# Patient Record
Sex: FEMALE | Race: ASIAN | NOT HISPANIC OR LATINO | Employment: FULL TIME | URBAN - METROPOLITAN AREA
[De-identification: names, ages, dates, MRNs, and addresses within clinical notes are randomized per-mention and may not be internally consistent; named-entity substitution may affect disease eponyms.]

---

## 2019-11-06 ENCOUNTER — APPOINTMENT (OUTPATIENT)
Dept: GENERAL RADIOLOGY | Facility: HOSPITAL | Age: 34
End: 2019-11-06

## 2019-11-06 ENCOUNTER — HOSPITAL ENCOUNTER (INPATIENT)
Facility: HOSPITAL | Age: 34
LOS: 3 days | Discharge: HOME | End: 2019-11-10
Attending: EMERGENCY MEDICINE | Admitting: INTERNAL MEDICINE

## 2019-11-06 DIAGNOSIS — I50.9 ACUTE CONGESTIVE HEART FAILURE, UNSPECIFIED HEART FAILURE TYPE (HCC): Primary | ICD-10-CM

## 2019-11-06 DIAGNOSIS — R60.0 PEDAL EDEMA: ICD-10-CM

## 2019-11-06 DIAGNOSIS — I10 HYPERTENSION, UNSPECIFIED TYPE: ICD-10-CM

## 2019-11-06 DIAGNOSIS — N28.9 ACUTE RENAL INSUFFICIENCY: ICD-10-CM

## 2019-11-06 PROCEDURE — 84703 CHORIONIC GONADOTROPIN ASSAY: CPT | Performed by: EMERGENCY MEDICINE

## 2019-11-06 PROCEDURE — 99284 EMERGENCY DEPT VISIT MOD MDM: CPT

## 2019-11-06 PROCEDURE — 83880 ASSAY OF NATRIURETIC PEPTIDE: CPT | Performed by: EMERGENCY MEDICINE

## 2019-11-06 PROCEDURE — 85025 COMPLETE CBC W/AUTO DIFF WBC: CPT | Performed by: EMERGENCY MEDICINE

## 2019-11-06 PROCEDURE — 80053 COMPREHEN METABOLIC PANEL: CPT | Performed by: EMERGENCY MEDICINE

## 2019-11-06 PROCEDURE — 93005 ELECTROCARDIOGRAM TRACING: CPT | Performed by: EMERGENCY MEDICINE

## 2019-11-06 PROCEDURE — 84484 ASSAY OF TROPONIN QUANT: CPT | Performed by: EMERGENCY MEDICINE

## 2019-11-06 PROCEDURE — 71046 X-RAY EXAM CHEST 2 VIEWS: CPT

## 2019-11-06 RX ORDER — SODIUM CHLORIDE 0.9 % (FLUSH) 0.9 %
10 SYRINGE (ML) INJECTION AS NEEDED
Status: DISCONTINUED | OUTPATIENT
Start: 2019-11-06 | End: 2019-11-10 | Stop reason: HOSPADM

## 2019-11-07 ENCOUNTER — APPOINTMENT (OUTPATIENT)
Dept: ULTRASOUND IMAGING | Facility: HOSPITAL | Age: 34
End: 2019-11-07

## 2019-11-07 ENCOUNTER — APPOINTMENT (OUTPATIENT)
Dept: CARDIOLOGY | Facility: HOSPITAL | Age: 34
End: 2019-11-07

## 2019-11-07 PROBLEM — I50.9 ACUTE CONGESTIVE HEART FAILURE (HCC): Status: ACTIVE | Noted: 2019-11-07

## 2019-11-07 PROBLEM — E66.01 OBESITY, MORBID, BMI 50 OR HIGHER: Status: ACTIVE | Noted: 2019-11-07

## 2019-11-07 PROBLEM — E11.65 TYPE 2 DIABETES MELLITUS WITH HYPERGLYCEMIA: Status: ACTIVE | Noted: 2019-11-07

## 2019-11-07 PROBLEM — N17.9 AKI (ACUTE KIDNEY INJURY) (HCC): Status: ACTIVE | Noted: 2019-11-07

## 2019-11-07 LAB
ALBUMIN SERPL-MCNC: 2.9 G/DL (ref 3.5–5.2)
ALBUMIN/GLOB SERPL: 0.7 G/DL
ALP SERPL-CCNC: 97 U/L (ref 39–117)
ALT SERPL W P-5'-P-CCNC: 18 U/L (ref 1–33)
AMPHET+METHAMPHET UR QL: NEGATIVE
ANION GAP SERPL CALCULATED.3IONS-SCNC: 13.3 MMOL/L (ref 5–15)
ANION GAP SERPL CALCULATED.3IONS-SCNC: 14.3 MMOL/L (ref 5–15)
AORTIC DIMENSIONLESS INDEX: 0.7 (DI)
AST SERPL-CCNC: 22 U/L (ref 1–32)
BACTERIA UR QL AUTO: ABNORMAL /HPF
BARBITURATES UR QL SCN: NEGATIVE
BASOPHILS # BLD AUTO: 0.09 10*3/MM3 (ref 0–0.2)
BASOPHILS NFR BLD AUTO: 1.1 % (ref 0–1.5)
BENZODIAZ UR QL SCN: NEGATIVE
BH CV ECHO MEAS - ACS: 1.5 CM
BH CV ECHO MEAS - AO MAX PG (FULL): 3.8 MMHG
BH CV ECHO MEAS - AO MAX PG: 9.5 MMHG
BH CV ECHO MEAS - AO MEAN PG (FULL): 2 MMHG
BH CV ECHO MEAS - AO MEAN PG: 5 MMHG
BH CV ECHO MEAS - AO ROOT AREA (BSA CORRECTED): 1.2
BH CV ECHO MEAS - AO ROOT AREA: 6.2 CM^2
BH CV ECHO MEAS - AO ROOT DIAM: 2.8 CM
BH CV ECHO MEAS - AO V2 MAX: 154 CM/SEC
BH CV ECHO MEAS - AO V2 MEAN: 112 CM/SEC
BH CV ECHO MEAS - AO V2 VTI: 21.9 CM
BH CV ECHO MEAS - ASC AORTA: 3.2 CM
BH CV ECHO MEAS - AVA(I,A): 2.1 CM^2
BH CV ECHO MEAS - AVA(I,D): 2.1 CM^2
BH CV ECHO MEAS - AVA(V,A): 2.2 CM^2
BH CV ECHO MEAS - AVA(V,D): 2.2 CM^2
BH CV ECHO MEAS - BSA(HAYCOCK): 2.7 M^2
BH CV ECHO MEAS - BSA: 2.4 M^2
BH CV ECHO MEAS - BZI_BMI: 60.4 KILOGRAMS/M^2
BH CV ECHO MEAS - BZI_METRIC_HEIGHT: 157.5 CM
BH CV ECHO MEAS - BZI_METRIC_WEIGHT: 149.7 KG
BH CV ECHO MEAS - DIST REN A EDV LEFT: 8.8 CM/SEC
BH CV ECHO MEAS - DIST REN A PSV LEFT: 46.8 CM/SEC
BH CV ECHO MEAS - DIST REN A RI LEFT: 0.81
BH CV ECHO MEAS - EDV(CUBED): 117.6 ML
BH CV ECHO MEAS - EDV(MOD-SP2): 186 ML
BH CV ECHO MEAS - EDV(MOD-SP4): 156 ML
BH CV ECHO MEAS - EDV(TEICH): 112.8 ML
BH CV ECHO MEAS - EF(CUBED): 69.5 %
BH CV ECHO MEAS - EF(MOD-SP2): 50 %
BH CV ECHO MEAS - EF(MOD-SP4): 46.8 %
BH CV ECHO MEAS - EF(TEICH): 60.9 %
BH CV ECHO MEAS - ESV(CUBED): 35.9 ML
BH CV ECHO MEAS - ESV(MOD-SP2): 93 ML
BH CV ECHO MEAS - ESV(MOD-SP4): 83 ML
BH CV ECHO MEAS - ESV(TEICH): 44.1 ML
BH CV ECHO MEAS - FS: 32.7 %
BH CV ECHO MEAS - IVS/LVPW: 1.2
BH CV ECHO MEAS - IVSD: 1.4 CM
BH CV ECHO MEAS - LAT PEAK E' VEL: 9 CM/SEC
BH CV ECHO MEAS - LV DIASTOLIC VOL/BSA (35-75): 66 ML/M^2
BH CV ECHO MEAS - LV MASS(C)D: 253.7 GRAMS
BH CV ECHO MEAS - LV MASS(C)DI: 107.3 GRAMS/M^2
BH CV ECHO MEAS - LV MAX PG: 5.7 MMHG
BH CV ECHO MEAS - LV MEAN PG: 3 MMHG
BH CV ECHO MEAS - LV SYSTOLIC VOL/BSA (12-30): 35.1 ML/M^2
BH CV ECHO MEAS - LV V1 MAX: 119 CM/SEC
BH CV ECHO MEAS - LV V1 MEAN: 76.7 CM/SEC
BH CV ECHO MEAS - LV V1 VTI: 16 CM
BH CV ECHO MEAS - LVIDD: 4.9 CM
BH CV ECHO MEAS - LVIDS: 3.3 CM
BH CV ECHO MEAS - LVLD AP2: 9.4 CM
BH CV ECHO MEAS - LVLD AP4: 9.1 CM
BH CV ECHO MEAS - LVLS AP2: 8.5 CM
BH CV ECHO MEAS - LVLS AP4: 7.6 CM
BH CV ECHO MEAS - LVOT AREA (M): 2.8 CM^2
BH CV ECHO MEAS - LVOT AREA: 2.8 CM^2
BH CV ECHO MEAS - LVOT DIAM: 1.9 CM
BH CV ECHO MEAS - LVPWD: 1.2 CM
BH CV ECHO MEAS - MED PEAK E' VEL: 10 CM/SEC
BH CV ECHO MEAS - MID REN A EDV LEFT: 13.9 CM/SEC
BH CV ECHO MEAS - MID REN A PSV LEFT: 65.5 CM/SEC
BH CV ECHO MEAS - MID REN A RI LEFT: 0.79
BH CV ECHO MEAS - MR MAX PG: 142.6 MMHG
BH CV ECHO MEAS - MR MAX VEL: 597 CM/SEC
BH CV ECHO MEAS - MV DEC SLOPE: 1464 CM/SEC^2
BH CV ECHO MEAS - MV DEC TIME: 120 SEC
BH CV ECHO MEAS - MV E MAX VEL: 134.4 CM/SEC
BH CV ECHO MEAS - MV MAX PG: 11 MMHG
BH CV ECHO MEAS - MV MEAN PG: 6 MMHG
BH CV ECHO MEAS - MV P1/2T MAX VEL: 171 CM/SEC
BH CV ECHO MEAS - MV P1/2T: 34.2 MSEC
BH CV ECHO MEAS - MV V2 MAX: 166 CM/SEC
BH CV ECHO MEAS - MV V2 MEAN: 112 CM/SEC
BH CV ECHO MEAS - MV V2 VTI: 22.4 CM
BH CV ECHO MEAS - MVA P1/2T LCG: 1.3 CM^2
BH CV ECHO MEAS - MVA(P1/2T): 6.4 CM^2
BH CV ECHO MEAS - MVA(VTI): 2 CM^2
BH CV ECHO MEAS - PA ACC TIME: 0.06 SEC
BH CV ECHO MEAS - PA MAX PG (FULL): 3.6 MMHG
BH CV ECHO MEAS - PA MAX PG: 6.1 MMHG
BH CV ECHO MEAS - PA PR(ACCEL): 54.3 MMHG
BH CV ECHO MEAS - PA V2 MAX: 123 CM/SEC
BH CV ECHO MEAS - PROX REN A EDV LEFT: 19.4 CM/SEC
BH CV ECHO MEAS - PROX REN A PSV LEFT: 106 CM/SEC
BH CV ECHO MEAS - PROX REN A RI LEFT: 0.82
BH CV ECHO MEAS - PVA(V,A): 3.1 CM^2
BH CV ECHO MEAS - PVA(V,D): 3.1 CM^2
BH CV ECHO MEAS - QP/QS: 1.3
BH CV ECHO MEAS - RAP SYSTOLE: 15 MMHG
BH CV ECHO MEAS - RV MAX PG: 2.5 MMHG
BH CV ECHO MEAS - RV MEAN PG: 1 MMHG
BH CV ECHO MEAS - RV V1 MAX: 78.3 CM/SEC
BH CV ECHO MEAS - RV V1 MEAN: 48.7 CM/SEC
BH CV ECHO MEAS - RV V1 VTI: 12.1 CM
BH CV ECHO MEAS - RVOT AREA: 4.9 CM^2
BH CV ECHO MEAS - RVOT DIAM: 2.5 CM
BH CV ECHO MEAS - RVSP: 58 MMHG
BH CV ECHO MEAS - SI(AO): 57 ML/M^2
BH CV ECHO MEAS - SI(CUBED): 34.6 ML/M^2
BH CV ECHO MEAS - SI(LVOT): 19.2 ML/M^2
BH CV ECHO MEAS - SI(MOD-SP2): 39.3 ML/M^2
BH CV ECHO MEAS - SI(MOD-SP4): 30.9 ML/M^2
BH CV ECHO MEAS - SI(TEICH): 29 ML/M^2
BH CV ECHO MEAS - SV(AO): 134.9 ML
BH CV ECHO MEAS - SV(CUBED): 81.7 ML
BH CV ECHO MEAS - SV(LVOT): 45.4 ML
BH CV ECHO MEAS - SV(MOD-SP2): 93 ML
BH CV ECHO MEAS - SV(MOD-SP4): 73 ML
BH CV ECHO MEAS - SV(RVOT): 59.4 ML
BH CV ECHO MEAS - SV(TEICH): 68.7 ML
BH CV ECHO MEAS - TAPSE (>1.6): 1.9 CM2
BH CV ECHO MEAS - TR MAX VEL: 331 CM/SEC
BH CV ECHO MEASUREMENTS AVERAGE E/E' RATIO: 14.15
BH CV VAS BP LEFT ARM: NORMAL MMHG
BH CV VAS BP LEFT ARM: NORMAL MMHG
BH CV VAS BP RIGHT ARM: NORMAL MMHG
BH CV VAS RENAL AORTIC MID PSV: 68 CM/S
BH CV VAS RENAL HILUM LEFT EDV: 6 CM/S
BH CV VAS RENAL HILUM LEFT PSV: 26 CM/S
BH CV VAS RENAL HILUM RIGHT EDV: 7.3 CM/S
BH CV VAS RENAL HILUM RIGHT PSV: 23.3 CM/S
BH CV XLRA - RV BASE: 3.8 CM
BH CV XLRA - TDI S': 15 CM/SEC
BH CV XLRA MEAS - KID L LEFT: 11.2 CM
BH CV XLRA MEAS - RENAL A ORG RI LEFT: 0.81
BH CV XLRA MEAS DIST REN A EDV RIGHT: 14.5 CM/SEC
BH CV XLRA MEAS DIST REN A PSV RIGHT: 59.8 CM/SEC
BH CV XLRA MEAS DIST REN A RI RIGHT: 0.76
BH CV XLRA MEAS KID L RIGHT: 10.6 CM
BH CV XLRA MEAS KID W RIGHT: 4.9 CM
BH CV XLRA MEAS MID REN A EDV RIGHT: 15.3 CM/SEC
BH CV XLRA MEAS MID REN A PSV RIGHT: 71.7 CM/SEC
BH CV XLRA MEAS MID REN A RI RIGHT: 0.79
BH CV XLRA MEAS PROX REN A EDV RIGHT: 19.2 CM/SEC
BH CV XLRA MEAS PROX REN A PSV RIGHT: 87.4 CM/SEC
BH CV XLRA MEAS PROX REN A RI RIGHT: 0.78
BH CV XLRA MEAS RAR LEFT: 1.6
BH CV XLRA MEAS RAR RIGHT: 1.5
BH CV XLRA MEAS RENAL A ORG EDV LEFT: 15.7 CM/SEC
BH CV XLRA MEAS RENAL A ORG EDV RIGHT: 19.2 CM/SEC
BH CV XLRA MEAS RENAL A ORG PSV LEFT: 81.6 CM/SEC
BH CV XLRA MEAS RENAL A ORG PSV RIGHT: 103 CM/SEC
BH CV XLRA MEAS RENAL A ORG RI RIGHT: 0.81
BILIRUB SERPL-MCNC: 0.4 MG/DL (ref 0.2–1.2)
BILIRUB UR QL STRIP: NEGATIVE
BUN BLD-MCNC: 20 MG/DL (ref 6–20)
BUN BLD-MCNC: 21 MG/DL (ref 6–20)
BUN/CREAT SERPL: 8.9 (ref 7–25)
BUN/CREAT SERPL: 9.2 (ref 7–25)
CALCIUM SPEC-SCNC: 9 MG/DL (ref 8.6–10.5)
CALCIUM SPEC-SCNC: 9 MG/DL (ref 8.6–10.5)
CANNABINOIDS SERPL QL: NEGATIVE
CHLORIDE SERPL-SCNC: 100 MMOL/L (ref 98–107)
CHLORIDE SERPL-SCNC: 103 MMOL/L (ref 98–107)
CLARITY UR: CLEAR
CO2 SERPL-SCNC: 22.7 MMOL/L (ref 22–29)
CO2 SERPL-SCNC: 23.7 MMOL/L (ref 22–29)
COCAINE UR QL: NEGATIVE
COLOR UR: YELLOW
CREAT BLD-MCNC: 2.24 MG/DL (ref 0.57–1)
CREAT BLD-MCNC: 2.28 MG/DL (ref 0.57–1)
CREAT UR-MCNC: 36.8 MG/DL
DEPRECATED RDW RBC AUTO: 38.6 FL (ref 37–54)
DEPRECATED RDW RBC AUTO: 41 FL (ref 37–54)
EOSINOPHIL # BLD AUTO: 0.18 10*3/MM3 (ref 0–0.4)
EOSINOPHIL NFR BLD AUTO: 2.2 % (ref 0.3–6.2)
ERYTHROCYTE [DISTWIDTH] IN BLOOD BY AUTOMATED COUNT: 12.2 % (ref 12.3–15.4)
ERYTHROCYTE [DISTWIDTH] IN BLOOD BY AUTOMATED COUNT: 12.7 % (ref 12.3–15.4)
GFR SERPL CREATININE-BSD FRML MDRD: 25 ML/MIN/1.73
GFR SERPL CREATININE-BSD FRML MDRD: 25 ML/MIN/1.73
GFR SERPL CREATININE-BSD FRML MDRD: 30 ML/MIN/1.73
GFR SERPL CREATININE-BSD FRML MDRD: 31 ML/MIN/1.73
GLOBULIN UR ELPH-MCNC: 3.9 GM/DL
GLUCOSE BLD-MCNC: 142 MG/DL (ref 65–99)
GLUCOSE BLD-MCNC: 155 MG/DL (ref 65–99)
GLUCOSE BLDC GLUCOMTR-MCNC: 135 MG/DL (ref 70–130)
GLUCOSE BLDC GLUCOMTR-MCNC: 154 MG/DL (ref 70–130)
GLUCOSE UR STRIP-MCNC: ABNORMAL MG/DL
HBA1C MFR BLD: 9.2 % (ref 4.8–5.6)
HCG SERPL QL: NEGATIVE
HCT VFR BLD AUTO: 51 % (ref 34–46.6)
HCT VFR BLD AUTO: 52.8 % (ref 34–46.6)
HGB BLD-MCNC: 16.5 G/DL (ref 12–15.9)
HGB BLD-MCNC: 16.6 G/DL (ref 12–15.9)
HGB UR QL STRIP.AUTO: ABNORMAL
HYALINE CASTS UR QL AUTO: ABNORMAL /LPF
IMM GRANULOCYTES # BLD AUTO: 0.02 10*3/MM3 (ref 0–0.05)
IMM GRANULOCYTES NFR BLD AUTO: 0.2 % (ref 0–0.5)
KETONES UR QL STRIP: NEGATIVE
LEFT ATRIUM VOLUME INDEX: 37 ML/M2
LEFT ATRIUM VOLUME: 75 CM3
LEFT KIDNEY WIDTH: 5.1 CM
LEFT RENAL UPPER PARENCHYMA MAX: 22.3 CM/S
LEFT RENAL UPPER PARENCHYMA MIN: 0 CM/S
LEFT RENAL UPPER PARENCHYMA RI: 1
LEUKOCYTE ESTERASE UR QL STRIP.AUTO: NEGATIVE
LYMPHOCYTES # BLD AUTO: 2.06 10*3/MM3 (ref 0.7–3.1)
LYMPHOCYTES NFR BLD AUTO: 25.2 % (ref 19.6–45.3)
MAGNESIUM SERPL-MCNC: 1.8 MG/DL (ref 1.6–2.6)
MAXIMAL PREDICTED HEART RATE: 188 BPM
MCH RBC QN AUTO: 27.6 PG (ref 26.6–33)
MCH RBC QN AUTO: 28.2 PG (ref 26.6–33)
MCHC RBC AUTO-ENTMCNC: 31.4 G/DL (ref 31.5–35.7)
MCHC RBC AUTO-ENTMCNC: 32.4 G/DL (ref 31.5–35.7)
MCV RBC AUTO: 87 FL (ref 79–97)
MCV RBC AUTO: 87.7 FL (ref 79–97)
METHADONE UR QL SCN: NEGATIVE
MONOCYTES # BLD AUTO: 0.5 10*3/MM3 (ref 0.1–0.9)
MONOCYTES NFR BLD AUTO: 6.1 % (ref 5–12)
NEUTROPHILS # BLD AUTO: 5.34 10*3/MM3 (ref 1.7–7)
NEUTROPHILS NFR BLD AUTO: 65.2 % (ref 42.7–76)
NITRITE UR QL STRIP: NEGATIVE
NRBC BLD AUTO-RTO: 0 /100 WBC (ref 0–0.2)
NT-PROBNP SERPL-MCNC: 6322 PG/ML (ref 5–450)
OPIATES UR QL: NEGATIVE
OXYCODONE UR QL SCN: NEGATIVE
PH UR STRIP.AUTO: 6.5 [PH] (ref 5–8)
PLATELET # BLD AUTO: 339 10*3/MM3 (ref 140–450)
PLATELET # BLD AUTO: 358 10*3/MM3 (ref 140–450)
PMV BLD AUTO: 10 FL (ref 6–12)
PMV BLD AUTO: 10.1 FL (ref 6–12)
POTASSIUM BLD-SCNC: 4 MMOL/L (ref 3.5–5.2)
POTASSIUM BLD-SCNC: 4.2 MMOL/L (ref 3.5–5.2)
PROT SERPL-MCNC: 6.8 G/DL (ref 6–8.5)
PROT UR QL STRIP: ABNORMAL
PROT UR-MCNC: 409 MG/DL
RBC # BLD AUTO: 5.86 10*6/MM3 (ref 3.77–5.28)
RBC # BLD AUTO: 6.02 10*6/MM3 (ref 3.77–5.28)
RBC # UR: ABNORMAL /HPF
REF LAB TEST METHOD: ABNORMAL
RIGHT RENAL UPPER PARENCHYMA MAX: 38.2 CM/S
RIGHT RENAL UPPER PARENCHYMA MIN: 11.3 CM/S
RIGHT RENAL UPPER PARENCHYMA RI: 0.7
SODIUM BLD-SCNC: 136 MMOL/L (ref 136–145)
SODIUM BLD-SCNC: 141 MMOL/L (ref 136–145)
SODIUM UR-SCNC: 100 MMOL/L
SP GR UR STRIP: 1.01 (ref 1–1.03)
SQUAMOUS #/AREA URNS HPF: ABNORMAL /HPF
STRESS TARGET HR: 160 BPM
T3FREE SERPL-MCNC: 3.33 PG/ML (ref 2–4.4)
T4 FREE SERPL-MCNC: 1.52 NG/DL (ref 0.93–1.7)
TROPONIN T SERPL-MCNC: 0.01 NG/ML (ref 0–0.03)
TROPONIN T SERPL-MCNC: 0.02 NG/ML (ref 0–0.03)
TSH SERPL DL<=0.05 MIU/L-ACNC: 10.4 UIU/ML (ref 0.27–4.2)
UROBILINOGEN UR QL STRIP: ABNORMAL
WBC NRBC COR # BLD: 8.19 10*3/MM3 (ref 3.4–10.8)
WBC NRBC COR # BLD: 8.95 10*3/MM3 (ref 3.4–10.8)
WBC UR QL AUTO: ABNORMAL /HPF

## 2019-11-07 PROCEDURE — 81001 URINALYSIS AUTO W/SCOPE: CPT | Performed by: INTERNAL MEDICINE

## 2019-11-07 PROCEDURE — 80184 ASSAY OF PHENOBARBITAL: CPT | Performed by: INTERNAL MEDICINE

## 2019-11-07 PROCEDURE — 84484 ASSAY OF TROPONIN QUANT: CPT | Performed by: NURSE PRACTITIONER

## 2019-11-07 PROCEDURE — 25010000002 HYDRALAZINE PER 20 MG: Performed by: EMERGENCY MEDICINE

## 2019-11-07 PROCEDURE — G0480 DRUG TEST DEF 1-7 CLASSES: HCPCS | Performed by: INTERNAL MEDICINE

## 2019-11-07 PROCEDURE — 25010000002 FUROSEMIDE PER 20 MG: Performed by: INTERNAL MEDICINE

## 2019-11-07 PROCEDURE — 93306 TTE W/DOPPLER COMPLETE: CPT | Performed by: INTERNAL MEDICINE

## 2019-11-07 PROCEDURE — 84439 ASSAY OF FREE THYROXINE: CPT | Performed by: INTERNAL MEDICINE

## 2019-11-07 PROCEDURE — 83036 HEMOGLOBIN GLYCOSYLATED A1C: CPT | Performed by: INTERNAL MEDICINE

## 2019-11-07 PROCEDURE — 84300 ASSAY OF URINE SODIUM: CPT | Performed by: INTERNAL MEDICINE

## 2019-11-07 PROCEDURE — 82962 GLUCOSE BLOOD TEST: CPT

## 2019-11-07 PROCEDURE — 80307 DRUG TEST PRSMV CHEM ANLYZR: CPT | Performed by: INTERNAL MEDICINE

## 2019-11-07 PROCEDURE — 82570 ASSAY OF URINE CREATININE: CPT | Performed by: INTERNAL MEDICINE

## 2019-11-07 PROCEDURE — 93306 TTE W/DOPPLER COMPLETE: CPT

## 2019-11-07 PROCEDURE — 80048 BASIC METABOLIC PNL TOTAL CA: CPT | Performed by: NURSE PRACTITIONER

## 2019-11-07 PROCEDURE — 84443 ASSAY THYROID STIM HORMONE: CPT | Performed by: NURSE PRACTITIONER

## 2019-11-07 PROCEDURE — 84156 ASSAY OF PROTEIN URINE: CPT | Performed by: INTERNAL MEDICINE

## 2019-11-07 PROCEDURE — 83735 ASSAY OF MAGNESIUM: CPT | Performed by: NURSE PRACTITIONER

## 2019-11-07 PROCEDURE — 93010 ELECTROCARDIOGRAM REPORT: CPT | Performed by: INTERNAL MEDICINE

## 2019-11-07 PROCEDURE — 25010000002 HYDRALAZINE PER 20 MG: Performed by: NURSE PRACTITIONER

## 2019-11-07 PROCEDURE — 80299 QUANTITATIVE ASSAY DRUG: CPT | Performed by: INTERNAL MEDICINE

## 2019-11-07 PROCEDURE — 76775 US EXAM ABDO BACK WALL LIM: CPT

## 2019-11-07 PROCEDURE — 99254 IP/OBS CNSLTJ NEW/EST MOD 60: CPT | Performed by: INTERNAL MEDICINE

## 2019-11-07 PROCEDURE — 36415 COLL VENOUS BLD VENIPUNCTURE: CPT | Performed by: NURSE PRACTITIONER

## 2019-11-07 PROCEDURE — 85027 COMPLETE CBC AUTOMATED: CPT | Performed by: NURSE PRACTITIONER

## 2019-11-07 PROCEDURE — 63710000001 INSULIN LISPRO (HUMAN) PER 5 UNITS: Performed by: INTERNAL MEDICINE

## 2019-11-07 PROCEDURE — 93975 VASCULAR STUDY: CPT

## 2019-11-07 PROCEDURE — 84481 FREE ASSAY (FT-3): CPT | Performed by: INTERNAL MEDICINE

## 2019-11-07 RX ORDER — ONDANSETRON 2 MG/ML
4 INJECTION INTRAMUSCULAR; INTRAVENOUS EVERY 6 HOURS PRN
Status: DISCONTINUED | OUTPATIENT
Start: 2019-11-07 | End: 2019-11-10 | Stop reason: HOSPADM

## 2019-11-07 RX ORDER — SODIUM CHLORIDE 0.9 % (FLUSH) 0.9 %
10 SYRINGE (ML) INJECTION EVERY 12 HOURS SCHEDULED
Status: DISCONTINUED | OUTPATIENT
Start: 2019-11-07 | End: 2019-11-10 | Stop reason: HOSPADM

## 2019-11-07 RX ORDER — HYDRALAZINE HYDROCHLORIDE 20 MG/ML
20 INJECTION INTRAMUSCULAR; INTRAVENOUS ONCE
Status: COMPLETED | OUTPATIENT
Start: 2019-11-07 | End: 2019-11-07

## 2019-11-07 RX ORDER — FUROSEMIDE 10 MG/ML
80 INJECTION INTRAMUSCULAR; INTRAVENOUS ONCE
Status: COMPLETED | OUTPATIENT
Start: 2019-11-07 | End: 2019-11-07

## 2019-11-07 RX ORDER — SODIUM CHLORIDE 9 MG/ML
50 INJECTION, SOLUTION INTRAVENOUS CONTINUOUS
Status: DISCONTINUED | OUTPATIENT
Start: 2019-11-07 | End: 2019-11-07

## 2019-11-07 RX ORDER — DEXTROSE MONOHYDRATE 25 G/50ML
25 INJECTION, SOLUTION INTRAVENOUS
Status: DISCONTINUED | OUTPATIENT
Start: 2019-11-07 | End: 2019-11-10 | Stop reason: HOSPADM

## 2019-11-07 RX ORDER — CARVEDILOL 12.5 MG/1
12.5 TABLET ORAL 2 TIMES DAILY WITH MEALS
Status: DISCONTINUED | OUTPATIENT
Start: 2019-11-07 | End: 2019-11-09

## 2019-11-07 RX ORDER — CALCIUM CARBONATE 200(500)MG
2 TABLET,CHEWABLE ORAL 3 TIMES DAILY PRN
Status: DISCONTINUED | OUTPATIENT
Start: 2019-11-07 | End: 2019-11-10 | Stop reason: HOSPADM

## 2019-11-07 RX ORDER — HYDRALAZINE HYDROCHLORIDE 20 MG/ML
10 INJECTION INTRAMUSCULAR; INTRAVENOUS EVERY 6 HOURS PRN
Status: DISCONTINUED | OUTPATIENT
Start: 2019-11-07 | End: 2019-11-10 | Stop reason: HOSPADM

## 2019-11-07 RX ORDER — BISACODYL 5 MG/1
10 TABLET, DELAYED RELEASE ORAL DAILY PRN
Status: DISCONTINUED | OUTPATIENT
Start: 2019-11-07 | End: 2019-11-10 | Stop reason: HOSPADM

## 2019-11-07 RX ORDER — SODIUM CHLORIDE 0.9 % (FLUSH) 0.9 %
10 SYRINGE (ML) INJECTION AS NEEDED
Status: DISCONTINUED | OUTPATIENT
Start: 2019-11-07 | End: 2019-11-10 | Stop reason: HOSPADM

## 2019-11-07 RX ORDER — NICOTINE POLACRILEX 4 MG
15 LOZENGE BUCCAL
Status: DISCONTINUED | OUTPATIENT
Start: 2019-11-07 | End: 2019-11-10 | Stop reason: HOSPADM

## 2019-11-07 RX ADMIN — SODIUM CHLORIDE, PRESERVATIVE FREE 10 ML: 5 INJECTION INTRAVENOUS at 08:18

## 2019-11-07 RX ADMIN — NIFEDIPINE 90 MG: 60 TABLET, FILM COATED, EXTENDED RELEASE ORAL at 11:41

## 2019-11-07 RX ADMIN — HYDRALAZINE HYDROCHLORIDE 20 MG: 20 INJECTION INTRAMUSCULAR; INTRAVENOUS at 02:59

## 2019-11-07 RX ADMIN — HYDRALAZINE HYDROCHLORIDE 10 MG: 20 INJECTION INTRAMUSCULAR; INTRAVENOUS at 07:59

## 2019-11-07 RX ADMIN — INSULIN LISPRO 2 UNITS: 100 INJECTION, SOLUTION INTRAVENOUS; SUBCUTANEOUS at 21:45

## 2019-11-07 RX ADMIN — FUROSEMIDE 80 MG: 40 INJECTION, SOLUTION INTRAMUSCULAR; INTRAVENOUS at 11:42

## 2019-11-07 RX ADMIN — FUROSEMIDE 80 MG: 40 INJECTION, SOLUTION INTRAMUSCULAR; INTRAVENOUS at 18:22

## 2019-11-07 RX ADMIN — CARVEDILOL 12.5 MG: 12.5 TABLET, FILM COATED ORAL at 18:22

## 2019-11-07 RX ADMIN — CARVEDILOL 12.5 MG: 12.5 TABLET, FILM COATED ORAL at 11:41

## 2019-11-07 NOTE — PROGRESS NOTES
Discharge Planning Assessment  [facility]     Patient Name: Gisela Dyson  MRN: [id number]  Today's Date: 11/7/2019    Admit Date: 11/6/2019    Discharge Needs Assessment     Row Name 11/07/19 0322       Living Environment    Lives With  parent(s)    Name(s) of Who Lives With Patient  father, Tony Dyson, [phone]    Current Living Arrangements  home/apartment/condo    Primary Care Provided by  self    Provides Primary Care For  no one    Family Caregiver if Needed  parent(s)    Quality of Family Relationships  helpful;involved;supportive    Able to Return to Prior Arrangements  yes       Resource/Environmental Concerns    Resource/Environmental Concerns  financial    Financial Concerns  insurance, none       Transition Planning    Patient/Family Anticipates Transition to  home with family    Patient/Family Anticipated Services at Transition  outpatient care    Transportation Anticipated  family or friend will provide       Discharge Needs Assessment    Concerns to be Addressed  discharge planning;financial/insurance    Equipment Currently Used at Home  none    Discharge Coordination/Progress  Home        Discharge Plan     Row Name 11/07/19 9710       Plan    Plan  Home with parents    Patient/Family in Agreement with Plan  yes    Plan Comments  CCP met with pt to discuss d/c planning. Pt resides in a home with her parents, uses no DME, and denies past HH/sub-acute rehab. Pt uses [facility] on [address], but agrees to Meds to beds enrollment to verify/assist with d/c medication cost due to no insurance on file. Pt states she works part-time at [employer] and is ineligible for insurance via her employer. Med Assist contacted to eval for Medicaid eligibility. Pt has no current PCP. CCP to provide information for follow up at [facility] where pt can access care via sliding scale fees and medication cost assistance. CCP to follow to assist with d/c planning. Juana Escamilla LCSW        Destination      No  service coordination in this encounter.      Durable Medical Equipment      No service coordination in this encounter.      Dialysis/Infusion      No service coordination in this encounter.      Home Medical Care      No service coordination in this encounter.      Therapy      No service coordination in this encounter.      Community Resources      No service coordination in this encounter.          Demographic Summary     Row Name 11/07/19 1613       General Information    Admission Type  inpatient    Arrived From  home    Required Notices Provided  Important Message from Medicare    Referral Source  admission list    Reason for Consult  discharge planning    Preferred Language  English        Functional Status     Row Name 11/07/19 1614       Functional Status    Usual Activity Tolerance  good    Current Activity Tolerance  good       Functional Status, IADL    Medications  independent    Meal Preparation  independent    Housekeeping  independent    Laundry  independent    Shopping  independent       Mental Status Summary    Recent Changes in Mental Status/Cognitive Functioning  no changes        Psychosocial    No documentation.       Abuse/Neglect    No documentation.       Legal    No documentation.       Substance Abuse    No documentation.       Patient Forms    No documentation.           Chloe Escamilla LCSW

## 2019-11-07 NOTE — CONSULTS
Referring Provider: Dr. Mohit Christy  Reason for Consultation: elevated serum creatinine    Subjective     Chief complaint   Chief Complaint   Patient presents with   • Abdominal Pain   • Leg Swelling       History of present illness:  33 yo woman who denies any prior kidney problem, admitted yesterday for further evaluation of worsening lower extremity swelling for the past several days.  Renal consulted due to elevated SCR 2.3, with value today 2.2; potassium is normal.  Evaluation so far has revealed hypertensive urgency; tachycardia; erythrocytosis; and elevated TSH (10.4).  PMH outlined below; pertinent is morbid obesity and lack of any regular medications other than as needed Zyrtec.  No prior kidney stones, frequent urinary tract infections, or any regular NSAID use.  Family history notable for ESRD in mother, attributed to life-long diabetes, successfully treated with kidney transplant.  · Stable weight for the past 1 year  · No urinary complaints  · Denies any illicit substance use  · No prior pregnancy  · Leg swelling has occurred just over the last few days  · No orthopnea or PND; does have MORRIS  · No fever or chills  · Appetite is good; no nausea or vomiting  · No rash, epistaxis, or chronic URI infections    History reviewed. No pertinent past medical history.  History reviewed. No pertinent surgical history.  History reviewed. No pertinent family history.  Social History     Tobacco Use   • Smoking status: Never Smoker   • Smokeless tobacco: Never Used   Substance Use Topics   • Alcohol use: Not on file   • Drug use: Not on file     No medications prior to admission.     Allergies:  Patient has no known allergies.    Review of Systems  14-point ROS performed and all negative except for pertinent +/-'s detailed in HPI.     Objective     Vital Signs  Temp:  [97.6 °F (36.4 °C)-98.1 °F (36.7 °C)] 97.6 °F (36.4 °C)  Heart Rate:  [106-130] 110  Resp:  [18-20] 20  BP: (169-196)/(116-141) 169/116    Flowsheet  "Rows      First Filed Value   Admission Height  157.5 cm (62\") Documented at 11/06/2019 2322   Admission Weight  155 kg (340 lb 12.8 oz)  (Abnormal)  Documented at 11/06/2019 2331           I/O this shift:  In: 210 [P.O.:210]  Out: -   No intake/output data recorded.    Intake/Output Summary (Last 24 hours) at 11/7/2019 1008  Last data filed at 11/7/2019 0919  Gross per 24 hour   Intake 210 ml   Output --   Net 210 ml       Physical Exam:  NAD; pleasant; oriented; looks stated age  Morbidly obese; alopecic  MMM; AT/NC   No eye discharge; no scleral icterus; periorbital edema  No JVD; no carotid bruits  Coarse bilat; no wheezes or crackles heard; not labored  RRR, no rub  Soft, NT, +D, BS+  +1-2 edema both legs  No clubbing  No asterixis  Moves all extremities   Mood and affect are normal  Speech is fluent    Results Review:  Results from last 7 days   Lab Units 11/07/19 0628 11/06/19  2357   SODIUM mmol/L 141 136   POTASSIUM mmol/L 4.0 4.2   CHLORIDE mmol/L 103 100   CO2 mmol/L 23.7 22.7   BUN mg/dL 20 21*   CREATININE mg/dL 2.24* 2.28*   CALCIUM mg/dL 9.0 9.0   BILIRUBIN mg/dL  --  0.4   ALK PHOS U/L  --  97   ALT (SGPT) U/L  --  18   AST (SGOT) U/L  --  22   GLUCOSE mg/dL 142* 155*       Estimated Creatinine Clearance: 51.3 mL/min (A) (by C-G formula based on SCr of 2.24 mg/dL (H)).    Results from last 7 days   Lab Units 11/07/19 0628   MAGNESIUM mg/dL 1.8       Results from last 7 days   Lab Units 11/07/19 0628 11/06/19 2357   WBC 10*3/mm3 8.95 8.19   HEMOGLOBIN g/dL 16.5* 16.6*   PLATELETS 10*3/mm3 339 358             Active Medications    carvedilol 12.5 mg Oral BID With Meals   furosemide 80 mg Intravenous Once   NIFEdipine XL 90 mg Oral Q24H   sodium chloride 10 mL Intravenous Q12H          Assessment/Plan   Assessment  1.  JULISSA versus CKD: no baseline SCR available as she has not had reason for any lab work previously.  Most worried about primary renal disease, such as GN, though no urine available yet.  " She denies any foaming or significant bubbles when urinating, though this certainly does not rule out the heavy proteinuria.  Family history of ESRD (mother), though in the setting of chronic diabetes.  ROS not suggestive of obstructive etiology.  Secondary renal disease, perhaps due to primary cardiac disease, also possible.  Peripheral and central volume excess.  Electrolytes are stable.  No uremic symptoms  2.  Lower extremity edema and pulmonary vascular congestion  3.  Morbid obesity  4.  Hypertension, likely driven in part by volume excess.  Primary GN could also be the culprit. Denies illicit substance use  5.  Tachycardia  6.  Erythrocytosis  7.  Elevated TSH  8.  Hyperglycemia      Acute congestive heart failure (CMS/HCC)    JULISSA (acute kidney injury) (CMS/[facility])    Obesity, morbid, BMI 50 or higher (CMS/[facility])      Plan  1.  Urinalysis and random urine for protein:creatinine  2.  If urine sediment active, will pursue serologies +/- renal biopsy  3.  Diuresis  4.  Noted addition already of nifedipine and carvedilol  5.  Echocardiogram  6.  Check serum and urine toxicology screens  7.  Renal u/s and doppler      I discussed the patient's findings and my recommendations with the patient    Hayden Hunter MD  11/07/19  10:08 AM

## 2019-11-07 NOTE — CONSULTS
Date of Hospital Visit: 2019  Date of consult: 2019  Encounter Provider: Reji Rudd MD  Place of Service: [facility] CARDIOLOGY  Patient Name: Gisela Dyson  :1985  Referral Provider: Zaki De Oliveira*    Chief complaint: Extremity edema/shortness of breath    Reason for Consult: CHF    History of Present Illness    Ms Dyson is a 34 year old patient with past medical history of untreated hypertension who presented to the ED yesterday with bilateral LE edema and leg tightness that started 2 days ago that progressively gotten worse.  She denied associated pain or change in color of the extremity.  But she reports increased exertional shortness of breath. She denies any chest pain. On arrival to the ED her HR was 130 elevated blood pressure.  She is admitted for further evaluation as she is also noted to have elevated BUN/creatinine  Patient denied any prior known heart disease, stress testing or coronary angiogram.  Any significant palpitations, presyncope or syncope.  She denied any orthopnea or PND.  She was given IV hydralazine once but blood pressure still high.  Patient denied any significant changes in her symptoms after admission and feels about the same.      No Previous Cardiac Testing found      History reviewed. No pertinent past medical history.    History reviewed. No pertinent surgical history.    No medications prior to admission.       Current Meds  Scheduled Meds:    sodium chloride 10 mL Intravenous Q12H     Continuous Infusions:   PRN Meds:.•  bisacodyl  •  calcium carbonate  •  hydrALAZINE  •  ondansetron  •  [COMPLETED] Insert peripheral IV **AND** sodium chloride  •  [COMPLETED] Insert peripheral IV **AND** sodium chloride  •  sodium chloride    Allergies as of 2019   • (No Known Allergies)       Social History     Socioeconomic History   • Marital status: Single     Spouse name: Not on file   • Number of children: Not on file  "  • Years of education: Not on file   • Highest education level: Not on file   Tobacco Use   • Smoking status: Never Smoker   • Smokeless tobacco: Never Used       History reviewed. No pertinent family history.    REVIEW OF SYSTEMS:   All systems reviewed.  She complains of lower abdominal area pain.  Otherwise negative review of systems       Objective:   Temp:  [97.6 °F (36.4 °C)-98.1 °F (36.7 °C)] 97.6 °F (36.4 °C)  Heart Rate:  [106-130] 110  Resp:  [18-20] 20  BP: (169-196)/(116-141) 169/116  Body mass index is 60.41 kg/m².  Flowsheet Rows      First Filed Value   Admission Height  157.5 cm (62\") Documented at 11/06/2019 2322   Admission Weight  155 kg (340 lb 12.8 oz)  (Abnormal)  Documented at 11/06/2019 2331        Vitals:    11/07/19 0819   BP: (!) 169/116   Pulse:    Resp:    Temp:    SpO2:        General Appearance:    Alert, cooperative, in no acute distress, morbidly obese   Head:    Normocephalic, without obvious abnormality, atraumatic   Eyes:            Lids and lashes normal, conjunctivae and sclerae normal, no   icterus, no pallor, corneas clear, PERRLA   Ears:    Ears appear intact with no abnormalities noted   Throat:   No oral lesions, no thrush, oral mucosa moist   Neck:   No adenopathy, supple, trachea midline, no thyromegaly, no   carotid bruit, no JVD   Back:     No kyphosis present, no scoliosis present, no skin lesions, erythema or scars, no tenderness to percussion or palpation, range of motion normal   Lungs:     Clear to auscultation,respirations regular, even and unlabored    Heart:    Regular rhythm and normal rate, normal S1 and S2, no murmur, no gallop, no rub, no click   Chest Wall:    No abnormalities observed   Abdomen:     Normal bowel sounds, no masses, no organomegaly, soft        non-tender, non-distended, no guarding, no rebound  tenderness   Extremities:   Moves all extremities well, bilateral lower extremity pitting edema, no cyanosis, no redness   Pulses:   Pulses " palpable and equal bilaterally. Normal radial, carotid, femoral, dorsalis pedis and posterior tibial pulses bilaterally. Normal abdominal aorta   Skin:  Neurology:   Psychiatric:   No bleeding, bruising or rash   Normal speech and cranial nerve exam, no focal deficit   Alert and oriented x 3, normal mood and affect                 Review of Data:      Results from last 7 days   Lab Units 11/07/19 0628 11/06/19  2357   SODIUM mmol/L 141 136   POTASSIUM mmol/L 4.0 4.2   CHLORIDE mmol/L 103 100   CO2 mmol/L 23.7 22.7   BUN mg/dL 20 21*   CREATININE mg/dL 2.24* 2.28*   CALCIUM mg/dL 9.0 9.0   BILIRUBIN mg/dL  --  0.4   ALK PHOS U/L  --  97   ALT (SGPT) U/L  --  18   AST (SGOT) U/L  --  22   GLUCOSE mg/dL 142* 155*     Results from last 7 days   Lab Units 11/07/19 0628 11/06/19  2357   TROPONIN T ng/mL 0.016 0.014     @LABRCNTbnp@  Results from last 7 days   Lab Units 11/07/19 0628 11/06/19  2357   WBC 10*3/mm3 8.95 8.19   HEMOGLOBIN g/dL 16.5* 16.6*   HEMATOCRIT % 51.0* 52.8*   PLATELETS 10*3/mm3 339 358         Results from last 7 days   Lab Units 11/07/19 0628   MAGNESIUM mg/dL 1.8     @LABRCNTIP(chol,trig,hdl,ldl)  CXR  FINDINGS:  Cardiomegaly is identified. No significant definite evidence of vascular  congestion. No pneumothorax is seen. There is some blunting of the  costophrenic angles, which may reflect trace effusions. There is  bibasilar atelectasis. No definite infiltrates are seen.     IMPRESSION:     1. Cardiomegaly.  2. Blunting of the costophrenic angles may reflect trace effusions    Telemetry      EKG    No previous EKG for comparison    I personally viewed and interpreted the patient's EKG/Telemetry data  )  Patient Active Problem List   Diagnosis   • Acute congestive heart failure (CMS/HCC)   • JULISSA (acute kidney injury) (CMS/[facility])   • Obesity, morbid, BMI 50 or higher (CMS/[facility])     Assessment and Plan:    Ms. Dyson is a 34 years old female patient admitted with significant bilateral lower extremity  edema and shortness of breath.    1.  Shortness of breath-DDX -CHF, renal failure   -some pulmonary congestion on chest x-ray but not florid pulmonary edema.  BNP elevation could also be due to renal failure  -She does not have orthopnea or PND.  Difficult to evaluate JVD  -Get echocardiogram  -Cautious diuresis    2.  Hypertensive urgency  -Start calcium channel blocker, diuretic, beta-blocker.  She may benefit from addition of ACE but will wait and monitor creatinine first    3.  Morbidly obese  4.  High TSH-check free T4    5.  Renal failure-current GFR 31.  Unknown baseline.  Nephrology consulted        Reji Rudd MD  11/07/19  8:57 AM.  Time spent in reviewing chart, discussion and examination:

## 2019-11-07 NOTE — PLAN OF CARE
Problem: Patient Care Overview  Goal: Plan of Care Review  Outcome: Ongoing (interventions implemented as appropriate)   11/07/19 1612   Coping/Psychosocial   Plan of Care Reviewed With patient   Plan of Care Review   Progress no change   OTHER   Outcome Summary Vitals as charted, no c/o pain, echo and renal US completed, A1C 9.2, will continue to monitor.

## 2019-11-07 NOTE — ED NOTES
While walking back to pt room, pt also c/o SOB with activity     Maricruz Pagan RN  11/06/19 0479

## 2019-11-07 NOTE — H&P
Patient Name:  Gisela Dyson  YOB: 1985  MRN:  [id number]  Admit Date:  11/6/2019  Patient Care Team:  Provider, No Known as PCP - General      Subjective   History Present Illness     Chief Complaint   Patient presents with   • Abdominal Pain   • Leg Swelling       Ms. Dyson is a 34 y.o. non-smoker with no prior medical history that presents to [facility] complaining of leg swelling and shortness of breath.  She reports the leg swelling has been ongoing for about a week, but progressed to the point where her legs began feeling tight last night.  She states her shortness of breath is worse on exertion, but she denies orthopnea.  She denies fever, chills, nausea, vomiting, and abdominal pain.  She denies chest pain and chest tightness.  Denies dizziness, headache, and light-headedness.  She c/o non-productive cough for about two weeks. She denies fatigue and weakness.   Work up in the ED revealed BNP 6,322, glucose 155, creat 2.28, BUN 21, and GFR 25. EKG showed sinus tachycardia with a possible anterior infarct. Troponin was negative.  Chest x-ray showed cardiomegaly and blunting of the costophrenic angles, suggestive of trace effusions.  Her blood pressure was 192/141 on arrival, and she received IV hydralazine in the ED.      History of Present Illness  Review of Systems   Constitutional: Negative.    HENT: Negative.    Eyes: Negative.    Respiratory: Positive for cough and shortness of breath.    Cardiovascular: Positive for leg swelling.   Gastrointestinal: Negative.    Genitourinary: Negative.    Musculoskeletal: Negative.    Skin: Negative.    Neurological: Negative.    Psychiatric/Behavioral: Negative.         Personal History     History reviewed. No pertinent past medical history.  History reviewed. No pertinent surgical history.  History reviewed. No pertinent family history.  Social History     Tobacco Use   • Smoking status: Never Smoker   • Smokeless tobacco: Never  Used   Substance Use Topics   • Alcohol use: Not on file   • Drug use: Not on file     No medications prior to admission.     Allergies:  No Known Allergies    Objective    Objective     Vital Signs  Temp:  [97.6 °F (36.4 °C)-98.1 °F (36.7 °C)] 97.6 °F (36.4 °C)  Heart Rate:  [] 90  Resp:  [18-20] 20  BP: (150-196)/() 150/94  SpO2:  [97 %-100 %] 99 %  on   ;   Device (Oxygen Therapy): room air  Body mass index is 60.36 kg/m².    Physical Exam   Constitutional: She is oriented to person, place, and time. She appears well-developed and well-nourished.   HENT:   Head: Normocephalic and atraumatic.   Eyes: Conjunctivae and EOM are normal.   Neck: Normal range of motion. Neck supple.   Cardiovascular: Normal rate, regular rhythm, normal heart sounds and intact distal pulses.   No murmur heard.  Pulmonary/Chest: Effort normal. She has wheezes (Expiratory) in the right lower field and the left lower field.   Abdominal: Soft. Bowel sounds are normal.   Musculoskeletal: Normal range of motion. She exhibits edema (2+ BLE).   Neurological: She is alert and oriented to person, place, and time.   Skin: Skin is warm and dry.   Psychiatric: She has a normal mood and affect. Her behavior is normal. Judgment and thought content normal.   Nursing note and vitals reviewed.      Results Review:  I reviewed the patient's new clinical results.  I reviewed the patient's new imaging results and agree with the interpretation.  I reviewed the patient's other test results and agree with the interpretation  I personally viewed and interpreted the patient's EKG/Telemetry data  Discussed with ED provider.    Lab Results (last 24 hours)     Procedure Component Value Units Date/Time    CBC & Differential [[id number]] Collected:  11/06/19 2577    Specimen:  Blood Updated:  11/07/19 0020    Narrative:       The following orders were created for panel order CBC & Differential.  Procedure                               Abnormality          Status                     ---------                               -----------         ------                     CBC Auto Differential[[id number]]        Abnormal            Final result                 Please view results for these tests on the individual orders.    Comprehensive Metabolic Panel [[id number]]  (Abnormal) Collected:  11/06/19 2357    Specimen:  Blood Updated:  11/07/19 0052     Glucose 155 mg/dL      BUN 21 mg/dL      Creatinine 2.28 mg/dL      Sodium 136 mmol/L      Potassium 4.2 mmol/L      Chloride 100 mmol/L      CO2 22.7 mmol/L      Calcium 9.0 mg/dL      Total Protein 6.8 g/dL      Albumin 2.90 g/dL      ALT (SGPT) 18 U/L      AST (SGOT) 22 U/L      Alkaline Phosphatase 97 U/L      Total Bilirubin 0.4 mg/dL      eGFR Non African Amer 25 mL/min/1.73      eGFR  African Amer 30 mL/min/1.73      Globulin 3.9 gm/dL      A/G Ratio 0.7 g/dL      BUN/Creatinine Ratio 9.2     Anion Gap 13.3 mmol/L     Narrative:       GFR Normal >60  Chronic Kidney Disease <60  Kidney Failure <15    hCG, Serum, Qualitative [[id number]]  (Normal) Collected:  11/06/19 2357    Specimen:  Blood Updated:  11/07/19 0053     HCG Qualitative Negative    CBC Auto Differential [[id number]]  (Abnormal) Collected:  11/06/19 2357    Specimen:  Blood Updated:  11/07/19 0020     WBC 8.19 10*3/mm3      RBC 6.02 10*6/mm3      Hemoglobin 16.6 g/dL      Hematocrit 52.8 %      MCV 87.7 fL      MCH 27.6 pg      MCHC 31.4 g/dL      RDW 12.7 %      RDW-SD 41.0 fl      MPV 10.1 fL      Platelets 358 10*3/mm3      Neutrophil % 65.2 %      Lymphocyte % 25.2 %      Monocyte % 6.1 %      Eosinophil % 2.2 %      Basophil % 1.1 %      Immature Grans % 0.2 %      Neutrophils, Absolute 5.34 10*3/mm3      Lymphocytes, Absolute 2.06 10*3/mm3      Monocytes, Absolute 0.50 10*3/mm3      Eosinophils, Absolute 0.18 10*3/mm3      Basophils, Absolute 0.09 10*3/mm3      Immature Grans, Absolute 0.02 10*3/mm3      nRBC 0.0 /100 WBC     BNP [[id number]]  (Abnormal)  Collected:  11/06/19 2357    Specimen:  Blood Updated:  11/07/19 0053     proBNP 6,322.0 pg/mL     Narrative:       Among patients with dyspnea, NT-proBNP is highly sensitive for the detection of acute congestive heart failure. In addition NT-proBNP of <300 pg/ml effectively rules out acute congestive heart failure with 99% negative predictive value.    Troponin [[id number]]  (Normal) Collected:  11/06/19 2357    Specimen:  Blood Updated:  11/07/19 0053     Troponin T 0.014 ng/mL     Narrative:       Troponin T Reference Range:  <= 0.03 ng/mL-   Negative for AMI  >0.03 ng/mL-     Abnormal for myocardial necrosis.  Clinicians would have to utilize clinical acumen, EKG, Troponin and serial changes to determine if it is an Acute Myocardial Infarction or myocardial injury due to an underlying chronic condition.     Basic Metabolic Panel [[id number]]  (Abnormal) Collected:  11/07/19 0628    Specimen:  Blood from Arm, Left Updated:  11/07/19 0712     Glucose 142 mg/dL      BUN 20 mg/dL      Creatinine 2.24 mg/dL      Sodium 141 mmol/L      Potassium 4.0 mmol/L      Chloride 103 mmol/L      CO2 23.7 mmol/L      Calcium 9.0 mg/dL      eGFR   Amer 31 mL/min/1.73      eGFR Non African Amer 25 mL/min/1.73      BUN/Creatinine Ratio 8.9     Anion Gap 14.3 mmol/L     Narrative:       GFR Normal >60  Chronic Kidney Disease <60  Kidney Failure <15    CBC (No Diff) [[id number]]  (Abnormal) Collected:  11/07/19 0628    Specimen:  Blood Updated:  11/07/19 0650     WBC 8.95 10*3/mm3      RBC 5.86 10*6/mm3      Hemoglobin 16.5 g/dL      Hematocrit 51.0 %      MCV 87.0 fL      MCH 28.2 pg      MCHC 32.4 g/dL      RDW 12.2 %      RDW-SD 38.6 fl      MPV 10.0 fL      Platelets 339 10*3/mm3     Magnesium [[id number]]  (Normal) Collected:  11/07/19 0628    Specimen:  Blood from Arm, Left Updated:  11/07/19 0709     Magnesium 1.8 mg/dL     TSH [[id number]]  (Abnormal) Collected:  11/07/19 0628    Specimen:  Blood from Arm, Left Updated:   11/07/19 0719     TSH 10.400 uIU/mL     Troponin [[id number]]  (Normal) Collected:  11/07/19 0628    Specimen:  Blood from Arm, Left Updated:  11/07/19 0709     Troponin T 0.016 ng/mL     Narrative:       Troponin T Reference Range:  <= 0.03 ng/mL-   Negative for AMI  >0.03 ng/mL-     Abnormal for myocardial necrosis.  Clinicians would have to utilize clinical acumen, EKG, Troponin and serial changes to determine if it is an Acute Myocardial Infarction or myocardial injury due to an underlying chronic condition.     T4, Free [[id number]]  (Normal) Collected:  11/07/19 0628    Specimen:  Blood from Arm, Left Updated:  11/07/19 0821     Free T4 1.52 ng/dL     T3, Free [[id number]]  (Normal) Collected:  11/07/19 0628    Specimen:  Blood from Arm, Left Updated:  11/07/19 1010     T3, Free 3.33 pg/mL     Hemoglobin A1c [[id number]]  (Abnormal) Collected:  11/07/19 0628    Specimen:  Blood Updated:  11/07/19 1412     Hemoglobin A1C 9.20 %     Narrative:       Hemoglobin A1C Ranges:    Increased Risk for Diabetes  5.7% to 6.4%  Diabetes                     >= 6.5%  Diabetic Goal                < 7.0%    Urinalysis With Microscopic If Indicated (No Culture) - Urine, Clean Catch [[id number]]  (Abnormal) Collected:  11/07/19 1205    Specimen:  Urine, Clean Catch Updated:  11/07/19 1236     Color, UA Yellow     Appearance, UA Clear     pH, UA 6.5     Specific Gravity, UA 1.012     Glucose,  mg/dL (Trace)     Ketones, UA Negative     Bilirubin, UA Negative     Blood, UA Small (1+)     Protein, UA >=300 mg/dL (3+)     Leuk Esterase, UA Negative     Nitrite, UA Negative     Urobilinogen, UA 0.2 E.U./dL    Sodium, Urine, Random - [[id number]] Collected:  11/07/19 1205    Specimen:  Urine Updated:  11/07/19 1246     Sodium, Urine 100 mmol/L     Narrative:       Reference intervals for random urine have not been established.  Clinical usage is dependent upon physician's interpretation in combination with other laboratory tests.      Protein, Urine, Random - [[id number]] Collected:  11/07/19 1205    Specimen:  Urine Updated:  11/07/19 1258     Total Protein, Urine 409.0 mg/dL     Narrative:       Reference intervals for random urine have not been established.  Clinical usage is dependent upon physician's interpretation in combination with other laboratory tests.     Creatinine, Urine, Random - [[id number]] Collected:  11/07/19 1205    Specimen:  Urine Updated:  11/07/19 1246     Creatinine, Urine 36.8 mg/dL     Narrative:       Reference intervals for random urine have not been established.  Clinical usage is dependent upon physician's interpretation in combination with other laboratory tests.     Urine Drug Screen - Urine, Clean Catch [[id number]]  (Normal) Collected:  11/07/19 1205    Specimen:  Urine, Clean Catch Updated:  11/07/19 1243     Amphet/Methamphet, Screen Negative     Barbiturates Screen, Urine Negative     Benzodiazepine Screen, Urine Negative     Cocaine Screen, Urine Negative     Opiate Screen Negative     THC, Screen, Urine Negative     Methadone Screen, Urine Negative     Oxycodone Screen, Urine Negative    Narrative:       Negative Thresholds For Drugs Screened:     Amphetamines               500 ng/ml   Barbiturates               200 ng/ml   Benzodiazepines            100 ng/ml   Cocaine                    300 ng/ml   Methadone                  300 ng/ml   Opiates                    300 ng/ml   Oxycodone                  100 ng/ml   THC                        50 ng/ml    The Normal Value for all drugs tested is negative. This report includes final unconfirmed screening results to be used for medical treatment purposes only. Unconfirmed results must not be used for non-medical purposes such as employment or legal testing. Clinical consideration should be applied to any drug of abuse test, particulary when unconfirmed results are used.    Urinalysis, Microscopic Only - Urine, Clean Catch [[id number]]  (Abnormal) Collected:   11/07/19 1205    Specimen:  Urine, Clean Catch Updated:  11/07/19 1236     RBC, UA 3-5 /HPF      WBC, UA 0-2 /HPF      Bacteria, UA None Seen /HPF      Squamous Epithelial Cells, UA 0-2 /HPF      Hyaline Casts, UA 0-2 /LPF      Methodology Automated Microscopy    Toxicology Screen, Serum [[id number]] Collected:  11/07/19 1207    Specimen:  Blood from Arm, Left Updated:  11/07/19 1225          Imaging Results (Last 24 Hours)     Procedure Component Value Units Date/Time    US Renal Bilateral [[id number]] Collected:  11/07/19 1325     Updated:  11/07/19 1330    Narrative:       US RENAL BILATERAL-     INDICATIONS: Acute kidney injury     TECHNIQUE: ULTRASOUND OF THE KIDNEYS AND URINARY BLADDER.     COMPARISON: None available     FINDINGS:     The right kidney measures 11.6 centimeters, the left kidney measures  10.6 centimeters.     No renal lesion is identified. No hydronephrosis or echogenic  nephrolithiasis.     No ureteral jets were observed during the exam. The urinary bladder is  suboptimally visualized owing to body habitus, otherwise appears  unremarkable.     Incidentally, right pleural effusion.       Impression:       No hydronephrosis or echogenic nephrolithiasis.        This report was finalized on 11/7/2019 1:26 PM by Dr. Emery Cook M.D.       XR Chest 2 View [[id number]] Collected:  11/07/19 0038     Updated:  11/07/19 0043    Narrative:       PA AND LATERAL CHEST RADIOGRAPH     HISTORY: Shortness of air     COMPARISON: None available.     FINDINGS:  Cardiomegaly is identified. No significant definite evidence of vascular  congestion. No pneumothorax is seen. There is some blunting of the  costophrenic angles, which may reflect trace effusions. There is  bibasilar atelectasis. No definite infiltrates are seen.       Impression:          1. Cardiomegaly.  2. Blunting of the costophrenic angles may reflect trace effusions.     This report was finalized on 11/7/2019 12:40 AM by Dr. Feng  SUGAR Quick                  ECG 12 Lead   Final Result   HEART RATE= 116  bpm   RR Interval= 516  ms   AL Interval= 149  ms   P Horizontal Axis= -46  deg   P Front Axis= 51  deg   QRSD Interval= 89  ms   QT Interval= 334  ms   QRS Axis= 90  deg   T Wave Axis= 0  deg   - BORDERLINE ECG -   Sinus tachycardia   Borderline right axis deviation   Consider anterior infarct   NO PRIOR TRACING AVAILABLE FOR COMPARISON   Electronically Signed By: Rodriguez YadavWILLOW) ([facility]) 07-Nov-2019 06:03:02   Date and Time of Study: 2019-11-07 00:04:03           Assessment/Plan     Active Hospital Problems    Diagnosis POA   • **Acute congestive heart failure (CMS/[facility]) [I50.9] Yes   • JULISSA (acute kidney injury) (CMS/HCC) [N17.9] Yes   • Obesity, morbid, BMI 50 or higher (CMS/[facility]) [E66.01] Yes   • Type 2 diabetes mellitus with hyperglycemia (CMS/[facility]) [E11.65] Yes     New Onset Acute Congestive Heart Failure  -Cardiomegaly and blunting of the costophrenic angles, suggestive of trace effusions on x-ray  -BNP 6,322  -With no prior cardiac history, I will consult cardiology  -Will defer diuretic therapy to cardiology group. 2+ BLE edema, but no respiratory distress on exam  -Blood pressures running high. Will add hydralazine q 6 H PRN SBP > 170  -Daily weights   -Recheck EKG and troponin in the AM  -Check TSH in the AM    Acute Kidney Injury  -Creat 2.28, GFR 25  -Most likely prerenal in nature  -Nephrology consult to assist with diuretic management    -I discussed the patients findings and my recommendations with patient.    VTE Prophylaxis - SCDs.  Code Status - Full code.       DANIKA Clinton  [facility]  11/07/19  6:10 AM  Attending Note:  I have personally interviewed and examined the patient and agree with the above note.  Patient without known past medical history and no consistent medical follow up presenting with edema from her feet to her abdomen and dyspnea.  She is found to be in congestive  heart failure which is newly diagnosed.  She is feeling much better with diuresis.  She also has significant renal dysfunction and it is not known how much of this is acute, though I suspect the latter based on renal ultrasound.  She has significant proteinuria as well. This is probably going to be secondary to HTN and CHF but will also check A1C given hyperglycemia.  Will continue diuresis and BP control and await renal artery dopplers and echocardiogram results. Cardiology and nephrology are following.      Idris Christy MD  11/7/2019  1:35 PM    Addendum:  A1C 9.20%.  Will start on sliding scale insulin coverage with hypoglycemia protocol and consult DM educator.    Idris Christy MD  11/7/2019  2:50 PM

## 2019-11-07 NOTE — ED PROVIDER NOTES
EMERGENCY DEPARTMENT ENCOUNTER    CHIEF COMPLAINT  Chief Complaint: Leg swelling  History given by: Patient  History limited by: None  Room Number: 12/12  PMD: Provider, No Known      HPI:  Pt is a 34 y.o. female who presents complaining of constant leg swelling/stiffness that began a few days ago. Pt reports she works at [employer], and she stands and walks all day long. Pt denies urinary symptoms, chest pain, nausea/vomiting, but she reports associated SOA with ambulation that she attributes to the leg stiffness. Pt denies chance of pregnancy. She denies any known medical conditions.    Duration:  A few days  Onset: gradual  Timing: constant  Location: BLE  Radiation: none  Quality: stiffness  Intensity/Severity: moderate  Progression: worsened  Associated Symptoms: SOA  Aggravating Factors: standing/walking  Alleviating Factors: none  Previous Episodes: none  Treatment before arrival: none    PAST MEDICAL HISTORY  Active Ambulatory Problems     Diagnosis Date Noted   • No Active Ambulatory Problems     Resolved Ambulatory Problems     Diagnosis Date Noted   • No Resolved Ambulatory Problems     No Additional Past Medical History       PAST SURGICAL HISTORY  History reviewed. No pertinent surgical history.    FAMILY HISTORY  History reviewed. No pertinent family history.    SOCIAL HISTORY  Social History     Socioeconomic History   • Marital status: Single     Spouse name: Not on file   • Number of children: Not on file   • Years of education: Not on file   • Highest education level: Not on file   Tobacco Use   • Smoking status: Never Smoker   • Smokeless tobacco: Never Used       ALLERGIES  Patient has no known allergies.    REVIEW OF SYSTEMS  Review of Systems   Constitutional: Negative for fever.   HENT: Negative for sore throat.    Eyes: Negative.    Respiratory: Positive for shortness of breath. Negative for cough.    Cardiovascular: Positive for leg swelling. Negative for chest pain.   Gastrointestinal:  Negative for abdominal pain, diarrhea and vomiting.   Genitourinary: Negative for dysuria.   Musculoskeletal: Negative for neck pain.   Skin: Negative for rash.   Allergic/Immunologic: Negative.    Neurological: Negative for weakness, numbness and headaches.   Hematological: Negative.    Psychiatric/Behavioral: Negative.    All other systems reviewed and are negative.      PHYSICAL EXAM  ED Triage Vitals [11/06/19 2322]   Temp Heart Rate Resp BP SpO2   98.1 °F (36.7 °C) (!) 130 18 -- 97 %      Temp src Heart Rate Source Patient Position BP Location FiO2 (%)   Tympanic -- -- -- --       Physical Exam   Constitutional: She is oriented to person, place, and time. No distress.   HENT:   Head: Normocephalic and atraumatic.   Eyes: EOM are normal. Pupils are equal, round, and reactive to light.   Neck: Normal range of motion. Neck supple.   Cardiovascular: Regular rhythm and normal heart sounds. Tachycardia present. Exam reveals no gallop and no friction rub.   No murmur heard.  Pulmonary/Chest: Effort normal and breath sounds normal. No respiratory distress. She has no wheezes. She has no rhonchi. She has no rales.   Abdominal: Soft. There is no tenderness. There is no rebound and no guarding.   Musculoskeletal: Normal range of motion. She exhibits edema (2+ edema to the BLE).   Neurological: She is alert and oriented to person, place, and time. She has normal sensation and normal strength.   Skin: Skin is warm and dry. No rash noted.   Psychiatric: Mood and affect normal.   Nursing note and vitals reviewed.      LAB RESULTS  Lab Results (last 24 hours)     Procedure Component Value Units Date/Time    CBC & Differential [[id number]] Collected:  11/06/19 2357    Specimen:  Blood Updated:  11/07/19 0020    Narrative:       The following orders were created for panel order CBC & Differential.  Procedure                               Abnormality         Status                     ---------                                -----------         ------                     CBC Auto Differential[[id number]]        Abnormal            Final result                 Please view results for these tests on the individual orders.    Comprehensive Metabolic Panel [[id number]]  (Abnormal) Collected:  11/06/19 2357    Specimen:  Blood Updated:  11/07/19 0052     Glucose 155 mg/dL      BUN 21 mg/dL      Creatinine 2.28 mg/dL      Sodium 136 mmol/L      Potassium 4.2 mmol/L      Chloride 100 mmol/L      CO2 22.7 mmol/L      Calcium 9.0 mg/dL      Total Protein 6.8 g/dL      Albumin 2.90 g/dL      ALT (SGPT) 18 U/L      AST (SGOT) 22 U/L      Alkaline Phosphatase 97 U/L      Total Bilirubin 0.4 mg/dL      eGFR Non African Amer 25 mL/min/1.73      eGFR  African Amer 30 mL/min/1.73      Globulin 3.9 gm/dL      A/G Ratio 0.7 g/dL      BUN/Creatinine Ratio 9.2     Anion Gap 13.3 mmol/L     Narrative:       GFR Normal >60  Chronic Kidney Disease <60  Kidney Failure <15    hCG, Serum, Qualitative [[id number]]  (Normal) Collected:  11/06/19 2357    Specimen:  Blood Updated:  11/07/19 0053     HCG Qualitative Negative    CBC Auto Differential [[id number]]  (Abnormal) Collected:  11/06/19 2357    Specimen:  Blood Updated:  11/07/19 0020     WBC 8.19 10*3/mm3      RBC 6.02 10*6/mm3      Hemoglobin 16.6 g/dL      Hematocrit 52.8 %      MCV 87.7 fL      MCH 27.6 pg      MCHC 31.4 g/dL      RDW 12.7 %      RDW-SD 41.0 fl      MPV 10.1 fL      Platelets 358 10*3/mm3      Neutrophil % 65.2 %      Lymphocyte % 25.2 %      Monocyte % 6.1 %      Eosinophil % 2.2 %      Basophil % 1.1 %      Immature Grans % 0.2 %      Neutrophils, Absolute 5.34 10*3/mm3      Lymphocytes, Absolute 2.06 10*3/mm3      Monocytes, Absolute 0.50 10*3/mm3      Eosinophils, Absolute 0.18 10*3/mm3      Basophils, Absolute 0.09 10*3/mm3      Immature Grans, Absolute 0.02 10*3/mm3      nRBC 0.0 /100 WBC     BNP [[id number]]  (Abnormal) Collected:  11/06/19 7507    Specimen:  Blood Updated:  11/07/19  0053     proBNP 6,322.0 pg/mL     Narrative:       Among patients with dyspnea, NT-proBNP is highly sensitive for the detection of acute congestive heart failure. In addition NT-proBNP of <300 pg/ml effectively rules out acute congestive heart failure with 99% negative predictive value.    Troponin [[id number]]  (Normal) Collected:  11/06/19 2357    Specimen:  Blood Updated:  11/07/19 0053     Troponin T 0.014 ng/mL     Narrative:       Troponin T Reference Range:  <= 0.03 ng/mL-   Negative for AMI  >0.03 ng/mL-     Abnormal for myocardial necrosis.  Clinicians would have to utilize clinical acumen, EKG, Troponin and serial changes to determine if it is an Acute Myocardial Infarction or myocardial injury due to an underlying chronic condition.           I ordered the above labs and reviewed the results    RADIOLOGY  XR Chest 2 View   Final Result       1. Cardiomegaly.   2. Blunting of the costophrenic angles may reflect trace effusions.       This report was finalized on 11/7/2019 12:40 AM by Dr. Ping Quick M.D.               I ordered the above noted radiological studies. Interpreted by radiologist. Reviewed by me in PACS.       PROCEDURES  Procedures    EKG          EKG time: 0004  Rhythm/Rate: 116, sinus tachycardia  P waves and VA: normal  QRS, axis: normal   ST and T waves: normal     Interpreted Contemporaneously by me, independently viewed  No prior       PROGRESS AND CONSULTS     2327 Labs ordered for evaluation.    2345 CXR ordered for evaluation.    0133 Rechecked the patient who is resting comfortably and in NAD. Patient is stable. BP- (!) 180/140 HR- 117 Temp- 98.1 °F (36.7 °C) (Tympanic) O2 sat- 98%. Informed the patient of all labs and imaging, including elevated BNP, hypertension, and elevated renal function. Discussed the plan for admission for further evaluation and management and to obtain ECHO. Pt understands and agrees with the plan, all questions answered.    0246 Reviewed the patient's  case with DANIKA Lim ([facility]) who agrees with the plan for admission for Dr. Yennifer MD.      MEDICAL DECISION MAKING  Results were reviewed/discussed with the patient and they were also made aware of online access. Pt also made aware that some labs, such as cultures, will not be resulted during ER visit and follow up with PMD is necessary.     MDM  Number of Diagnoses or Management Options     Amount and/or Complexity of Data Reviewed  Clinical lab tests: ordered and reviewed (BNP: 6,322; BUN: 21; Creatinine: 2.28)  Tests in the radiology section of CPT®: reviewed and ordered (CXR: Cardiomegaly. Blunting of the costophrenic angles may reflect trace effusions.   )  Tests in the medicine section of CPT®: ordered and reviewed (See procedure note for EKG.)  Decide to obtain previous medical records or to obtain history from someone other than the patient: yes (Epic)  Review and summarize past medical records: yes (Pt has no recent pertinent ED records.)  Discuss the patient with other providers: yes  Independent visualization of images, tracings, or specimens: yes (Dr. Ynes MD)    Patient Progress  Patient progress: stable         DIAGNOSIS  Final diagnoses:   Acute congestive heart failure, unspecified heart failure type (CMS/HCC)   Pedal edema   Acute renal insufficiency   Hypertension, unspecified type       DISPOSITION  ADMISSION    Discussed treatment plan and reason for admission with pt/family and admitting physician.  Pt/family voiced understanding of the plan for admission for further testing/treatment as needed.       Latest Documented Vital Signs:  As of 5:51 AM  BP- (!) 180/140 HR- 117 Temp- 98.1 °F (36.7 °C) (Tympanic) O2 sat- 98%    --  Documentation assistance provided by massimo Damon for Dr. Dae MD.  Information recorded by the scromi was done at my direction and has been verified and validated by me.          Adriana Damon  11/07/19 0303       Frankie Pearl MD  11/07/19  9735

## 2019-11-07 NOTE — ED NOTES
Pt c/o low abd pain x2 days and aditi leg tightness and swelling. Pt states it feels like fluid in her legs. Pt denies urinary symptoms, denies n/v/d.        Maricruz Pagan RN  11/06/19 1740

## 2019-11-08 PROBLEM — E11.29 TYPE 2 DIABETES MELLITUS WITH RENAL COMPLICATION (HCC): Status: ACTIVE | Noted: 2019-11-07

## 2019-11-08 PROBLEM — I50.31 ACUTE DIASTOLIC CONGESTIVE HEART FAILURE (HCC): Status: ACTIVE | Noted: 2019-11-07

## 2019-11-08 LAB
ALBUMIN SERPL-MCNC: 2.1 G/DL (ref 3.5–5.2)
ANION GAP SERPL CALCULATED.3IONS-SCNC: 8.9 MMOL/L (ref 5–15)
BUN BLD-MCNC: 20 MG/DL (ref 6–20)
BUN/CREAT SERPL: 8.5 (ref 7–25)
CALCIUM SPEC-SCNC: 8.7 MG/DL (ref 8.6–10.5)
CHLORIDE SERPL-SCNC: 105 MMOL/L (ref 98–107)
CO2 SERPL-SCNC: 28.1 MMOL/L (ref 22–29)
CREAT BLD-MCNC: 2.35 MG/DL (ref 0.57–1)
DEPRECATED RDW RBC AUTO: 38.6 FL (ref 37–54)
ERYTHROCYTE [DISTWIDTH] IN BLOOD BY AUTOMATED COUNT: 12.4 % (ref 12.3–15.4)
GFR SERPL CREATININE-BSD FRML MDRD: 24 ML/MIN/1.73
GFR SERPL CREATININE-BSD FRML MDRD: 29 ML/MIN/1.73
GLUCOSE BLD-MCNC: 108 MG/DL (ref 65–99)
GLUCOSE BLDC GLUCOMTR-MCNC: 108 MG/DL (ref 70–130)
GLUCOSE BLDC GLUCOMTR-MCNC: 122 MG/DL (ref 70–130)
GLUCOSE BLDC GLUCOMTR-MCNC: 139 MG/DL (ref 70–130)
GLUCOSE BLDC GLUCOMTR-MCNC: 153 MG/DL (ref 70–130)
HCT VFR BLD AUTO: 45.6 % (ref 34–46.6)
HGB BLD-MCNC: 15 G/DL (ref 12–15.9)
MAGNESIUM SERPL-MCNC: 1.8 MG/DL (ref 1.6–2.6)
MCH RBC QN AUTO: 28.1 PG (ref 26.6–33)
MCHC RBC AUTO-ENTMCNC: 32.9 G/DL (ref 31.5–35.7)
MCV RBC AUTO: 85.4 FL (ref 79–97)
PHOSPHATE SERPL-MCNC: 4.3 MG/DL (ref 2.5–4.5)
PLATELET # BLD AUTO: 273 10*3/MM3 (ref 140–450)
PMV BLD AUTO: 10.3 FL (ref 6–12)
POTASSIUM BLD-SCNC: 3.8 MMOL/L (ref 3.5–5.2)
RBC # BLD AUTO: 5.34 10*6/MM3 (ref 3.77–5.28)
SODIUM BLD-SCNC: 142 MMOL/L (ref 136–145)
WBC NRBC COR # BLD: 5.67 10*3/MM3 (ref 3.4–10.8)

## 2019-11-08 PROCEDURE — 83735 ASSAY OF MAGNESIUM: CPT | Performed by: INTERNAL MEDICINE

## 2019-11-08 PROCEDURE — 85027 COMPLETE CBC AUTOMATED: CPT | Performed by: INTERNAL MEDICINE

## 2019-11-08 PROCEDURE — 99233 SBSQ HOSP IP/OBS HIGH 50: CPT | Performed by: INTERNAL MEDICINE

## 2019-11-08 PROCEDURE — 82962 GLUCOSE BLOOD TEST: CPT

## 2019-11-08 PROCEDURE — 80069 RENAL FUNCTION PANEL: CPT | Performed by: INTERNAL MEDICINE

## 2019-11-08 RX ORDER — FUROSEMIDE 80 MG
80 TABLET ORAL DAILY
Status: DISCONTINUED | OUTPATIENT
Start: 2019-11-08 | End: 2019-11-10 | Stop reason: HOSPADM

## 2019-11-08 RX ADMIN — SODIUM CHLORIDE, PRESERVATIVE FREE 10 ML: 5 INJECTION INTRAVENOUS at 00:39

## 2019-11-08 RX ADMIN — NIFEDIPINE 90 MG: 60 TABLET, FILM COATED, EXTENDED RELEASE ORAL at 09:22

## 2019-11-08 RX ADMIN — CARVEDILOL 12.5 MG: 12.5 TABLET, FILM COATED ORAL at 18:16

## 2019-11-08 RX ADMIN — CARVEDILOL 12.5 MG: 12.5 TABLET, FILM COATED ORAL at 09:23

## 2019-11-08 RX ADMIN — SODIUM CHLORIDE, PRESERVATIVE FREE 10 ML: 5 INJECTION INTRAVENOUS at 09:26

## 2019-11-08 RX ADMIN — FUROSEMIDE 80 MG: 80 TABLET ORAL at 13:33

## 2019-11-08 NOTE — PLAN OF CARE
Problem: Patient Care Overview  Goal: Plan of Care Review  Outcome: Ongoing (interventions implemented as appropriate)   11/08/19 0583   Coping/Psychosocial   Plan of Care Reviewed With patient   Plan of Care Review   Progress no change   OTHER   Outcome Summary pt vss, blood pressure stablized and within normal limits, pt A&Ox3 with education given on plan of care, fluid restrictions and diabetic diet, Pt calm and alert with no s's of distress, will cont to monitor       Problem: Cardiac: Heart Failure (Adult)  Goal: Signs and Symptoms of Listed Potential Problems Will be Absent, Minimized or Managed (Cardiac: Heart Failure)  Outcome: Ongoing (interventions implemented as appropriate)

## 2019-11-08 NOTE — PROGRESS NOTES
"CC: Shortness of breath/congestive heart failure    Interval History: Patient feels significantly better today with regards to breathing as well as extremity swelling and tightness      Vital Signs  Temp:  [97.6 °F (36.4 °C)-97.9 °F (36.6 °C)] 97.6 °F (36.4 °C)  Heart Rate:  [83-94] 85  Resp:  [18-20] 18  BP: (113-150)/(81-94) 140/88    Intake/Output Summary (Last 24 hours) at 11/8/2019 1227  Last data filed at 11/8/2019 0900  Gross per 24 hour   Intake 1028 ml   Output 3595 ml   Net -2567 ml     Flowsheet Rows      First Filed Value   Admission Height  157.5 cm (62\") Documented at 11/06/2019 2322   Admission Weight  155 kg (340 lb 12.8 oz)  (Abnormal)  Documented at 11/06/2019 2331          PHYSICAL EXAM:  General: No acute distress, morbidly obese  Resp:NL Rate, symmetric chest expansion,unlabored, clear  CV:NL rate and rhythm, NL PMI, NL S1 and S2, no Murmur, no gallop, no rub, No JVD.   ABD:Nl sounds, no masses or tenderness, nondistended, no guarding or rebound  Neuro: alert,cooperative and oriented  Extr:Normal pedal pulses, bilateral lower extremity pitting edema, No cyanosis, moves all extremities      Results Review:    Results from last 7 days   Lab Units 11/08/19  0639   SODIUM mmol/L 142   POTASSIUM mmol/L 3.8   CHLORIDE mmol/L 105   CO2 mmol/L 28.1   BUN mg/dL 20   CREATININE mg/dL 2.35*   GLUCOSE mg/dL 108*   CALCIUM mg/dL 8.7     Results from last 7 days   Lab Units 11/07/19  0628 11/06/19  2357   TROPONIN T ng/mL 0.016 0.014     Results from last 7 days   Lab Units 11/08/19  0638   WBC 10*3/mm3 5.67   HEMOGLOBIN g/dL 15.0   HEMATOCRIT % 45.6   PLATELETS 10*3/mm3 273             Results from last 7 days   Lab Units 11/08/19  0639   MAGNESIUM mg/dL 1.8         I reviewed the patient's new clinical results.  I personally viewed and interpreted the patient's EKG/Telemetry data        Medication Review:   Meds reviewed         Assessment/Plan    1.    Diastolic congestive heart failure with pulmonary " hypertension  -Patient responded well to IV diuresis over the last 24 hours and has improved shortness of breath and extremity swelling  -PH probably WHO group 2  -Stable BUN/creatinine  -Would benefit from continued diuresis, can be switched to oral at this point  -Optimize blood pressure control.  And patient would benefit from outpatient sleep study.    2.  Hypertensive urgency  -Better blood pressure control with Coreg and nifedipine  -Would defer starting ACE to nephrology     3.  Morbidly obese  -Would benefit from dietitian referral, exercise, diet    4.  High TSH- free T4 within range     5.  Renal failure-current GFR 31.  Unknown baseline.   -Follow-up nephrology recommendations    Reji Rudd MD  11/08/19  12:27 PM

## 2019-11-08 NOTE — PROGRESS NOTES
Name: Gisela Dyson ADMIT: 2019   : 1985  PCP: Provider, No Known    MRN: [id number] LOS: 1 days   AGE/SEX: 34 y.o. female  ROOM: [facility] [[email or web address]     Subjective   Subjective   CC: dyspnea  No acute events.  Patient has no new complaints.  She overall feels much better.  Her leg swelling and dyspnea have much improved.  Denies CP/f/c/n/v/d.    Objective   Objective   Vital Signs  Temp:  [97.6 °F (36.4 °C)-97.9 °F (36.6 °C)] 97.6 °F (36.4 °C)  Heart Rate:  [79-94] 79  Resp:  [18-20] 18  BP: (113-140)/(81-89) 128/89  SpO2:  [92 %-96 %] 95 %  on   ;   Device (Oxygen Therapy): room air  Body mass index is 57.38 kg/m².  Physical Exam   Constitutional: She is oriented to person, place, and time. No distress.   HENT:   Head: Normocephalic and atraumatic.   Mouth/Throat: Oropharynx is clear and moist.   Eyes: Conjunctivae and EOM are normal. Pupils are equal, round, and reactive to light.   Neck: Normal range of motion. Neck supple.   Cardiovascular: Normal rate, regular rhythm and intact distal pulses.   Pulmonary/Chest: Effort normal and breath sounds normal. She has no rales.   Abdominal: Soft. Bowel sounds are normal. There is no tenderness.   Musculoskeletal: She exhibits edema (2+ BLE). She exhibits no tenderness.   Neurological: She is alert and oriented to person, place, and time.   Skin: Skin is warm and dry. Capillary refill takes less than 2 seconds. She is not diaphoretic.   Psychiatric: She has a normal mood and affect. Her behavior is normal.   Nursing note and vitals reviewed.      Results Review:       I reviewed the patient's new clinical results.  Results from last 7 days   Lab Units 19  0638 19  2357   WBC 10*3/mm3 5.67 8.95 8.19   HEMOGLOBIN g/dL 15.0 16.5* 16.6*   PLATELETS 10*3/mm3 273 339 358     Results from last 7 days   Lab Units 19  0639 19  0628 19  3077   SODIUM mmol/L 142 141 136   POTASSIUM mmol/L 3.8 4.0 4.2   CHLORIDE mmol/L 105 103 100   CO2  mmol/L 28.1 23.7 22.7   BUN mg/dL 20 20 21*   CREATININE mg/dL 2.35* 2.24* 2.28*   GLUCOSE mg/dL 108* 142* 155*   Estimated Creatinine Clearance: 47.1 mL/min (A) (by C-G formula based on SCr of 2.35 mg/dL (H)).  Results from last 7 days   Lab Units 11/08/19  0639 11/06/19  2357   ALBUMIN g/dL 2.10* 2.90*   BILIRUBIN mg/dL  --  0.4   ALK PHOS U/L  --  97   AST (SGOT) U/L  --  22   ALT (SGPT) U/L  --  18     Results from last 7 days   Lab Units 11/08/19  0639 11/07/19  0628 11/06/19  2357   CALCIUM mg/dL 8.7 9.0 9.0   ALBUMIN g/dL 2.10*  --  2.90*   MAGNESIUM mg/dL 1.8 1.8  --    PHOSPHORUS mg/dL 4.3  --   --        Hemoglobin A1C   Date/Time Value Ref Range Status   11/07/2019 0628 9.20 (H) 4.80 - 5.60 % Final     Glucose   Date/Time Value Ref Range Status   11/08/2019 1701 139 (H) 70 - 130 mg/dL Final   11/08/2019 1142 122 70 - 130 mg/dL Final   11/08/2019 0637 108 70 - 130 mg/dL Final   11/07/2019 2055 154 (H) 70 - 130 mg/dL Final   11/07/2019 1807 135 (H) 70 - 130 mg/dL Final         carvedilol 12.5 mg Oral BID With Meals   furosemide 80 mg Oral Daily   insulin lispro 0-7 Units Subcutaneous 4x Daily With Meals & Nightly   NIFEdipine XL 90 mg Oral Q24H   sodium chloride 10 mL Intravenous Q12H      Diet Regular; Cardiac, Consistent Carbohydrate       Assessment/Plan     Active Hospital Problems    Diagnosis  POA   • **Acute diastolic congestive heart failure (CMS/[facility]) [I50.31]  Yes   • JULISSA (acute kidney injury) (CMS/[facility]) [N17.9]  Yes   • Obesity, morbid, BMI 50 or higher (CMS/[facility]) [E66.01]  Yes   • Type 2 diabetes mellitus with renal complication (CMS/[facility]) [E11.29]  Yes      Resolved Hospital Problems   No resolved problems to display.   Acute Diastolic CHF  - echocardiogram noted  - much improved with IV lasix, switched to PO  - complicated by renal dysfunction  - cardiology following, appreciate recs    JULISSA  - question of CKD but unknown baseline  - renal ultrasound showing no obstruction, renal artery doppler is  normal  - has significant proteinuria-probably from HTN and DM  - nephrology following, appreciate recs    HTN  - continue on nifedipine, coreg, and lasix    Type 2 DM  - new diagnosis  - A1C 9.20%  - BG have not been bad here and she has hardly received any sliding scale insulin coverage, likely either because her dietary habits are different here and/or her renal function has recently worsened-either way would not start on scheduled medication for this-oral options are limited anyway due to renal dysfunction  - DM educator and dietician consulted    Elevated TSH  - T3 and T4 nml  - would repeat TSH in 4-6 week      SCDs for DVT prophylaxis.  Full code.  Discussed with patient, family and nursing staff.  Anticipate discharge home possibly tomorrow depending on response to oral diuretic and further plans per nephrology and cardiology.      Idris Christy MD  [facility]  11/08/19  5:28 PM

## 2019-11-08 NOTE — PLAN OF CARE
Problem: Patient Care Overview  Goal: Plan of Care Review  Outcome: Ongoing (interventions implemented as appropriate)   11/08/19 0850   Coping/Psychosocial   Plan of Care Reviewed With patient;father   Plan of Care Review   Progress improving   OTHER   Outcome Summary Diabetes education initiated     Goal: Discharge Needs Assessment  Outcome: Ongoing (interventions implemented as appropriate)

## 2019-11-08 NOTE — PLAN OF CARE
Problem: Patient Care Overview  Goal: Plan of Care Review  Outcome: Ongoing (interventions implemented as appropriate)   11/08/19 8653   Coping/Psychosocial   Plan of Care Reviewed With patient   Plan of Care Review   Progress improving   OTHER   Outcome Summary Vital as charted, no c/o pain, pt states she feels less SOA, BLE edema has improved, education given r/t diabetes and CHF, A&Ox4, up ab edwin, orders changed to PO lasix, will continue to monitor.

## 2019-11-08 NOTE — CONSULTS
"Diabetes Education  Assessment/Teaching    Patient Name:  Gisela Dyson  YOB: 1985  MRN: [id number]  Admit Date:  11/6/2019      Assessment Date:  11/8/2019    Most Recent Value   General Information    Referral From:  MD abernathy   Height  157.5 cm (62\")   Height Method  Stated   Weight  142 kg (313 lb 11.4 oz)  (Abnormal)    Weight Method  Standing scale   Have you had weight changes?  No   Are you currently involved in an activity/exercise program?   No   Pregnancy Assessment   Diabetes History   What type of diabetes do you have?  Type 2   Length of Diabetes Diagnosis  Newly diagnosed <6 months   Current DM knowledge  fair [Father at bedside.  Father states pt lives at home.  Both parents have DM.  ]   Have you had diabetes education/teaching in the past?  no   Do you test your blood sugar at home?  no   Education Preferences   What areas of diabetes would you like to learn about?  diet information, exercise information   Barriers to Learning  other (comment) [Currently no health insurance or PCP.  No barriers to learning.  ]   Nutrition Information   Assessment Topics   Healthy Eating - Assessment  Needs education   Being Active - Assessment  Needs education   Taking Medication - Assessment  Needs education   Problem Solving - Assessment  Needs education   Reducing Risk - Assessment  Needs education   Monitoring - Assessment  Needs education   DM Goals   Healthy Eating - Goal  0-30 days from discharge   Being Active - Goal  0-30 days from discharge   Taking Medication - Goal  0-7 days from discharge   Problem Solving - Goal  0-30 days from discharge   Reducing Risk - Goal  30-90 days from discharge   Monitoring - Goal  0-7 days from discharge   Contact Plan  Follow-up medical care            Most Recent Value   DM Education Needs   Meter  Meter provided   Meter Type  Contour [[employer].  Discussed more economical store brands while pt waiting for insurance through open enrollment.  ]   Frequency of " Testing  AC [Discussed benefits of BGM.  Unsure testing frequency or times as no final discharge plans on medications at this time.  ]   Blood Glucose Target Range   mg/dL   Problem Solving  Hypoglycemia, Hyperglycemia, Sick days, Signs, Symptoms, Treatment   Reducing Risks  A1C testing [a1c 9.2%.  Discussed lowering target to less than 7% goal.  ]   Healthy Eating  Reviewed meal plan, Basic meal plan provided   Physical Activity  None   Physical Activity Frequency  Never   Healthy Coping  Appropriate   Discharge Plan  Home   Motivation  Engaged   Teaching Method  Discussion   Patient Response  Verbalized understanding            Other Comments:  Discussed CCHO diet and exercise with patient and father. Both expressed understanding and no further education needs at this time        Electronically signed by:  Shyam Walkre RD  11/08/19 1:04 PM

## 2019-11-08 NOTE — CONSULTS
"Diabetes Education  Assessment/Teaching    Patient Name:  Gisela Dyson  YOB: 1985  MRN: [id number]  Admit Date:  11/6/2019      Assessment Date:  11/8/2019    Most Recent Value   General Information    Referral From:  MD abernathy   Height  157.5 cm (62\")   Height Method  Stated   Weight  142 kg (313 lb 11.4 oz)  (Abnormal)    Weight Method  Standing scale   Pregnancy Assessment   Diabetes History   What type of diabetes do you have?  Type 2   Length of Diabetes Diagnosis  Newly diagnosed <6 months   Current DM knowledge  fair [Father at bedside.  Father states pt lives at home.  Both parents have DM.  ]   Have you had diabetes education/teaching in the past?  no   Do you test your blood sugar at home?  no   Education Preferences   Barriers to Learning  other (comment) [Currently no health insurance or PCP.  No barriers to learning.  ]   Nutrition Information   Assessment Topics   Healthy Eating - Assessment  Needs education   Being Active - Assessment  Needs education   Taking Medication - Assessment  Needs education   Problem Solving - Assessment  Needs education   Reducing Risk - Assessment  Needs education   Monitoring - Assessment  Needs education   DM Goals   Healthy Eating - Goal  0-30 days from discharge   Being Active - Goal  0-30 days from discharge   Taking Medication - Goal  0-7 days from discharge   Problem Solving - Goal  0-30 days from discharge   Reducing Risk - Goal  30-90 days from discharge   Monitoring - Goal  0-7 days from discharge   Contact Plan  Follow-up medical care            Most Recent Value   DM Education Needs   Meter  Meter provided   Meter Type  Contour [[employer].  Discussed more economical store brands while pt waiting for insurance through open enrollment.  ]   Frequency of Testing  AC [Discussed benefits of BGM.  Unsure testing frequency or times as no final discharge plans on medications at this time.  ]   Blood Glucose Target Range   mg/dL   Problem Solving  " Hypoglycemia, Hyperglycemia, Sick days, Signs, Symptoms, Treatment   Reducing Risks  A1C testing [a1c 9.2%.  Discussed lowering target to less than 7% goal.  ]   Healthy Eating  RD consult   Physical Activity  Walking [Resistance bands, weights. ]   Physical Activity Frequency  Discussed exercise importance [Encouraged weight loss to assist w/ management of HTN and DM.  ]   Healthy Coping  Appropriate   Discharge Plan  Home, Follow-up with PCP   Motivation  Engaged   Teaching Method  Explanation, Discussion, Demonstration, Handouts   Patient Response  Verbalized understanding              Electronically signed by:  Tri Cheng RN  11/08/19 9:06 AM

## 2019-11-09 LAB
ACETONE SERPL-MCNC: NEGATIVE % (ref 0–0.01)
BUTALBITAL SERPL-MCNC: ABNORMAL UG/ML (ref 1–10)
C3 SERPL-MCNC: 143 MG/DL (ref 82–167)
C4 SERPL-MCNC: 27 MG/DL (ref 14–44)
CHLORDIAZEP SERPL-MCNC: <0.1 UG/ML (ref 0.1–0.9)
CREAT UR-MCNC: 86.9 MG/DL
DIAZEPAM SERPL-MCNC: <0.1 UG/ML (ref 0.1–0.9)
ETHANOL SPEC-SCNC: NEGATIVE % (ref 0–0.01)
GLUCOSE BLDC GLUCOMTR-MCNC: 127 MG/DL (ref 70–130)
GLUCOSE BLDC GLUCOMTR-MCNC: 132 MG/DL (ref 70–130)
GLUCOSE BLDC GLUCOMTR-MCNC: 137 MG/DL (ref 70–130)
GLUCOSE BLDC GLUCOMTR-MCNC: 147 MG/DL (ref 70–130)
ISOPROPANOL SERPL-MCNC: NEGATIVE % (ref 0–0.01)
Lab: ABNORMAL
METHANOL SERPL-MCNC: NEGATIVE % (ref 0–0.01)
NORCHLORDIAZEP SERPL-MCNC: <0.1 UG/ML (ref 0.1–0.6)
NORDIAZEPAM SERPL-MCNC: <0.1 UG/ML (ref 0.1–1.4)
PENTOBARB SERPL-MCNC: ABNORMAL UG/ML (ref 1–5)
PHENOBARB SERPL-MCNC: ABNORMAL UG/ML (ref 15–40)
PROT UR-MCNC: 447 MG/DL
SALICYLATES SERPL-MCNC: ABNORMAL UG/ML (ref 30–250)

## 2019-11-09 PROCEDURE — 82962 GLUCOSE BLOOD TEST: CPT

## 2019-11-09 PROCEDURE — 86256 FLUORESCENT ANTIBODY TITER: CPT | Performed by: INTERNAL MEDICINE

## 2019-11-09 PROCEDURE — 86160 COMPLEMENT ANTIGEN: CPT | Performed by: INTERNAL MEDICINE

## 2019-11-09 PROCEDURE — 86235 NUCLEAR ANTIGEN ANTIBODY: CPT | Performed by: INTERNAL MEDICINE

## 2019-11-09 PROCEDURE — 25010000002 FUROSEMIDE PER 20 MG: Performed by: INTERNAL MEDICINE

## 2019-11-09 PROCEDURE — 86225 DNA ANTIBODY NATIVE: CPT | Performed by: INTERNAL MEDICINE

## 2019-11-09 PROCEDURE — 82570 ASSAY OF URINE CREATININE: CPT | Performed by: INTERNAL MEDICINE

## 2019-11-09 PROCEDURE — 83520 IMMUNOASSAY QUANT NOS NONAB: CPT | Performed by: INTERNAL MEDICINE

## 2019-11-09 PROCEDURE — 86431 RHEUMATOID FACTOR QUANT: CPT | Performed by: INTERNAL MEDICINE

## 2019-11-09 PROCEDURE — 84156 ASSAY OF PROTEIN URINE: CPT | Performed by: INTERNAL MEDICINE

## 2019-11-09 RX ORDER — HYDRALAZINE HYDROCHLORIDE 25 MG/1
25 TABLET, FILM COATED ORAL EVERY 8 HOURS SCHEDULED
Status: DISCONTINUED | OUTPATIENT
Start: 2019-11-09 | End: 2019-11-10 | Stop reason: HOSPADM

## 2019-11-09 RX ORDER — CARVEDILOL 25 MG/1
25 TABLET ORAL 2 TIMES DAILY WITH MEALS
Status: DISCONTINUED | OUTPATIENT
Start: 2019-11-09 | End: 2019-11-10 | Stop reason: HOSPADM

## 2019-11-09 RX ORDER — FUROSEMIDE 10 MG/ML
80 INJECTION INTRAMUSCULAR; INTRAVENOUS ONCE
Status: COMPLETED | OUTPATIENT
Start: 2019-11-09 | End: 2019-11-09

## 2019-11-09 RX ORDER — NIFEDIPINE 60 MG/1
120 TABLET, EXTENDED RELEASE ORAL
Status: DISCONTINUED | OUTPATIENT
Start: 2019-11-10 | End: 2019-11-10 | Stop reason: HOSPADM

## 2019-11-09 RX ADMIN — HYDRALAZINE HYDROCHLORIDE 25 MG: 25 TABLET, FILM COATED ORAL at 20:52

## 2019-11-09 RX ADMIN — HYDRALAZINE HYDROCHLORIDE 25 MG: 25 TABLET, FILM COATED ORAL at 14:34

## 2019-11-09 RX ADMIN — CARVEDILOL 25 MG: 25 TABLET, FILM COATED ORAL at 17:44

## 2019-11-09 RX ADMIN — SODIUM CHLORIDE, PRESERVATIVE FREE 10 ML: 5 INJECTION INTRAVENOUS at 20:53

## 2019-11-09 RX ADMIN — NIFEDIPINE 90 MG: 60 TABLET, FILM COATED, EXTENDED RELEASE ORAL at 07:58

## 2019-11-09 RX ADMIN — FUROSEMIDE 80 MG: 40 INJECTION, SOLUTION INTRAMUSCULAR; INTRAVENOUS at 08:43

## 2019-11-09 RX ADMIN — CARVEDILOL 12.5 MG: 12.5 TABLET, FILM COATED ORAL at 07:58

## 2019-11-09 NOTE — PLAN OF CARE
Problem: Patient Care Overview  Goal: Plan of Care Review  Outcome: Ongoing (interventions implemented as appropriate)   11/09/19 0510 11/09/19 4391   Coping/Psychosocial   Plan of Care Reviewed With patient --    Plan of Care Review   Progress --  improving   OTHER   Outcome Summary --  SOA better, swelling increased, BP meds changed, dose of IV lasix given, hopeful for dc tomorrow.        Problem: Cardiac: Heart Failure (Adult)  Goal: Signs and Symptoms of Listed Potential Problems Will be Absent, Minimized or Managed (Cardiac: Heart Failure)  Outcome: Ongoing (interventions implemented as appropriate)

## 2019-11-09 NOTE — PROGRESS NOTES
NEPHROLOGY PROGRESS NOTE    PATIENT IDENTIFICATION:   Name:  Gisela Dyson      MRN:  [id number]     34 y.o.  female             Reason for visit: JULISSA vs CKD    SUBJECTIVE:   Very high blood pressures earlier this morning, but trend is improving now; eager to go home; no shortness of breath; leg swelling better.  Father at bedside; many questions about future course    OBJECTIVE:  Vitals:    11/09/19 0700 11/09/19 0930 11/09/19 1150 11/09/19 1310   BP: (!) 183/115 (!) 147/105 (!) 147/102 146/97   BP Location: Left arm   Left arm   Patient Position: Lying   Lying   Pulse: 78 89 76 80   Resp: 18   16   Temp: 98.1 °F (36.7 °C)   97.4 °F (36.3 °C)   TempSrc: Oral   Oral   SpO2: 99%  99% 98%   Weight:       Height:               Body mass index is 56.97 kg/m².    Intake/Output Summary (Last 24 hours) at 11/9/2019 1709  Last data filed at 11/9/2019 1310  Gross per 24 hour   Intake 1600 ml   Output 3400 ml   Net -1800 ml     Wt Readings from Last 1 Encounters:   11/09/19 0529 (!) 141 kg (311 lb 8 oz)   11/08/19 0523 (!) 142 kg (313 lb 11.4 oz)   11/07/19 1150 (!) 150 kg (330 lb)   11/07/19 0651 (!) 150 kg (330 lb 4.8 oz)   11/07/19 0648 (!) 151 kg (332 lb 10.8 oz)   11/06/19 2331 (!) 155 kg (340 lb 12.8 oz)     Wt Readings from Last 3 Encounters:   11/09/19 (!) 141 kg (311 lb 8 oz)         Exam:  NAD; pleasant; oriented; looks stated age  Morbidly obese; alopecic  MMM; AT/NC   No eye discharge; no scleral icterus; periorbital edema, less than yesterday  No JVD; no carotid bruits  Coarse bilat; no wheezes or crackles heard; not labored  RRR, no rub  Soft, NT, +D, BS+  +1-2 edema both legs  No clubbing  No asterixis  Moves all extremities   Mood and affect are normal  Speech is fluent    Scheduled meds:      carvedilol 25 mg Oral BID With Meals   furosemide 80 mg Oral Daily   hydrALAZINE 25 mg Oral Q8H   insulin lispro 0-7 Units Subcutaneous 4x Daily With Meals & Nightly   [START ON 11/10/2019] NIFEdipine  mg Oral  Q24H   sodium chloride 10 mL Intravenous Q12H     IV meds:                           Data Review:    Results from last 7 days   Lab Units 11/08/19  0639 11/07/19  0628 11/06/19  2357   SODIUM mmol/L 142 141 136   POTASSIUM mmol/L 3.8 4.0 4.2   CHLORIDE mmol/L 105 103 100   CO2 mmol/L 28.1 23.7 22.7   BUN mg/dL 20 20 21*   CREATININE mg/dL 2.35* 2.24* 2.28*   CALCIUM mg/dL 8.7 9.0 9.0   BILIRUBIN mg/dL  --   --  0.4   ALK PHOS U/L  --   --  97   ALT (SGPT) U/L  --   --  18   AST (SGOT) U/L  --   --  22   GLUCOSE mg/dL 108* 142* 155*     Estimated Creatinine Clearance: 46.9 mL/min (A) (by C-G formula based on SCr of 2.35 mg/dL (H)).  Results from last 7 days   Lab Units 11/09/19  0529 11/07/19  1205   SODIUM UR mmol/L  --  100   CREATININE UR mg/dL 86.9 36.8   PROTEIN TOTAL URINE mg/dL 447.0 409.0     Results from last 7 days   Lab Units 11/08/19  0639 11/07/19  0628   MAGNESIUM mg/dL 1.8 1.8   PHOSPHORUS mg/dL 4.3  --        Results from last 7 days   Lab Units 11/08/19  0638 11/07/19  0628 11/06/19  2357   WBC 10*3/mm3 5.67 8.95 8.19   HEMOGLOBIN g/dL 15.0 16.5* 16.6*   PLATELETS 10*3/mm3 273 339 358                   ASSESSMENT:     Acute diastolic congestive heart failure (CMS/[facility])    JULISSA (acute kidney injury) (CMS/[facility])    Obesity, morbid, BMI 50 or higher (CMS/[facility])    Type 2 diabetes mellitus with renal complication (CMS/[facility])    1.  JULISSA versus CKD and heavy nephrotic syndrome (11 g/day predicted initially, though ratio now predicts roughly 5 g daily when obtained with better blood pressure control)): no baseline SCR available as she has not had reason for any lab work previously.  Suspect glomerular disease, though not clear whether primary or secondary.  Peripheral and central volume excess, improving. Electrolytes are stable.  No uremic symptoms.  No hydronephrosis; no JANA  2.  Lower extremity edema and pulmonary vascular congestion  3.  Morbid obesity  4.  Hypertension, likely driven in part by volume excess,  better following diuresis and start of BP medications.  Primary GN could also be the culprit. Denies illicit substance use  5.  Tachycardia  6.  Erythrocytosis  7.  Elevated TSH  8.  Hyperglycemia        PLAN:  1.  I told her and her father that I recommend she remain an inpatient given multiple medicine changes made earlier this morning needed to control blood pressure.   2.  Will follow up on serology studies sent earlier this morning  3.  Furosemide 80 mg once daily  4.  Hopefully home tomorrow morning barring any surprises with blood pressure control this evening and tonight, and provided blood work stable tomorrow  5.  Will arrange close follow-up in my office; discussed with her the high likelihood a renal biopsy will be needed (which I will arrange as an outpatient procedure)  6.  Discussed with her father at bedside  7.  Discussed with Dr. Tana Hunter MD  11/9/2019    5:09 PM

## 2019-11-09 NOTE — PLAN OF CARE
Problem: Patient Care Overview  Goal: Plan of Care Review  Outcome: Ongoing (interventions implemented as appropriate)   11/09/19 0510   Coping/Psychosocial   Plan of Care Reviewed With patient   Plan of Care Review   Progress improving   OTHER   Outcome Summary SOA and swelling much decreased. VSS, no longer hypertenive. Hopeful to d/c home today. Needs a uring sample this morning. Continue to monitor.       Problem: Cardiac: Heart Failure (Adult)  Goal: Signs and Symptoms of Listed Potential Problems Will be Absent, Minimized or Managed (Cardiac: Heart Failure)  Outcome: Resolved for upgrade, new template will be applied

## 2019-11-09 NOTE — PROGRESS NOTES
NEPHROLOGY PROGRESS NOTE    PATIENT IDENTIFICATION:   Name:  Gisela Dyson      MRN:  [id number]     34 y.o.  female             Reason for visit: JULISSA vs CKD    SUBJECTIVE:   She feels much better; breathing is comfortable on room air; feels much less puffy than yesterday; good UOP    OBJECTIVE:  Vitals:    11/08/19 0700 11/08/19 1334 11/08/19 1816 11/08/19 1956   BP: 140/88 128/89  119/82   BP Location: Left arm Left arm  Left arm   Patient Position: Lying Lying  Sitting   Pulse: 85 79 81 83   Resp: 18 18  18   Temp: 97.6 °F (36.4 °C) 97.6 °F (36.4 °C)  97.2 °F (36.2 °C)   TempSrc: Oral Oral  Oral   SpO2: 96% 95%  100%   Weight:       Height:               Body mass index is 57.38 kg/m².    Intake/Output Summary (Last 24 hours) at 11/8/2019 2004  Last data filed at 11/8/2019 1956  Gross per 24 hour   Intake 908 ml   Output 3315 ml   Net -2407 ml     Wt Readings from Last 1 Encounters:   11/08/19 0523 (!) 142 kg (313 lb 11.4 oz)   11/07/19 1150 (!) 150 kg (330 lb)   11/07/19 0651 (!) 150 kg (330 lb 4.8 oz)   11/07/19 0648 (!) 151 kg (332 lb 10.8 oz)   11/06/19 2331 (!) 155 kg (340 lb 12.8 oz)     Wt Readings from Last 3 Encounters:   11/08/19 (!) 142 kg (313 lb 11.4 oz)         Exam:  NAD; pleasant; oriented; looks stated age  Morbidly obese; alopecic  MMM; AT/NC   No eye discharge; no scleral icterus; periorbital edema, less than yesterday  No JVD; no carotid bruits  Coarse bilat; no wheezes or crackles heard; not labored  RRR, no rub  Soft, NT, +D, BS+  +1-2 edema both legs  No clubbing  No asterixis  Moves all extremities   Mood and affect are normal  Speech is fluent    Scheduled meds:    carvedilol 12.5 mg Oral BID With Meals   furosemide 80 mg Oral Daily   insulin lispro 0-7 Units Subcutaneous 4x Daily With Meals & Nightly   NIFEdipine XL 90 mg Oral Q24H   sodium chloride 10 mL Intravenous Q12H     IV meds:                           Data Review:    Results from last 7 days   Lab Units 11/08/19  0615  11/07/19  0628 11/06/19  2357   SODIUM mmol/L 142 141 136   POTASSIUM mmol/L 3.8 4.0 4.2   CHLORIDE mmol/L 105 103 100   CO2 mmol/L 28.1 23.7 22.7   BUN mg/dL 20 20 21*   CREATININE mg/dL 2.35* 2.24* 2.28*   CALCIUM mg/dL 8.7 9.0 9.0   BILIRUBIN mg/dL  --   --  0.4   ALK PHOS U/L  --   --  97   ALT (SGPT) U/L  --   --  18   AST (SGOT) U/L  --   --  22   GLUCOSE mg/dL 108* 142* 155*     Estimated Creatinine Clearance: 47.1 mL/min (A) (by C-G formula based on SCr of 2.35 mg/dL (H)).  Results from last 7 days   Lab Units 11/07/19  1205   SODIUM UR mmol/L 100   CREATININE UR mg/dL 36.8   PROTEIN TOTAL URINE mg/dL 409.0     Results from last 7 days   Lab Units 11/08/19  0639 11/07/19  0628   MAGNESIUM mg/dL 1.8 1.8   PHOSPHORUS mg/dL 4.3  --        Results from last 7 days   Lab Units 11/08/19  0638 11/07/19  0628 11/06/19  2357   WBC 10*3/mm3 5.67 8.95 8.19   HEMOGLOBIN g/dL 15.0 16.5* 16.6*   PLATELETS 10*3/mm3 273 339 358                   ASSESSMENT:     Acute diastolic congestive heart failure (CMS/[facility])    JULISSA (acute kidney injury) (CMS/[facility])    Obesity, morbid, BMI 50 or higher (CMS/[facility])    Type 2 diabetes mellitus with renal complication (CMS/[facility])    1.  JULISSA versus CKD and heavy nephrotic syndrome (11 g/day predicted): no baseline SCR available as she has not had reason for any lab work previously.  Suspect glomerular disease, though not clear whether primary or secondary.  Peripheral and central volume excess, improving. Electrolytes are stable.  No uremic symptoms.  No hydronephrosis; no JANA  2.  Lower extremity edema and pulmonary vascular congestion  3.  Morbid obesity  4.  Hypertension, likely driven in part by volume excess, better following diuresis and start of BP medications.  Primary GN could also be the culprit. Denies illicit substance use  5.  Tachycardia  6.  Erythrocytosis  7.  Elevated TSH  8.  Hyperglycemia        PLAN:  1.  Re-check urine for protein:creatinine tomorrow; previous ratio may have  been falsely elevated due to hypertensive urgency.  2.  Will send a few serologies: lupus panel, complements, ANCA  3.  Furosemide 80 mg once daily  4.  No renal objection to discharge home barring any surprise with chemistries tomorrow.  I will follow-up with serologies obtained tomorrow morning  5.  Will arrange close follow-up in my office; discussed with her the high likelihood a renal biopsy will need to be obtained as an outpatient    Hayden Hunter MD  11/8/2019    8:04 PM

## 2019-11-09 NOTE — PROGRESS NOTES
[facility]               ASSOCIATES     LOS: 2 days     Name: Gisela Dyson  Age: 34 y.o.  Sex: female  :  1985  MRN: [id number]         Primary Care Physician: Provider, No Known    Diet Regular; Cardiac, Consistent Carbohydrate    Subjective   Denies any complaint.  States she is breathing fine and is ambulating fine. Greater than 50% of time spent in counseling and/or coordination of care. Total face/floor time 35 minutes.     Review of Systems   Respiratory: Negative for shortness of breath.    Cardiovascular: Negative for chest pain.     Objective   Temp:  [97.2 °F (36.2 °C)-98.1 °F (36.7 °C)] 98.1 °F (36.7 °C)  Heart Rate:  [78-89] 89  Resp:  [18] 18  BP: (119-183)/() 147/105  SpO2:  [95 %-100 %] 99 %  on   ;   Device (Oxygen Therapy): room air  Body mass index is 56.97 kg/m².    Physical Exam   Constitutional: She is oriented to person, place, and time. No distress.   obese   Cardiovascular: Normal rate and regular rhythm.   Pulmonary/Chest: Effort normal and breath sounds normal. No respiratory distress.   Abdominal: Soft. Bowel sounds are normal. There is no tenderness. There is no rebound and no guarding.   Musculoskeletal: She exhibits edema (1+).   Neurological: She is alert and oriented to person, place, and time.   Skin: Skin is warm and dry.   Psychiatric: She has a normal mood and affect. Her behavior is normal.   Nursing note and vitals reviewed.    Reviewed medications and new clinical results    Scheduled Meds  carvedilol 12.5 mg Oral BID With Meals   furosemide 80 mg Oral Daily   insulin lispro 0-7 Units Subcutaneous 4x Daily With Meals & Nightly   NIFEdipine XL 90 mg Oral Q24H   sodium chloride 10 mL Intravenous Q12H     Continuous Infusions   PRN Meds  •  bisacodyl  •  calcium carbonate  •  dextrose  •  dextrose  •  glucagon (human recombinant)  •  hydrALAZINE  •  ondansetron  •  [COMPLETED] Insert peripheral IV **AND** sodium chloride  •  [COMPLETED]  Insert peripheral IV **AND** sodium chloride  •  sodium chloride    Results from last 7 days   Lab Units 11/08/19  0638 11/07/19  0628 11/06/19  2357   WBC 10*3/mm3 5.67 8.95 8.19   HEMOGLOBIN g/dL 15.0 16.5* 16.6*   PLATELETS 10*3/mm3 273 339 358     Results from last 7 days   Lab Units 11/08/19  0639 11/07/19  0628 11/06/19  2357   SODIUM mmol/L 142 141 136   POTASSIUM mmol/L 3.8 4.0 4.2   CHLORIDE mmol/L 105 103 100   CO2 mmol/L 28.1 23.7 22.7   BUN mg/dL 20 20 21*   CREATININE mg/dL 2.35* 2.24* 2.28*   CALCIUM mg/dL 8.7 9.0 9.0   GLUCOSE mg/dL 108* 142* 155*     Lab Results   Component Value Date    ANIONGAP 8.9 11/08/2019     Glucose   Date/Time Value Ref Range Status   11/09/2019 0633 127 70 - 130 mg/dL Final   11/08/2019 2023 153 (H) 70 - 130 mg/dL Final   11/08/2019 1701 139 (H) 70 - 130 mg/dL Final   11/08/2019 1142 122 70 - 130 mg/dL Final   11/08/2019 0637 108 70 - 130 mg/dL Final   11/07/2019 2055 154 (H) 70 - 130 mg/dL Final   11/07/2019 1807 135 (H) 70 - 130 mg/dL Final     Hemoglobin A1C   Date Value Ref Range Status   11/07/2019 9.20 (H) 4.80 - 5.60 % Final     Estimated Creatinine Clearance: 46.9 mL/min (A) (by C-G formula based on SCr of 2.35 mg/dL (H)).    Normal bilateral renal arteries on duplex  renal ultrasound unremarkable    Results for orders placed during the hospital encounter of 11/06/19   Adult Transthoracic Echo Complete W/ Cont if Necessary Per Protocol    Narrative · Left ventricular systolic function is normal. Estimated EF appears to be   in the range of 56 - 60%. Normal left ventricular cavity size noted.  · Left ventricular wall thickness is consistent with mild concentric   hypertrophy  · Left ventricular diastolic dysfunction is noted (grade II w/high LAP)   consistent with pseudonormalization. Elevated left atrial pressure.  · The mitral valve is grossly normal in structure. Moderate mitral valve   regurgitation is present. No significant mitral valve stenosis is present.  ·  Mild to moderate tricuspid valve regurgitation is present. Estimated   right ventricular systolic pressure from tricuspid regurgitation is   markedly elevated (>55 mmHg).            11/08/19  0523 11/09/19  0529   Weight: (!) 142 kg (313 lb 11.4 oz) (!) 141 kg (311 lb 8 oz)       Intake/Output Summary (Last 24 hours) at 11/9/2019 1036  Last data filed at 11/9/2019 0945  Gross per 24 hour   Intake 1520 ml   Output 3940 ml   Net -2420 ml     Assessment/Plan   Active Hospital Problems    Diagnosis  POA   • **Acute diastolic congestive heart failure (CMS/[facility]) [I50.31]  Yes   • JULISSA (acute kidney injury) (CMS/[facility]) [N17.9]  Yes   • Obesity, morbid, BMI 50 or higher (CMS/[facility]) [E66.01]  Yes   • Type 2 diabetes mellitus with renal complication (CMS/[facility]) [E11.29]  Yes      Resolved Hospital Problems   No resolved problems to display.     34 y.o. female admitted with leg swelling. Body mass index is 56.97 kg/m².     · Acute diastolic CHF  · Diuresing per nephrology.  Received IV Lasix this morning 80 mg.  -2420 mL / 24 hours  · Acute kidney injury  · Creatinine relatively stable.  Unknown baseline  · Hypertension  · Diabetes mellitus type 2 A1c 9.20  · Mostly controlled and not requiring much correctional insulin  · Continue same for now  · Elevated TSH: Repeat TSH 4 to 6 weeks  · SCDs for DVT prophylaxis  · Disposition to be determined  · Discussed with patient, family and nursing staff.    Nico Fajardo MD   11/09/19  10:34 AM

## 2019-11-10 VITALS
BODY MASS INDEX: 53.92 KG/M2 | SYSTOLIC BLOOD PRESSURE: 132 MMHG | HEART RATE: 78 BPM | OXYGEN SATURATION: 98 % | DIASTOLIC BLOOD PRESSURE: 93 MMHG | WEIGHT: 293 LBS | HEIGHT: 62 IN | TEMPERATURE: 97.5 F | RESPIRATION RATE: 18 BRPM

## 2019-11-10 LAB
ANION GAP SERPL CALCULATED.3IONS-SCNC: 13.5 MMOL/L (ref 5–15)
BUN BLD-MCNC: 26 MG/DL (ref 6–20)
BUN/CREAT SERPL: 9.4 (ref 7–25)
CALCIUM SPEC-SCNC: 8.6 MG/DL (ref 8.6–10.5)
CHLORIDE SERPL-SCNC: 100 MMOL/L (ref 98–107)
CO2 SERPL-SCNC: 25.5 MMOL/L (ref 22–29)
CREAT BLD-MCNC: 2.76 MG/DL (ref 0.57–1)
GFR SERPL CREATININE-BSD FRML MDRD: 20 ML/MIN/1.73
GFR SERPL CREATININE-BSD FRML MDRD: 24 ML/MIN/1.73
GLUCOSE BLD-MCNC: 146 MG/DL (ref 65–99)
GLUCOSE BLDC GLUCOMTR-MCNC: 137 MG/DL (ref 70–130)
GLUCOSE BLDC GLUCOMTR-MCNC: 159 MG/DL (ref 70–130)
MAGNESIUM SERPL-MCNC: 2.2 MG/DL (ref 1.6–2.6)
POTASSIUM BLD-SCNC: 3.9 MMOL/L (ref 3.5–5.2)
SODIUM BLD-SCNC: 139 MMOL/L (ref 136–145)

## 2019-11-10 PROCEDURE — 82962 GLUCOSE BLOOD TEST: CPT

## 2019-11-10 PROCEDURE — 83735 ASSAY OF MAGNESIUM: CPT | Performed by: INTERNAL MEDICINE

## 2019-11-10 PROCEDURE — 80048 BASIC METABOLIC PNL TOTAL CA: CPT | Performed by: HOSPITALIST

## 2019-11-10 PROCEDURE — 63710000001 INSULIN LISPRO (HUMAN) PER 5 UNITS: Performed by: INTERNAL MEDICINE

## 2019-11-10 RX ORDER — HYDRALAZINE HYDROCHLORIDE 25 MG/1
25 TABLET, FILM COATED ORAL EVERY 8 HOURS SCHEDULED
Qty: 90 TABLET | Refills: 0 | Status: SHIPPED | OUTPATIENT
Start: 2019-11-10 | End: 2021-06-22 | Stop reason: HOSPADM

## 2019-11-10 RX ORDER — CARVEDILOL 25 MG/1
25 TABLET ORAL 2 TIMES DAILY WITH MEALS
Qty: 60 TABLET | Refills: 0 | Status: SHIPPED | OUTPATIENT
Start: 2019-11-10

## 2019-11-10 RX ORDER — NIFEDIPINE 60 MG/1
120 TABLET, FILM COATED, EXTENDED RELEASE ORAL
Qty: 30 TABLET | Refills: 0 | Status: SHIPPED | OUTPATIENT
Start: 2019-11-11 | End: 2021-06-22 | Stop reason: HOSPADM

## 2019-11-10 RX ORDER — FUROSEMIDE 80 MG
80 TABLET ORAL DAILY
Qty: 30 TABLET | Refills: 0 | Status: SHIPPED | OUTPATIENT
Start: 2019-11-11 | End: 2021-06-22 | Stop reason: HOSPADM

## 2019-11-10 RX ADMIN — CARVEDILOL 25 MG: 25 TABLET, FILM COATED ORAL at 09:29

## 2019-11-10 RX ADMIN — HYDRALAZINE HYDROCHLORIDE 25 MG: 25 TABLET, FILM COATED ORAL at 05:19

## 2019-11-10 RX ADMIN — INSULIN LISPRO 2 UNITS: 100 INJECTION, SOLUTION INTRAVENOUS; SUBCUTANEOUS at 12:38

## 2019-11-10 RX ADMIN — NIFEDIPINE 120 MG: 60 TABLET, FILM COATED, EXTENDED RELEASE ORAL at 09:29

## 2019-11-10 RX ADMIN — FUROSEMIDE 80 MG: 80 TABLET ORAL at 09:29

## 2019-11-10 NOTE — PROGRESS NOTES
NEPHROLOGY PROGRESS NOTE    PATIENT IDENTIFICATION:   Name:  Gisela Dyson      MRN:  [id number]     34 y.o.  female             Reason for visit: JULISSA vs CKD    SUBJECTIVE:   No problems overnight; blood pressure trend improving, though diastolic still around 90; no shortness of breath; has only walked around the room, but no MORRIS with that    OBJECTIVE:  Vitals:    11/10/19 0519 11/10/19 0521 11/10/19 0737 11/10/19 0929   BP: 138/97  127/94 132/93   BP Location:   Left arm    Patient Position:   Sitting    Pulse: 77  77 78   Resp:   18    Temp:   97.5 °F (36.4 °C)    TempSrc:   Oral    SpO2:   98%    Weight:  (!) 141 kg (311 lb 8 oz)     Height:               Body mass index is 56.97 kg/m².    Intake/Output Summary (Last 24 hours) at 11/10/2019 1049  Last data filed at 11/10/2019 0608  Gross per 24 hour   Intake 700 ml   Output 1450 ml   Net -750 ml     Wt Readings from Last 1 Encounters:   11/10/19 0521 (!) 141 kg (311 lb 8 oz)   11/09/19 0529 (!) 141 kg (311 lb 8 oz)   11/08/19 0523 (!) 142 kg (313 lb 11.4 oz)   11/07/19 1150 (!) 150 kg (330 lb)   11/07/19 0651 (!) 150 kg (330 lb 4.8 oz)   11/07/19 0648 (!) 151 kg (332 lb 10.8 oz)   11/06/19 2331 (!) 155 kg (340 lb 12.8 oz)     Wt Readings from Last 3 Encounters:   11/10/19 (!) 141 kg (311 lb 8 oz)         Exam:  NAD; pleasant; oriented; looks stated age  Morbidly obese; alopecic; very flat affect; depressed mood  MMM; AT/NC   No eye discharge; no scleral icterus; periorbital edema, less than yesterday  No JVD; no carotid bruits  Coarse bilat; no wheezes or crackles heard; not labored  RRR, no rub  Soft, NT, +D, BS+  +1-2 edema both legs  No clubbing  No asterixis  Moves all extremities   Speech is fluent    Scheduled meds:      carvedilol 25 mg Oral BID With Meals   furosemide 80 mg Oral Daily   hydrALAZINE 25 mg Oral Q8H   insulin lispro 0-7 Units Subcutaneous 4x Daily With Meals & Nightly   NIFEdipine  mg Oral Q24H   sodium chloride 10 mL Intravenous  Q12H     IV meds:                           Data Review:    Results from last 7 days   Lab Units 11/10/19  0508 11/08/19  0639 11/07/19  0628 11/06/19  2357   SODIUM mmol/L 139 142 141 136   POTASSIUM mmol/L 3.9 3.8 4.0 4.2   CHLORIDE mmol/L 100 105 103 100   CO2 mmol/L 25.5 28.1 23.7 22.7   BUN mg/dL 26* 20 20 21*   CREATININE mg/dL 2.76* 2.35* 2.24* 2.28*   CALCIUM mg/dL 8.6 8.7 9.0 9.0   BILIRUBIN mg/dL  --   --   --  0.4   ALK PHOS U/L  --   --   --  97   ALT (SGPT) U/L  --   --   --  18   AST (SGOT) U/L  --   --   --  22   GLUCOSE mg/dL 146* 108* 142* 155*     Estimated Creatinine Clearance: 40 mL/min (A) (by C-G formula based on SCr of 2.76 mg/dL (H)).  Results from last 7 days   Lab Units 11/09/19  0529 11/07/19  1205   SODIUM UR mmol/L  --  100   CREATININE UR mg/dL 86.9 36.8   PROTEIN TOTAL URINE mg/dL 447.0 409.0     Results from last 7 days   Lab Units 11/10/19  0508 11/08/19  0639 11/07/19  0628   MAGNESIUM mg/dL 2.2 1.8 1.8   PHOSPHORUS mg/dL  --  4.3  --        Results from last 7 days   Lab Units 11/08/19  0638 11/07/19  0628 11/06/19  2357   WBC 10*3/mm3 5.67 8.95 8.19   HEMOGLOBIN g/dL 15.0 16.5* 16.6*   PLATELETS 10*3/mm3 273 339 358                   ASSESSMENT:     Acute diastolic congestive heart failure (CMS/[facility])    JULISSA (acute kidney injury) (CMS/[facility])    Obesity, morbid, BMI 50 or higher (CMS/[facility])    Type 2 diabetes mellitus with renal complication (CMS/[facility])    1.  JULISSA versus CKD and heavy nephrotic syndrome (11 g/day predicted initially, though more recent one predicts roughly 5 g daily with better blood pressure control)): no baseline SCR available as she has not had reason for any lab work previously. Expected rise in SCR with improved BP. Suspect glomerular disease, though not clear whether primary or secondary.  Peripheral and central volume excess, improving. Electrolytes are stable.  No uremic symptoms.  No hydronephrosis; no JANA.  Serum complements are normal.  2.  Lower extremity edema  and pulmonary vascular congestion  3.  Morbid obesity  4.  Hypertension, likely driven in part by volume excess, better following diuresis and start of BP medications.  Primary GN could also be the culprit. Denies illicit substance use  5.  Tachycardia  6.  Erythrocytosis  7.  Elevated TSH  8.  Hyperglycemia        PLAN:  1.  Will follow-up other serologies (lupus panel and ANCA panel) once available, though suspect she will need a renal biopsy, which I plan to do as an outpatient  2.  No objection to discharge today from renal view  3.  Will arrange follow-up in my office in the next 7-10 days  4.  Discussed with her father at bedside  5.  Discussed with Dr. Tana Hunter MD  11/10/2019    10:49 AM

## 2019-11-10 NOTE — PLAN OF CARE
Problem: Patient Care Overview  Goal: Plan of Care Review  Outcome: Ongoing (interventions implemented as appropriate)   11/10/19 0583   Coping/Psychosocial   Plan of Care Reviewed With patient   Plan of Care Review   Progress improving   OTHER   Outcome Summary Increased dosages of BP meds given overnight. AM blood pressure 138/76. Voiding well. No complaints of pain. No coverage given with insulin. Hopeful to d/c home today. Continue to monitor.        Problem: Cardiac: Heart Failure (Adult)  Goal: Signs and Symptoms of Listed Potential Problems Will be Absent, Minimized or Managed (Cardiac: Heart Failure)  Outcome: Ongoing (interventions implemented as appropriate)      Problem: Diabetes, Type 2 (Adult)  Goal: Signs and Symptoms of Listed Potential Problems Will be Absent, Minimized or Managed (Diabetes, Type 2)  Outcome: Ongoing (interventions implemented as appropriate)

## 2019-11-10 NOTE — DISCHARGE SUMMARY
[facility]    Date of Discharge:  11/10/2019    PCP: Provider, No Known    Discharge Diagnosis:   Active Hospital Problems    Diagnosis  POA   • **Acute diastolic congestive heart failure (CMS/[facility]) [I50.31]  Yes   • JULISSA (acute kidney injury) (CMS/[facility]) [N17.9]  Yes   • Obesity, morbid, BMI 50 or higher (CMS/[facility]) [E66.01]  Yes   • Type 2 diabetes mellitus with renal complication (CMS/[facility]) [E11.29]  Yes      Resolved Hospital Problems   No resolved problems to display.      Consults     Date and Time Order Name Status Description    11/7/2019 0613 Inpatient Nephrology Consult      11/7/2019 0412 Inpatient Cardiology Consult Completed     11/7/2019 0218 [facility] (on-call MD unless specified) Details Completed         Hospital Course  Please see history and physical for details. Patient is a 34 y.o. female morbidly obese with a BMI over 50 without any prior medical history presented with shortness of breath and leg swelling.  She diagnosed with diastolic heart failure and pulmonary hypertension and had acute kidney injury as well as new diagnosis of diabetes.  She responded well to IV diuretics with improved shortness of breath and swelling.  Diuretics were transitioned to p.o.  She also had heavy nephrotic syndrome.  There is no baseline creatinine available.  She had expected rising creatinine with blood pressure control.  Work-up for renal failure was initiated and lupus panel and ANCA panel are pending and she will follow-up with nephrology in about a week and they will discuss possible biopsy.  As far as her diabetes her A1c was 9 suggesting poor control however while here in the hospital on a consistent carbohydrate cardiac diet she has required only 2 units of correctional insulin over about 3 days.  Suspect her dietary habits are different here in the hospital.  Diabetic educator and dietitian consulted.  She will be referred to [facility] for  primary care provider.    Results for orders placed during the hospital encounter of 11/06/19   Adult Transthoracic Echo Complete W/ Cont if Necessary Per Protocol    Narrative · Left ventricular systolic function is normal. Estimated EF appears to be   in the range of 56 - 60%. Normal left ventricular cavity size noted.  · Left ventricular wall thickness is consistent with mild concentric   hypertrophy  · Left ventricular diastolic dysfunction is noted (grade II w/high LAP)   consistent with pseudonormalization. Elevated left atrial pressure.  · The mitral valve is grossly normal in structure. Moderate mitral valve   regurgitation is present. No significant mitral valve stenosis is present.  · Mild to moderate tricuspid valve regurgitation is present. Estimated   right ventricular systolic pressure from tricuspid regurgitation is   markedly elevated (>55 mmHg).        I discussed the patient's findings and my recommendations with patient, family and nursing staff and Dr. Hayden Hunter.    Condition on Discharge: Improved.  Patient would very much like to go home today.    Temp:  [97.2 °F (36.2 °C)-97.5 °F (36.4 °C)] 97.5 °F (36.4 °C)  Heart Rate:  [76-80] 78  Resp:  [16-18] 18  BP: (118-147)/() 132/93  Body mass index is 56.97 kg/m².    Physical Exam   Constitutional: She is oriented to person, place, and time. No distress.   Cardiovascular: Normal rate and regular rhythm.   Pulmonary/Chest: Effort normal and breath sounds normal. No respiratory distress.   Abdominal: Soft. Bowel sounds are normal. There is no tenderness. There is no rebound and no guarding.   Musculoskeletal: She exhibits edema (1+).   Neurological: She is alert and oriented to person, place, and time.   Skin: Skin is warm and dry.   Psychiatric: She has a normal mood and affect. Her behavior is normal.   Nursing note and vitals reviewed.       Discharge Medications      New Medications      Instructions Start Date   carvedilol 25 MG  tablet  Commonly known as:  COREG   25 mg, Oral, 2 Times Daily With Meals      furosemide 80 MG tablet  Commonly known as:  LASIX   80 mg, Oral, Daily   Start Date:  11/11/2019     hydrALAZINE 25 MG tablet  Commonly known as:  APRESOLINE   25 mg, Oral, Every 8 Hours Scheduled      NIFEdipine CC 60 MG 24 hr tablet  Commonly known as:  ADALAT CC   120 mg, Oral, Every 24 Hours Scheduled   Start Date:  11/11/2019           Diet Instructions     Diet: Regular, Consistent Carbohydrate, Cardiac      Discharge Diet:   Regular  Consistent Carbohydrate  Cardiac            Activity Instructions     Activity as Tolerated           Additional Instructions for the Follow-ups that You Need to Schedule     Call MD for problems / concerns.   As directed        Follow-up Information     [facility] Follow up.    Contact information:  [place] [address]  [phone]           Hayden Hunter MD Follow up in 1 week(s).    Specialty:  Nephrology  Contact information:  [address]  [facility] [address]  [place] [address]  [phone]             Reji Rudd MD Follow up.    Specialty:  Cardiology  Contact information:  [address]  [facility] [address]  [place] [address]  [phone]             Provider, No Known Follow up.    Contact information:  [place] [id number] [phone]                 Test Results Pending at Discharge   Order Current Status    ANCA Panel In process    Systemic Lupus Profile A In process         Nico Fajardo MD  11/10/19  11:44 AM    Discharge time spent greater than 30 minutes.

## 2019-11-11 ENCOUNTER — READMISSION MANAGEMENT (OUTPATIENT)
Dept: CALL CENTER | Facility: HOSPITAL | Age: 34
End: 2019-11-11

## 2019-11-11 LAB
CHROMATIN AB SERPL-ACNC: <0.2 AI (ref 0–0.9)
DSDNA AB SER-ACNC: 2 IU/ML (ref 0–9)
ENA RNP AB SER-ACNC: <0.2 AI (ref 0–0.9)
ENA SM AB SER-ACNC: <0.2 AI (ref 0–0.9)
ENA SS-A AB SER-ACNC: <0.2 AI (ref 0–0.9)
ENA SS-B AB SER-ACNC: <0.2 AI (ref 0–0.9)
RA LATEX TURBID: <10 IU/ML (ref 0–13.9)

## 2019-11-12 ENCOUNTER — READMISSION MANAGEMENT (OUTPATIENT)
Dept: CALL CENTER | Facility: HOSPITAL | Age: 34
End: 2019-11-12

## 2019-11-12 LAB
C-ANCA TITR SER IF: NORMAL TITER
MYELOPEROXIDASE AB SER-ACNC: <9 U/ML (ref 0–9)
P-ANCA ATYPICAL TITR SER IF: NORMAL TITER
P-ANCA TITR SER IF: NORMAL TITER
PROTEINASE3 AB SER IA-ACNC: <3.5 U/ML (ref 0–3.5)

## 2019-11-12 NOTE — OUTREACH NOTE
CHF Week 1 Survey      Responses   Facility patient discharged from?  [place]   Does the patient have one of the following disease processes/diagnoses(primary or secondary)?  CHF   Is there a successful TCM telephone encounter documented?  No   CHF Week 1 attempt successful?  Yes   Call start time  1535   Call end time  1540   Discharge diagnosis  Acute CHF   Is patient permission given to speak with other caregiver?  No   Meds reviewed with patient/caregiver?  Yes   Is the patient having any side effects they believe may be caused by any medication additions or changes?  No   Does the patient have all medications ordered at discharge?  Yes   Is the patient taking all medications as directed (includes completed medication regime)?  Yes   Does the patient have a primary care provider?   No   PCP Nursing Intervention  Provided number to obtain PCP   Does the patient have an appointment with their PCP within 7 days of discharge?  No   Nursing Interventions  Educated patient on importance of making appointment, Advised patient to make appointment   Has the patient kept scheduled appointments due by today?  N/A   Has home health visited the patient within 72 hours of discharge?  N/A   Psychosocial issues?  No   Did the patient receive a copy of their discharge instructions?  Yes   Nursing interventions  Reviewed instructions with patient   What is the patient's perception of their health status since discharge?  Improving   Nursing interventions  Nurse provided patient education   Is the patient able to teach back signs and symptoms of worsening condition? (i.e. weight gain, shortness of air, etc.)  Yes   Is the patient/caregiver able to teach back the hierarchy of who to call/visit for symptoms/problems? PCP, Specialist, Home health nurse, Urgent Care, ED, 911  Yes    CHF Week 1 call completed?  Yes          Eufemia Madison RN

## 2019-11-12 NOTE — OUTREACH NOTE
Prep Survey      Responses   Facility patient discharged from?  [place]   Is patient eligible?  Yes   Discharge diagnosis  Acute CHF, pulmonary HTN, JULISSA, new dx DM, morbid obesity   Does the patient have one of the following disease processes/diagnoses(primary or secondary)?  CHF   Does the patient have Home health ordered?  No   Is there a DME ordered?  No   Comments regarding appointments  referral given for a PCP   Medication alerts for this patient  no insurance - F/U Lexi Rios for med cost assistance   Prep survey completed?  Yes          Hortensia Cartagena RN

## 2019-11-15 ENCOUNTER — OFFICE VISIT (OUTPATIENT)
Dept: INTERNAL MEDICINE | Age: 34
End: 2019-11-15

## 2019-11-15 VITALS
WEIGHT: 293 LBS | OXYGEN SATURATION: 98 % | BODY MASS INDEX: 53.92 KG/M2 | DIASTOLIC BLOOD PRESSURE: 80 MMHG | HEIGHT: 62 IN | SYSTOLIC BLOOD PRESSURE: 110 MMHG | RESPIRATION RATE: 13 BRPM | HEART RATE: 69 BPM | TEMPERATURE: 97.1 F

## 2019-11-15 DIAGNOSIS — I50.31 ACUTE DIASTOLIC CONGESTIVE HEART FAILURE (HCC): ICD-10-CM

## 2019-11-15 DIAGNOSIS — E11.22 TYPE 2 DIABETES MELLITUS WITH STAGE 3 CHRONIC KIDNEY DISEASE, WITHOUT LONG-TERM CURRENT USE OF INSULIN (HCC): Primary | ICD-10-CM

## 2019-11-15 DIAGNOSIS — Z09 FOLLOW UP: ICD-10-CM

## 2019-11-15 DIAGNOSIS — N18.30 TYPE 2 DIABETES MELLITUS WITH STAGE 3 CHRONIC KIDNEY DISEASE, WITHOUT LONG-TERM CURRENT USE OF INSULIN (HCC): Primary | ICD-10-CM

## 2019-11-15 PROBLEM — I10 ESSENTIAL HYPERTENSION: Status: ACTIVE | Noted: 2019-11-15

## 2019-11-15 PROBLEM — I34.0 NONRHEUMATIC MITRAL VALVE REGURGITATION: Status: ACTIVE | Noted: 2019-11-15

## 2019-11-15 PROCEDURE — 99202 OFFICE O/P NEW SF 15 MIN: CPT | Performed by: INTERNAL MEDICINE

## 2019-11-15 NOTE — PROGRESS NOTES
"  Gisela Dyson is a 34 y.o. female who presents with   Chief Complaint   Patient presents with   • Diabetes     Currently on no medication.  Blood sugars at home ranging 140-190   • Follow-up CHF/chronic kidney disease     Recent hospitalization at [facility] November 6 to November 10-CHF; chronic kidney disease with 30% function of kidneys.  Echocardiogram showing mitral regurgitation/tricuspid regurgitation.   • New patient/initial visit     Patient has not seen a physician in 15 years according to her father who is with her.  Daughter of Emery/Gabrielle Dyson   .    34-year-old female.  Daughter of Emery and Gabrielle Dyson-patients of mine presents as a new patient with a history that she was in the hospital November 6 through November 10 with congestive heart failure and chronic kidney disease.  Her kidneys were estimated to be \"30% functioning\" according to her father.  She is going to follow-up with nephrology-Dr. Hunter.  Also while in the hospital in addition to the congestive heart failure she was noted to have mitral/tricuspid regurgitation per echocardiogram and is currently being seen in follow-up by cardiologist-Dr. Charlton.  They have not been referred to endocrinology yet but the father would like the patient referred to Dr. Coon who also sees his son.      Diabetes   She presents for her initial diabetic visit. She has type 2 diabetes mellitus. Her disease course has been stable. There are no hypoglycemic complications. Symptoms are stable. When asked about current treatments, none were reported.        /80   Pulse 69   Temp 97.1 °F (36.2 °C)   Resp 13   Ht 157.5 cm (62.01\")   Wt (!) 144 kg (318 lb 6.4 oz)   LMP 10/28/2019 (Exact Date)   SpO2 98%   Breastfeeding? No   BMI 58.22 kg/m²     The following portions of the patient's history were reviewed and updated as appropriate: allergies, current medications, past medical history and problem list.    Review of Systems   Constitutional: " Negative.    HENT: Negative.    Eyes: Negative.    Respiratory: Negative.    Cardiovascular: Negative.    Genitourinary: Negative.    Musculoskeletal: Negative.    Skin: Negative.    Neurological: Negative.    Psychiatric/Behavioral: Negative.        Objective   Physical Exam   Constitutional: She is oriented to person, place, and time. She appears well-developed and well-nourished. No distress.   Morbidly obese female who does not seem particularly concerned about her overall medical condition.   HENT:   Head: Normocephalic and atraumatic.   Eyes: Conjunctivae and EOM are normal. Pupils are equal, round, and reactive to light.   Neck: Normal range of motion. Neck supple. No thyromegaly present.   Neck exam negative.  Carotid auscultation normal-no bruits heard.   Cardiovascular: Normal rate, regular rhythm, normal heart sounds and intact distal pulses. Exam reveals no gallop and no friction rub.   No murmur heard.  Pulmonary/Chest: Effort normal and breath sounds normal. No respiratory distress. She has no wheezes. She has no rales. She exhibits no tenderness.   Neurological: She is alert and oriented to person, place, and time.   Psychiatric: She has a normal mood and affect. Her behavior is normal. Judgment and thought content normal.   Nursing note and vitals reviewed.      Assessment/Plan   Gisela was seen today for diabetes, follow-up chf/chronic kidney disease and new patient/initial visit.    Diagnoses and all orders for this visit:    Type 2 diabetes mellitus with stage 3 chronic kidney disease, without long-term current use of insulin (CMS/HCC)  -     Ambulatory Referral to Endocrinology  -     metFORMIN (GLUCOPHAGE) 500 MG tablet; Take 1 tablet by mouth 2 (Two) Times a Day With Meals.    Follow up    Acute diastolic congestive heart failure (CMS/HCC)        Plan: We will make ambulatory referral to [facility] per request of the patient's father.  Pending endocrinology evaluation we will place  her on metformin 500 mg twice daily.    Nephrology follow-up with Dr. Hunter.    Cardiology follow-up with Dr. Charlton.    Continue all other medications as prescribed.  Blood pressure checkup-4 months

## 2019-11-20 ENCOUNTER — READMISSION MANAGEMENT (OUTPATIENT)
Dept: CALL CENTER | Facility: HOSPITAL | Age: 34
End: 2019-11-20

## 2019-11-20 NOTE — OUTREACH NOTE
CHF Week 2 Survey      Responses   Facility patient discharged from?  [place]   Does the patient have one of the following disease processes/diagnoses(primary or secondary)?  CHF   Week 2 attempt successful?  No   Unsuccessful attempts  Attempt 1          Eufemia Acosta RN

## 2019-11-22 ENCOUNTER — READMISSION MANAGEMENT (OUTPATIENT)
Dept: CALL CENTER | Facility: HOSPITAL | Age: 34
End: 2019-11-22

## 2019-11-22 NOTE — OUTREACH NOTE
CHF Week 2 Survey      Responses   Facility patient discharged from?  [place]   Does the patient have one of the following disease processes/diagnoses(primary or secondary)?  CHF   Week 2 attempt successful?  No   Unsuccessful attempts  Attempt 2          Liseth Carvalho RN

## 2019-11-25 ENCOUNTER — READMISSION MANAGEMENT (OUTPATIENT)
Dept: CALL CENTER | Facility: HOSPITAL | Age: 34
End: 2019-11-25

## 2019-11-25 NOTE — OUTREACH NOTE
CHF Week 3 Survey      Responses   Facility patient discharged from?  [place]   Does the patient have one of the following disease processes/diagnoses(primary or secondary)?  CHF   Week 3 attempt successful?  No   Unsuccessful attempts  Attempt 1          Gaviota Rangel RN

## 2019-11-26 ENCOUNTER — READMISSION MANAGEMENT (OUTPATIENT)
Dept: CALL CENTER | Facility: HOSPITAL | Age: 34
End: 2019-11-26

## 2019-11-26 NOTE — OUTREACH NOTE
CHF Week 3 Survey      Responses   Facility patient discharged from?  [place]   Does the patient have one of the following disease processes/diagnoses(primary or secondary)?  CHF   Week 3 attempt successful?  No   Unsuccessful attempts  Attempt 2          Rufus Armando RN

## 2019-12-13 ENCOUNTER — OFFICE VISIT (OUTPATIENT)
Dept: CARDIOLOGY | Facility: CLINIC | Age: 34
End: 2019-12-13

## 2019-12-13 VITALS
BODY MASS INDEX: 46.93 KG/M2 | WEIGHT: 255 LBS | SYSTOLIC BLOOD PRESSURE: 142 MMHG | OXYGEN SATURATION: 100 % | HEART RATE: 81 BPM | DIASTOLIC BLOOD PRESSURE: 82 MMHG | HEIGHT: 62 IN

## 2019-12-13 DIAGNOSIS — I10 ESSENTIAL HYPERTENSION: Primary | ICD-10-CM

## 2019-12-13 DIAGNOSIS — N18.30 CKD (CHRONIC KIDNEY DISEASE) STAGE 3, GFR 30-59 ML/MIN (HCC): ICD-10-CM

## 2019-12-13 DIAGNOSIS — I50.31 ACUTE DIASTOLIC CONGESTIVE HEART FAILURE (HCC): ICD-10-CM

## 2019-12-13 DIAGNOSIS — I34.0 NONRHEUMATIC MITRAL VALVE REGURGITATION: ICD-10-CM

## 2019-12-13 DIAGNOSIS — E66.01 OBESITY, MORBID, BMI 50 OR HIGHER (HCC): ICD-10-CM

## 2019-12-13 PROCEDURE — 99213 OFFICE O/P EST LOW 20 MIN: CPT | Performed by: INTERNAL MEDICINE

## 2019-12-13 NOTE — PROGRESS NOTES
"PATIENTINFORMATION    Date of Office Visit: 2019  Encounter Provider: Reji Rudd MD  Place of Service: [facility] CARDIOLOGY  Patient Name: Gisela Dyson  : 1985    Subjective:     Encounter Date:2019      Patient ID: Gisela Dyson is a 34 y.o. female.    No chief complaint on file.    HPI  Ms. Dyson is a 34 years old female patient with past medical history of uncontrolled blood pressure, diastolic heart failure, hypertensive heart disease came to clinic for post hospital discharge follow-up visit.  Since discharge last month she has lost more than 50 pounds with diet and she feels significantly better.  She checks her blood pressure at home and mostly is in the 120s systolic and she reports being very compliant with her treatment regimen for hypertension.  And she is following up with nephrology for the renal failure.  She denied any significant shortness of breath, orthopnea, PND or extremity swelling.  Regarding elevated hemoglobin A1c she is going to follow-up with endocrinologist.  No other significant complaints today.    ROS   All systems reviewed and negative except as noted in HPI    Past Medical History:   Diagnosis Date   • JULISSA (acute kidney injury) (CMS/HCC)    • Diabetes mellitus (CMS/HCC)    • Diastolic CHF (CMS/HCC)    • Obesity, morbid, BMI 50 or higher (CMS/HCC)        No past surgical history on file.    Social History     Socioeconomic History   • Marital status: Single     Spouse name: Not on file   • Number of children: Not on file   • Years of education: Not on file   • Highest education level: Not on file   Tobacco Use   • Smoking status: Never Smoker   • Smokeless tobacco: Never Used   Substance and Sexual Activity   • Alcohol use: Yes     Comment: socially   • Drug use: No       Family History   Adopted: Yes         Procedures       Objective:     /82 (BP Location: Right arm)   Pulse 81   Ht 157.5 cm (62\")   Wt 116 kg (255 lb)  "  SpO2 100%   BMI 46.64 kg/m²  Body mass index is 46.64 kg/m².     Physical Exam   Constitutional: She is oriented to person, place, and time. She appears well-developed and well-nourished. No distress.   Morbidly obese   HENT:   Head: Normocephalic and atraumatic.   Eyes: Pupils are equal, round, and reactive to light. EOM are normal.   Neck: Normal range of motion. Neck supple. No thyromegaly present.   Cardiovascular: Normal rate, regular rhythm, normal heart sounds and intact distal pulses. Exam reveals no gallop and no friction rub.   No murmur heard.  Pulmonary/Chest: Effort normal and breath sounds normal. No respiratory distress. She has no wheezes. She has no rales. She exhibits no tenderness.   Abdominal: Soft. Bowel sounds are normal. She exhibits no distension. There is no guarding.   Musculoskeletal: Normal range of motion. She exhibits no edema or deformity.   Neurological: She is alert and oriented to person, place, and time. She has normal reflexes. No cranial nerve deficit.   Skin: Skin is warm and dry. No rash noted. She is not diaphoretic.   Psychiatric: She has a normal mood and affect. Judgment normal.       Review Of Data: Reviewed labs      Assessment/Plan:         Essential hypertension with hypertensive heart disease including left ventricular hypertrophy and diastolic dysfunction  -Noting clinical heart failure today  -Significantly improved blood pressure control on Coreg, Lasix, hydralazine and nifedipine    Nonrheumatic mitral valve regurgitation  -Moderate  -Follow-up    Obesity, morbid, BMI 50 or higher (CMS/HCC)  -Patient lost significant amount of weight in 1 month by adjusting her diet and she is motivated to lose more weight.    CKD (chronic kidney disease) stage 3, GFR 30-59 ml/min (CMS/HCC)  -Following up with nephrology      Diagnosis and plan of care discussed with patient and verbalized understanding.           Reji Rudd MD  12/13/19  10:45 AM

## 2019-12-17 ENCOUNTER — TELEPHONE (OUTPATIENT)
Dept: CARDIOLOGY | Facility: CLINIC | Age: 34
End: 2019-12-17

## 2020-01-10 ENCOUNTER — OFFICE VISIT (OUTPATIENT)
Dept: ENDOCRINOLOGY | Age: 35
End: 2020-01-10

## 2020-01-10 VITALS
DIASTOLIC BLOOD PRESSURE: 80 MMHG | BODY MASS INDEX: 46.7 KG/M2 | WEIGHT: 253.8 LBS | SYSTOLIC BLOOD PRESSURE: 128 MMHG | HEIGHT: 62 IN | RESPIRATION RATE: 16 BRPM

## 2020-01-10 DIAGNOSIS — I10 ESSENTIAL HYPERTENSION: ICD-10-CM

## 2020-01-10 DIAGNOSIS — L68.0 HIRSUTISM: ICD-10-CM

## 2020-01-10 DIAGNOSIS — E11.22 TYPE 2 DIABETES MELLITUS WITH STAGE 3 CHRONIC KIDNEY DISEASE, WITHOUT LONG-TERM CURRENT USE OF INSULIN (HCC): Primary | ICD-10-CM

## 2020-01-10 DIAGNOSIS — L65.9 ALOPECIA: ICD-10-CM

## 2020-01-10 DIAGNOSIS — E66.01 OBESITY, MORBID, BMI 50 OR HIGHER (HCC): ICD-10-CM

## 2020-01-10 DIAGNOSIS — E03.9 PRIMARY HYPOTHYROIDISM: ICD-10-CM

## 2020-01-10 DIAGNOSIS — N18.30 TYPE 2 DIABETES MELLITUS WITH STAGE 3 CHRONIC KIDNEY DISEASE, WITHOUT LONG-TERM CURRENT USE OF INSULIN (HCC): Primary | ICD-10-CM

## 2020-01-10 PROCEDURE — 99205 OFFICE O/P NEW HI 60 MIN: CPT | Performed by: INTERNAL MEDICINE

## 2020-01-10 RX ORDER — LEVOTHYROXINE SODIUM 0.12 MG/1
125 TABLET ORAL DAILY
Qty: 30 TABLET | Refills: 11 | Status: SHIPPED | OUTPATIENT
Start: 2020-01-10 | End: 2021-01-09

## 2020-01-10 RX ORDER — INSULIN DEGLUDEC 200 U/ML
75 INJECTION, SOLUTION SUBCUTANEOUS
Qty: 4 PEN | Refills: 11 | Status: SHIPPED | OUTPATIENT
Start: 2020-01-10 | End: 2021-06-22 | Stop reason: HOSPADM

## 2020-01-10 NOTE — PROGRESS NOTES
"Tim Dyson is a 34 y.o. female seen as a new patient for DM2. She is checking BG once a week. No BG readings. Patient denies any problems or concerns. Father states that she lost a lot of weight while she was in the hospital for CHF. He also states that she has given up soft drinks and cut eating down.     History of Present Illness 34-year-old female who is here with her adopted father for further evaluation and treatment of newly discovered type 2 diabetes.  She was hospitalized on 11/6/2019 because of congestive heart failure and uncontrolled hypertension.  In the process of work-up she was found to have a hemoglobin A1c of 9.2 and a TSH of 10.4.  By virtue of the fact that she is adopted there is no information on her family history.  As her hospitalization and is starting on antihypertensive medication as well as diuretics she has lost 65 pounds of weight and brought down her BMI from 58.2 to 46.4.    /80   Resp 16   Ht 157.5 cm (62\")   Wt 115 kg (253 lb 12.8 oz)   BMI 46.42 kg/m²      No Known Allergies    Current Outpatient Medications:   •  carvedilol (COREG) 25 MG tablet, Take 1 tablet by mouth 2 (Two) Times a Day With Meals., Disp: 60 tablet, Rfl: 0  •  furosemide (LASIX) 80 MG tablet, Take 1 tablet by mouth Daily., Disp: 30 tablet, Rfl: 0  •  hydrALAZINE (APRESOLINE) 25 MG tablet, Take 1 tablet by mouth Every 8 (Eight) Hours., Disp: 90 tablet, Rfl: 0  •  NIFEdipine XL (ADALAT CC) 60 MG 24 hr tablet, Take 2 tablets by mouth Daily., Disp: 30 tablet, Rfl: 0      The following portions of the patient's history were reviewed and updated as appropriate: allergies, current medications, past family history, past medical history, past social history, past surgical history and problem list.    Review of Systems   Constitutional: Negative.    HENT: Negative.    Eyes: Negative.    Respiratory: Negative.    Cardiovascular: Negative.    Gastrointestinal: Negative.    Endocrine: Negative.  "   Genitourinary: Negative.    Musculoskeletal: Negative.    Skin: Negative.    Allergic/Immunologic: Negative.    Neurological: Negative.    Hematological: Negative.    Psychiatric/Behavioral: Negative.        Objective   Physical Exam   Constitutional: She is oriented to person, place, and time. She appears well-developed and well-nourished. No distress.   HENT:   Head: Normocephalic and atraumatic.   Right Ear: External ear normal.   Left Ear: External ear normal.   Nose: Nose normal.   Mouth/Throat: Oropharynx is clear and moist. No oropharyngeal exudate.   Eyes: Pupils are equal, round, and reactive to light. Conjunctivae and EOM are normal. Right eye exhibits no discharge. Left eye exhibits no discharge. No scleral icterus.   Neck: Normal range of motion. Neck supple. No JVD present. No tracheal deviation present. No thyromegaly present.   Cardiovascular: Normal rate, regular rhythm, normal heart sounds and intact distal pulses. Exam reveals no gallop and no friction rub.   No murmur heard.  Pulmonary/Chest: Effort normal and breath sounds normal. No stridor. No respiratory distress. She has no wheezes. She has no rales. She exhibits no tenderness.   Abdominal: Soft. Bowel sounds are normal. She exhibits no distension and no mass. There is no tenderness. There is no rebound and no guarding. No hernia.   Musculoskeletal: Normal range of motion. She exhibits no edema, tenderness or deformity.   Lymphadenopathy:     She has no cervical adenopathy.   Neurological: She is alert and oriented to person, place, and time. She displays normal reflexes. No cranial nerve deficit or sensory deficit. She exhibits normal muscle tone. Coordination normal.   Skin: Skin is warm and dry. No rash noted. She is not diaphoretic. No erythema. No pallor.   Very sparse scalp hair and some facial hair under the chin.   Psychiatric: She has a normal mood and affect. Her behavior is normal. Judgment and thought content normal.   Nursing  note and vitals reviewed.        Assessment/Plan   Gisela was seen today for diabetes.    Diagnoses and all orders for this visit:    Type 2 diabetes mellitus with stage 3 chronic kidney disease, without long-term current use of insulin (CMS/HCC)  -     T4 & TSH ([facility])  -     T3, Free  -     T4, Free  -     Uric Acid  -     Vitamin D 25 Hydroxy  -     Comprehensive Metabolic Panel  -     C-Peptide  -     Follicle Stimulating Hormone  -     Hemoglobin A1c  -     Luteinizing Hormone  -     Prolactin  -     TestT+TestF+SHBG  -     ACTH  -     Cortisol  -     Aldosterone  -     Catecholamines, Fractionated, Plasma  -     Glutamic Acid Decarboxylase  -     Calcium, Ionized  -     PTH, Intact  -     Phosphorus  -     T4 & TSH ([facility]); Future  -     Uric Acid; Future  -     Vitamin D 25 Hydroxy; Future  -     Comprehensive Metabolic Panel; Future  -     C-Peptide; Future  -     Hemoglobin A1c; Future  -     MicroAlbumin, Urine, Random - Urine, Clean Catch; Future  -     NMR LipoProfile; Future    Obesity, morbid, BMI 50 or higher (CMS/[facility])  -     T4 & TSH ([facility])  -     T3, Free  -     T4, Free  -     Uric Acid  -     Vitamin D 25 Hydroxy  -     Comprehensive Metabolic Panel  -     C-Peptide  -     Follicle Stimulating Hormone  -     Hemoglobin A1c  -     Luteinizing Hormone  -     Prolactin  -     TestT+TestF+SHBG  -     ACTH  -     Cortisol  -     Aldosterone  -     Catecholamines, Fractionated, Plasma  -     Glutamic Acid Decarboxylase  -     Calcium, Ionized  -     PTH, Intact  -     Phosphorus  -     T4 & TSH ([facility]); Future  -     Uric Acid; Future  -     Vitamin D 25 Hydroxy; Future  -     Comprehensive Metabolic Panel; Future  -     C-Peptide; Future  -     Hemoglobin A1c; Future  -     MicroAlbumin, Urine, Random - Urine, Clean Catch; Future  -     NMR LipoProfile; Future    Essential hypertension  -     T4 & TSH ([facility])  -     T3, Free  -     T4, Free  -     Uric Acid  -     Vitamin D 25 Hydroxy  -      Comprehensive Metabolic Panel  -     C-Peptide  -     Follicle Stimulating Hormone  -     Hemoglobin A1c  -     Luteinizing Hormone  -     Prolactin  -     TestT+TestF+SHBG  -     ACTH  -     Cortisol  -     Aldosterone  -     Catecholamines, Fractionated, Plasma  -     Glutamic Acid Decarboxylase  -     Calcium, Ionized  -     PTH, Intact  -     Phosphorus  -     T4 & TSH ([facility]); Future  -     Uric Acid; Future  -     Vitamin D 25 Hydroxy; Future  -     Comprehensive Metabolic Panel; Future  -     C-Peptide; Future  -     Hemoglobin A1c; Future  -     MicroAlbumin, Urine, Random - Urine, Clean Catch; Future  -     NMR LipoProfile; Future    Primary hypothyroidism  -     T4 & TSH ([facility])  -     T3, Free  -     T4, Free  -     Uric Acid  -     Vitamin D 25 Hydroxy  -     Comprehensive Metabolic Panel  -     C-Peptide  -     Follicle Stimulating Hormone  -     Hemoglobin A1c  -     Luteinizing Hormone  -     Prolactin  -     TestT+TestF+SHBG  -     ACTH  -     Cortisol  -     Aldosterone  -     Catecholamines, Fractionated, Plasma  -     Glutamic Acid Decarboxylase  -     Calcium, Ionized  -     PTH, Intact  -     Phosphorus  -     T4 & TSH ([facility]); Future  -     Uric Acid; Future  -     Vitamin D 25 Hydroxy; Future  -     Comprehensive Metabolic Panel; Future  -     C-Peptide; Future  -     Hemoglobin A1c; Future  -     MicroAlbumin, Urine, Random - Urine, Clean Catch; Future  -     NMR LipoProfile; Future    Alopecia  -     T4 & TSH ([facility])  -     T3, Free  -     T4, Free  -     Uric Acid  -     Vitamin D 25 Hydroxy  -     Comprehensive Metabolic Panel  -     C-Peptide  -     Follicle Stimulating Hormone  -     Hemoglobin A1c  -     Luteinizing Hormone  -     Prolactin  -     TestT+TestF+SHBG  -     ACTH  -     Cortisol  -     Aldosterone  -     Catecholamines, Fractionated, Plasma  -     Glutamic Acid Decarboxylase  -     Calcium, Ionized  -     PTH, Intact  -     Phosphorus  -     T4 & TSH ([facility]);  Future  -     Uric Acid; Future  -     Vitamin D 25 Hydroxy; Future  -     Comprehensive Metabolic Panel; Future  -     C-Peptide; Future  -     Hemoglobin A1c; Future  -     MicroAlbumin, Urine, Random - Urine, Clean Catch; Future  -     NMR LipoProfile; Future    Hirsutism  -     T4 & TSH ([facility])  -     T3, Free  -     T4, Free  -     Uric Acid  -     Vitamin D 25 Hydroxy  -     Comprehensive Metabolic Panel  -     C-Peptide  -     Follicle Stimulating Hormone  -     Hemoglobin A1c  -     Luteinizing Hormone  -     Prolactin  -     TestT+TestF+SHBG  -     ACTH  -     Cortisol  -     Aldosterone  -     Catecholamines, Fractionated, Plasma  -     Glutamic Acid Decarboxylase  -     Calcium, Ionized  -     PTH, Intact  -     Phosphorus  -     T4 & TSH ([facility]); Future  -     Uric Acid; Future  -     Vitamin D 25 Hydroxy; Future  -     Comprehensive Metabolic Panel; Future  -     C-Peptide; Future  -     Hemoglobin A1c; Future  -     MicroAlbumin, Urine, Random - Urine, Clean Catch; Future  -     NMR LipoProfile; Future    Other orders  -     TRESIBA FLEXTOUCH 200 UNIT/ML solution pen-injector pen injection; Inject 75 Units under the skin into the appropriate area as directed Daily With Breakfast.  -     Dulaglutide (TRULICITY) 1.5 MG/0.5ML solution pen-injector; Inject 1.5 mg under the skin into the appropriate area as directed 1 (One) Time Per Week.  -     levothyroxine (SYNTHROID) 125 MCG tablet; Take 1 tablet by mouth Daily.      Summary I saw and examined this 34-year-old female for above-mentioned problems.  I reviewed her laboratory evaluation of 11/7/2019 and at this point will go ahead and order additional laboratory evaluation and once the results come back we will go ahead and call for any possible modification.  In the meantime I am starting her on Tresiba 50 units every morning and every 3 days if her fasting blood glucose is greater than 110 increase the dose by 2 units up to 75 units daily.   Additionally we are starting her on Trulicity 0.75 mg weekly and after 2 weeks move up to 1.5 mg weekly.  Also for her hypothyroidism I am going to start her on levothyroxine 125 mcg daily.  This office visit lasted 70 minutes of which 40 minutes was a spent on face-to-face counseling and education including what type 2 diabetes and hypothyroidism and morbid obesity are.  Additionally we had to teach her how to inject herself as well as how to monitor her blood glucose with the use of a meter.  I asked her to check her blood sugar 3 times daily.  She will see Ms. Norah Guerra in 4 months or sooner if needed with laboratory evaluation prior to each office visit.

## 2020-01-10 NOTE — PATIENT INSTRUCTIONS
Obesity, Adult  Obesity is the condition of having too much total body fat. Being overweight or obese means that your weight is greater than what is considered healthy for your body size. Obesity is determined by a measurement called BMI. BMI is an estimate of body fat and is calculated from height and weight. For adults, a BMI of 30 or higher is considered obese.  Obesity can lead to other health concerns and major illnesses, including:  · Stroke.  · Coronary artery disease (CAD).  · Type 2 diabetes.  · Some types of cancer, including cancers of the colon, breast, uterus, and gallbladder.  · Osteoarthritis.  · High blood pressure (hypertension).  · High cholesterol.  · Sleep apnea.  · Gallbladder stones.  · Infertility problems.  What are the causes?  Common causes of this condition include:  · Eating daily meals that are high in calories, sugar, and fat.  · Being born with genes that may make you more likely to become obese.  · Having a medical condition that causes obesity, including:  ? Hypothyroidism.  ? Polycystic ovarian syndrome (PCOS).  ? Binge-eating disorder.  ? Cushing syndrome.  · Taking certain medicines, such as steroids, antidepressants, and seizure medicines.  · Not being physically active (sedentary lifestyle).  · Not getting enough sleep.  · Drinking high amounts of sugar-sweetened beverages, such as soft drinks.  What increases the risk?  The following factors may make you more likely to develop this condition:  · Having a family history of obesity.  · Being a woman of  descent.  · Being a man of  descent.  · Living in an area with limited access to:  ? [place], recreation centers, or sidewalks.  ? Healthy food choices, such as grocery stores and farmers' markets.  What are the signs or symptoms?  The main sign of this condition is having too much body fat.  How is this diagnosed?  This condition is diagnosed based on:  · Your BMI. If you are an adult with a BMI of 30 or  higher, you are considered obese.  · Your waist circumference. This measures the distance around your waistline.  · Your skinfold thickness. Your health care provider may gently pinch a fold of your skin and measure it.  You may have other tests to check for underlying conditions.  How is this treated?  Treatment for this condition often includes changing your lifestyle. Treatment may include some or all of the following:  · Dietary changes. This may include developing a healthy meal plan.  · Regular physical activity. This may include activity that causes your heart to beat faster (aerobic exercise) and strength training. Work with your health care provider to design an exercise program that works for you.  · Medicine to help you lose weight if you are unable to lose 1 pound a week after 6 weeks of healthy eating and more physical activity.  · Treating conditions that cause the obesity (underlying conditions).  · Surgery. Surgical options may include gastric banding and gastric bypass. Surgery may be done if:  ? Other treatments have not helped to improve your condition.  ? You have a BMI of 40 or higher.  ? You have life-threatening health problems related to obesity.  Follow these instructions at home:  Eating and drinking    · Follow recommendations from your health care provider about what you eat and drink. Your health care provider may advise you to:  ? Limit fast food, sweets, and processed snack foods.  ? Choose low-fat options, such as low-fat milk instead of whole milk.  ? Eat 5 or more servings of fruits or vegetables every day.  ? Eat at home more often. This gives you more control over what you eat.  ? Choose healthy foods when you eat out.  ? Learn to read food labels. This will help you understand how much food is considered 1 serving.  ? Learn what a healthy serving size is.  ? Keep low-fat snacks available.  ? Limit sugary drinks, such as soda, fruit juice, sweetened iced tea, and flavored  milk.  · Drink enough water to keep your urine pale yellow.  · Do not follow a fad diet. Fad diets can be unhealthy and even dangerous.  Physical activity  · Exercise regularly, as told by your health care provider.  ? Most adults should get up to 150 minutes of moderate-intensity exercise every week.  ? Ask your health care provider what types of exercise are safe for you and how often you should exercise.  · Warm up and stretch before being active.  · Cool down and stretch after being active.  · Rest between periods of activity.  Lifestyle  · Work with your health care provider and a dietitian to set a weight-loss goal that is healthy and reasonable for you.  · Limit your screen time.  · Find ways to reward yourself that do not involve food.  · Do not drink alcohol if:  ? Your health care provider tells you not to drink.  ? You are pregnant, may be pregnant, or are planning to become pregnant.  · If you drink alcohol:  ? Limit how much you use to:  § 0-1 drink a day for women.  § 0-2 drinks a day for men.  ? Be aware of how much alcohol is in your drink. In the [place] one drink equals one 12 oz bottle of beer (355 mL), one 5 oz glass of wine (148 mL), or one 1½ oz glass of hard liquor (44 mL).  General instructions  · Keep a weight-loss journal to keep track of the food you eat and how much exercise you get.  · Take over-the-counter and prescription medicines only as told by your health care provider.  · Take vitamins and supplements only as told by your health care provider.  · Consider joining a support group. Your health care provider may be able to recommend a support group.  · Keep all follow-up visits as told by your health care provider. This is important.  Contact a health care provider if:  · You are unable to meet your weight loss goal after 6 weeks of dietary and lifestyle changes.  Get help right away if you are having:  · Trouble breathing.  · Suicidal thoughts or behaviors.  Summary  · Obesity is the  condition of having too much total body fat.  · Being overweight or obese means that your weight is greater than what is considered healthy for your body size.  · Work with your health care provider and a dietitian to set a weight-loss goal that is healthy and reasonable for you.  · Exercise regularly, as told by your health care provider. Ask your health care provider what types of exercise are safe for you and how often you should exercise.  This information is not intended to replace advice given to you by your health care provider. Make sure you discuss any questions you have with your health care provider.  Document Released: 01/25/2006 Document Revised: 08/22/2019 Document Reviewed: 08/22/2019  [employer] Interactive Patient Education © 2019 [employer]  Hypothyroidism    Hypothyroidism is when the thyroid gland does not make enough of certain hormones (it is underactive). The thyroid gland is a small gland located in the lower front part of the neck, just in front of the windpipe (trachea). This gland makes hormones that help control how the body uses food for energy (metabolism) as well as how the heart and brain function. These hormones also play a role in keeping your bones strong. When the thyroid is underactive, it produces too little of the hormones thyroxine (T4) and triiodothyronine (T3).  What are the causes?  This condition may be caused by:  · Hashimoto's disease. This is a disease in which the body's disease-fighting system (immune system) attacks the thyroid gland. This is the most common cause.  · Viral infections.  · Pregnancy.  · Certain medicines.  · Birth defects.  · Past radiation treatments to the head or neck for cancer.  · Past treatment with radioactive iodine.  · Past exposure to radiation in the environment.  · Past surgical removal of part or all of the thyroid.  · Problems with a gland in the center of the brain (pituitary gland).  · Lack of enough iodine in the diet.  What  increases the risk?  You are more likely to develop this condition if:  · You are female.  · You have a family history of thyroid conditions.  · You use a medicine called lithium.  · You take medicines that affect the immune system (immunosuppressants).  What are the signs or symptoms?  Symptoms of this condition include:  · Feeling as though you have no energy (lethargy).  · Not being able to tolerate cold.  · Weight gain that is not explained by a change in diet or exercise habits.  · Lack of appetite.  · Dry skin.  · Coarse hair.  · Menstrual irregularity.  · Slowing of thought processes.  · Constipation.  · Sadness or depression.  How is this diagnosed?  This condition may be diagnosed based on:  · Your symptoms, your medical history, and a physical exam.  · Blood tests.  You may also have imaging tests, such as an ultrasound or MRI.  How is this treated?  This condition is treated with medicine that replaces the thyroid hormones that your body does not make. After you begin treatment, it may take several weeks for symptoms to go away.  Follow these instructions at home:  · Take over-the-counter and prescription medicines only as told by your health care provider.  · If you start taking any new medicines, tell your health care provider.  · Keep all follow-up visits as told by your health care provider. This is important.  ? As your condition improves, your dosage of thyroid hormone medicine may change.  ? You will need to have blood tests regularly so that your health care provider can monitor your condition.  Contact a health care provider if:  · Your symptoms do not get better with treatment.  · You are taking thyroid replacement medicine and you:  ? Sweat a lot.  ? Have tremors.  ? Feel anxious.  ? Lose weight rapidly.  ? Cannot tolerate heat.  ? Have emotional swings.  ? Have diarrhea.  ? Feel weak.  Get help right away if you have:  · Chest pain.  · An irregular heartbeat.  · A rapid  heartbeat.  · Difficulty breathing.  Summary  · Hypothyroidism is when the thyroid gland does not make enough of certain hormones (it is underactive).  · When the thyroid is underactive, it produces too little of the hormones thyroxine (T4) and triiodothyronine (T3).  · The most common cause is Hashimoto's disease, a disease in which the body's disease-fighting system (immune system) attacks the thyroid gland. The condition can also be caused by viral infections, medicine, pregnancy, or past radiation treatment to the head or neck.  · Symptoms may include weight gain, dry skin, constipation, feeling as though you do not have energy, and not being able to tolerate cold.  · This condition is treated with medicine to replace the thyroid hormones that your body does not make.  This information is not intended to replace advice given to you by your health care provider. Make sure you discuss any questions you have with your health care provider.  Document Released: 12/18/2006 Document Revised: 11/28/2018 Document Reviewed: 11/28/2018  [employer] Interactive Patient Education © 2019 [employer]  Type 2 Diabetes Mellitus, Diagnosis, Adult  Type 2 diabetes (type 2 diabetes mellitus) is a long-term (chronic) disease. In type 2 diabetes, one or both of these problems may be present:  · The pancreas does not make enough of a hormone called insulin.  · Cells in the body do not respond properly to insulin that the body makes (insulin resistance).  Normally, insulin allows blood sugar (glucose) to enter cells in the body. The cells use glucose for energy. Insulin resistance or lack of insulin causes excess glucose to build up in the blood instead of going into cells. As a result, high blood glucose (hyperglycemia) develops.  What increases the risk?  The following factors may make you more likely to develop type 2 diabetes:  · Having a family member with type 2 diabetes.  · Being overweight or obese.  · Having an inactive  (sedentary) lifestyle.  · Having been diagnosed with insulin resistance.  · Having a history of prediabetes, gestational diabetes, or polycystic ovary syndrome (PCOS).  · Being of American[place], -American, /, or / descent.  What are the signs or symptoms?  In the early stage of this condition, you may not have symptoms. Symptoms develop slowly and may include:  · Increased thirst (polydipsia).  · Increased hunger (polyphagia).  · Increased urination (polyuria).  · Increased urination during the night (nocturia).  · Unexplained weight loss.  · Frequent infections that keep coming back (recurring).  · Fatigue.  · Weakness.  · Vision changes, such as blurry vision.  · Cuts or bruises that are slow to heal.  · Tingling or numbness in the hands or feet.  · Dark patches on the skin (acanthosis nigricans).  How is this diagnosed?  This condition is diagnosed based on your symptoms, your medical history, a physical exam, and your blood glucose level. Your blood glucose may be checked with one or more of the following blood tests:  · A fasting blood glucose (FBG) test. You will not be allowed to eat (you will fast) for 8 hours or longer before a blood sample is taken.  · A random blood glucose test. This test checks blood glucose at any time of day regardless of when you ate.  · An A1c (hemoglobin A1c) blood test. This test provides information about blood glucose control over the previous 2-3 months.  · An oral glucose tolerance test (OGTT). This test measures your blood glucose at two times:  ? After fasting. This is your baseline blood glucose level.  ? Two hours after drinking a beverage that contains glucose.  You may be diagnosed with type 2 diabetes if:  · Your FBG level is 126 mg/dL (7.0 mmol/L) or higher.  · Your random blood glucose level is 200 mg/dL (11.1 mmol/L) or higher.  · Your A1c level is 6.5% or higher.  · Your OGTT result is higher than 200 mg/dL (11.1  mmol/L).  These blood tests may be repeated to confirm your diagnosis.  How is this treated?  Your treatment may be managed by a specialist called an endocrinologist. Type 2 diabetes may be treated by following instructions from your health care provider about:  · Making diet and lifestyle changes. This may include:  ? Following an individualized nutrition plan that is developed by a diet and nutrition specialist (registered dietitian).  ? Exercising regularly.  ? Finding ways to manage stress.  · Checking your blood glucose level as often as told.  · Taking diabetes medicines or insulin daily. This helps to keep your blood glucose levels in the healthy range.  ? If you use insulin, you may need to adjust the dosage depending on how physically active you are and what foods you eat. Your health care provider will tell you how to adjust your dosage.  · Taking medicines to help prevent complications from diabetes, such as:  ? Aspirin.  ? Medicine to lower cholesterol.  ? Medicine to control blood pressure.  Your health care provider will set individualized treatment goals for you. Your goals will be based on your age, other medical conditions you have, and how you respond to diabetes treatment. Generally, the goal of treatment is to maintain the following blood glucose levels:  · Before meals (preprandial):  mg/dL (4.4-7.2 mmol/L).  · After meals (postprandial): below 180 mg/dL (10 mmol/L).  · A1c level: less than 7%.  Follow these instructions at home:  Questions to ask your health care provider  · Consider asking the following questions:  ? Do I need to meet with a diabetes educator?  ? Where can I find a support group for people with diabetes?  ? What equipment will I need to manage my diabetes at home?  ? What diabetes medicines do I need, and when should I take them?  ? How often do I need to check my blood glucose?  ? What number can I call if I have questions?  ? When is my next appointment?  General  instructions  · Take over-the-counter and prescription medicines only as told by your health care provider.  · Keep all follow-up visits as told by your health care provider. This is important.  · For more information about diabetes, visit:  ? American Diabetes Association (ADA): www.diabetes.org  ? American Association of Diabetes Educators (AADE): www.diabeteseducator.org  Contact a health care provider if:  · Your blood glucose is at or above 240 mg/dL (13.3 mmol/L) for 2 days in a row.  · You have been sick or have had a fever for 2 days or longer, and you are not getting better.  · You have any of the following problems for more than 6 hours:  ? You cannot eat or drink.  ? You have nausea and vomiting.  ? You have diarrhea.  Get help right away if:  · Your blood glucose is lower than 54 mg/dL (3.0 mmol/L).  · You become confused or you have trouble thinking clearly.  · You have difficulty breathing.  · You have moderate or large ketone levels in your urine.  Summary  · Type 2 diabetes (type 2 diabetes mellitus) is a long-term (chronic) disease. In type 2 diabetes, the pancreas does not make enough of a hormone called insulin, or cells in the body do not respond properly to insulin that the body makes (insulin resistance).  · This condition is treated by making diet and lifestyle changes and taking diabetes medicines or insulin.  · Your health care provider will set individualized treatment goals for you. Your goals will be based on your age, other medical conditions you have, and how you respond to diabetes treatment.  · Keep all follow-up visits as told by your health care provider. This is important.  This information is not intended to replace advice given to you by your health care provider. Make sure you discuss any questions you have with your health care provider.  Document Released: 12/18/2006 Document Revised: 07/19/2018 Document Reviewed: 01/20/2017  [employer] Interactive Patient Education © 2019  [employer].

## 2020-01-14 ENCOUNTER — TELEPHONE (OUTPATIENT)
Dept: ENDOCRINOLOGY | Age: 35
End: 2020-01-14

## 2020-01-14 NOTE — TELEPHONE ENCOUNTER
PT'S FATHER CALLED AND HE THAT HIS DAUGHTER CANNOT AFFORD THE RXS FOR TRULICITY AND TRESIBA, IS THERE ANYTHING THAT SHE COULD GET THAT IS CHEAPER.  IF YOU HAVE ANY QUESTIONS YOU CAN CONTACT HER FATHER -[phone].

## 2020-01-15 RX ORDER — EXENATIDE 2 MG/.85ML
2 INJECTION, SUSPENSION, EXTENDED RELEASE SUBCUTANEOUS WEEKLY
Qty: 4 PEN | Refills: 11
Start: 2020-01-15 | End: 2021-06-22 | Stop reason: HOSPADM

## 2020-01-15 NOTE — TELEPHONE ENCOUNTER
Her father is asked we will go ahead and provide samples for her until she gets things in order.  She can also apply for pharmaceutical assistance.  She will continue her Tresiba and instead of Trulicity since we have more all that switching her to Bydureon 2 mg once weekly.  I still do not have her lab results to share with her and her father.

## 2020-01-15 NOTE — TELEPHONE ENCOUNTER
Spoke to patient's father. He will come  samples. He got patient assistance paperwork from [facility] while there for himself so he will fill out their portion and bring into the office to be sent to the company.

## 2020-01-16 LAB
25(OH)D3+25(OH)D2 SERPL-MCNC: 14.1 NG/ML (ref 30–100)
ACTH PLAS-MCNC: 28.8 PG/ML (ref 7.2–63.3)
ALBUMIN SERPL-MCNC: 3.8 G/DL (ref 3.5–5.2)
ALBUMIN/GLOB SERPL: 1 G/DL
ALDOST SERPL-MCNC: 39 NG/DL (ref 0–30)
ALP SERPL-CCNC: 90 U/L (ref 39–117)
ALT SERPL-CCNC: 15 U/L (ref 1–33)
AST SERPL-CCNC: 17 U/L (ref 1–32)
BILIRUB SERPL-MCNC: 0.3 MG/DL (ref 0.2–1.2)
BUN SERPL-MCNC: 39 MG/DL (ref 6–20)
BUN/CREAT SERPL: 15.9 (ref 7–25)
C PEPTIDE SERPL-MCNC: 14.3 NG/ML (ref 1.1–4.4)
CA-I SERPL ISE-MCNC: 5.4 MG/DL (ref 4.5–5.6)
CALCIUM SERPL-MCNC: 9.4 MG/DL (ref 8.6–10.5)
CHLORIDE SERPL-SCNC: 99 MMOL/L (ref 98–107)
CO2 SERPL-SCNC: 22 MMOL/L (ref 22–29)
CORTIS SERPL-MCNC: 15.8 UG/DL
CREAT SERPL-MCNC: 2.45 MG/DL (ref 0.57–1)
DOPAMINE SERPL-MCNC: <30 PG/ML (ref 0–48)
EPINEPH PLAS-MCNC: 46 PG/ML (ref 0–62)
FSH SERPL-ACNC: 5.1 MIU/ML
GAD65 AB SER IA-ACNC: <5 U/ML (ref 0–5)
GLOBULIN SER CALC-MCNC: 3.8 GM/DL
GLUCOSE SERPL-MCNC: 155 MG/DL (ref 65–99)
HBA1C MFR BLD: 8.1 % (ref 4.8–5.6)
INTERPRETATION: NORMAL
LH SERPL-ACNC: 9.5 MIU/ML
Lab: NORMAL
NOREPINEPH PLAS-MCNC: 608 PG/ML (ref 0–874)
PHOSPHATE SERPL-MCNC: 4.9 MG/DL (ref 2.5–4.5)
POTASSIUM SERPL-SCNC: 4.8 MMOL/L (ref 3.5–5.2)
PROLACTIN SERPL-MCNC: 17.2 NG/ML (ref 4.8–23.3)
PROT SERPL-MCNC: 7.6 G/DL (ref 6–8.5)
PTH-INTACT SERPL-MCNC: 32 PG/ML (ref 15–65)
SHBG SERPL-SCNC: 22.3 NMOL/L (ref 24.6–122)
SODIUM SERPL-SCNC: 137 MMOL/L (ref 136–145)
T3FREE SERPL-MCNC: 2.8 PG/ML (ref 2–4.4)
T4 FREE SERPL-MCNC: 1.16 NG/DL (ref 0.93–1.7)
T4 SERPL-MCNC: 9.08 MCG/DL (ref 4.5–11.7)
TESTOST FREE SERPL-MCNC: 2.1 PG/ML (ref 0–4.2)
TESTOST SERPL-MCNC: 20 NG/DL (ref 8–48)
TSH SERPL DL<=0.005 MIU/L-ACNC: 3.87 UIU/ML (ref 0.27–4.2)
URATE SERPL-MCNC: 11 MG/DL (ref 2.4–5.7)

## 2020-01-16 RX ORDER — ALLOPURINOL 300 MG/1
300 TABLET ORAL DAILY
Qty: 90 TABLET | Refills: 3 | Status: SHIPPED | OUTPATIENT
Start: 2020-01-16 | End: 2021-01-15

## 2020-01-16 RX ORDER — ERGOCALCIFEROL 1.25 MG/1
50000 CAPSULE ORAL 3 TIMES WEEKLY
Qty: 39 CAPSULE | Refills: 3 | Status: SHIPPED | OUTPATIENT
Start: 2020-01-17 | End: 2021-01-16

## 2020-02-24 ENCOUNTER — TELEPHONE (OUTPATIENT)
Dept: ENDOCRINOLOGY | Age: 35
End: 2020-02-24

## 2020-02-24 NOTE — TELEPHONE ENCOUNTER
Left message for patient that patient assistance had arrived. Called father as well but number not in service.

## 2020-04-29 ENCOUNTER — RESULTS ENCOUNTER (OUTPATIENT)
Dept: ENDOCRINOLOGY | Age: 35
End: 2020-04-29

## 2020-04-29 DIAGNOSIS — L65.9 ALOPECIA: ICD-10-CM

## 2020-04-29 DIAGNOSIS — E11.22 TYPE 2 DIABETES MELLITUS WITH STAGE 3 CHRONIC KIDNEY DISEASE, WITHOUT LONG-TERM CURRENT USE OF INSULIN (HCC): ICD-10-CM

## 2020-04-29 DIAGNOSIS — L68.0 HIRSUTISM: ICD-10-CM

## 2020-04-29 DIAGNOSIS — N18.30 TYPE 2 DIABETES MELLITUS WITH STAGE 3 CHRONIC KIDNEY DISEASE, WITHOUT LONG-TERM CURRENT USE OF INSULIN (HCC): ICD-10-CM

## 2020-04-29 DIAGNOSIS — E66.01 OBESITY, MORBID, BMI 50 OR HIGHER (HCC): ICD-10-CM

## 2020-04-29 DIAGNOSIS — E03.9 PRIMARY HYPOTHYROIDISM: ICD-10-CM

## 2020-04-29 DIAGNOSIS — I10 ESSENTIAL HYPERTENSION: ICD-10-CM

## 2020-05-22 RX ORDER — DULAGLUTIDE 1.5 MG/.5ML
1 INJECTION, SOLUTION SUBCUTANEOUS
Qty: 12 PEN | Refills: 3 | Status: SHIPPED | OUTPATIENT
Start: 2020-05-22 | End: 2021-06-22 | Stop reason: HOSPADM

## 2021-04-16 ENCOUNTER — BULK ORDERING (OUTPATIENT)
Dept: CASE MANAGEMENT | Facility: OTHER | Age: 36
End: 2021-04-16

## 2021-04-16 DIAGNOSIS — Z23 IMMUNIZATION DUE: ICD-10-CM

## 2021-06-17 ENCOUNTER — APPOINTMENT (OUTPATIENT)
Dept: GENERAL RADIOLOGY | Facility: HOSPITAL | Age: 36
End: 2021-06-17

## 2021-06-17 ENCOUNTER — HOSPITAL ENCOUNTER (INPATIENT)
Facility: HOSPITAL | Age: 36
LOS: 5 days | Discharge: HOME | End: 2021-06-22
Attending: EMERGENCY MEDICINE | Admitting: HOSPITALIST

## 2021-06-17 ENCOUNTER — APPOINTMENT (OUTPATIENT)
Dept: ULTRASOUND IMAGING | Facility: HOSPITAL | Age: 36
End: 2021-06-17

## 2021-06-17 DIAGNOSIS — R77.8 ELEVATED TROPONIN: ICD-10-CM

## 2021-06-17 DIAGNOSIS — I10 UNCONTROLLED HYPERTENSION: ICD-10-CM

## 2021-06-17 DIAGNOSIS — N17.9 ACUTE RENAL FAILURE, UNSPECIFIED ACUTE RENAL FAILURE TYPE (HCC): Primary | ICD-10-CM

## 2021-06-17 DIAGNOSIS — E87.70 HYPERVOLEMIA, UNSPECIFIED HYPERVOLEMIA TYPE: ICD-10-CM

## 2021-06-17 DIAGNOSIS — Z91.199 NONCOMPLIANCE: ICD-10-CM

## 2021-06-17 PROBLEM — I50.32 CHRONIC DIASTOLIC CONGESTIVE HEART FAILURE (HCC): Status: ACTIVE | Noted: 2019-11-07

## 2021-06-17 LAB
ALBUMIN SERPL-MCNC: 3.1 G/DL (ref 3.5–5.2)
ALBUMIN/GLOB SERPL: 1.1 G/DL
ALP SERPL-CCNC: 84 U/L (ref 39–117)
ALT SERPL W P-5'-P-CCNC: 15 U/L (ref 1–33)
ANION GAP SERPL CALCULATED.3IONS-SCNC: 14.1 MMOL/L (ref 5–15)
AST SERPL-CCNC: 10 U/L (ref 1–32)
BILIRUB SERPL-MCNC: 0.3 MG/DL (ref 0–1.2)
BUN SERPL-MCNC: 80 MG/DL (ref 6–20)
BUN/CREAT SERPL: 6.3 (ref 7–25)
BURR CELLS BLD QL SMEAR: ABNORMAL
CALCIUM SPEC-SCNC: 7.3 MG/DL (ref 8.6–10.5)
CHLORIDE SERPL-SCNC: 106 MMOL/L (ref 98–107)
CO2 SERPL-SCNC: 18.9 MMOL/L (ref 22–29)
CREAT SERPL-MCNC: 12.67 MG/DL (ref 0.57–1)
DEPRECATED RDW RBC AUTO: 45.8 FL (ref 37–54)
EOSINOPHIL # BLD MANUAL: 2.16 10*3/MM3 (ref 0–0.4)
EOSINOPHIL NFR BLD MANUAL: 17.3 % (ref 0.3–6.2)
ERYTHROCYTE [DISTWIDTH] IN BLOOD BY AUTOMATED COUNT: 13.3 % (ref 12.3–15.4)
GFR SERPL CREATININE-BSD FRML MDRD: 3 ML/MIN/1.73
GFR SERPL CREATININE-BSD FRML MDRD: 4 ML/MIN/1.73
GLOBULIN UR ELPH-MCNC: 2.9 GM/DL
GLUCOSE SERPL-MCNC: 138 MG/DL (ref 65–99)
HCG SERPL QL: NEGATIVE
HCT VFR BLD AUTO: 33.2 % (ref 34–46.6)
HGB BLD-MCNC: 10.7 G/DL (ref 12–15.9)
HOLD SPECIMEN: NORMAL
LYMPHOCYTES # BLD MANUAL: 0.51 10*3/MM3 (ref 0.7–3.1)
LYMPHOCYTES NFR BLD MANUAL: 4.1 % (ref 19.6–45.3)
LYMPHOCYTES NFR BLD MANUAL: 5.1 % (ref 5–12)
MCH RBC QN AUTO: 29.9 PG (ref 26.6–33)
MCHC RBC AUTO-ENTMCNC: 32.2 G/DL (ref 31.5–35.7)
MCV RBC AUTO: 92.7 FL (ref 79–97)
MONOCYTES # BLD AUTO: 0.64 10*3/MM3 (ref 0.1–0.9)
NEUTROPHILS # BLD AUTO: 9.17 10*3/MM3 (ref 1.7–7)
NEUTROPHILS NFR BLD MANUAL: 73.5 % (ref 42.7–76)
NT-PROBNP SERPL-MCNC: ABNORMAL PG/ML (ref 0–450)
PLAT MORPH BLD: NORMAL
PLATELET # BLD AUTO: 304 10*3/MM3 (ref 140–450)
PMV BLD AUTO: 10.1 FL (ref 6–12)
POIKILOCYTOSIS BLD QL SMEAR: ABNORMAL
POTASSIUM SERPL-SCNC: 4.8 MMOL/L (ref 3.5–5.2)
PROT SERPL-MCNC: 6 G/DL (ref 6–8.5)
RBC # BLD AUTO: 3.58 10*6/MM3 (ref 3.77–5.28)
SARS-COV-2 RNA RESP QL NAA+PROBE: NOT DETECTED
SODIUM SERPL-SCNC: 139 MMOL/L (ref 136–145)
TROPONIN T SERPL-MCNC: 0.06 NG/ML (ref 0–0.03)
WBC # BLD AUTO: 12.47 10*3/MM3 (ref 3.4–10.8)
WBC MORPH BLD: NORMAL
WHOLE BLOOD HOLD SPECIMEN: NORMAL

## 2021-06-17 PROCEDURE — 99284 EMERGENCY DEPT VISIT MOD MDM: CPT

## 2021-06-17 PROCEDURE — 85007 BL SMEAR W/DIFF WBC COUNT: CPT

## 2021-06-17 PROCEDURE — 76775 US EXAM ABDO BACK WALL LIM: CPT

## 2021-06-17 PROCEDURE — 93005 ELECTROCARDIOGRAM TRACING: CPT

## 2021-06-17 PROCEDURE — 80053 COMPREHEN METABOLIC PANEL: CPT

## 2021-06-17 PROCEDURE — 87340 HEPATITIS B SURFACE AG IA: CPT | Performed by: INTERNAL MEDICINE

## 2021-06-17 PROCEDURE — 93010 ELECTROCARDIOGRAM REPORT: CPT | Performed by: INTERNAL MEDICINE

## 2021-06-17 PROCEDURE — 25010000002 HYDRALAZINE PER 20 MG: Performed by: EMERGENCY MEDICINE

## 2021-06-17 PROCEDURE — 84484 ASSAY OF TROPONIN QUANT: CPT | Performed by: NURSE PRACTITIONER

## 2021-06-17 PROCEDURE — 84703 CHORIONIC GONADOTROPIN ASSAY: CPT

## 2021-06-17 PROCEDURE — 71046 X-RAY EXAM CHEST 2 VIEWS: CPT

## 2021-06-17 PROCEDURE — U0003 INFECTIOUS AGENT DETECTION BY NUCLEIC ACID (DNA OR RNA); SEVERE ACUTE RESPIRATORY SYNDROME CORONAVIRUS 2 (SARS-COV-2) (CORONAVIRUS DISEASE [COVID-19]), AMPLIFIED PROBE TECHNIQUE, MAKING USE OF HIGH THROUGHPUT TECHNOLOGIES AS DESCRIBED BY CMS-2020-01-R: HCPCS | Performed by: EMERGENCY MEDICINE

## 2021-06-17 PROCEDURE — 84484 ASSAY OF TROPONIN QUANT: CPT

## 2021-06-17 PROCEDURE — 83880 ASSAY OF NATRIURETIC PEPTIDE: CPT

## 2021-06-17 PROCEDURE — 85025 COMPLETE CBC W/AUTO DIFF WBC: CPT

## 2021-06-17 PROCEDURE — 36415 COLL VENOUS BLD VENIPUNCTURE: CPT

## 2021-06-17 RX ORDER — ACETAMINOPHEN 650 MG/1
650 SUPPOSITORY RECTAL EVERY 4 HOURS PRN
Status: DISCONTINUED | OUTPATIENT
Start: 2021-06-17 | End: 2021-06-22 | Stop reason: HOSPADM

## 2021-06-17 RX ORDER — SODIUM CHLORIDE 9 MG/ML
100 INJECTION, SOLUTION INTRAVENOUS CONTINUOUS
Status: DISCONTINUED | OUTPATIENT
Start: 2021-06-18 | End: 2021-06-17

## 2021-06-17 RX ORDER — CEFDINIR 300 MG/1
300 CAPSULE ORAL DAILY
COMMUNITY
End: 2021-06-22 | Stop reason: HOSPADM

## 2021-06-17 RX ORDER — SODIUM CHLORIDE 0.9 % (FLUSH) 0.9 %
10 SYRINGE (ML) INJECTION EVERY 12 HOURS SCHEDULED
Status: DISCONTINUED | OUTPATIENT
Start: 2021-06-17 | End: 2021-06-22 | Stop reason: HOSPADM

## 2021-06-17 RX ORDER — ACETAMINOPHEN 325 MG/1
650 TABLET ORAL EVERY 4 HOURS PRN
Status: DISCONTINUED | OUTPATIENT
Start: 2021-06-17 | End: 2021-06-22 | Stop reason: HOSPADM

## 2021-06-17 RX ORDER — DEXTROSE MONOHYDRATE 25 G/50ML
25 INJECTION, SOLUTION INTRAVENOUS
Status: DISCONTINUED | OUTPATIENT
Start: 2021-06-17 | End: 2021-06-22 | Stop reason: HOSPADM

## 2021-06-17 RX ORDER — ACETAMINOPHEN 160 MG/5ML
650 SOLUTION ORAL EVERY 4 HOURS PRN
Status: DISCONTINUED | OUTPATIENT
Start: 2021-06-17 | End: 2021-06-22 | Stop reason: HOSPADM

## 2021-06-17 RX ORDER — SODIUM CHLORIDE 0.9 % (FLUSH) 0.9 %
10 SYRINGE (ML) INJECTION AS NEEDED
Status: DISCONTINUED | OUTPATIENT
Start: 2021-06-17 | End: 2021-06-22 | Stop reason: HOSPADM

## 2021-06-17 RX ORDER — NITROGLYCERIN 0.4 MG/1
0.4 TABLET SUBLINGUAL
Status: DISCONTINUED | OUTPATIENT
Start: 2021-06-17 | End: 2021-06-22 | Stop reason: HOSPADM

## 2021-06-17 RX ORDER — HYDRALAZINE HYDROCHLORIDE 20 MG/ML
20 INJECTION INTRAMUSCULAR; INTRAVENOUS ONCE
Status: COMPLETED | OUTPATIENT
Start: 2021-06-17 | End: 2021-06-17

## 2021-06-17 RX ORDER — NICOTINE POLACRILEX 4 MG
15 LOZENGE BUCCAL
Status: DISCONTINUED | OUTPATIENT
Start: 2021-06-17 | End: 2021-06-22 | Stop reason: HOSPADM

## 2021-06-17 RX ORDER — INSULIN LISPRO 100 [IU]/ML
0-14 INJECTION, SOLUTION INTRAVENOUS; SUBCUTANEOUS
Status: DISCONTINUED | OUTPATIENT
Start: 2021-06-18 | End: 2021-06-22 | Stop reason: HOSPADM

## 2021-06-17 RX ORDER — ALLOPURINOL 300 MG/1
300 TABLET ORAL DAILY
COMMUNITY

## 2021-06-17 RX ORDER — ONDANSETRON 2 MG/ML
4 INJECTION INTRAMUSCULAR; INTRAVENOUS EVERY 6 HOURS PRN
Status: DISCONTINUED | OUTPATIENT
Start: 2021-06-17 | End: 2021-06-22 | Stop reason: HOSPADM

## 2021-06-17 RX ORDER — LEVOTHYROXINE SODIUM 0.12 MG/1
125 TABLET ORAL NIGHTLY
COMMUNITY

## 2021-06-17 RX ORDER — INSULIN LISPRO 100 [IU]/ML
0-14 INJECTION, SOLUTION INTRAVENOUS; SUBCUTANEOUS
Status: DISCONTINUED | OUTPATIENT
Start: 2021-06-18 | End: 2021-06-17

## 2021-06-17 RX ADMIN — HYDRALAZINE HYDROCHLORIDE 20 MG: 20 INJECTION INTRAMUSCULAR; INTRAVENOUS at 21:13

## 2021-06-17 NOTE — ED TRIAGE NOTES
"Pt to ER via PV. Pt states bilateral leg swelling that started yesterday. Pt states hx of CHF. Pt also c/o abdominal distention and SOA.     Pt is prescribed Lasix but pt states she stopped taking it because it made her dizzy. Pt states she has been off it for \"awhile\"    Patient in mask. This RN in appropriate PPE - including mask, goggles, and gloves during all of patient care.     "

## 2021-06-18 ENCOUNTER — APPOINTMENT (OUTPATIENT)
Dept: CARDIOLOGY | Facility: HOSPITAL | Age: 36
End: 2021-06-18

## 2021-06-18 ENCOUNTER — ANESTHESIA (OUTPATIENT)
Dept: PERIOP | Facility: HOSPITAL | Age: 36
End: 2021-06-18

## 2021-06-18 ENCOUNTER — ANESTHESIA EVENT (OUTPATIENT)
Dept: PERIOP | Facility: HOSPITAL | Age: 36
End: 2021-06-18

## 2021-06-18 ENCOUNTER — APPOINTMENT (OUTPATIENT)
Dept: GENERAL RADIOLOGY | Facility: HOSPITAL | Age: 36
End: 2021-06-18

## 2021-06-18 PROBLEM — N18.6 ESRD ON HEMODIALYSIS (HCC): Status: ACTIVE | Noted: 2021-06-18

## 2021-06-18 PROBLEM — N18.30 CKD (CHRONIC KIDNEY DISEASE) STAGE 3, GFR 30-59 ML/MIN (HCC): Status: RESOLVED | Noted: 2019-12-13 | Resolved: 2021-06-18

## 2021-06-18 PROBLEM — Z99.2 ESRD ON HEMODIALYSIS (HCC): Status: ACTIVE | Noted: 2021-06-18

## 2021-06-18 LAB
ANION GAP SERPL CALCULATED.3IONS-SCNC: 18 MMOL/L (ref 5–15)
BACTERIA UR QL AUTO: ABNORMAL /HPF
BILIRUB UR QL STRIP: NEGATIVE
BUN SERPL-MCNC: 82 MG/DL (ref 6–20)
BUN/CREAT SERPL: 6.5 (ref 7–25)
CALCIUM SPEC-SCNC: 7.2 MG/DL (ref 8.6–10.5)
CHLORIDE SERPL-SCNC: 109 MMOL/L (ref 98–107)
CLARITY UR: CLEAR
CO2 SERPL-SCNC: 15 MMOL/L (ref 22–29)
COLOR UR: YELLOW
CREAT SERPL-MCNC: 12.56 MG/DL (ref 0.57–1)
CREAT UR-MCNC: 59.6 MG/DL
DEPRECATED RDW RBC AUTO: 46.5 FL (ref 37–54)
ERYTHROCYTE [DISTWIDTH] IN BLOOD BY AUTOMATED COUNT: 13.5 % (ref 12.3–15.4)
GFR SERPL CREATININE-BSD FRML MDRD: 3 ML/MIN/1.73
GFR SERPL CREATININE-BSD FRML MDRD: 4 ML/MIN/1.73
GLUCOSE BLDC GLUCOMTR-MCNC: 110 MG/DL (ref 70–130)
GLUCOSE BLDC GLUCOMTR-MCNC: 120 MG/DL (ref 70–130)
GLUCOSE BLDC GLUCOMTR-MCNC: 123 MG/DL (ref 70–130)
GLUCOSE BLDC GLUCOMTR-MCNC: 131 MG/DL (ref 70–130)
GLUCOSE BLDC GLUCOMTR-MCNC: 188 MG/DL (ref 70–130)
GLUCOSE SERPL-MCNC: 108 MG/DL (ref 65–99)
GLUCOSE UR STRIP-MCNC: ABNORMAL MG/DL
HBA1C MFR BLD: 6 % (ref 4.8–5.6)
HBV SURFACE AG SERPL QL IA: NORMAL
HCT VFR BLD AUTO: 30.2 % (ref 34–46.6)
HGB BLD-MCNC: 9.7 G/DL (ref 12–15.9)
HGB UR QL STRIP.AUTO: ABNORMAL
HYALINE CASTS UR QL AUTO: ABNORMAL /LPF
KETONES UR QL STRIP: NEGATIVE
LEUKOCYTE ESTERASE UR QL STRIP.AUTO: NEGATIVE
MCH RBC QN AUTO: 29.8 PG (ref 26.6–33)
MCHC RBC AUTO-ENTMCNC: 32.1 G/DL (ref 31.5–35.7)
MCV RBC AUTO: 92.9 FL (ref 79–97)
NITRITE UR QL STRIP: NEGATIVE
PH UR STRIP.AUTO: 6 [PH] (ref 5–8)
PLATELET # BLD AUTO: 258 10*3/MM3 (ref 140–450)
PMV BLD AUTO: 10.1 FL (ref 6–12)
POTASSIUM SERPL-SCNC: 4.1 MMOL/L (ref 3.5–5.2)
PROT UR QL STRIP: ABNORMAL
PROT UR-MCNC: 650 MG/DL
PROT/CREAT UR: ABNORMAL MG/G CREA (ref 0–200)
RBC # BLD AUTO: 3.25 10*6/MM3 (ref 3.77–5.28)
RBC # UR: ABNORMAL /HPF
REF LAB TEST METHOD: ABNORMAL
SODIUM SERPL-SCNC: 142 MMOL/L (ref 136–145)
SP GR UR STRIP: 1.01 (ref 1–1.03)
SQUAMOUS #/AREA URNS HPF: ABNORMAL /HPF
T4 FREE SERPL-MCNC: 1.05 NG/DL (ref 0.93–1.7)
TROPONIN T SERPL-MCNC: 0.06 NG/ML (ref 0–0.03)
TROPONIN T SERPL-MCNC: 0.06 NG/ML (ref 0–0.03)
TSH SERPL DL<=0.05 MIU/L-ACNC: 2.87 UIU/ML (ref 0.27–4.2)
UROBILINOGEN UR QL STRIP: ABNORMAL
WBC # BLD AUTO: 10.8 10*3/MM3 (ref 3.4–10.8)
WBC UR QL AUTO: ABNORMAL /HPF

## 2021-06-18 PROCEDURE — 84484 ASSAY OF TROPONIN QUANT: CPT | Performed by: NURSE PRACTITIONER

## 2021-06-18 PROCEDURE — 82962 GLUCOSE BLOOD TEST: CPT

## 2021-06-18 PROCEDURE — 25010000002 MIDAZOLAM PER 1 MG: Performed by: ANESTHESIOLOGY

## 2021-06-18 PROCEDURE — 63710000001 INSULIN LISPRO (HUMAN) PER 5 UNITS: Performed by: SURGERY

## 2021-06-18 PROCEDURE — 0JH63XZ INSERTION OF TUNNELED VASCULAR ACCESS DEVICE INTO CHEST SUBCUTANEOUS TISSUE AND FASCIA, PERCUTANEOUS APPROACH: ICD-10-PCS | Performed by: SURGERY

## 2021-06-18 PROCEDURE — 84156 ASSAY OF PROTEIN URINE: CPT | Performed by: INTERNAL MEDICINE

## 2021-06-18 PROCEDURE — 71045 X-RAY EXAM CHEST 1 VIEW: CPT

## 2021-06-18 PROCEDURE — 83036 HEMOGLOBIN GLYCOSYLATED A1C: CPT | Performed by: NURSE PRACTITIONER

## 2021-06-18 PROCEDURE — 02HV33Z INSERTION OF INFUSION DEVICE INTO SUPERIOR VENA CAVA, PERCUTANEOUS APPROACH: ICD-10-PCS | Performed by: SURGERY

## 2021-06-18 PROCEDURE — 76000 FLUOROSCOPY <1 HR PHYS/QHP: CPT

## 2021-06-18 PROCEDURE — 84439 ASSAY OF FREE THYROXINE: CPT | Performed by: NURSE PRACTITIONER

## 2021-06-18 PROCEDURE — 93306 TTE W/DOPPLER COMPLETE: CPT

## 2021-06-18 PROCEDURE — 25010000002 HEPARIN (PORCINE) PER 1000 UNITS: Performed by: SURGERY

## 2021-06-18 PROCEDURE — 93306 TTE W/DOPPLER COMPLETE: CPT | Performed by: INTERNAL MEDICINE

## 2021-06-18 PROCEDURE — 85027 COMPLETE CBC AUTOMATED: CPT | Performed by: NURSE PRACTITIONER

## 2021-06-18 PROCEDURE — 25010000002 ONDANSETRON PER 1 MG: Performed by: NURSE PRACTITIONER

## 2021-06-18 PROCEDURE — 80048 BASIC METABOLIC PNL TOTAL CA: CPT | Performed by: NURSE PRACTITIONER

## 2021-06-18 PROCEDURE — C1750 CATH, HEMODIALYSIS,LONG-TERM: HCPCS | Performed by: SURGERY

## 2021-06-18 PROCEDURE — 81001 URINALYSIS AUTO W/SCOPE: CPT | Performed by: INTERNAL MEDICINE

## 2021-06-18 PROCEDURE — 84443 ASSAY THYROID STIM HORMONE: CPT | Performed by: NURSE PRACTITIONER

## 2021-06-18 PROCEDURE — 82570 ASSAY OF URINE CREATININE: CPT | Performed by: INTERNAL MEDICINE

## 2021-06-18 PROCEDURE — 25010000002 CEFAZOLIN PER 500 MG: Performed by: SURGERY

## 2021-06-18 PROCEDURE — 25010000002 PROPOFOL 10 MG/ML EMULSION: Performed by: NURSE ANESTHETIST, CERTIFIED REGISTERED

## 2021-06-18 PROCEDURE — 25010000003 LIDOCAINE 1 % SOLUTION 20 ML VIAL: Performed by: SURGERY

## 2021-06-18 PROCEDURE — 99254 IP/OBS CNSLTJ NEW/EST MOD 60: CPT | Performed by: INTERNAL MEDICINE

## 2021-06-18 RX ORDER — SODIUM CHLORIDE 0.9 % (FLUSH) 0.9 %
3-10 SYRINGE (ML) INJECTION AS NEEDED
Status: DISCONTINUED | OUTPATIENT
Start: 2021-06-18 | End: 2021-06-18 | Stop reason: HOSPADM

## 2021-06-18 RX ORDER — ALLOPURINOL 100 MG/1
100 TABLET ORAL DAILY
Status: DISCONTINUED | OUTPATIENT
Start: 2021-06-19 | End: 2021-06-22 | Stop reason: HOSPADM

## 2021-06-18 RX ORDER — PROPOFOL 10 MG/ML
VIAL (ML) INTRAVENOUS AS NEEDED
Status: DISCONTINUED | OUTPATIENT
Start: 2021-06-18 | End: 2021-06-18 | Stop reason: SURG

## 2021-06-18 RX ORDER — SODIUM CHLORIDE 0.9 % (FLUSH) 0.9 %
3 SYRINGE (ML) INJECTION EVERY 12 HOURS SCHEDULED
Status: DISCONTINUED | OUTPATIENT
Start: 2021-06-18 | End: 2021-06-18 | Stop reason: HOSPADM

## 2021-06-18 RX ORDER — MIDAZOLAM HYDROCHLORIDE 1 MG/ML
1 INJECTION INTRAMUSCULAR; INTRAVENOUS
Status: DISCONTINUED | OUTPATIENT
Start: 2021-06-18 | End: 2021-06-18 | Stop reason: HOSPADM

## 2021-06-18 RX ORDER — DIPHENHYDRAMINE HCL 25 MG
25 CAPSULE ORAL
Status: DISCONTINUED | OUTPATIENT
Start: 2021-06-18 | End: 2021-06-18 | Stop reason: HOSPADM

## 2021-06-18 RX ORDER — HYDRALAZINE HYDROCHLORIDE 20 MG/ML
5 INJECTION INTRAMUSCULAR; INTRAVENOUS
Status: DISCONTINUED | OUTPATIENT
Start: 2021-06-18 | End: 2021-06-18 | Stop reason: HOSPADM

## 2021-06-18 RX ORDER — LIDOCAINE HYDROCHLORIDE 10 MG/ML
0.5 INJECTION, SOLUTION EPIDURAL; INFILTRATION; INTRACAUDAL; PERINEURAL ONCE AS NEEDED
Status: DISCONTINUED | OUTPATIENT
Start: 2021-06-18 | End: 2021-06-18 | Stop reason: HOSPADM

## 2021-06-18 RX ORDER — HEPARIN SODIUM 5000 [USP'U]/ML
5000 INJECTION, SOLUTION INTRAVENOUS; SUBCUTANEOUS EVERY 12 HOURS SCHEDULED
Status: DISCONTINUED | OUTPATIENT
Start: 2021-06-19 | End: 2021-06-22 | Stop reason: HOSPADM

## 2021-06-18 RX ORDER — PROMETHAZINE HYDROCHLORIDE 25 MG/1
25 SUPPOSITORY RECTAL ONCE AS NEEDED
Status: DISCONTINUED | OUTPATIENT
Start: 2021-06-18 | End: 2021-06-18 | Stop reason: HOSPADM

## 2021-06-18 RX ORDER — ONDANSETRON 2 MG/ML
4 INJECTION INTRAMUSCULAR; INTRAVENOUS ONCE AS NEEDED
Status: DISCONTINUED | OUTPATIENT
Start: 2021-06-18 | End: 2021-06-18 | Stop reason: HOSPADM

## 2021-06-18 RX ORDER — NALOXONE HCL 0.4 MG/ML
0.2 VIAL (ML) INJECTION AS NEEDED
Status: DISCONTINUED | OUTPATIENT
Start: 2021-06-18 | End: 2021-06-18 | Stop reason: HOSPADM

## 2021-06-18 RX ORDER — CEFAZOLIN SODIUM IN 0.9 % NACL 3 G/100 ML
3 INTRAVENOUS SOLUTION, PIGGYBACK (ML) INTRAVENOUS
Status: COMPLETED | OUTPATIENT
Start: 2021-06-18 | End: 2021-06-18

## 2021-06-18 RX ORDER — LEVOTHYROXINE SODIUM 0.12 MG/1
125 TABLET ORAL DAILY
Status: DISCONTINUED | OUTPATIENT
Start: 2021-06-18 | End: 2021-06-22 | Stop reason: HOSPADM

## 2021-06-18 RX ORDER — CARVEDILOL 25 MG/1
25 TABLET ORAL 2 TIMES DAILY WITH MEALS
Status: DISCONTINUED | OUTPATIENT
Start: 2021-06-18 | End: 2021-06-22 | Stop reason: HOSPADM

## 2021-06-18 RX ORDER — OXYCODONE AND ACETAMINOPHEN 10; 325 MG/1; MG/1
1 TABLET ORAL EVERY 4 HOURS PRN
Status: DISCONTINUED | OUTPATIENT
Start: 2021-06-18 | End: 2021-06-18 | Stop reason: HOSPADM

## 2021-06-18 RX ORDER — FENTANYL CITRATE 50 UG/ML
50 INJECTION, SOLUTION INTRAMUSCULAR; INTRAVENOUS
Status: DISCONTINUED | OUTPATIENT
Start: 2021-06-18 | End: 2021-06-18 | Stop reason: HOSPADM

## 2021-06-18 RX ORDER — PROMETHAZINE HYDROCHLORIDE 25 MG/1
25 TABLET ORAL ONCE AS NEEDED
Status: DISCONTINUED | OUTPATIENT
Start: 2021-06-18 | End: 2021-06-18 | Stop reason: HOSPADM

## 2021-06-18 RX ORDER — MONTELUKAST SODIUM 10 MG/1
10 TABLET ORAL NIGHTLY
Status: DISCONTINUED | OUTPATIENT
Start: 2021-06-18 | End: 2021-06-18

## 2021-06-18 RX ORDER — HEPARIN SODIUM 1000 [USP'U]/ML
INJECTION, SOLUTION INTRAVENOUS; SUBCUTANEOUS AS NEEDED
Status: DISCONTINUED | OUTPATIENT
Start: 2021-06-18 | End: 2021-06-18 | Stop reason: HOSPADM

## 2021-06-18 RX ORDER — HYDRALAZINE HYDROCHLORIDE 50 MG/1
50 TABLET, FILM COATED ORAL EVERY 8 HOURS SCHEDULED
Status: DISCONTINUED | OUTPATIENT
Start: 2021-06-18 | End: 2021-06-18

## 2021-06-18 RX ORDER — FENTANYL CITRATE 50 UG/ML
100 INJECTION, SOLUTION INTRAMUSCULAR; INTRAVENOUS
Status: DISCONTINUED | OUTPATIENT
Start: 2021-06-18 | End: 2021-06-18 | Stop reason: HOSPADM

## 2021-06-18 RX ORDER — HYDROMORPHONE HYDROCHLORIDE 1 MG/ML
0.5 INJECTION, SOLUTION INTRAMUSCULAR; INTRAVENOUS; SUBCUTANEOUS
Status: DISCONTINUED | OUTPATIENT
Start: 2021-06-18 | End: 2021-06-18 | Stop reason: HOSPADM

## 2021-06-18 RX ORDER — LABETALOL HYDROCHLORIDE 5 MG/ML
10 INJECTION, SOLUTION INTRAVENOUS EVERY 6 HOURS PRN
Status: DISCONTINUED | OUTPATIENT
Start: 2021-06-18 | End: 2021-06-22 | Stop reason: HOSPADM

## 2021-06-18 RX ORDER — HYDROCODONE BITARTRATE AND ACETAMINOPHEN 5; 325 MG/1; MG/1
1 TABLET ORAL EVERY 4 HOURS PRN
Status: DISCONTINUED | OUTPATIENT
Start: 2021-06-18 | End: 2021-06-22 | Stop reason: HOSPADM

## 2021-06-18 RX ORDER — HYDRALAZINE HYDROCHLORIDE 25 MG/1
25 TABLET, FILM COATED ORAL EVERY 8 HOURS SCHEDULED
Status: DISCONTINUED | OUTPATIENT
Start: 2021-06-18 | End: 2021-06-21

## 2021-06-18 RX ORDER — SODIUM CHLORIDE, SODIUM LACTATE, POTASSIUM CHLORIDE, CALCIUM CHLORIDE 600; 310; 30; 20 MG/100ML; MG/100ML; MG/100ML; MG/100ML
9 INJECTION, SOLUTION INTRAVENOUS CONTINUOUS
Status: DISCONTINUED | OUTPATIENT
Start: 2021-06-18 | End: 2021-06-18

## 2021-06-18 RX ORDER — CEFAZOLIN SODIUM 2 G/100ML
2 INJECTION, SOLUTION INTRAVENOUS EVERY 8 HOURS
Status: DISCONTINUED | OUTPATIENT
Start: 2021-06-19 | End: 2021-06-19

## 2021-06-18 RX ORDER — HYDRALAZINE HYDROCHLORIDE 25 MG/1
25 TABLET, FILM COATED ORAL EVERY 8 HOURS SCHEDULED
Status: DISCONTINUED | OUTPATIENT
Start: 2021-06-18 | End: 2021-06-18

## 2021-06-18 RX ORDER — IBUPROFEN 600 MG/1
600 TABLET ORAL ONCE AS NEEDED
Status: DISCONTINUED | OUTPATIENT
Start: 2021-06-18 | End: 2021-06-18 | Stop reason: HOSPADM

## 2021-06-18 RX ORDER — NIFEDIPINE 60 MG/1
120 TABLET, EXTENDED RELEASE ORAL
Status: DISCONTINUED | OUTPATIENT
Start: 2021-06-18 | End: 2021-06-21

## 2021-06-18 RX ORDER — LIDOCAINE HYDROCHLORIDE 20 MG/ML
INJECTION, SOLUTION INFILTRATION; PERINEURAL AS NEEDED
Status: DISCONTINUED | OUTPATIENT
Start: 2021-06-18 | End: 2021-06-18 | Stop reason: SURG

## 2021-06-18 RX ORDER — BUMETANIDE 0.25 MG/ML
4 INJECTION INTRAMUSCULAR; INTRAVENOUS EVERY 8 HOURS
Status: DISCONTINUED | OUTPATIENT
Start: 2021-06-18 | End: 2021-06-18

## 2021-06-18 RX ORDER — EPHEDRINE SULFATE 50 MG/ML
5 INJECTION, SOLUTION INTRAVENOUS ONCE AS NEEDED
Status: DISCONTINUED | OUTPATIENT
Start: 2021-06-18 | End: 2021-06-18 | Stop reason: HOSPADM

## 2021-06-18 RX ORDER — DIPHENHYDRAMINE HYDROCHLORIDE 50 MG/ML
12.5 INJECTION INTRAMUSCULAR; INTRAVENOUS
Status: DISCONTINUED | OUTPATIENT
Start: 2021-06-18 | End: 2021-06-18 | Stop reason: HOSPADM

## 2021-06-18 RX ORDER — LABETALOL HYDROCHLORIDE 5 MG/ML
10 INJECTION, SOLUTION INTRAVENOUS ONCE
Status: COMPLETED | OUTPATIENT
Start: 2021-06-18 | End: 2021-06-18

## 2021-06-18 RX ORDER — FLUMAZENIL 0.1 MG/ML
0.2 INJECTION INTRAVENOUS AS NEEDED
Status: DISCONTINUED | OUTPATIENT
Start: 2021-06-18 | End: 2021-06-18 | Stop reason: HOSPADM

## 2021-06-18 RX ORDER — HYDROCODONE BITARTRATE AND ACETAMINOPHEN 7.5; 325 MG/1; MG/1
1 TABLET ORAL ONCE AS NEEDED
Status: DISCONTINUED | OUTPATIENT
Start: 2021-06-18 | End: 2021-06-18 | Stop reason: HOSPADM

## 2021-06-18 RX ADMIN — PROPOFOL 75 MCG/KG/MIN: 10 INJECTION, EMULSION INTRAVENOUS at 17:00

## 2021-06-18 RX ADMIN — LABETALOL HYDROCHLORIDE 10 MG: 5 INJECTION, SOLUTION INTRAVENOUS at 02:20

## 2021-06-18 RX ADMIN — HYDRALAZINE HYDROCHLORIDE 25 MG: 25 TABLET, FILM COATED ORAL at 06:43

## 2021-06-18 RX ADMIN — SODIUM CHLORIDE, PRESERVATIVE FREE 10 ML: 5 INJECTION INTRAVENOUS at 01:53

## 2021-06-18 RX ADMIN — NIFEDIPINE 120 MG: 60 TABLET, FILM COATED, EXTENDED RELEASE ORAL at 09:29

## 2021-06-18 RX ADMIN — SODIUM CHLORIDE, POTASSIUM CHLORIDE, SODIUM LACTATE AND CALCIUM CHLORIDE 9 ML/HR: 600; 310; 30; 20 INJECTION, SOLUTION INTRAVENOUS at 16:36

## 2021-06-18 RX ADMIN — LEVOTHYROXINE SODIUM 125 MCG: 0.12 TABLET ORAL at 09:29

## 2021-06-18 RX ADMIN — CARVEDILOL 25 MG: 25 TABLET, FILM COATED ORAL at 23:24

## 2021-06-18 RX ADMIN — ONDANSETRON HYDROCHLORIDE 4 MG: 2 SOLUTION INTRAMUSCULAR; INTRAVENOUS at 16:59

## 2021-06-18 RX ADMIN — PROPOFOL 50 MG: 10 INJECTION, EMULSION INTRAVENOUS at 16:59

## 2021-06-18 RX ADMIN — LIDOCAINE HYDROCHLORIDE 80 MG: 20 INJECTION, SOLUTION INFILTRATION; PERINEURAL at 16:59

## 2021-06-18 RX ADMIN — INSULIN LISPRO 3 UNITS: 100 INJECTION, SOLUTION INTRAVENOUS; SUBCUTANEOUS at 23:24

## 2021-06-18 RX ADMIN — BUMETANIDE 4 MG: 0.25 INJECTION INTRAMUSCULAR; INTRAVENOUS at 09:29

## 2021-06-18 RX ADMIN — HYDROCODONE BITARTRATE AND ACETAMINOPHEN 1 TABLET: 5; 325 TABLET ORAL at 20:55

## 2021-06-18 RX ADMIN — SODIUM CHLORIDE, PRESERVATIVE FREE 10 ML: 5 INJECTION INTRAVENOUS at 23:25

## 2021-06-18 RX ADMIN — SODIUM CHLORIDE, PRESERVATIVE FREE 10 ML: 5 INJECTION INTRAVENOUS at 09:32

## 2021-06-18 RX ADMIN — HYDRALAZINE HYDROCHLORIDE 25 MG: 25 TABLET, FILM COATED ORAL at 20:56

## 2021-06-18 RX ADMIN — MIDAZOLAM 1 MG: 1 INJECTION INTRAMUSCULAR; INTRAVENOUS at 16:36

## 2021-06-18 RX ADMIN — CEFAZOLIN SODIUM 3 G: 10 INJECTION, POWDER, FOR SOLUTION INTRAVENOUS at 16:36

## 2021-06-18 NOTE — OP NOTE
Gisela Dyson  Admission date: 6/17/2021  Date of operation: 6/18/2021    Location: [facility]    Pre-op Diagnosis:   Acute on chronic renal failure, ESRD on hemodialysis    Post-Op Diagnosis Codes:  Same    Procedure performed: Ultrasound-guided access right jugular vein, 23 cm tunneled dialysis palindrome catheter placement    Surgeon: Dr. Ancelmo Menendez    Assistants:  None, Provided critical assistance in exposure, retraction, and suction that overall decrease blood loss and operative time.    Anesthesia: Monitored Anesthesia Care with [facility]    Staff:   Circulator: Aliya Herrera RN  Radiology Technologist: Omid Valenzuela Person: Majo Restrepo    Indications: Pleasant female starting dialysis per nephrology.  She has had no previous procedures.  Plan tunneled dialysis catheter and dialysis later on tonight per nephrology orders.  Patient needs arm vein mapping in future fistula access.  Risk discussed with patient and her father and agreed to proceed.       Procedure Details right neck prepped and draped in usual fashion.  1% Xylocaine with Marcaine used local anesthesia.  Under ultrasound guidance right jugular vein percutaneously accessed without problems.  Wire advanced to the inferior vena cava without difficulty.  23 cm tunneled catheter placed from the anterior chest wall to the neck incision.  Dilator and peel-away sheath placed over the wire.  The dilator and wire removed.  This palindrome catheter placed through the peel-away sheath.  The peel-away sheath removed in its entirety.  Tip of the palindrome catheter placed at the right atrium superior vena cava junction.  There was normal contour at the neck.  The catheter was flushed with heparin saline.  It was flushed with high concentration heparin and capped.  Catheter secured to the skin with nylon suture.  Neck incision closed with subcuticular Vicryl stitch.  Glue applied.  Biopatch and dressing applied to exit site of  catheter on chest wall.  Patient tolerated procedure well and taken to recovery in stable condition.    Radiographic interpretation: Normal contour catheter at neck with tip of right atrium superior vena cava junction    Findings: See above    Estimated Blood Loss: minimal    Specimens: * No orders in the log *      Drains: * No LDAs found *    Complications: None    Condition: stable    Disposition: To recovery room    Ancelmo Menendez MD     Date: 6/18/2021  Time: 17:45 EDT    Active Hospital Problems    Diagnosis  POA   • **Acute renal failure (CMS/HCC) [N17.9]  Yes   • Primary hypothyroidism [E03.9]  Yes   • CKD (chronic kidney disease) stage 3, GFR 30-59 ml/min (CMS/HCC) [N18.30]  Yes   • Nonrheumatic mitral valve regurgitation [I34.0]  Yes   • Essential hypertension [I10]  Yes   • Type 2 diabetes mellitus with renal complication (CMS/HCC) [E11.29]  Yes   • Obesity, morbid, BMI 50 or higher (CMS/HCC) [E66.01]  Yes   • Chronic diastolic congestive heart failure (CMS/HCC) [I50.32]  Yes      Resolved Hospital Problems   No resolved problems to display.

## 2021-06-18 NOTE — CONSULTS
Name: Gisela Dyson ADMIT: 2021   : 1985  PCP: Emery Silveira MD    MRN: [id number] LOS: 1 days   AGE/SEX: 36 y.o. female  ROOM: [facility]/[address] [place] [address]      Patient Care Team:  Emery Silveira MD as PCP - General (Internal Medicine)  Chief Complaint   Patient presents with   • Leg Swelling     CC: Need catheter placed to start dialysis    Subjective     Consults  36-year-old female asked to see for urgent tunneled catheter placement to start dialysis.  Potassium 4.  Patient acidotic with bicarb 15.  Patient alert and oriented.  Patient is very reluctant to start hemodialysis.  She has never had vein mapping.  Discussed with her at length.  Discussed with nurse to talk with the nephrologist.  Patient is very young and recommended to her to proceed with tunneled dialysis catheter.  Renal failure is related to underlying diabetes hypertension and coronary disease.  Discussed with patient would need to have arm vein mapping and arm access in future.  She does have increased risk because of morbid obesity and BMI of 54.  All questions answered with patient.  She has had a negative Covid test yesterday.  We will plan to proceed with surgery today as she has been n.p.o.  Risks of surgery discussed.  Need to keep catheter clean and dry discussed.  She has a family member who is under gone hemodialysis and very familiar with it.  All questions answered.    Leg Swelling      Review of Systems   Cardiovascular: Positive for leg swelling.   All other systems reviewed and are negative.      Past Medical History:   Diagnosis Date   • JULISSA (acute kidney injury) (CMS/HCC)    • CKD (chronic kidney disease) stage 3, GFR 30-59 ml/min (CMS/HCC)    • Diabetes mellitus (CMS/HCC)    • Diastolic CHF (CMS/HCC)    • Essential hypertension    • Nonrheumatic mitral valve regurgitation    • Obesity, morbid, BMI 50 or higher (CMS/HCC)    • Type 2 diabetes mellitus (CMS/HCC)      History reviewed. No pertinent surgical history.  Family  "History   Adopted: Yes     Social History     Tobacco Use   • Smoking status: Never Smoker   • Smokeless tobacco: Never Used   Vaping Use   • Vaping Use: Never used   Substance Use Topics   • Alcohol use: Yes     Alcohol/week: 1.0 - 2.0 standard drinks     Types: 1 - 2 Glasses of wine per week     Comment: socially   • Drug use: No     Medications Prior to Admission   Medication Sig Dispense Refill Last Dose   • allopurinol (ZYLOPRIM) 300 MG tablet Take 300 mg by mouth Daily.      • carvedilol (COREG) 25 MG tablet Take 1 tablet by mouth 2 (Two) Times a Day With Meals. 60 tablet 0    • cefdinir (OMNICEF) 300 MG capsule Take 300 mg by mouth Daily.      • Ergocalciferol (ERGOCAL PO) Take 1.25 mg by mouth 3 (Three) Times a Week.      • furosemide (LASIX) 80 MG tablet Take 1 tablet by mouth Daily. 30 tablet 0    • hydrALAZINE (APRESOLINE) 25 MG tablet Take 1 tablet by mouth Every 8 (Eight) Hours. 90 tablet 0    • levothyroxine (SYNTHROID, LEVOTHROID) 125 MCG tablet Take 125 mcg by mouth Daily.      • NIFEdipine XL (ADALAT CC) 60 MG 24 hr tablet Take 2 tablets by mouth Daily. 30 tablet 0    • BYDUREON BCISE 2 MG/0.85ML auto-injector injection Inject 0.85 mL under the skin into the appropriate area as directed 1 (One) Time Per Week. 4 pen 11    • montelukast (SINGULAIR) 10 MG tablet Take 1 tablet by mouth Every Night for 30 days. 30 tablet 0    • Needle, Disp, (BD DISP NEEDLES) 30G X 1/2\" misc Use daily for injection of Tresiba. 100 each 3    • TRESIBA FLEXTOUCH 200 UNIT/ML solution pen-injector pen injection Inject 75 Units under the skin into the appropriate area as directed Daily With Breakfast. 4 pen 11    • TRULICITY 1.5 MG/0.5ML solution pen-injector Inject 1.5 mg under the skin into the appropriate area as directed Every 7 (Seven) Days. 12 pen 3      bumetanide, 4 mg, Intravenous, Q8H  carvedilol, 25 mg, Oral, BID With Meals  ceFAZolin, 3 g, Intravenous, 30 Min Pre-Op  hydrALAZINE, 50 mg, Oral, Q8H  insulin lispro, " "0-14 Units, Subcutaneous, 4x Daily With Meals & Nightly  levothyroxine, 125 mcg, Oral, Daily  NIFEdipine CC, 120 mg, Oral, Q24H  sodium chloride, 10 mL, Intravenous, Q12H         •  acetaminophen **OR** acetaminophen **OR** acetaminophen  •  dextrose  •  dextrose  •  glucagon (human recombinant)  •  labetalol  •  nitroglycerin  •  ondansetron  •  sodium chloride  •  sodium chloride  Patient has no known allergies.    Objective     Physical Exam:  Physical Exam  Vitals reviewed.   Constitutional:       Appearance: Normal appearance. She is morbidly obese.   Eyes:      Extraocular Movements: Extraocular movements intact.      Pupils: Pupils are equal, round, and reactive to light.   Cardiovascular:      Rate and Rhythm: Normal rate and regular rhythm.      Pulses: Normal pulses.      Heart sounds: Normal heart sounds.   Pulmonary:      Effort: Pulmonary effort is normal.      Breath sounds: Normal breath sounds.   Abdominal:      General: Abdomen is flat. Bowel sounds are normal.      Palpations: Abdomen is soft.   Musculoskeletal:         General: Swelling (Mild both legs) present.      Cervical back: Neck supple.   Skin:     General: Skin is warm and dry.      Capillary Refill: Capillary refill takes less than 2 seconds.   Neurological:      General: No focal deficit present.      Mental Status: She is alert and oriented to person, place, and time.      Cranial Nerves: No cranial nerve deficit.      Sensory: No sensory deficit.      Motor: No weakness.   Psychiatric:         Mood and Affect: Mood normal.         Behavior: Behavior normal.         Thought Content: Thought content normal.         Judgment: Judgment normal.          Vital Signs and Labs:  Vital Signs Patient Vitals for the past 24 hrs:   BP Temp Temp src Pulse Resp SpO2 Height Weight   06/18/21 1516 135/82 98.2 °F (36.8 °C) Oral 84 18 -- -- --   06/18/21 1403 122/90 -- -- 86 -- 99 % 160 cm (62.99\") (!) 139 kg (306 lb 7 oz)   06/18/21 1117 (!) 161/102 " "98 °F (36.7 °C) Oral 87 20 -- -- --   06/18/21 0929 (!) 191/124 -- -- 101 -- -- -- --   06/18/21 0740 (!) 192/123 98.2 °F (36.8 °C) Oral 103 20 98 % -- --   06/18/21 0645 -- -- -- -- -- -- -- (!) 139 kg (307 lb 8 oz)   06/18/21 0643 (!) 179/115 -- -- 101 -- -- -- --   06/18/21 0240 (!) 182/119 -- -- -- -- 94 % -- --   06/18/21 0159 (!) 200/132 -- -- -- -- 98 % -- --   06/17/21 2337 (!) 224/141 98.1 °F (36.7 °C) Oral 116 20 100 % 160 cm (63\") (!) 141 kg (311 lb 6.4 oz)   06/17/21 2300 (!) 228/135 -- -- 116 -- 98 % -- --   06/17/21 2249 (!) 204/133 -- -- 89 -- 94 % -- --   06/17/21 2130 (!) 213/132 -- -- 113 -- 98 % -- --   06/17/21 2108 (!) 219/138 -- -- 112 -- 98 % -- --   06/17/21 2000 (!) 219/159 -- -- 110 -- 100 % -- --   06/17/21 1953 (!) 140/125 -- -- -- -- -- -- --   06/17/21 1749 (!) 198/100 -- -- -- -- -- -- --   06/17/21 1728 -- 98.6 °F (37 °C) Tympanic 108 20 100 % -- --       CBC    Results from last 7 days   Lab Units 06/18/21  0610 06/17/21  1740   WBC 10*3/mm3 10.80 12.47*   HEMOGLOBIN g/dL 9.7* 10.7*   PLATELETS 10*3/mm3 258 304     BMP   Results from last 7 days   Lab Units 06/18/21  0610 06/17/21  1740   SODIUM mmol/L 142 139   POTASSIUM mmol/L 4.1 4.8   CHLORIDE mmol/L 109* 106   CO2 mmol/L 15.0* 18.9*   BUN mg/dL 82* 80*   CREATININE mg/dL 12.56* 12.67*   GLUCOSE mg/dL 108* 138*     Radiology(recent) XR Chest 2 View    Result Date: 6/17/2021  Mild cardiomegaly.  This report was finalized on 6/17/2021 6:31 PM by Dr. Nelson Flaherty M.D.      US Renal Bilateral    Result Date: 6/17/2021  Overall echogenic appearance to the kidneys, suggesting chronic medical renal disease. This is a new finding when compared to the patient's prior study from 11/07/2019.  This report was finalized on 6/17/2021 11:15 PM by Dr. Ping Quick M.D.        Active Hospital Problems    Diagnosis  POA   • **Acute renal failure (CMS/HCC) [N17.9]  Yes   • Primary hypothyroidism [E03.9]  Yes   • CKD (chronic kidney " disease) stage 3, GFR 30-59 ml/min (CMS/[facility]) [N18.30]  Yes   • Nonrheumatic mitral valve regurgitation [I34.0]  Yes   • Essential hypertension [I10]  Yes   • Type 2 diabetes mellitus with renal complication (CMS/[facility]) [E11.29]  Yes   • Obesity, morbid, BMI 50 or higher (CMS/[facility]) [E66.01]  Yes   • Chronic diastolic congestive heart failure (CMS/[facility]) [I50.32]  Yes      Resolved Hospital Problems   No resolved problems to display.     Problem Points:  4:  Patient has a new problem, with additional work-up planned  Total problem points:4 or more    Data Points:  1:  I have reviewed or order clinical lab test  1:  I have reviewed or order radiology test (except heart catheterization or echo)  2:  I have personally and independently review of image, tracing, or specimen  1:  I have personally decided to obtain of records  2:  I have reviewed and summation of old records and/or discussed the patients care with another health care provider  Total data points:4 or more    Risk Points:  High:  Any illness that poses threat to life or body funciton    MDM requires 2/3 (Problem points, Data points and Risk)  MDM Prob point Data point Risk   SF 1 1 Minimal   Low 2 2 Low   Mod 3 3 Moderate   High 4 4 High     Code requires 3/3 (MDM, History and Exam)  Code MDM History Exam Time   05188 SF/Low Detailed Detailed 30   35005 Mod Comprehensive Comprehensive 50   51027 High Comprehensive Comprehensive 70     Detailed history:  4 elements HPI or status of 3 chronic problems; 2-9 system ROS  Comprehensive:  4 elements HPI or status of 3 chronic problems;  10 system ROS    Detailed Exam:  12 findings from any organ system  Comprehensive Exam:  2 findings from each of 9 systems.   40902    Assessment/Plan       Acute renal failure (CMS/HCC)    Chronic diastolic congestive heart failure (CMS/[facility])    Obesity, morbid, BMI 50 or higher (CMS/[facility])    Type 2 diabetes mellitus with renal complication (CMS/[facility])    Essential hypertension     Nonrheumatic mitral valve regurgitation    CKD (chronic kidney disease) stage 3, GFR 30-59 ml/min (CMS/[facility])    Primary hypothyroidism      36 y.o. female with acute on chronic renal failure for tunneled dialysis catheter today.  Plan future arm access vein mapping.  See discussion above in this note.    Personal protective equipment used for this patient encounter:  Patient wearing surgical mask [x]    Provider wearing a surgical mask [x]    Gloves [x]    Eye protection [x]    Face Shield []    Gown []    N 95 respirator or CAPR/PAPR []   Duration of interaction 25 minutes    I discussed the patients findings and my recommendations with patient, nursing staff and consulting provider.    Ancelmo Menendez MD  06/18/21  15:54 EDT    Please call my office with any question: [phone]

## 2021-06-18 NOTE — CONSULTS
Date of Hospital Visit: 2021  Date of consult: 2021  Encounter Provider: Reji Rudd MD  Place of Service: [facility] CARDIOLOGY  Patient Name: Gisela Dyson  :1985  Referral Provider: Francisco Mcclure MD    Chief complaint leg swelling     Reason for consult: Evaluation for CHF    History of Present Illness   Gisela Dyson is a 36 year old pt who I follow in clinic with a history of diabetes, hypothyroidism, morbid obesity, HTN with hypertensive heart disease, CKD III, chronic diastolic CHF and non rheumatic mitral valve regurgitations.   I last saw pt on 19 for follow up visit from hospital discharge. Pt had lost 50 pounds with diet and was feeling significantly better. Pt BP at home was mostly in 120s. Pt denied any shortness of breath, orthopnea, PND or edema. Pt was to follow up with nephrology and endocrinology. No changes were made.       Pt presented to ER with complaints of bilateral leg swelling and shortness of breath of several days.  She admits she has not been taking any of her antibiotics medications for the past year or more and she was lost to follow-up in our clinic.  She denied any significant chest pain.  She reports making ample amount of urine and denied any significant urinary changes.      In ER Creatinine was  12.67, CXR showed mild cardiomegaly. BNP 62043. Troponin 0.061, WBC 12.47,HGB 10.7.   Pt reported she was discharged on Lasix but has not taken it since 2019 due to dizziness upon taking it.         ECHO 19    · Left ventricular systolic function is normal. Estimated EF appears to be in the range of 56 - 60%. Normal left ventricular cavity size noted.  · Left ventricular wall thickness is consistent with mild concentric hypertrophy  · Left ventricular diastolic dysfunction is noted (grade II w/high LAP) consistent with pseudonormalization. Elevated left atrial pressure.  · The mitral valve is grossly normal in  "structure. Moderate mitral valve regurgitation is present. No significant mitral valve stenosis is present.  · Mild to moderate tricuspid valve regurgitation is present. Estimated right ventricular systolic pressure from tricuspid regurgitation is markedly elevated (>55 mmHg).      Past Medical History:   Diagnosis Date   • JULISSA (acute kidney injury) (CMS/[facility])    • CKD (chronic kidney disease) stage 3, GFR 30-59 ml/min (CMS/[facility])    • Diabetes mellitus (CMS/[facility])    • Diastolic CHF (CMS/[facility])    • Essential hypertension    • Nonrheumatic mitral valve regurgitation    • Obesity, morbid, BMI 50 or higher (CMS/[facility])    • Type 2 diabetes mellitus (CMS/[facility])        History reviewed. No pertinent surgical history.    Medications Prior to Admission   Medication Sig Dispense Refill Last Dose   • allopurinol (ZYLOPRIM) 300 MG tablet Take 300 mg by mouth Daily.      • carvedilol (COREG) 25 MG tablet Take 1 tablet by mouth 2 (Two) Times a Day With Meals. 60 tablet 0    • cefdinir (OMNICEF) 300 MG capsule Take 300 mg by mouth Daily.      • Ergocalciferol (ERGOCAL PO) Take 1.25 mg by mouth 3 (Three) Times a Week.      • furosemide (LASIX) 80 MG tablet Take 1 tablet by mouth Daily. 30 tablet 0    • hydrALAZINE (APRESOLINE) 25 MG tablet Take 1 tablet by mouth Every 8 (Eight) Hours. 90 tablet 0    • levothyroxine (SYNTHROID, LEVOTHROID) 125 MCG tablet Take 125 mcg by mouth Daily.      • NIFEdipine XL (ADALAT CC) 60 MG 24 hr tablet Take 2 tablets by mouth Daily. 30 tablet 0    • BYDUREON BCISE 2 MG/0.85ML auto-injector injection Inject 0.85 mL under the skin into the appropriate area as directed 1 (One) Time Per Week. 4 pen 11    • montelukast (SINGULAIR) 10 MG tablet Take 1 tablet by mouth Every Night for 30 days. 30 tablet 0    • Needle, Disp, (BD DISP NEEDLES) 30G X 1/2\" misc Use daily for injection of Tresiba. 100 each 3    • TRESIBA FLEXTOUCH 200 UNIT/ML solution pen-injector pen injection Inject 75 Units under the skin into the " "appropriate area as directed Daily With Breakfast. 4 pen 11    • TRULICITY 1.5 MG/0.5ML solution pen-injector Inject 1.5 mg under the skin into the appropriate area as directed Every 7 (Seven) Days. 12 pen 3        Current Meds  Scheduled Meds:bumetanide, 4 mg, Intravenous, Q8H  carvedilol, 25 mg, Oral, BID With Meals  hydrALAZINE, 50 mg, Oral, Q8H  insulin lispro, 0-14 Units, Subcutaneous, 4x Daily With Meals & Nightly  levothyroxine, 125 mcg, Oral, Daily  NIFEdipine CC, 120 mg, Oral, Q24H  sodium chloride, 10 mL, Intravenous, Q12H      Continuous Infusions:   PRN Meds:.•  acetaminophen **OR** acetaminophen **OR** acetaminophen  •  dextrose  •  dextrose  •  glucagon (human recombinant)  •  labetalol  •  nitroglycerin  •  ondansetron  •  sodium chloride  •  sodium chloride    Allergies as of 06/17/2021   • (No Known Allergies)       Social History     Socioeconomic History   • Marital status: Single     Spouse name: Not on file   • Number of children: Not on file   • Years of education: Not on file   • Highest education level: Not on file   Tobacco Use   • Smoking status: Never Smoker   • Smokeless tobacco: Never Used   Vaping Use   • Vaping Use: Never used   Substance and Sexual Activity   • Alcohol use: Yes     Alcohol/week: 1.0 - 2.0 standard drinks     Types: 1 - 2 Glasses of wine per week     Comment: socially   • Drug use: No   • Sexual activity: Defer       Family History   Adopted: Yes       REVIEW OF SYSTEMS:   All systems were reviewed and negative except as noted in HPI.       Objective:   Temp:  [98.1 °F (36.7 °C)-98.6 °F (37 °C)] 98.2 °F (36.8 °C)  Heart Rate:  [] 101  Resp:  [20] 20  BP: (140-228)/(100-159) 191/124  Body mass index is 54.47 kg/m².  Flowsheet Rows      First Filed Value   Admission Height  160 cm (63\") Documented at 06/17/2021 2337   Admission Weight  (!) 141 kg (311 lb 6.4 oz) Documented at 06/17/2021 2337        Vitals:    06/18/21 0929   BP: (!) 191/124   Pulse: 101   Resp:  "   Temp:    SpO2:        General Appearance:    Alert, cooperative, in no acute distress   Head:    Normocephalic, without obvious abnormality, atraumatic   Eyes:            Lids and lashes normal, conjunctivae and sclerae normal, no   icterus, no pallor, corneas clear, PERRLA   Ears:    Ears appear intact with no abnormalities noted   Throat:   No oral lesions, no thrush, oral mucosa moist   Neck:   No adenopathy, supple, trachea midline, no thyromegaly, no   carotid bruit, no JVD   Back:     No kyphosis present, no scoliosis present, no skin lesions, erythema or scars, no tenderness to percussion or palpation, range of motion normal   Lungs:     Clear to auscultation,respirations regular, even and unlabored    Heart:    Regular rhythm and normal rate, normal S1 and S2, no murmur, no gallop, no rub, no click   Chest Wall:    No abnormalities observed   Abdomen:     Normal bowel sounds, no masses, no organomegaly, soft nontender, nondistended, no guarding, no rebound  tenderness   Extremities:   Moves all extremities well, no edema, no cyanosis, no redness   Pulses:   Pulses palpable and equal bilaterally. Normal radial, carotid, femoral, dorsalis pedis and posterior tibial pulses bilaterally. Normal abdominal aorta   Skin:  Neurology:   Psychiatric:   No bleeding, bruising or rash   Normal speech and cranial nerve exam, no focal deficit   Alert and oriented x 3, normal mood and affect                 Review of Data:      Results from last 7 days   Lab Units 06/18/21  0610 06/17/21  1740   SODIUM mmol/L 142 139   POTASSIUM mmol/L 4.1 4.8   CHLORIDE mmol/L 109* 106   CO2 mmol/L 15.0* 18.9*   BUN mg/dL 82* 80*   CREATININE mg/dL 12.56* 12.67*   CALCIUM mg/dL 7.2* 7.3*   BILIRUBIN mg/dL  --  0.3   ALK PHOS U/L  --  84   ALT (SGPT) U/L  --  15   AST (SGOT) U/L  --  10   GLUCOSE mg/dL 108* 138*     Results from last 7 days   Lab Units 06/18/21  0610 06/17/21  2354 06/17/21  1740   TROPONIN T ng/mL 0.061* 0.056* 0.061*      @LABRCNTbnp@  Results from last 7 days   Lab Units 06/18/21  0610 06/17/21  1740   WBC 10*3/mm3 10.80 12.47*   HEMOGLOBIN g/dL 9.7* 10.7*   HEMATOCRIT % 30.2* 33.2*   PLATELETS 10*3/mm3 258 304             @LABRCNTIP(chol,trig,hdl,ldl)                    I personally viewed and interpreted the patient's EKG/Telemetry data  )  Patient Active Problem List   Diagnosis   • Chronic diastolic congestive heart failure (CMS/HCC)   • JULISSA (acute kidney injury) (CMS/[facility])   • Obesity, morbid, BMI 50 or higher (CMS/[facility])   • Type 2 diabetes mellitus with renal complication (CMS/[facility])   • Essential hypertension   • Nonrheumatic mitral valve regurgitation   • CKD (chronic kidney disease) stage 3, GFR 30-59 ml/min (CMS/[facility])   • Primary hypothyroidism   • Hirsutism   • Alopecia   • Acute renal failure (CMS/[facility])     Assessment and Plan:    Ms. Dyson is 36 years old female patient with past medical history of hypertension with hypertensive heart disease, diastolic CHF, morbid obesity, CKD stage III, admitted with worsening extremity swelling.     1.  Acute on chronic renal failure  2.  Acute on chronic diastolic CHF  3.  Hypertensive emergency with hypertensive heart disease  4.  Morbidly obese complication  5.  Elevated troponin-likely due to renal failure, possible CHF and uncontrolled blood pressure   6.  Chronic normocytic anemia  7.  Moderate mitral valve regurgitation  8.  Medication noncompliance    Patient was given hydralazine 20 mg IV times once, labetalol 10 mg IV x1.  This morning home medications including Coreg 25 mg p.o. twice daily, nifedipine 120 mg p.o. daily extended release, and IV Bumex 4 mg IV given.  In the morning her systolic blood pressure close to 200 and diastolic was 123.  Her blood pressure has significantly improved in the afternoon.  Repeat echocardiogram.    Reji Rudd MD  06/18/21  09:32 EDT.  Time spent in reviewing chart, discussion and examination:

## 2021-06-18 NOTE — CONSULTS
Referring Provider: ANGEL Vizcarra NP   Reason for Consultation: JULISSA CKD4     Subjective     Chief complaint   Chief Complaint   Patient presents with   • Leg Swelling       History of present illness:  37 yo obese F with h/o CKD stage 4, DM2, HTN, VHD (MR) who presented with worsneing BLE swelling and dyspnea.  Found to have marked renal failure, BUN/Cr 80/12.6, AG metabolic acidosis with HCo3 18 -> 15.  Creatinine was 2.5 in Jan 2020.  Sees Dr Blake in office but no recent visits.  She occ takes motrin OTC.  Denies nausea, vomiting, change in appetite or dysgeusia.  K is normal.  Bladder scan only 50 cc last night.  Has voided approx 600 cc.  Renal US shows no hydronephrosis.  CXR without pulm edema/effusions.  She is distraught when discussing need for dialysis, concerned about impact on work, etc.  She has not attended any predialysis education classes nor seen vascular surgery for AVF evaluation.  BP is markedly elevated 198/100 on arrival.      Past Medical History:   Diagnosis Date   • JULISSA (acute kidney injury) (CMS/HCC)    • CKD (chronic kidney disease) stage 3, GFR 30-59 ml/min (CMS/HCC)    • Diabetes mellitus (CMS/HCC)    • Diastolic CHF (CMS/HCC)    • Essential hypertension    • Nonrheumatic mitral valve regurgitation    • Obesity, morbid, BMI 50 or higher (CMS/HCC)    • Type 2 diabetes mellitus (CMS/HCC)      History reviewed. No pertinent surgical history.  Family History   Adopted: Yes       Social History     Tobacco Use   • Smoking status: Never Smoker   • Smokeless tobacco: Never Used   Vaping Use   • Vaping Use: Never used   Substance Use Topics   • Alcohol use: Yes     Alcohol/week: 1.0 - 2.0 standard drinks     Types: 1 - 2 Glasses of wine per week     Comment: socially   • Drug use: No     Medications Prior to Admission   Medication Sig Dispense Refill Last Dose   • allopurinol (ZYLOPRIM) 300 MG tablet Take 300 mg by mouth Daily.      • carvedilol (COREG) 25 MG tablet Take 1 tablet by mouth 2 (Two)  "Times a Day With Meals. 60 tablet 0    • cefdinir (OMNICEF) 300 MG capsule Take 300 mg by mouth Daily.      • Ergocalciferol (ERGOCAL PO) Take 1.25 mg by mouth 3 (Three) Times a Week.      • furosemide (LASIX) 80 MG tablet Take 1 tablet by mouth Daily. 30 tablet 0    • hydrALAZINE (APRESOLINE) 25 MG tablet Take 1 tablet by mouth Every 8 (Eight) Hours. 90 tablet 0    • levothyroxine (SYNTHROID, LEVOTHROID) 125 MCG tablet Take 125 mcg by mouth Daily.      • NIFEdipine XL (ADALAT CC) 60 MG 24 hr tablet Take 2 tablets by mouth Daily. 30 tablet 0    • BYDUREON BCISE 2 MG/0.85ML auto-injector injection Inject 0.85 mL under the skin into the appropriate area as directed 1 (One) Time Per Week. 4 pen 11    • montelukast (SINGULAIR) 10 MG tablet Take 1 tablet by mouth Every Night for 30 days. 30 tablet 0    • Needle, Disp, (BD DISP NEEDLES) 30G X 1/2\" misc Use daily for injection of Tresiba. 100 each 3    • TRESIBA FLEXTOUCH 200 UNIT/ML solution pen-injector pen injection Inject 75 Units under the skin into the appropriate area as directed Daily With Breakfast. 4 pen 11    • TRULICITY 1.5 MG/0.5ML solution pen-injector Inject 1.5 mg under the skin into the appropriate area as directed Every 7 (Seven) Days. 12 pen 3      Allergies:  Patient has no known allergies.    Review of Systems  14 points review of system was performed and it was negative other than what noted above in the HPI    Objective     Vital Signs  Temp:  [98.1 °F (36.7 °C)-98.6 °F (37 °C)] 98.1 °F (36.7 °C)  Heart Rate:  [] 101  Resp:  [20] 20  BP: (140-228)/(100-159) 179/115    Flowsheet Rows      First Filed Value   Admission Height  160 cm (63\") Documented at 06/17/2021 2337   Admission Weight  (!) 141 kg (311 lb 6.4 oz) Documented at 06/17/2021 2337           No intake/output data recorded.  I/O last 3 completed shifts:  In: -   Out: 600 [Urine:600]    Intake/Output Summary (Last 24 hours) at 6/18/2021 0724  Last data filed at 6/18/2021 0644  Gross " per 24 hour   Intake --   Output 600 ml   Net -600 ml       Physical Exam:  General Appearance: obese irritable F in no acute distress, alert  HEENT NC/AT OP clear  Neck supple no JVD  Lungs CTA bilat no rales  CV RRR no m/g  abd soft NT/ND +BS  vasc 3+ BLE pedal/ankle edema 2+ radial pulses  MS no joint warmth or erythema  Derm normal turgor, no rash  Neuro speech intact, follows commands    Results Review:  Results for orders placed or performed during the hospital encounter of 06/17/21   COVID-19,[facility] IN-HOUSE CEPHEID/[facility]ARTIN NP SWAB IN TRANSPORT MEDIA 8-12 HR TAT - Swab, Nasopharynx    Specimen: Nasopharynx; Swab   Result Value Ref Range    COVID19 Not Detected Not Detected - Ref. Range   Comprehensive Metabolic Panel    Specimen: Blood   Result Value Ref Range    Glucose 138 (H) 65 - 99 mg/dL    BUN 80 (H) 6 - 20 mg/dL    Creatinine 12.67 (H) 0.57 - 1.00 mg/dL    Sodium 139 136 - 145 mmol/L    Potassium 4.8 3.5 - 5.2 mmol/L    Chloride 106 98 - 107 mmol/L    CO2 18.9 (L) 22.0 - 29.0 mmol/L    Calcium 7.3 (L) 8.6 - 10.5 mg/dL    Total Protein 6.0 6.0 - 8.5 g/dL    Albumin 3.10 (L) 3.50 - 5.20 g/dL    ALT (SGPT) 15 1 - 33 U/L    AST (SGOT) 10 1 - 32 U/L    Alkaline Phosphatase 84 39 - 117 U/L    Total Bilirubin 0.3 0.0 - 1.2 mg/dL    eGFR Non African Amer 3 (L) >60 mL/min/1.73    eGFR  African Amer 4 (L) >60 mL/min/1.73    Globulin 2.9 gm/dL    A/G Ratio 1.1 g/dL    BUN/Creatinine Ratio 6.3 (L) 7.0 - 25.0    Anion Gap 14.1 5.0 - 15.0 mmol/L   BNP    Specimen: Blood   Result Value Ref Range    proBNP 33,099.0 (H) 0.0 - 450.0 pg/mL   Troponin    Specimen: Blood   Result Value Ref Range    Troponin T 0.061 (C) 0.000 - 0.030 ng/mL   hCG, Serum, Qualitative    Specimen: Blood   Result Value Ref Range    HCG Qualitative Negative Negative   CBC Auto Differential    Specimen: Blood   Result Value Ref Range    WBC 12.47 (H) 3.40 - 10.80 10*3/mm3    RBC 3.58 (L) 3.77 - 5.28 10*6/mm3    Hemoglobin 10.7 (L) 12.0 - 15.9 g/dL     Hematocrit 33.2 (L) 34.0 - 46.6 %    MCV 92.7 79.0 - 97.0 fL    MCH 29.9 26.6 - 33.0 pg    MCHC 32.2 31.5 - 35.7 g/dL    RDW 13.3 12.3 - 15.4 %    RDW-SD 45.8 37.0 - 54.0 fl    MPV 10.1 6.0 - 12.0 fL    Platelets 304 140 - 450 10*3/mm3   Manual Differential    Specimen: Blood   Result Value Ref Range    Neutrophil % 73.5 42.7 - 76.0 %    Lymphocyte % 4.1 (L) 19.6 - 45.3 %    Monocyte % 5.1 5.0 - 12.0 %    Eosinophil % 17.3 (H) 0.3 - 6.2 %    Neutrophils Absolute 9.17 (H) 1.70 - 7.00 10*3/mm3    Lymphocytes Absolute 0.51 (L) 0.70 - 3.10 10*3/mm3    Monocytes Absolute 0.64 0.10 - 0.90 10*3/mm3    Eosinophils Absolute 2.16 (H) 0.00 - 0.40 10*3/mm3    [place] Cells Mod/2+ None Seen    Poikilocytes Mod/2+ None Seen    WBC Morphology Normal Normal    Platelet Morphology Normal Normal   Troponin    Specimen: Blood   Result Value Ref Range    Troponin T 0.056 (C) 0.000 - 0.030 ng/mL   Hemoglobin A1c    Specimen: Blood   Result Value Ref Range    Hemoglobin A1C 6.00 (H) 4.80 - 5.60 %   Basic Metabolic Panel    Specimen: Blood   Result Value Ref Range    Glucose 108 (H) 65 - 99 mg/dL    BUN 82 (H) 6 - 20 mg/dL    Creatinine 12.56 (H) 0.57 - 1.00 mg/dL    Sodium 142 136 - 145 mmol/L    Potassium 4.1 3.5 - 5.2 mmol/L    Chloride 109 (H) 98 - 107 mmol/L    CO2 15.0 (L) 22.0 - 29.0 mmol/L    Calcium 7.2 (L) 8.6 - 10.5 mg/dL    eGFR  African Amer 4 (L) >60 mL/min/1.73    eGFR Non African Amer 3 (L) >60 mL/min/1.73    BUN/Creatinine Ratio 6.5 (L) 7.0 - 25.0    Anion Gap 18.0 (H) 5.0 - 15.0 mmol/L   CBC (No Diff)    Specimen: Blood   Result Value Ref Range    WBC 10.80 3.40 - 10.80 10*3/mm3    RBC 3.25 (L) 3.77 - 5.28 10*6/mm3    Hemoglobin 9.7 (L) 12.0 - 15.9 g/dL    Hematocrit 30.2 (L) 34.0 - 46.6 %    MCV 92.9 79.0 - 97.0 fL    MCH 29.8 26.6 - 33.0 pg    MCHC 32.1 31.5 - 35.7 g/dL    RDW 13.5 12.3 - 15.4 %    RDW-SD 46.5 37.0 - 54.0 fl    MPV 10.1 6.0 - 12.0 fL    Platelets 258 140 - 450 10*3/mm3   Troponin    Specimen: Blood    Result Value Ref Range    Troponin T 0.061 (C) 0.000 - 0.030 ng/mL   TSH    Specimen: Blood   Result Value Ref Range    TSH 2.870 0.270 - 4.200 uIU/mL   T4, Free    Specimen: Blood   Result Value Ref Range    Free T4 1.05 0.93 - 1.70 ng/dL   POC Glucose Once    Specimen: Blood   Result Value Ref Range    Glucose 110 70 - 130 mg/dL   POC Glucose Once    Specimen: Blood   Result Value Ref Range    Glucose 120 70 - 130 mg/dL   ECG 12 Lead   Result Value Ref Range    QT Interval 364 ms   Green Top (Gel)   Result Value Ref Range    Extra Tube Hold for add-ons.    Lavender Top   Result Value Ref Range    Extra Tube hold for add-on      Imaging Results (Last 72 Hours)     Procedure Component Value Units Date/Time    US Renal Bilateral [[id number]] Collected: 06/17/21 2313     Updated: 06/17/21 2318    Narrative:      RENAL ULTRASOUND     HISTORY: Acute kidney injury     COMPARISON: 11/07/2019     TECHNIQUE: Grayscale and color Doppler sonographic images were obtained  through the kidneys and bladder.     FINDINGS:  Right kidney measures 10.2 x 4.3 x 4.7 cm. Left kidney measures 9.3 x  5.8 x 4.9 cm. No hydronephrosis is seen on either side. I think both  kidneys appear echogenic, suggesting chronic medical renal disease.  Urinary bladder cannot be seen, as the patient recently voided.       Impression:      Overall echogenic appearance to the kidneys, suggesting chronic medical  renal disease. This is a new finding when compared to the patient's  prior study from 11/07/2019.      This report was finalized on 6/17/2021 11:15 PM by Dr. Ping Quick M.D.       XR Chest 2 View [[id number]] Collected: 06/17/21 1830     Updated: 06/17/21 1834    Narrative:      XR CHEST 2 VW-  06/17/2021     HISTORY: Shortness of breath.     Heart size is mildly enlarged. Lungs appear free of acute infiltrates.  Bones and soft tissues are unremarkable.       Impression:      Mild cardiomegaly.     This report was finalized on 6/17/2021 6:31  PM by Dr. Nelson Flaherty M.D.               carvedilol, 25 mg, Oral, BID With Meals  hydrALAZINE, 25 mg, Oral, Q8H  insulin lispro, 0-14 Units, Subcutaneous, 4x Daily With Meals & Nightly  levothyroxine, 125 mcg, Oral, Daily  NIFEdipine CC, 120 mg, Oral, Q24H  sodium chloride, 10 mL, Intravenous, Q12H           Assessment/Plan   ESRD - progression of CKD: Cr 2.5 in Jan 2020, now 12.5, without any e/o reversible JULISSA ie no vol depletion (has vol overload) or obstruction (PVR 50 cc, US: no hydro).  Related to diabetic nephropathy and uncontrolled HTN (BP ~ 190/100).  While no major uremic sx, degree of vol overload, metabolic acidosis warrant urgent dialysis initiation but she is very resistant to this idea.  K is normal.  Recommend TDC placement to initiate HD and potential transition to home modality ie PD once stabilized.      Vol overload - marked periph edema; has dyspnea but no central lilia on CXR  HTN urgency - vol excess likely a factor  DM2 on insulin, A1c good 6.0  AG metabolic acidosis - due to uremia; HCo3 15, AG 18   Anemia of CKD - hgb 9.7  Gout - no acute flair, on allopurinol  Hypothyroidism - on synthroid  Hypocalcemia - Ca 7.2, corrects slightly higher for low alb  PCM, severe, alb 3.1    Plan  - keep NPO for now, patient to discuss HD initiation with family; she's extremely resistant to idea and counseled about life threatening nature of declining dialysis   - bumex 4mg IV q8h  - inc hydralazine 50 TID and cont max coreg/nifedipine; can add ACEi if starts HD  - dec allopurinol 100 mg based on GFR  - check Hep B Surface Ag, iPTH, Phos, D25, iron stores  - assess extent of proteinuria    Thank you for involving me in pt's care.  RN to update me after patient talks to family    Addendum: D/w RN later in AM and patient agreed to proceed with HD.  Vascular to place TDC today.  Will do short treatment today and dialyze again tomorrow.  DC IV bumex.  BP much better in afternoon, so will decrease  hydralazine to allow some cushion for ultrafiltration.  We could add ACEi over weekend and possibly wean CCB dose given marked edema.         Acute renal failure (CMS/HCC)    Chronic diastolic congestive heart failure (CMS/[facility])    Obesity, morbid, BMI 50 or higher (CMS/[facility])    Type 2 diabetes mellitus with renal complication (CMS/[facility])    Essential hypertension    Nonrheumatic mitral valve regurgitation    CKD (chronic kidney disease) stage 3, GFR 30-59 ml/min (CMS/[facility])    Primary hypothyroidism        I discussed the patient's findings and my recommendations with patient and nursing staff    Omid Newell MD  06/18/21  07:24 EDT      Much of this encounter note is an electronic transcription/translation of spoken language to printed text. The electronic translation of spoken language may permit erroneous, or at times, nonsensical words or phrases to be inadvertently transcribed; Although I have reviewed the note for such errors, some may still exist

## 2021-06-18 NOTE — PAYOR COMM NOTE
"Gisela Ulrich (36 y.o. Female)     PLEASE SEE ATTACHED CLINICAL REVIEW.     REF#[id number]    PLEASE CALL   OR  [phone] WITH INPT AUTH AND DAYS APPROVED.     THANK YOU    GUTIERREZ JOSÉ LPN CCP    Date of Birth Social Security Number Address Home Phone MRN    1985  [address] [facility] [phone] [id number]    [employer] Marital Status          None Single       Admission Date Admission Type Admitting Provider Attending Provider Department, Room/Bed    6/17/21 Emergency Francisco Mcclure MD Masden, Troy Andrew, MD [facility] CARDIOVASC UNIT, 2225/1    Discharge Date Discharge Disposition Discharge Destination                       Attending Provider: Francisco Mcclure MD    Allergies: No Known Allergies    Isolation: None   Infection: None   Code Status: CPR    Ht: 160 cm (63\")   Wt: 139 kg (307 lb 8 oz)    Admission Cmt: None   Principal Problem: Acute renal failure (CMS/[facility]) [N17.9]                 Active Insurance as of 6/17/2021     Primary Coverage     Payor Plan Insurance Group Employer/Plan Group    ANTHEM BLUE CROSS ANTHEM BLUE CROSS BLUE SHIELD PPO [id number]     Payor Plan Address Payor Plan Phone Number Payor Plan Fax Number Effective Dates    [address] [phone]  1/1/2021 - None Entered    [place] [address]       Subscriber Name Subscriber Birth Date Member ID       GISELA ULRICH 1985 [id number]                 Emergency Contacts      (Rel.) Home Phone Work Phone Mobile Phone    FILOMENA ULRICH (KEVIN) (Father) [phone] -- [phone]               History & Physical      Rosalva Vizcarra APRN at 06/18/21 0015     Attestation signed by Francisco Mcclure MD at 06/18/21 0951    Agree with H&P per NP.  Also went to bedside conducted my own history and physical exam.  At this time the patient is resting comfortably in bed.  She has no uremic symptoms and is completely alert and oriented x3.  " She is amenable to proceeding with hemodialysis at this time and she states that she is getting her port placed later this afternoon.  I thoroughly appreciate nephrology assistance as well as surgical assistance for placement of dialysis catheter.  Patient has had pretty good urine output with use of IV Bumex which is reassuring in conjunction with the fact that her potassium is normal despite a creatinine of 12+.  I feel her elevated troponin levels are secondary to kidney disease and cardiology consultation is pending.  Her elevated blood pressure is definitely renally driven and I appreciate nephrology addressing this.  Currently she is on Coreg 25 twice daily with Bumex 4 mg IV every 8 in addition to hydralazine 50 mg every 8 and nifedipine 120 mg extended release daily.  Certainly this pressure will improve with the above treatment as well as initiation of dialysis.  Her diabetic control is not bad as evident by an A1c of 6.0 and will implement sliding scale for now as current blood sugars are ranging in the very low 100 range.  Since she is n.p.o. for upcoming procedure, will keep sliding scale only but need to watch her sugars closely postoperatively as her home med rec does identify some long acting insulins and she will need reinstatement of these pending her blood sugar trends.  TSH is therapeutic indicating adequate levothyroxine dosing.  Further recommendations to follow as clinical course unfolds      98.2, 101, 20, 194/124, 98% room air  Alert and oriented x3, nontoxic and appears in no distress, speech is fluent.  Morbidly obese with no family present  NCAT/PERRLA/MMM  S1-S2 mildly tachycardic decreased breath sounds at bases otherwise clear and no increased work of breathing  Soft nontender positive bowel sounds obese  2-3+ pitting edema  Cranial nerves are intact with no focal deficits    Lab/chart reviewed                      Patient Name:  Gisela Dyson  YOB: 1985  MRN:   [id number]  Admit Date:  6/17/2021  Patient Care Team:  Emery Silveira MD as PCP - General (Internal Medicine)      Subjective   History Present Illness     Chief Complaint   Patient presents with   • Leg Swelling     History of Present Illness   Ms. Dyson is a 36 y.o. non-smoker with a history of type 2 diabetes, hypothyroidism, morbid obesity, essential hypertension, CKD stage III, chronic diastolic CHF, and nonrheumatic mitral valve regurgitation that presents to [facility] complaining of bilateral leg swelling and shortness of breath.  Patient reports that she was admitted back in 2019 for the same complaints.  Patient reports shortness of breath with exertion intermittently since her diagnosis in 2019.  She states that she works at [employer] and is on her feet constantly.  She denies any chest pain or dizziness.  Work-up in the emergency department revealed a creat of 12.67.  Patient reports he was prescribed Lasix upon discharge and has not taken the medication since 2019 due to dizziness upon taking the medication.  She reports that she occasionally takes over-the-counter Motrin but denies any other daily NSAID use.  Chest x-ray shows mild cardiomegaly.  Patient has been kept for further evaluation and treatment.    Review of Systems   Constitutional: Negative for chills and fever.   HENT: Negative for congestion and rhinorrhea.    Eyes: Negative for photophobia and visual disturbance.   Respiratory: Positive for shortness of breath. Negative for cough.    Cardiovascular: Positive for leg swelling. Negative for chest pain and palpitations.   Gastrointestinal: Negative for constipation, diarrhea, nausea and vomiting.   Endocrine: Negative for cold intolerance and heat intolerance.   Genitourinary: Negative for difficulty urinating and dysuria.   Musculoskeletal: Negative for gait problem and joint swelling.   Skin: Negative for rash and wound.   Neurological: Negative for dizziness,  light-headedness and headaches.   Psychiatric/Behavioral: Negative for sleep disturbance and suicidal ideas.        Personal History     Past Medical History:   Diagnosis Date   • JULISSA (acute kidney injury) (CMS/[facility])    • CKD (chronic kidney disease) stage 3, GFR 30-59 ml/min (CMS/[facility])    • Diabetes mellitus (CMS/[facility])    • Diastolic CHF (CMS/[facility])    • Essential hypertension    • Nonrheumatic mitral valve regurgitation    • Obesity, morbid, BMI 50 or higher (CMS/[facility])    • Type 2 diabetes mellitus (CMS/[facility])      History reviewed. No pertinent surgical history.  Family History   Adopted: Yes     Social History     Tobacco Use   • Smoking status: Never Smoker   • Smokeless tobacco: Never Used   Vaping Use   • Vaping Use: Never used   Substance Use Topics   • Alcohol use: Yes     Alcohol/week: 1.0 - 2.0 standard drinks     Types: 1 - 2 Glasses of wine per week     Comment: socially   • Drug use: No     No current facility-administered medications on file prior to encounter.     Current Outpatient Medications on File Prior to Encounter   Medication Sig Dispense Refill   • allopurinol (ZYLOPRIM) 300 MG tablet Take 300 mg by mouth Daily.     • carvedilol (COREG) 25 MG tablet Take 1 tablet by mouth 2 (Two) Times a Day With Meals. 60 tablet 0   • cefdinir (OMNICEF) 300 MG capsule Take 300 mg by mouth Daily.     • Ergocalciferol (ERGOCAL PO) Take 1.25 mg by mouth 3 (Three) Times a Week.     • furosemide (LASIX) 80 MG tablet Take 1 tablet by mouth Daily. 30 tablet 0   • hydrALAZINE (APRESOLINE) 25 MG tablet Take 1 tablet by mouth Every 8 (Eight) Hours. 90 tablet 0   • levothyroxine (SYNTHROID, LEVOTHROID) 125 MCG tablet Take 125 mcg by mouth Daily.     • NIFEdipine XL (ADALAT CC) 60 MG 24 hr tablet Take 2 tablets by mouth Daily. 30 tablet 0   • BYDUREON BCISE 2 MG/0.85ML auto-injector injection Inject 0.85 mL under the skin into the appropriate area as directed 1 (One) Time Per Week. 4 pen 11   • montelukast (SINGULAIR) 10 MG  "tablet Take 1 tablet by mouth Every Night for 30 days. 30 tablet 0   • Needle, Disp, (BD DISP NEEDLES) 30G X 1/2\" misc Use daily for injection of Tresiba. 100 each 3   • TRESIBA FLEXTOUCH 200 UNIT/ML solution pen-injector pen injection Inject 75 Units under the skin into the appropriate area as directed Daily With Breakfast. 4 pen 11   • TRULICITY 1.5 MG/0.5ML solution pen-injector Inject 1.5 mg under the skin into the appropriate area as directed Every 7 (Seven) Days. 12 pen 3     No Known Allergies    Objective    Objective     Vital Signs  Temp:  [98.1 °F (36.7 °C)-98.6 °F (37 °C)] 98.1 °F (36.7 °C)  Heart Rate:  [] 116  Resp:  [20] 20  BP: (140-228)/(100-159) 224/141  SpO2:  [94 %-100 %] 100 %  on   ;   Device (Oxygen Therapy): room air  Body mass index is 55.16 kg/m².    Physical Exam  Vitals and nursing note reviewed.   Constitutional:       General: She is not in acute distress.     Appearance: She is well-developed. She is obese. She is not toxic-appearing.   HENT:      Head: Normocephalic and atraumatic.   Eyes:      General: No scleral icterus.        Right eye: No discharge.         Left eye: No discharge.      Conjunctiva/sclera: Conjunctivae normal.   Neck:      Vascular: No JVD.   Cardiovascular:      Rate and Rhythm: Regular rhythm. Tachycardia present.      Heart sounds: Normal heart sounds. No murmur heard.   No friction rub. No gallop.    Pulmonary:      Effort: Pulmonary effort is normal. No respiratory distress.      Breath sounds: Decreased breath sounds present. No wheezing or rales.   Abdominal:      General: Bowel sounds are normal. There is no distension.      Palpations: Abdomen is soft.      Tenderness: There is no abdominal tenderness. There is no guarding.      Comments: Obese     Musculoskeletal:         General: No tenderness or deformity. Normal range of motion.      Cervical back: Normal range of motion and neck supple.      Right lower le+ Edema present.      Left lower " le+ Edema present.   Skin:     General: Skin is warm and dry.      Capillary Refill: Capillary refill takes less than 2 seconds.   Neurological:      Mental Status: She is alert and oriented to person, place, and time.   Psychiatric:         Behavior: Behavior normal.       Results Review:  I reviewed the patient's new clinical results.      ECG 12 Lead   Preliminary Result   HEART RATE= 113  bpm   RR Interval= 532  ms   ME Interval= 137  ms   P Horizontal Axis= -9  deg   P Front Axis= 62  deg   QRSD Interval= 88  ms   QT Interval= 364  ms   QRS Axis= 76  deg   T Wave Axis= 15  deg   - ABNORMAL ECG -   Sinus tachycardia   Prolonged QT interval   Electronically Signed By:    Date and Time of Study: 2021 20:33:41           Assessment/Plan     Active Hospital Problems    Diagnosis  POA   • **Acute renal failure (CMS/[facility]) [N17.9]  Yes   • Primary hypothyroidism [E03.9]  Yes   • CKD (chronic kidney disease) stage 3, GFR 30-59 ml/min (CMS/[facility]) [N18.30]  Yes   • Nonrheumatic mitral valve regurgitation [I34.0]  Yes   • Essential hypertension [I10]  Yes   • Type 2 diabetes mellitus with renal complication (CMS/[facility]) [E11.29]  Yes   • Obesity, morbid, BMI 50 or higher (CMS/[facility]) [E66.01]  Yes   • Chronic diastolic congestive heart failure (CMS/[facility]) [I50.32]  Yes      Resolved Hospital Problems   No resolved problems to display.       Ms. Dyson is a 36 y.o. non-smoker with a history of type 2 diabetes, HTN, obesity, chronic diastolic CHF, CKD stage III, hypothyroidism who presents with dyspnea and bilateral lower extremity edema who was found to have acute renal failure.    Acute renal failure  -With history of CKD stage III.  Creat 12.67, eGFR 3.  Patient's creatinine on 1/10/2020 was 2.45 with a eGFR of 28  -Nephrology consultation placed.  Spoke with the bedside RN to page nephrology stat regarding patient's creatinine level. Potassium is 4.8.  -Renal ultrasound in a.m.  -Avoid nephrotoxins  -Trend BMP    Acute on  "chronic diastolic congestive heart failure  -Bilateral lower extremity pitting edema with elevated BNP of 33,000, although, chest x-ray shows no vascular congestion.  Patient reports she has not taken her Lasix since it was originally prescribed due to some dizziness.  -Cardiology consultation  -Daily weights  -Strict I's and O    Elevated troponin  -Troponin 0.061 and 0.056 respectively, patient denies chest pain.  Troponin likely elevated due to to elevated creatinine.  Cardiology to follow.    Essential hypertension  -Patient's systolic blood pressure 224, diastolic 141.  Will give a dose of IV labetalol x1 as she is also tachycardic.  Patient reports compliance with her antihypertensives.  Will resume.    Type 2 diabetes mellitus  -Accu-Cheks 4 times a day with meals and at bedtime  -Moderate dose correctional factor with hypoglycemic protocol  -Check hemoglobin A1c  -Hold oral diabetic medication    Hypothyroidism  -Check TSH, free T4 in a.m.  -Resume Synthroid therapy      I discussed the patient's findings and my recommendations with patient and nursing staff.    VTE Prophylaxis - SCDs.  Code Status - Full code.       DANIKA Main  [facility]  06/18/21  00:15 EDT    Electronically signed by Francisco Mcclure MD at 06/18/21 0951          Emergency Department Notes      Julieth Conley RN at 06/17/21 1726        Pt to ER via PV. Pt states bilateral leg swelling that started yesterday. Pt states hx of CHF. Pt also c/o abdominal distention and SOA.     Pt is prescribed Lasix but pt states she stopped taking it because it made her dizzy. Pt states she has been off it for \"awhile\"    Patient in mask. This RN in appropriate PPE - including mask, goggles, and gloves during all of patient care.       Electronically signed by Julieth Conley RN at 06/17/21 1730     Rufus Hartman MD at 06/17/21 2051           EMERGENCY DEPARTMENT ENCOUNTER    Room Number:  43/43  Date of " encounter:  6/17/2021  PCP: Emery Silveira MD  Historian: Patient     I used full protective equipment while examining this patient.  This includes face mask, gloves and protective eyewear.  I washed my hands before entering the room and immediately upon leaving the room      HPI:  Chief Complaint: Lower extremity swelling, shortness of breath  A complete HPI/ROS/PMH/PSH/SH/FH are unobtainable due to: None    Context: Gisela Dyson is a 36 y.o. female who presents to the ED c/o lower extremity swelling, shortness of breath.  Symptoms of been ongoing for several weeks and are fairly constant.  Symptoms are worsened by working.  She works at [employer] and is on her feet most of the day.  Shortness of breath is present only with exertion and not present at rest.  Patient has a history of diabetes, renal failure and congestive heart failure.  She was hospitalized she states last fall for these problems.  She states she has been noncompliant with all of her medications with the exception of Lasix.  She states she does take Motrin but very rarely she takes about 6 pills a month usually 1 or 2 at a time.  Patient does see Dr. Nugent as an outpatient but does not see anyone for diabetes or kidney disease.      MEDICAL RECORD REVIEW  I reviewed prior medical records and note last creatinine in 2020 of 2.45.  Patient has had no recent hospital admissions.    PAST MEDICAL HISTORY  Active Ambulatory Problems     Diagnosis Date Noted   • Acute diastolic congestive heart failure (CMS/[facility]) 11/07/2019   • JULISSA (acute kidney injury) (CMS/[facility]) 11/07/2019   • Obesity, morbid, BMI 50 or higher (CMS/[facility]) 11/07/2019   • Type 2 diabetes mellitus with renal complication (CMS/[facility]) 11/07/2019   • Essential hypertension 11/15/2019   • Nonrheumatic mitral valve regurgitation 11/15/2019   • CKD (chronic kidney disease) stage 3, GFR 30-59 ml/min (CMS/[facility]) 12/13/2019   • Primary hypothyroidism 01/10/2020   • Hirsutism 01/10/2020   • Alopecia 01/10/2020      Resolved Ambulatory Problems     Diagnosis Date Noted   • No Resolved Ambulatory Problems     Past Medical History:   Diagnosis Date   • Diabetes mellitus (CMS/HCC)    • Diastolic CHF (CMS/H[facility])    • Type 2 diabetes mellitus (CMS/[facility])          PAST SURGICAL HISTORY  No past surgical history on file.      FAMILY HISTORY  Family History   Adopted: Yes         SOCIAL HISTORY  Social History     Socioeconomic History   • Marital status: Single     Spouse name: Not on file   • Number of children: Not on file   • Years of education: Not on file   • Highest education level: Not on file   Tobacco Use   • Smoking status: Never Smoker   • Smokeless tobacco: Never Used   Substance and Sexual Activity   • Alcohol use: Yes     Comment: socially   • Drug use: No         ALLERGIES  Patient has no known allergies.       REVIEW OF SYSTEMS  Review of Systems   Constitutional: Positive for fatigue. Negative for fever and unexpected weight change.   HENT: Negative.  Negative for sore throat.    Eyes: Negative.    Respiratory: Positive for shortness of breath. Negative for cough.    Cardiovascular: Positive for leg swelling. Negative for chest pain.   Gastrointestinal: Negative.    Genitourinary: Negative.  Negative for dysuria.        LMP about 1 week ago   Musculoskeletal: Negative.  Negative for back pain.   Skin: Negative.  Negative for rash.   Neurological: Negative.  Negative for headaches.   All other systems reviewed and are negative.          PHYSICAL EXAM    I have reviewed the triage vital signs and nursing notes.    ED Triage Vitals   Temp Heart Rate Resp BP SpO2   06/17/21 1728 06/17/21 1728 06/17/21 1728 06/17/21 1749 06/17/21 1728   98.6 °F (37 °C) 108 20 (!) 198/100 100 %      Temp src Heart Rate Source Patient Position BP Location FiO2 (%)   06/17/21 1728 06/17/21 2000 06/17/21 2000 06/17/21 2000 --   Tympanic Monitor Lying Left arm        Physical Exam  GENERAL: Alert female in no obvious distress.  Triage vitals  reviewed notable for blood pressure of 198/100.  Pulse 108.  HENT: nares patent  EYES: no scleral icterus  CV: regular rhythm, regular rate-no murmur  RESPIRATORY: normal effort, clear to auscultation bilaterally, O2 saturations 97% on room air  ABDOMEN: soft, morbid obesity without significant tenderness palpation  MUSCULOSKELETAL: 2+ edema bilateral lower extremities  NEURO: Strength, sensation, and coordination are grossly intact.  Speech and mentation are unremarkable  SKIN: warm, dry-no worrisome rashes are noted  MEDICATIONS GIVEN IN ER    Medications   sodium chloride 0.9 % flush 10 mL (has no administration in time range)   sodium chloride 0.9 % flush 10 mL (has no administration in time range)   sodium chloride 0.9 % flush 10 mL (has no administration in time range)   acetaminophen (TYLENOL) tablet 650 mg (has no administration in time range)     Or   acetaminophen (TYLENOL) 160 MG/5ML solution 650 mg (has no administration in time range)     Or   acetaminophen (TYLENOL) suppository 650 mg (has no administration in time range)   dextrose (GLUTOSE) oral gel 15 g (has no administration in time range)   dextrose (D50W) 25 g/ 50mL Intravenous Solution 25 g (has no administration in time range)   glucagon (human recombinant) (GLUCAGEN DIAGNOSTIC) injection 1 mg (has no administration in time range)   nitroglycerin (NITROSTAT) SL tablet 0.4 mg (has no administration in time range)   insulin lispro (ADMELOG) injection 0-14 Units (has no administration in time range)   ondansetron (ZOFRAN) injection 4 mg (has no administration in time range)   hydrALAZINE (APRESOLINE) injection 20 mg (20 mg Intravenous Given 6/17/21 2113)         PROGRESS, DATA ANALYSIS, CONSULTS, AND MEDICAL DECISION MAKING    All labs have been independently reviewed by me.  All radiology studies have been reviewed by me and discussed with radiologist dictating the report.   EKG's independently viewed and interpreted by me.  Discussion below  represents my analysis of pertinent findings related to patient's condition, differential diagnosis, treatment plan and final disposition.      ED Course as of Jun 17 2143   Thu Jun 17, 2021 2052 EKG          EKG time: 2033  Rhythm/Rate: Sinus tachycardia 113  P waves and NY: Normal P waves and NY intervals  QRS, axis: Normal axis, normal QRS  ST and T waves: Normal T waves, prolonged QT interval    Interpreted Contemporaneously by me, independently viewed  No prior to compare      [DB]   2105 Chest x-ray reviewed with Dr. Flaherty shows mild cardiomegaly.  No active disease.    [DB]   2105 Labs are reviewed and notable for acute renal failure BUN of 80, creatinine 12.7.  Potassium normal at 4.9.    [DB]   2106 Cardiac markers show elevated troponin 0.061 which is elevated but I do not suspect that this is from acute non-STEMI but rather related to chronic renal failure and volume overload.  BNP of 33K is again suggestive of volume overload related to renal failure.    [DB]   2106 CBC shows elevated white count of 12.5 but I see no obvious evidence of infection.  Hemoglobin 10.7 is noted.    [DB]   2107 I discussed evaluation of this patient with Tasneem from [facility] who will admit on behalf of Dr. Mcclure.    [DB]      ED Course User Index  [DB] Rufus Hartman MD       AS OF 21:43 EDT VITALS:    BP - (!) 219/138  HR - 112  TEMP - 98.6 °F (37 °C) (Tympanic)  O2 SATS - 98%      DIAGNOSIS  Final diagnoses:   Acute renal failure, unspecified acute renal failure type (CMS/HCC)   Hypervolemia, unspecified hypervolemia type   Elevated troponin   Noncompliance   Uncontrolled hypertension         DISPOSITION  Admission         Rufus Hartman MD  06/17/21 2142       Rufus Hartman MD  06/17/21 2143      Electronically signed by Rufus Hartman MD at 06/17/21 2143       Oxygen Therapy (since admission)     Date/Time   SpO2   Device (Oxygen Therapy)   Flow (L/min)   Oxygen Concentration (%)   ETCO2 (mmHg)    06/18/21 0740    98   --   --   --   --    06/18/21 0240   94   --   --   --   --    06/18/21 0159   98   --   --   --   --    06/17/21 2337   100   room air   --   --   --    06/17/21 2300   98   room air   --   --   --    06/17/21 2249   94   room air   --   --   --    06/17/21 2130   98   room air   --   --   --    06/17/21 2108   98   room air   --   --   --    06/17/21 2000   100   room air   --   --   --    06/17/21 1728   100   room air   --   --   --            Intake & Output (last 2 days)       06/16 0701 - 06/17 0700 06/17 0701 - 06/18 0700 06/18 0701 - 06/19 0700    Urine (mL/kg/hr)  600     Stool  0     Total Output  600     Net  -600            Urine Unmeasured Occurrence  1 x     Stool Unmeasured Occurrence  1 x         Lines, Drains & Airways    Active LDAs     Name:   Placement date:   Placement time:   Site:   Days:    Peripheral IV 06/17/21 2002 Right Antecubital   06/17/21 2002    Antecubital   less than 1         Inactive LDAs     None                  Medication Administration Report for Gisela Dyson as of 06/18/21 1245    Legend:    Given Hold Not Given Due Canceled Entry Other Actions    Time Time (Time) Time  Time-Action       Discontinued     Completed     Future     MAR Hold     Linked           Medications 06/16/21 06/17/21 06/18/21    acetaminophen (TYLENOL) tablet 650 mg  Dose: 650 mg  Freq: Every 4 Hours PRN Route: PO  PRN Reason: Mild Pain   Start: 06/17/21 2135    Admin Instructions:   Do not exceed 4 grams of acetaminophen in a 24 hr period.    If given for pain, use the following pain scale:   Mild Pain = Pain Score of 1-3, CPOT 1-2  Moderate Pain = Pain Score of 4-6, CPOT 3-4  Severe Pain = Pain Score of 7-10, CPOT 5-8  Do not exceed 4 grams of acetaminophen in a 24 hr period. Max dose of 2gm for AST/ALT greater than 120 units/L      If given for pain, use the following pain scale:   Mild Pain = Pain Score of 1-3, CPOT 1-2  Moderate Pain = Pain Score of 4-6, CPOT 3-4  Severe Pain = Pain Score  of 7-10, CPOT 5-8          Or  acetaminophen (TYLENOL) 160 MG/5ML solution 650 mg  Dose: 650 mg  Freq: Every 4 Hours PRN Route: PO  PRN Reason: Mild Pain   Start: 06/17/21 2135    Admin Instructions:   Do not exceed 4 grams of acetaminophen in a 24 hr period.    If given for pain, use the following pain scale:   Mild Pain = Pain Score of 1-3, CPOT 1-2  Moderate Pain = Pain Score of 4-6, CPOT 3-4  Severe Pain = Pain Score of 7-10, CPOT 5-8  Do not exceed 4 grams of acetaminophen in a 24 hr period. Max dose of 2gm for AST/ALT greater than 120 units/L      If given for pain, use the following pain scale:   Mild Pain = Pain Score of 1-3, CPOT 1-2  Moderate Pain = Pain Score of 4-6, CPOT 3-4  Severe Pain = Pain Score of 7-10, CPOT 5-8          Or  acetaminophen (TYLENOL) suppository 650 mg  Dose: 650 mg  Freq: Every 4 Hours PRN Route: RE  PRN Reason: Mild Pain   Start: 06/17/21 2135    Admin Instructions:   Do not exceed 4 grams of acetaminophen in a 24 hr period. Max dose of 2gm for AST/ALT greater than 120 units/L      If given for pain, use the following pain scale:   Mild Pain = Pain Score of 1-3, CPOT 1-2  Moderate Pain = Pain Score of 4-6, CPOT 3-4  Severe Pain = Pain Score of 7-10, CPOT 5-8           bumetanide (BUMEX) injection 4 mg  Dose: 4 mg  Freq: Every 8 Hours Route: IV  Start: 06/18/21 0830    Admin Instructions:   Give slow IV push over 1-2 minutes.       0944     1630            carvedilol (COREG) tablet 25 mg  Dose: 25 mg  Freq: 2 Times Daily With Meals Route: PO  Start: 06/18/21 0800    Admin Instructions:   Give with food.       (0928)     1800            dextrose (D50W) 25 g/ 50mL Intravenous Solution 25 g  Dose: 25 g  Freq: Every 15 Minutes PRN Route: IV  PRN Reason: Low Blood Sugar  PRN Comment: Blood Sugar Less Than 70  Start: 06/17/21 2135    Admin Instructions:   Blood sugar less than 70; patient has IV access - Unresponsive, NPO or Unable To Safely Swallow           dextrose (GLUTOSE) oral gel  15 g  Dose: 15 g  Freq: Every 15 Minutes PRN Route: PO  PRN Reason: Low Blood Sugar  PRN Comment: Blood sugar less than 70  Start: 06/17/21 2135    Admin Instructions:   BS<70, Patient Alert, Is not NPO, Can safely swallow.           glucagon (human recombinant) (GLUCAGEN DIAGNOSTIC) injection 1 mg  Dose: 1 mg  Freq: As Needed Route: SC  PRN Comment: Blood Glucose Less Than 70  Start: 06/17/21 2135    Admin Instructions:   Blood Glucose Less Than 70 - Patient Without IV Access - Unresponsive, NPO or Unable To Safely Swallow           hydrALAZINE (APRESOLINE) tablet 50 mg  Dose: 50 mg  Freq: Every 8 Hours Scheduled Route: PO  Start: 06/18/21 1400    Admin Instructions:   Caution: Look alike/sound alike drug alert       1400     2200            insulin lispro (ADMELOG) injection 0-14 Units  Dose: 0-14 Units  Freq: 4 Times Daily With Meals & Nightly Route: SC  Start: 06/18/21 0800    Admin Instructions:   Correction - Moderate-High Dose.  60-80 units/day total insulin dose or uses insulin at home    Blood glucose 150-199 mg/dL - 3 units  Blood glucose 200-249 mg/dL - 5 units  Blood glucose 250-299 mg/dL - 8 units  Blood glucose 300-349 mg/dL - 10 units  Blood glucose 350-400 mg/dL - 12 units  Blood glucose greater than 400 mg/dL - 14 units and call provider   Caution: Look alike/sound alike drug alert            1200     1800       2100             labetalol (NORMODYNE,TRANDATE) injection 10 mg  Dose: 10 mg  Freq: Every 6 Hours PRN Route: IV  PRN Reason: High Blood Pressure  Start: 06/18/21 0211    Admin Instructions:   As needed for SBP greater than 200, DBP greater than 110  Give by slow IV Push each 20mg (or less) over 2 minutes           levothyroxine (SYNTHROID, LEVOTHROID) tablet 125 mcg  Dose: 125 mcg  Freq: Daily Route: PO  Start: 06/18/21 0900    Admin Instructions:   Take on empty stomach.       0929             NIFEdipine XL (PROCARDIA XL) 24 hr tablet 120 mg  Dose: 120 mg  Freq: Every 24 Hours Scheduled  Route: PO  Start: 06/18/21 0900    Admin Instructions:   Caution: Look alike/sound alike drug alert. Avoid grapefruit juice.       0929             nitroglycerin (NITROSTAT) SL tablet 0.4 mg  Dose: 0.4 mg  Freq: Every 5 Minutes PRN Route: SL  PRN Reason: Chest Pain  PRN Comment: Only if SBP Greater Than 100  Start: 06/17/21 2135    Admin Instructions:   If Pain Unrelieved After 3 Doses Notify MD           ondansetron (ZOFRAN) injection 4 mg  Dose: 4 mg  Freq: Every 6 Hours PRN Route: IV  PRN Reasons: Nausea,Vomiting  Start: 06/17/21 2135    Admin Instructions:   If BOTH ondansetron (ZOFRAN) and promethazine (PHENERGAN) are ordered use ondansetron first and THEN promethazine IF ondansetron is ineffective.           sodium chloride 0.9 % flush 10 mL  Dose: 10 mL  Freq: As Needed Route: IV  PRN Reason: Line Care  Start: 06/17/21 2135          sodium chloride 0.9 % flush 10 mL  Dose: 10 mL  Freq: Every 12 Hours Scheduled Route: IV  Start: 06/17/21 2138      0153     0932     2100           sodium chloride 0.9 % flush 10 mL  Dose: 10 mL  Freq: As Needed Route: IV  PRN Reason: Line Care  Start: 06/17/21 1727         Completed Medications  Medications 06/16/21 06/17/21 06/18/21       hydrALAZINE (APRESOLINE) injection 20 mg  Dose: 20 mg  Freq: Once Route: IV  Start: 06/17/21 2106   End: 06/17/21 2113    Admin Instructions:   Caution: Look alike/sound alike drug alert      2113              labetalol (NORMODYNE,TRANDATE) injection 10 mg  Dose: 10 mg  Freq: Once Route: IV  Start: 06/18/21 0145   End: 06/18/21 0220    Admin Instructions:   As needed for SBP greater than 200  Give by slow IV Push each 20mg (or less) over 2 minutes       0220            Discontinued Medications  Medications 06/16/21 06/17/21 06/18/21       hydrALAZINE (APRESOLINE) tablet 25 mg  Dose: 25 mg  Freq: Every 8 Hours Scheduled Route: PO  Start: 06/18/21 0600   End: 06/18/21 0738    Admin Instructions:   Caution: Look alike/sound alike drug alert        0643             insulin lispro (ADMELOG) injection 0-14 Units  Dose: 0-14 Units  Freq: 3 Times Daily Before Meals Route: SC  Start: 06/18/21 0730   End: 06/17/21 5440    Admin Instructions:   Correction - Moderate-High Dose.  60-80 units/day total insulin dose or uses insulin at home    Blood glucose 150-199 mg/dL - 3 units  Blood glucose 200-249 mg/dL - 5 units  Blood glucose 250-299 mg/dL - 8 units  Blood glucose 300-349 mg/dL - 10 units  Blood glucose 350-400 mg/dL - 12 units  Blood glucose greater than 400 mg/dL - 14 units and call provider   Caution: Look alike/sound alike drug alert          montelukast (SINGULAIR) tablet 10 mg  Dose: 10 mg  Freq: Nightly Route: PO  Start: 06/18/21 0300   End: 06/18/21 0214          Pharmacy to Dose enoxaparin (LOVENOX)  Freq: Continuous PRN Route: XX  PRN Reason: Consult  Indications of Use: DVT/PE (active thrombosis)  Start: 06/18/21 0257   End: 06/18/21 0409          sodium chloride 0.9 % infusion  Rate: 100 mL/hr Dose: 100 mL/hr  Freq: Continuous Route: IV  Start: 06/18/21 0045   End: 06/17/21 0066                 Lab Results (all)     Procedure Component Value Units Date/Time    Urinalysis With Microscopic If Indicated (No Culture) - Urine, Clean Catch [[id number]]  (Abnormal) Collected: 06/18/21 1119    Specimen: Urine, Clean Catch Updated: 06/18/21 1205     Color, UA Yellow     Appearance, UA Clear     pH, UA 6.0     Specific Gravity, UA 1.014     Glucose,  mg/dL (Trace)     Ketones, UA Negative     Bilirubin, UA Negative     Blood, UA Small (1+)     Protein, UA >=300 mg/dL (3+)     Leuk Esterase, UA Negative     Nitrite, UA Negative     Urobilinogen, UA 0.2 E.U./dL    Urinalysis, Microscopic Only - Urine, Clean Catch [[id number]]  (Abnormal) Collected: 06/18/21 1119    Specimen: Urine, Clean Catch Updated: 06/18/21 1205     RBC, UA 0-2 /HPF      WBC, UA 6-12 /HPF      Bacteria, UA None Seen /HPF      Squamous Epithelial Cells, UA 0-2 /HPF      Hyaline Casts, UA  None Seen /LPF      Methodology Automated Microscopy    POC Glucose Once [[id number]]  (Normal) Collected: 06/18/21 1116    Specimen: Blood Updated: 06/18/21 1131     Glucose 123 mg/dL     Protein / Creatinine Ratio, Urine - Urine, Clean Catch [[id number]] Collected: 06/18/21 1119    Specimen: Urine, Clean Catch Updated: 06/18/21 1129    Hepatitis B Surface Antigen [[id number]]  (Normal) Collected: 06/17/21 1740    Specimen: Blood Updated: 06/18/21 0809     Hepatitis B Surface Ag Non-Reactive    Troponin [[id number]]  (Abnormal) Collected: 06/18/21 0610    Specimen: Blood Updated: 06/18/21 0712     Troponin T 0.061 ng/mL     Narrative:      TSH [[id number]]  (Normal) Collected: 06/18/21 0610    Specimen: Blood Updated: 06/18/21 0700     TSH 2.870 uIU/mL     T4, Free [[id number]]  (Normal) Collected: 06/18/21 0610    Specimen: Blood Updated: 06/18/21 0700     Free T4 1.05 ng/dL     Narrative:      Results may be falsely increased if patient taking Biotin.      Basic Metabolic Panel [[id number]]  (Abnormal) Collected: 06/18/21 0610    Specimen: Blood Updated: 06/18/21 0647     Glucose 108 mg/dL      BUN 82 mg/dL      Creatinine 12.56 mg/dL      Sodium 142 mmol/L      Potassium 4.1 mmol/L      Chloride 109 mmol/L      CO2 15.0 mmol/L      Calcium 7.2 mg/dL      eGFR  African Amer 4 mL/min/1.73      Comment: <15 Indicative of kidney failure.        eGFR Non African Amer 3 mL/min/1.73      Comment: <15 Indicative of kidney failure.        BUN/Creatinine Ratio 6.5     Anion Gap 18.0 mmol/L     Narrative:      CBC (No Diff) [[id number]]  (Abnormal) Collected: 06/18/21 0610    Specimen: Blood Updated: 06/18/21 0644     WBC 10.80 10*3/mm3      RBC 3.25 10*6/mm3      Hemoglobin 9.7 g/dL      Hematocrit 30.2 %      MCV 92.9 fL      MCH 29.8 pg      MCHC 32.1 g/dL      RDW 13.5 %      RDW-SD 46.5 fl      MPV 10.1 fL      Platelets 258 10*3/mm3     Hemoglobin A1c [[id number]]  (Abnormal) Collected: 06/18/21 0610    Specimen: Blood  Updated: 06/18/21 0634     Hemoglobin A1C 6.00 %     Narrative:      POC Glucose Once [[id number]]  (Normal) Collected: 06/18/21 0610    Specimen: Blood Updated: 06/18/21 0611     Glucose 120 mg/dL     POC Glucose Once [[id number]]  (Normal) Collected: 06/18/21 0207    Specimen: Blood Updated: 06/18/21 0208     Glucose 110 mg/dL     Troponin [[id number]]  (Abnormal) Collected: 06/17/21 2354    Specimen: Blood Updated: 06/18/21 0117     Troponin T 0.056 ng/mL     Narrative:      COVID PRE-OP / PRE-PROCEDURE SCREENING ORDER (NO ISOLATION) - Swab, Nasopharynx [[id number]]  (Normal) Collected: 06/17/21 2117    Specimen: Swab from Nasopharynx Updated: 06/17/21 2324    Narrative:      COVID-19,[facility] IN-HOUSE CEPHEID/[facility]IN NP SWAB IN TRANSPORT MEDIA 8-12 HR TAT - Swab, Nasopharynx [[id number]]  (Normal) Collected: 06/17/21 2117    Specimen: Swab from Nasopharynx Updated: 06/17/21 2324     COVID19 Not Detected    Narrative:      Troponin [[id number]]  (Abnormal) Collected: 06/17/21 1740    Specimen: Blood Updated: 06/17/21 2148     Troponin T 0.061 ng/mL     Narrative:      CBC Auto Differential [[id number]]  (Abnormal) Collected: 06/17/21 1740    Specimen: Blood Updated: 06/17/21 1845     WBC 12.47 10*3/mm3      RBC 3.58 10*6/mm3      Hemoglobin 10.7 g/dL      Hematocrit 33.2 %      MCV 92.7 fL      MCH 29.9 pg      MCHC 32.2 g/dL      RDW 13.3 %      RDW-SD 45.8 fl      MPV 10.1 fL      Platelets 304 10*3/mm3     Manual Differential [[id number]]  (Abnormal) Collected: 06/17/21 1740    Specimen: Blood Updated: 06/17/21 1845     Neutrophil % 73.5 %      Lymphocyte % 4.1 %      Monocyte % 5.1 %      Eosinophil % 17.3 %      Neutrophils Absolute 9.17 10*3/mm3      Lymphocytes Absolute 0.51 10*3/mm3      Monocytes Absolute 0.64 10*3/mm3      Eosinophils Absolute 2.16 10*3/mm3      Dakota Cells Mod/2+     Poikilocytes Mod/2+     WBC Morphology Normal     Platelet Morphology Normal    hCG, Serum, Qualitative [[id number]]  (Normal)  Collected: 06/17/21 1740    Specimen: Blood Updated: 06/17/21 1814     HCG Qualitative Negative    Comprehensive Metabolic Panel [[id number]]  (Abnormal) Collected: 06/17/21 1740    Specimen: Blood Updated: 06/17/21 1813     Glucose 138 mg/dL      BUN 80 mg/dL      Creatinine 12.67 mg/dL      Sodium 139 mmol/L      Potassium 4.8 mmol/L      Chloride 106 mmol/L      CO2 18.9 mmol/L      Calcium 7.3 mg/dL      Total Protein 6.0 g/dL      Albumin 3.10 g/dL      ALT (SGPT) 15 U/L      AST (SGOT) 10 U/L      Alkaline Phosphatase 84 U/L      Total Bilirubin 0.3 mg/dL      eGFR Non African Amer 3 mL/min/1.73      Comment: <15 Indicative of kidney failure.        eGFR  African Amer 4 mL/min/1.73      Comment: <15 Indicative of kidney failure.        Globulin 2.9 gm/dL      A/G Ratio 1.1 g/dL      BUN/Creatinine Ratio 6.3     Anion Gap 14.1 mmol/L     Narrative:      BNP [[id number]]  (Abnormal) Collected: 06/17/21 1740    Specimen: Blood Updated: 06/17/21 1809     proBNP 33,099.0 pg/mL     Narrative:            Imaging Results (All)     Procedure Component Value Units Date/Time    US Renal Bilateral [[id number]] Collected: 06/17/21 2313     Updated: 06/17/21 2318    Narrative:      RENAL ULTRASOUND     HISTORY: Acute kidney injury     COMPARISON: 11/07/2019     TECHNIQUE: Grayscale and color Doppler sonographic images were obtained  through the kidneys and bladder.     FINDINGS:  Right kidney measures 10.2 x 4.3 x 4.7 cm. Left kidney measures 9.3 x  5.8 x 4.9 cm. No hydronephrosis is seen on either side. I think both  kidneys appear echogenic, suggesting chronic medical renal disease.  Urinary bladder cannot be seen, as the patient recently voided.       Impression:      Overall echogenic appearance to the kidneys, suggesting chronic medical  renal disease. This is a new finding when compared to the patient's  prior study from 11/07/2019.      This report was finalized on 6/17/2021 11:15 PM by Dr. Ping Quick M.D.        XR Chest 2 View [[id number]] Collected: 06/17/21 1830     Updated: 06/17/21 1834    Narrative:      XR CHEST 2 VW-  06/17/2021     HISTORY: Shortness of breath.     Heart size is mildly enlarged. Lungs appear free of acute infiltrates.  Bones and soft tissues are unremarkable.       Impression:      Mild cardiomegaly.     This report was finalized on 6/17/2021 6:31 PM by Dr. Nelson Flaherty M.D.             ECG/EMG Results (all)     Procedure Component Value Units Date/Time    ECG 12 Lead [[id number]] Collected: 06/17/21 2033     Updated: 06/17/21 2035     QT Interval 364 ms     Narrative:      HEART RATE= 113  bpm  RR Interval= 532  ms  LA Interval= 137  ms  P Horizontal Axis= -9  deg  P Front Axis= 62  deg  QRSD Interval= 88  ms  QT Interval= 364  ms  QRS Axis= 76  deg  T Wave Axis= 15  deg  - ABNORMAL ECG -  Sinus tachycardia  Prolonged QT interval  Electronically Signed By:   Date and Time of Study: 2021-06-17 20:33:41          Orders (all)      Start     Ordered    06/19/21 0600  CBC & Differential  Morning Draw      06/18/21 0740    06/19/21 0600  Renal Function Panel  Morning Draw      06/18/21 0740    06/19/21 0600  PTH, Intact  Morning Draw      06/18/21 0812    06/19/21 0600  Vitamin D 25 Hydroxy  Morning Draw      06/18/21 0812    06/19/21 0600  Iron Profile  Morning Draw      06/18/21 0812    06/19/21 0600  Ferritin  Morning Draw      06/18/21 0812    06/18/21 1400  hydrALAZINE (APRESOLINE) tablet 50 mg  Every 8 Hours Scheduled      06/18/21 0738    06/18/21 1203  Urinalysis, Microscopic Only - Urine, Clean Catch  Once      06/18/21 1202    06/18/21 1132  POC Glucose Once  Once      06/18/21 1116    06/18/21 1058  Hemodialysis Inpatient  Once     Comments: After TDC placed  Heparin to lock catheter    06/18/21 1057    06/18/21 1058  Inpatient Case Management  Consult  Once     Provider:  (Not yet assigned)    06/18/21 1057    06/18/21 1042  Inpatient Vascular Surgery Consult  STAT      Specialty:  Vascular Surgery  Provider:  Ancelmo Menendez MD    06/18/21 1043    06/18/21 0935  Adult Transthoracic Echo Complete W/ Cont if Necessary Per Protocol  Once      06/18/21 0935    06/18/21 0900  levothyroxine (SYNTHROID, LEVOTHROID) tablet 125 mcg  Daily      06/18/21 0213    06/18/21 0900  NIFEdipine XL (PROCARDIA XL) 24 hr tablet 120 mg  Every 24 Hours Scheduled      06/18/21 0213    06/18/21 0830  bumetanide (BUMEX) injection 4 mg  Every 8 Hours      06/18/21 0740    06/18/21 0800  insulin lispro (ADMELOG) injection 0-14 Units  4 Times Daily With Meals & Nightly      06/17/21 2354    06/18/21 0800  carvedilol (COREG) tablet 25 mg  2 Times Daily With Meals      06/18/21 0213    06/18/21 0742  Urinalysis With Microscopic If Indicated (No Culture) - Urine, Clean Catch  Once      06/18/21 0741    06/18/21 0742  Protein / Creatinine Ratio, Urine - Urine, Clean Catch  Once      06/18/21 0741    06/18/21 0741  Call Doctor  Until Discontinued,   Status:  Canceled     Comments: Please update me on patient decision re: HD    06/18/21 0740    06/18/21 0740  Hepatitis B Surface Antigen  Once      06/18/21 0740    06/18/21 0740  Please see if lab able to add on hep B surface Ag to blood in lab  Nursing Communication  Once     Comments: Please see if lab able to add on hep B surface Ag to blood in lab    06/18/21 0740    06/18/21 0730  insulin lispro (ADMELOG) injection 0-14 Units  3 Times Daily Before Meals,   Status:  Discontinued      06/17/21 2136 06/18/21 0612  POC Glucose Once  Once      06/18/21 0610    06/18/21 0600  Hemoglobin A1c  Morning Draw      06/17/21 2136 06/18/21 0600  Basic Metabolic Panel  Morning Draw      06/17/21 2136 06/18/21 0600  CBC (No Diff)  Morning Draw      06/17/21 2136 06/18/21 0600  hydrALAZINE (APRESOLINE) tablet 25 mg  Every 8 Hours Scheduled,   Status:  Discontinued      06/18/21 0213 06/18/21 0600  TSH  Morning Draw      06/18/21 0512    06/18/21 0513  T4,  Free  Once      06/18/21 0512    06/18/21 0328  Inpatient Cardiology Consult  Once     Specialty:  Cardiology  Provider:  Reji Rudd MD    06/18/21 0327    06/18/21 0300  montelukast (SINGULAIR) tablet 10 mg  Nightly,   Status:  Discontinued      06/18/21 0213    06/18/21 0257  Pharmacy to Dose enoxaparin (LOVENOX)  Continuous PRN,   Status:  Discontinued      06/18/21 0257    06/18/21 0213  Inpatient Nephrology Consult  Once     Specialty:  Nephrology  Provider:  Mitch Salazar MD    06/18/21 0212    06/18/21 0211  labetalol (NORMODYNE,TRANDATE) injection 10 mg  Every 6 Hours PRN      06/18/21 0211    06/18/21 0209  POC Glucose Once  Once      06/18/21 0207    06/18/21 0145  labetalol (NORMODYNE,TRANDATE) injection 10 mg  Once      06/18/21 0059    06/18/21 0100  Inpatient Consult to Advance Care Planning  Once     Provider:  (Not yet assigned)    06/18/21 0100    06/18/21 0045  sodium chloride 0.9 % infusion  Continuous,   Status:  Discontinued      06/17/21 2354    06/18/21 0045  NPO Diet  Diet Effective Now      06/18/21 0045    06/18/21 0045  Bladder Scan  Once     Comments: If greater than 300 cc, place alonso catheter    06/18/21 0045    06/18/21 0000  Vital Signs  Every 4 Hours      06/17/21 2136 06/18/21 0000  Troponin  Every 6 Hours      06/17/21 2137 06/17/21 2200  Strict Intake & Output  Every Hour      06/17/21 2136 06/17/21 2200  POC Glucose 4x Daily AC & at Bedtime  4 Times Daily Before Meals & at Bedtime     Comments: If bedtime blood glucose is greater than 350 mg/dl, call MD.      06/17/21 2136 06/17/21 2147  US Renal Bilateral  1 Time Imaging      06/17/21 2136 06/17/21 2143  Inpatient Admission  Once      06/17/21 2142 06/17/21 2138  sodium chloride 0.9 % flush 10 mL  Every 12 Hours Scheduled      06/17/21 2136 06/17/21 2137  Daily Weights  Daily      06/17/21 2136 06/17/21 2137  US Renal Limited  1 Time Imaging,   Status:  Canceled      06/17/21 2136     06/17/21 2136  Intake & Output  Every Shift      06/17/21 2136 06/17/21 2136  Weigh Patient  Once      06/17/21 2136 06/17/21 2136  Oxygen Therapy- Nasal Cannula; Titrate for SPO2: 90%  Continuous      06/17/21 2136 06/17/21 2136  Insert Peripheral IV  Once      06/17/21 2136 06/17/21 2136  Saline Lock & Maintain IV Access  Continuous,   Status:  Canceled      06/17/21 2136 06/17/21 2136  Do NOT Hold Basal or Correction Scale Insulin When Patient is NPO, Hold Scheduled Mealtime (Bolus) Insulin if NPO  Continuous      06/17/21 2136 06/17/21 2136  Follow [facility] Hypoglycemia Standing Orders For Blood Glucose Less Than 70 mg/dL  Until Discontinued     Comments: ALERT PATIENT - NOT NPO & CAN SAFELY SWALLOW  Administer 4 oz Fruit Juice OR 4 oz Regular Soda OR 8 oz Milk OR 15-30 grams (1 tube) of Glucose Gel.  Recheck Blood Glucose Approximately 15 Minutes After Ingestion, Repeat Treatment & Continue to Recheck Blood Sugar Approximately Every 15 Minutes Until Blood Glucose is 70 or Higher.  Once Blood Glucose is 70 or Higher & if It Will Be More Than 60 Minutes Until Next Meal, Provide Appropriate Snack (Including Carbohydrate Food) Based on Meal Plan Order. Give Meal Tray As Soon As Possible.    PATIENT HAS IV ACCESS - UNRESPONSIVE, NPO OR UNABLE TO SAFELY SWALLOW  Administer 25g (50ml) D50W IV Push.  Recheck Blood Glucose Approximately 15 Minutes After Administration, if Blood Glucose Remains Less Than 70, Repeat Treatment   Recheck Blood Glucose Approximately 15 Minutes After 2nd Administration, if Blood Glucose Remains Less Than 70 After 2nd Dose of D50W, Contact Provider for Further Treatment Orders & Consider Adding IVF With D5W for Maintenance    PATIENT WITHOUT IV ACCESS - UNRESPONSIVE, NPO OR UNABLE TO SAFELY SWALLOW  Administer 1mg Glucagon SQ & Establish IV Access.  Turn Patient on Side - Nausea / Vomiting May Occur.  Recheck Blood Glucose Approximately 15 Minutes After Administration.  If Blood  Glucose Remains Less Than 70, Administer 25g D50W IV Push (50ml).  Recheck Blood Glucose Approximately 15 Minutes After Administration of D50W, if Blood Glucose Remains Less Than 70, Contact Provider for Further Treatment Orders & Consider Adding IVF With D5 for Maintenance    Document Event & Patient Response to Interventions in EMR, Document Medications on MAR  Notify Provider if Hypoglycemia Treatment Needed    06/17/21 2136 06/17/21 2136  Code Status and Medical Interventions:  Continuous      06/17/21 2136 06/17/21 2136  Place Sequential Compression Device  Once      06/17/21 2136 06/17/21 2136  Maintain Sequential Compression Device  Continuous      06/17/21 2136 06/17/21 2136  Telemetry - Maintain IV Access  Continuous      06/17/21 2136 06/17/21 2136  Continuous Cardiac Monitoring  Continuous      06/17/21 2136 06/17/21 2136  May Be Off Telemetry for Tests  Continuous      06/17/21 2136 06/17/21 2136  ACLS Protocol For Life Threatening Dysrhythmias (Unless Code Status Indicates Otherwise)  Continuous      06/17/21 2136 06/17/21 2136  Notify Provider if ACLS Protocol Activated  Until Discontinued      06/17/21 2136 06/17/21 2136  Diet Regular; Cardiac, Consistent Carbohydrate  Diet Effective Now,   Status:  Canceled      06/17/21 2136 06/17/21 2136  Inpatient Nephrology Consult  Once,   Status:  Canceled     Specialty:  Nephrology  Provider:  Hayden Hunter MD    06/17/21 2136 06/17/21 2135  ondansetron (ZOFRAN) injection 4 mg  Every 6 Hours PRN      06/17/21 2136 06/17/21 2135  Oxygen Therapy- Nasal Cannula; Titrate for SPO2: 90% - 95%  Continuous PRN,   Status:  Canceled     Comments: If Patient Develops Unresponsiveness, Acute Dyspnea, Cyanosis or Suspected Hypoxemia Start Continuous Pulse Ox Monitoring, Apply Oxygen & Notify Provider    06/17/21 2136 06/17/21 2135  nitroglycerin (NITROSTAT) SL tablet 0.4 mg  Every 5 Minutes PRN      06/17/21 2136 06/17/21  2135  dextrose (D50W) 25 g/ 50mL Intravenous Solution 25 g  Every 15 Minutes PRN      06/17/21 2136 06/17/21 2135  glucagon (human recombinant) (GLUCAGEN DIAGNOSTIC) injection 1 mg  As Needed      06/17/21 2136 06/17/21 2135  sodium chloride 0.9 % flush 10 mL  As Needed      06/17/21 2136 06/17/21 2135  acetaminophen (TYLENOL) tablet 650 mg  Every 4 Hours PRN      06/17/21 2136 06/17/21 2135  acetaminophen (TYLENOL) 160 MG/5ML solution 650 mg  Every 4 Hours PRN      06/17/21 2136 06/17/21 2135  acetaminophen (TYLENOL) suppository 650 mg  Every 4 Hours PRN      06/17/21 2136 06/17/21 2135  dextrose (GLUTOSE) oral gel 15 g  Every 15 Minutes PRN      06/17/21 2136 06/17/21 2108  [facility] (on-call MD unless specified) Details  Once     Specialty:  Hospitalist  Provider:  (Not yet assigned)    06/17/21 2107 06/17/21 2106  hydrALAZINE (APRESOLINE) injection 20 mg  Once      06/17/21 2105    06/17/21 2106  COVID PRE-OP / PRE-PROCEDURE SCREENING ORDER (NO ISOLATION) - Swab, Nasopharynx  Once      06/17/21 2105 06/17/21 2106  COVID-19, [facility] IN-HOUSE CEPHEID/MARTIN NP SWAB IN TRANSPORT MEDIA 8-12 HR TAT - Swab, Nasopharynx  PROCEDURE ONCE      06/17/21 2105    06/17/21 1759  Manual Differential  Once      06/17/21 1758    06/17/21 1728  NPO Diet  Diet Effective Now,   Status:  Canceled      06/17/21 1728    06/17/21 1728  Undress and Gown  Once      06/17/21 1728    06/17/21 1728  Cardiac Monitoring  Per Hospital Policy,   Status:  Canceled      06/17/21 1728    06/17/21 1728  Continuous Pulse Oximetry  Continuous      06/17/21 1728    06/17/21 1728  Vital Signs  Per Hospital Policy      06/17/21 1728    06/17/21 1728  ECG 12 Lead  Once      06/17/21 1728    06/17/21 1728  XR Chest 2 View  1 Time Imaging      06/17/21 1728    06/17/21 1728  Insert Peripheral IV  Once      06/17/21 1728    06/17/21 1728  [facility] Draw  Once      06/17/21 1728    06/17/21 1728  CBC & Differential  Once      06/17/21 1728     06/17/21 1728  Comprehensive Metabolic Panel  Once      06/17/21 1728    06/17/21 1728  BNP  Once      06/17/21 1728    06/17/21 1728  Troponin  Once      06/17/21 1728    06/17/21 1728  hCG, Serum, Qualitative  Once      06/17/21 1728    06/17/21 1728  Green Top (Gel)  PROCEDURE ONCE      06/17/21 1728    06/17/21 1728  Lavender Top  PROCEDURE ONCE      06/17/21 1728    06/17/21 1728  Gold Top - SST  PROCEDURE ONCE,   Status:  Canceled      06/17/21 1728    06/17/21 1728  CBC Auto Differential  PROCEDURE ONCE      06/17/21 1728    06/17/21 1727  Oxygen Therapy- Nasal Cannula; 2 LPM; Titrate for SPO2: equal to or greater than, 92%  Continuous PRN,   Status:  Canceled      06/17/21 1728    06/17/21 1727  sodium chloride 0.9 % flush 10 mL  As Needed      06/17/21 1728    Unscheduled  Telemetry - Pulse Oximetry  Continuous PRN     Comments: If Patient Develops Unresponsiveness, Acute Dyspnea, Cyanosis or Suspected Hypoxemia Start Continuous Pulse Ox Monitoring, Apply Oxygen & Notify Provider    06/17/21 2136    Unscheduled  ECG 12 Lead  As Needed     Comments: Nurse to Release if Patient Expericences Acute Chest Pain or Dysrhythmias    06/17/21 2136    Unscheduled  Potassium  As Needed     Comments: For Ventricular Arrhythmias      06/17/21 2136    Unscheduled  Magnesium  As Needed     Comments: For Ventricular Arrhythmias      06/17/21 2136    Unscheduled  Troponin  As Needed     Comments: For Chest Pain      06/17/21 2136    Unscheduled  Blood Gas, Arterial -  As Needed     Comments: Per O2 PolicyNotify Physician      06/17/21 2136    Unscheduled  Up With Assistance  As Needed      06/17/21 2136    --  Ergocalciferol (ERGOCAL PO)  3 Times Weekly      06/17/21 2359    --  cefdinir (OMNICEF) 300 MG capsule  Daily      06/17/21 2359    --  allopurinol (ZYLOPRIM) 300 MG tablet  Daily      06/17/21 2359    --  levothyroxine (SYNTHROID, LEVOTHROID) 125 MCG tablet  Daily      06/17/21 2359                Operative/Procedure  Notes (all)    No notes of this type exist for this encounter.         Physician Progress Notes (all)    No notes of this type exist for this encounter.            Consult Notes (all)      Kamaljit Navarrete at 06/18/21 1039      Consult Orders    1. Inpatient Consult to Advance Care Planning [[id number]] ordered by Francisco Mcclure MD at 06/18/21 0100             Pt did not request Adv Care.  No action necessary.    Electronically signed by Kamaljit Navarrete at 06/18/21 1040     Omid Newell MD at 06/18/21 0724      Consult Orders    1. Inpatient Nephrology Consult [[id number]] ordered by Rosalva Vizcarra APRN at 06/18/21 0212                 Referring Provider: ANGEL Vizcarra NP   Reason for Consultation: JULISSA CKD4     Subjective     Chief complaint   Chief Complaint   Patient presents with   • Leg Swelling       History of present illness:  35 yo obese F with h/o CKD stage 4, DM2, HTN, VHD (MR) who presented with worsneing BLE swelling and dyspnea.  Found to have marked renal failure, BUN/Cr 80/12.6, AG metabolic acidosis with HCo3 18 -> 15.  Creatinine was 2.5 in Jan 2020.  Sees Dr Blake in office but no recent visits.  She occ takes motrin OTC.  Denies nausea, vomiting, change in appetite or dysgeusia.  K is normal.  Bladder scan only 50 cc last night.  Has voided approx 600 cc.  Renal US shows no hydronephrosis.  CXR without pulm edema/effusions.  She is distraught when discussing need for dialysis, concerned about impact on work, etc.  She has not attended any predialysis education classes nor seen vascular surgery for AVF evaluation.  BP is markedly elevated 198/100 on arrival.      Past Medical History:   Diagnosis Date   • JULISSA (acute kidney injury) (CMS/HCC)    • CKD (chronic kidney disease) stage 3, GFR 30-59 ml/min (CMS/HCC)    • Diabetes mellitus (CMS/HCC)    • Diastolic CHF (CMS/HCC)    • Essential hypertension    • Nonrheumatic mitral valve regurgitation    • Obesity, morbid, BMI 50 or higher (CMS/HCC)  "   • Type 2 diabetes mellitus (CMS/HCC)      History reviewed. No pertinent surgical history.  Family History   Adopted: Yes       Social History     Tobacco Use   • Smoking status: Never Smoker   • Smokeless tobacco: Never Used   Vaping Use   • Vaping Use: Never used   Substance Use Topics   • Alcohol use: Yes     Alcohol/week: 1.0 - 2.0 standard drinks     Types: 1 - 2 Glasses of wine per week     Comment: socially   • Drug use: No     Medications Prior to Admission   Medication Sig Dispense Refill Last Dose   • allopurinol (ZYLOPRIM) 300 MG tablet Take 300 mg by mouth Daily.      • carvedilol (COREG) 25 MG tablet Take 1 tablet by mouth 2 (Two) Times a Day With Meals. 60 tablet 0    • cefdinir (OMNICEF) 300 MG capsule Take 300 mg by mouth Daily.      • Ergocalciferol (ERGOCAL PO) Take 1.25 mg by mouth 3 (Three) Times a Week.      • furosemide (LASIX) 80 MG tablet Take 1 tablet by mouth Daily. 30 tablet 0    • hydrALAZINE (APRESOLINE) 25 MG tablet Take 1 tablet by mouth Every 8 (Eight) Hours. 90 tablet 0    • levothyroxine (SYNTHROID, LEVOTHROID) 125 MCG tablet Take 125 mcg by mouth Daily.      • NIFEdipine XL (ADALAT CC) 60 MG 24 hr tablet Take 2 tablets by mouth Daily. 30 tablet 0    • BYDUREON BCISE 2 MG/0.85ML auto-injector injection Inject 0.85 mL under the skin into the appropriate area as directed 1 (One) Time Per Week. 4 pen 11    • montelukast (SINGULAIR) 10 MG tablet Take 1 tablet by mouth Every Night for 30 days. 30 tablet 0    • Needle, Disp, (BD DISP NEEDLES) 30G X 1/2\" misc Use daily for injection of Tresiba. 100 each 3    • TRESIBA FLEXTOUCH 200 UNIT/ML solution pen-injector pen injection Inject 75 Units under the skin into the appropriate area as directed Daily With Breakfast. 4 pen 11    • TRULICITY 1.5 MG/0.5ML solution pen-injector Inject 1.5 mg under the skin into the appropriate area as directed Every 7 (Seven) Days. 12 pen 3      Allergies:  Patient has no known allergies.    Review of " "Systems  14 points review of system was performed and it was negative other than what noted above in the HPI    Objective     Vital Signs  Temp:  [98.1 °F (36.7 °C)-98.6 °F (37 °C)] 98.1 °F (36.7 °C)  Heart Rate:  [] 101  Resp:  [20] 20  BP: (140-228)/(100-159) 179/115    Flowsheet Rows      First Filed Value   Admission Height  160 cm (63\") Documented at 06/17/2021 2337   Admission Weight  (!) 141 kg (311 lb 6.4 oz) Documented at 06/17/2021 2337           No intake/output data recorded.  I/O last 3 completed shifts:  In: -   Out: 600 [Urine:600]    Intake/Output Summary (Last 24 hours) at 6/18/2021 0724  Last data filed at 6/18/2021 0644  Gross per 24 hour   Intake --   Output 600 ml   Net -600 ml       Physical Exam:  General Appearance: obese irritable F in no acute distress, alert  HEENT NC/AT OP clear  Neck supple no JVD  Lungs CTA bilat no rales  CV RRR no m/g  abd soft NT/ND +BS  vasc 3+ BLE pedal/ankle edema 2+ radial pulses  MS no joint warmth or erythema  Derm normal turgor, no rash  Neuro speech intact, follows commands       Assessment/Plan   ESRD - progression of CKD: Cr 2.5 in Jan 2020, now 12.5, without any e/o reversible JULISSA ie no vol depletion (has vol overload) or obstruction (PVR 50 cc, US: no hydro).  Related to diabetic nephropathy and uncontrolled HTN (BP ~ 190/100).  While no major uremic sx, degree of vol overload, metabolic acidosis warrant urgent dialysis initiation but she is very resistant to this idea.  K is normal.  Recommend TDC placement to initiate HD and potential transition to home modality ie PD once stabilized.      Vol overload - marked periph edema; has dyspnea but no central lilia on CXR  HTN urgency - vol excess likely a factor  DM2 on insulin, A1c good 6.0  AG metabolic acidosis - due to uremia; HCo3 15, AG 18   Anemia of CKD - hgb 9.7  Gout - no acute flair, on allopurinol  Hypothyroidism - on synthroid  Hypocalcemia - Ca 7.2, corrects slightly higher for low alb  PCM, " severe, alb 3.1    Plan  - keep NPO for now, patient to discuss HD initiation with family; she's extremely resistant to idea and counseled about life threatening nature of declining dialysis   - bumex 4mg IV q8h  - inc hydralazine 50 TID and cont max coreg/nifedipine; can add ACEi if starts HD  - dec allopurinol 100 mg based on GFR  - check Hep B Surface Ag, iPTH, Phos, D25, iron stores  - assess extent of proteinuria    Thank you for involving me in pt's care.  RN to update me after patient talks to family         Acute renal failure (CMS/HCC)    Chronic diastolic congestive heart failure (CMS/HCC)    Obesity, morbid, BMI 50 or higher (CMS/[facility])    Type 2 diabetes mellitus with renal complication (CMS/[facility])    Essential hypertension    Nonrheumatic mitral valve regurgitation    CKD (chronic kidney disease) stage 3, GFR 30-59 ml/min (CMS/[facility])    Primary hypothyroidism        I discussed the patient's findings and my recommendations with patient and nursing staff    Omid Newell MD  06/18/21  07:24 EDT      Much of this encounter note is an electronic transcription/translation of spoken language to printed text. The electronic translation of spoken language may permit erroneous, or at times, nonsensical words or phrases to be inadvertently transcribed; Although I have reviewed the note for such errors, some may still exist        Electronically signed by Omid Newell MD at 06/18/21 2458

## 2021-06-18 NOTE — ED PROVIDER NOTES
EMERGENCY DEPARTMENT ENCOUNTER    Room Number:  43/43  Date of encounter:  6/17/2021  PCP: Emery Silveira MD  Historian: Patient     I used full protective equipment while examining this patient.  This includes face mask, gloves and protective eyewear.  I washed my hands before entering the room and immediately upon leaving the room      HPI:  Chief Complaint: Lower extremity swelling, shortness of breath  A complete HPI/ROS/PMH/PSH/SH/FH are unobtainable due to: None    Context: Gisela Dyson is a 36 y.o. female who presents to the ED c/o lower extremity swelling, shortness of breath.  Symptoms of been ongoing for several weeks and are fairly constant.  Symptoms are worsened by working.  She works at [employer] and is on her feet most of the day.  Shortness of breath is present only with exertion and not present at rest.  Patient has a history of diabetes, renal failure and congestive heart failure.  She was hospitalized she states last fall for these problems.  She states she has been noncompliant with all of her medications with the exception of Lasix.  She states she does take Motrin but very rarely she takes about 6 pills a month usually 1 or 2 at a time.  Patient does see Dr. Nugent as an outpatient but does not see anyone for diabetes or kidney disease.      MEDICAL RECORD REVIEW  I reviewed prior medical records and note last creatinine in 2020 of 2.45.  Patient has had no recent hospital admissions.    PAST MEDICAL HISTORY  Active Ambulatory Problems     Diagnosis Date Noted   • Acute diastolic congestive heart failure (CMS/[facility]) 11/07/2019   • JULISSA (acute kidney injury) (CMS/[facility]) 11/07/2019   • Obesity, morbid, BMI 50 or higher (CMS/[facility]) 11/07/2019   • Type 2 diabetes mellitus with renal complication (CMS/[facility]) 11/07/2019   • Essential hypertension 11/15/2019   • Nonrheumatic mitral valve regurgitation 11/15/2019   • CKD (chronic kidney disease) stage 3, GFR 30-59 ml/min (CMS/[facility]) 12/13/2019   • Primary  hypothyroidism 01/10/2020   • Hirsutism 01/10/2020   • Alopecia 01/10/2020     Resolved Ambulatory Problems     Diagnosis Date Noted   • No Resolved Ambulatory Problems     Past Medical History:   Diagnosis Date   • Diabetes mellitus (CMS/HCC)    • Diastolic CHF (CMS/HCC)    • Type 2 diabetes mellitus (CMS/HCC)          PAST SURGICAL HISTORY  No past surgical history on file.      FAMILY HISTORY  Family History   Adopted: Yes         SOCIAL HISTORY  Social History     Socioeconomic History   • Marital status: Single     Spouse name: Not on file   • Number of children: Not on file   • Years of education: Not on file   • Highest education level: Not on file   Tobacco Use   • Smoking status: Never Smoker   • Smokeless tobacco: Never Used   Substance and Sexual Activity   • Alcohol use: Yes     Comment: socially   • Drug use: No         ALLERGIES  Patient has no known allergies.       REVIEW OF SYSTEMS  Review of Systems   Constitutional: Positive for fatigue. Negative for fever and unexpected weight change.   HENT: Negative.  Negative for sore throat.    Eyes: Negative.    Respiratory: Positive for shortness of breath. Negative for cough.    Cardiovascular: Positive for leg swelling. Negative for chest pain.   Gastrointestinal: Negative.    Genitourinary: Negative.  Negative for dysuria.        LMP about 1 week ago   Musculoskeletal: Negative.  Negative for back pain.   Skin: Negative.  Negative for rash.   Neurological: Negative.  Negative for headaches.   All other systems reviewed and are negative.          PHYSICAL EXAM    I have reviewed the triage vital signs and nursing notes.    ED Triage Vitals   Temp Heart Rate Resp BP SpO2   06/17/21 1728 06/17/21 1728 06/17/21 1728 06/17/21 1749 06/17/21 1728   98.6 °F (37 °C) 108 20 (!) 198/100 100 %      Temp src Heart Rate Source Patient Position BP Location FiO2 (%)   06/17/21 1728 06/17/21 2000 06/17/21 2000 06/17/21 2000 --   Tympanic Monitor Lying Left arm         Physical Exam  GENERAL: Alert female in no obvious distress.  Triage vitals reviewed notable for blood pressure of 198/100.  Pulse 108.  HENT: nares patent  EYES: no scleral icterus  CV: regular rhythm, regular rate-no murmur  RESPIRATORY: normal effort, clear to auscultation bilaterally, O2 saturations 97% on room air  ABDOMEN: soft, morbid obesity without significant tenderness palpation  MUSCULOSKELETAL: 2+ edema bilateral lower extremities  NEURO: Strength, sensation, and coordination are grossly intact.  Speech and mentation are unremarkable  SKIN: warm, dry-no worrisome rashes are noted      LAB RESULTS  Recent Results (from the past 24 hour(s))   Comprehensive Metabolic Panel    Collection Time: 06/17/21  5:40 PM    Specimen: Blood   Result Value Ref Range    Glucose 138 (H) 65 - 99 mg/dL    BUN 80 (H) 6 - 20 mg/dL    Creatinine 12.67 (H) 0.57 - 1.00 mg/dL    Sodium 139 136 - 145 mmol/L    Potassium 4.8 3.5 - 5.2 mmol/L    Chloride 106 98 - 107 mmol/L    CO2 18.9 (L) 22.0 - 29.0 mmol/L    Calcium 7.3 (L) 8.6 - 10.5 mg/dL    Total Protein 6.0 6.0 - 8.5 g/dL    Albumin 3.10 (L) 3.50 - 5.20 g/dL    ALT (SGPT) 15 1 - 33 U/L    AST (SGOT) 10 1 - 32 U/L    Alkaline Phosphatase 84 39 - 117 U/L    Total Bilirubin 0.3 0.0 - 1.2 mg/dL    eGFR Non African Amer 3 (L) >60 mL/min/1.73    eGFR  African Amer 4 (L) >60 mL/min/1.73    Globulin 2.9 gm/dL    A/G Ratio 1.1 g/dL    BUN/Creatinine Ratio 6.3 (L) 7.0 - 25.0    Anion Gap 14.1 5.0 - 15.0 mmol/L   BNP    Collection Time: 06/17/21  5:40 PM    Specimen: Blood   Result Value Ref Range    proBNP 33,099.0 (H) 0.0 - 450.0 pg/mL   Troponin    Collection Time: 06/17/21  5:40 PM    Specimen: Blood   Result Value Ref Range    Troponin T 0.061 (C) 0.000 - 0.030 ng/mL   hCG, Serum, Qualitative    Collection Time: 06/17/21  5:40 PM    Specimen: Blood   Result Value Ref Range    HCG Qualitative Negative Negative   Green Top (Gel)    Collection Time: 06/17/21  5:40 PM   Result  Value Ref Range    Extra Tube Hold for add-ons.    Lavender Top    Collection Time: 06/17/21  5:40 PM   Result Value Ref Range    Extra Tube hold for add-on    CBC Auto Differential    Collection Time: 06/17/21  5:40 PM    Specimen: Blood   Result Value Ref Range    WBC 12.47 (H) 3.40 - 10.80 10*3/mm3    RBC 3.58 (L) 3.77 - 5.28 10*6/mm3    Hemoglobin 10.7 (L) 12.0 - 15.9 g/dL    Hematocrit 33.2 (L) 34.0 - 46.6 %    MCV 92.7 79.0 - 97.0 fL    MCH 29.9 26.6 - 33.0 pg    MCHC 32.2 31.5 - 35.7 g/dL    RDW 13.3 12.3 - 15.4 %    RDW-SD 45.8 37.0 - 54.0 fl    MPV 10.1 6.0 - 12.0 fL    Platelets 304 140 - 450 10*3/mm3   Manual Differential    Collection Time: 06/17/21  5:40 PM    Specimen: Blood   Result Value Ref Range    Neutrophil % 73.5 42.7 - 76.0 %    Lymphocyte % 4.1 (L) 19.6 - 45.3 %    Monocyte % 5.1 5.0 - 12.0 %    Eosinophil % 17.3 (H) 0.3 - 6.2 %    Neutrophils Absolute 9.17 (H) 1.70 - 7.00 10*3/mm3    Lymphocytes Absolute 0.51 (L) 0.70 - 3.10 10*3/mm3    Monocytes Absolute 0.64 0.10 - 0.90 10*3/mm3    Eosinophils Absolute 2.16 (H) 0.00 - 0.40 10*3/mm3    [place] Cells Mod/2+ None Seen    Poikilocytes Mod/2+ None Seen    WBC Morphology Normal Normal    Platelet Morphology Normal Normal   ECG 12 Lead    Collection Time: 06/17/21  8:33 PM   Result Value Ref Range    QT Interval 364 ms       Ordered the above labs and independently reviewed the results.      RADIOLOGY  XR Chest 2 View    Result Date: 6/17/2021  XR CHEST 2 VW-  06/17/2021  HISTORY: Shortness of breath.  Heart size is mildly enlarged. Lungs appear free of acute infiltrates. Bones and soft tissues are unremarkable.      Mild cardiomegaly.  This report was finalized on 6/17/2021 6:31 PM by Dr. Nelson Flaherty M.D.        I ordered the above noted radiological studies. Reviewed by me and discussed with radiologist.  See dictation for official radiology interpretation.      PROCEDURES  Procedures      MEDICATIONS GIVEN IN ER    Medications   sodium  chloride 0.9 % flush 10 mL (has no administration in time range)   sodium chloride 0.9 % flush 10 mL (has no administration in time range)   sodium chloride 0.9 % flush 10 mL (has no administration in time range)   acetaminophen (TYLENOL) tablet 650 mg (has no administration in time range)     Or   acetaminophen (TYLENOL) 160 MG/5ML solution 650 mg (has no administration in time range)     Or   acetaminophen (TYLENOL) suppository 650 mg (has no administration in time range)   dextrose (GLUTOSE) oral gel 15 g (has no administration in time range)   dextrose (D50W) 25 g/ 50mL Intravenous Solution 25 g (has no administration in time range)   glucagon (human recombinant) (GLUCAGEN DIAGNOSTIC) injection 1 mg (has no administration in time range)   nitroglycerin (NITROSTAT) SL tablet 0.4 mg (has no administration in time range)   insulin lispro (ADMELOG) injection 0-14 Units (has no administration in time range)   ondansetron (ZOFRAN) injection 4 mg (has no administration in time range)   hydrALAZINE (APRESOLINE) injection 20 mg (20 mg Intravenous Given 6/17/21 2113)         PROGRESS, DATA ANALYSIS, CONSULTS, AND MEDICAL DECISION MAKING    All labs have been independently reviewed by me.  All radiology studies have been reviewed by me and discussed with radiologist dictating the report.   EKG's independently viewed and interpreted by me.  Discussion below represents my analysis of pertinent findings related to patient's condition, differential diagnosis, treatment plan and final disposition.      ED Course as of Jun 17 2143   Thu Jun 17, 2021 2052 EKG          EKG time: 2033  Rhythm/Rate: Sinus tachycardia 113  P waves and LA: Normal P waves and LA intervals  QRS, axis: Normal axis, normal QRS  ST and T waves: Normal T waves, prolonged QT interval    Interpreted Contemporaneously by me, independently viewed  No prior to compare      [DB]   2105 Chest x-ray reviewed with Dr. Flaherty shows mild cardiomegaly.  No active  disease.    [DB]   2105 Labs are reviewed and notable for acute renal failure BUN of 80, creatinine 12.7.  Potassium normal at 4.9.    [DB]   2106 Cardiac markers show elevated troponin 0.061 which is elevated but I do not suspect that this is from acute non-STEMI but rather related to chronic renal failure and volume overload.  BNP of 33K is again suggestive of volume overload related to renal failure.    [DB]   2106 CBC shows elevated white count of 12.5 but I see no obvious evidence of infection.  Hemoglobin 10.7 is noted.    [DB]   2107 I discussed evaluation of this patient with Tasneem from [facility] who will admit on behalf of Dr. Mcclure.    [DB]      ED Course User Index  [DB] Rufus Hartman MD       AS OF 21:43 EDT VITALS:    BP - (!) 219/138  HR - 112  TEMP - 98.6 °F (37 °C) (Tympanic)  O2 SATS - 98%      DIAGNOSIS  Final diagnoses:   Acute renal failure, unspecified acute renal failure type (CMS/HCC)   Hypervolemia, unspecified hypervolemia type   Elevated troponin   Noncompliance   Uncontrolled hypertension         DISPOSITION  Admission         Rufus Hartman MD  06/17/21 2142       Rufus Hartman MD  06/17/21 2143

## 2021-06-18 NOTE — PLAN OF CARE
Goal Outcome Evaluation:  Plan of Care Reviewed With: patient        Progress: no change  Outcome Summary: A&O. RA. SR/ST. BP elevated - MD aware - One time dose of IV Labatolol given with some effectiveness. Renal and Cardiology consulted - to see this AM. NPO until seen this morning. No complaints of pain. SOB with exertion. BM overnight. 2-3+ edema. Pt verbalizes noncompliance with home meds. Will continue to monitor.

## 2021-06-18 NOTE — H&P
Patient Name:  Gisela Dyson  YOB: 1985  MRN:  [id number]  Admit Date:  6/17/2021  Patient Care Team:  Emery Silveira MD as PCP - General (Internal Medicine)      Subjective   History Present Illness     Chief Complaint   Patient presents with   • Leg Swelling     History of Present Illness   Ms. Dyson is a 36 y.o. non-smoker with a history of type 2 diabetes, hypothyroidism, morbid obesity, essential hypertension, CKD stage III, chronic diastolic CHF, and nonrheumatic mitral valve regurgitation that presents to [facility] complaining of bilateral leg swelling and shortness of breath.  Patient reports that she was admitted back in 2019 for the same complaints.  Patient reports shortness of breath with exertion intermittently since her diagnosis in 2019.  She states that she works at [employer] and is on her feet constantly.  She denies any chest pain or dizziness.  Work-up in the emergency department revealed a creat of 12.67.  Patient reports he was prescribed Lasix upon discharge and has not taken the medication since 2019 due to dizziness upon taking the medication.  She reports that she occasionally takes over-the-counter Motrin but denies any other daily NSAID use.  Chest x-ray shows mild cardiomegaly.  Patient has been kept for further evaluation and treatment.    Review of Systems   Constitutional: Negative for chills and fever.   HENT: Negative for congestion and rhinorrhea.    Eyes: Negative for photophobia and visual disturbance.   Respiratory: Positive for shortness of breath. Negative for cough.    Cardiovascular: Positive for leg swelling. Negative for chest pain and palpitations.   Gastrointestinal: Negative for constipation, diarrhea, nausea and vomiting.   Endocrine: Negative for cold intolerance and heat intolerance.   Genitourinary: Negative for difficulty urinating and dysuria.   Musculoskeletal: Negative for gait problem and joint swelling.   Skin: Negative for rash  and wound.   Neurological: Negative for dizziness, light-headedness and headaches.   Psychiatric/Behavioral: Negative for sleep disturbance and suicidal ideas.        Personal History     Past Medical History:   Diagnosis Date   • JULISSA (acute kidney injury) (CMS/[facility])    • CKD (chronic kidney disease) stage 3, GFR 30-59 ml/min (CMS/[facility])    • Diabetes mellitus (CMS/[facility])    • Diastolic CHF (CMS/[facility])    • Essential hypertension    • Nonrheumatic mitral valve regurgitation    • Obesity, morbid, BMI 50 or higher (CMS/[facility])    • Type 2 diabetes mellitus (CMS/[facility])      History reviewed. No pertinent surgical history.  Family History   Adopted: Yes     Social History     Tobacco Use   • Smoking status: Never Smoker   • Smokeless tobacco: Never Used   Vaping Use   • Vaping Use: Never used   Substance Use Topics   • Alcohol use: Yes     Alcohol/week: 1.0 - 2.0 standard drinks     Types: 1 - 2 Glasses of wine per week     Comment: socially   • Drug use: No     No current facility-administered medications on file prior to encounter.     Current Outpatient Medications on File Prior to Encounter   Medication Sig Dispense Refill   • allopurinol (ZYLOPRIM) 300 MG tablet Take 300 mg by mouth Daily.     • carvedilol (COREG) 25 MG tablet Take 1 tablet by mouth 2 (Two) Times a Day With Meals. 60 tablet 0   • cefdinir (OMNICEF) 300 MG capsule Take 300 mg by mouth Daily.     • Ergocalciferol (ERGOCAL PO) Take 1.25 mg by mouth 3 (Three) Times a Week.     • furosemide (LASIX) 80 MG tablet Take 1 tablet by mouth Daily. 30 tablet 0   • hydrALAZINE (APRESOLINE) 25 MG tablet Take 1 tablet by mouth Every 8 (Eight) Hours. 90 tablet 0   • levothyroxine (SYNTHROID, LEVOTHROID) 125 MCG tablet Take 125 mcg by mouth Daily.     • NIFEdipine XL (ADALAT CC) 60 MG 24 hr tablet Take 2 tablets by mouth Daily. 30 tablet 0   • BYDUREON BCISE 2 MG/0.85ML auto-injector injection Inject 0.85 mL under the skin into the appropriate area as directed 1 (One) Time Per  "Week. 4 pen 11   • montelukast (SINGULAIR) 10 MG tablet Take 1 tablet by mouth Every Night for 30 days. 30 tablet 0   • Needle, Disp, (BD DISP NEEDLES) 30G X 1/2\" misc Use daily for injection of Tresiba. 100 each 3   • TRESIBA FLEXTOUCH 200 UNIT/ML solution pen-injector pen injection Inject 75 Units under the skin into the appropriate area as directed Daily With Breakfast. 4 pen 11   • TRULICITY 1.5 MG/0.5ML solution pen-injector Inject 1.5 mg under the skin into the appropriate area as directed Every 7 (Seven) Days. 12 pen 3     No Known Allergies    Objective    Objective     Vital Signs  Temp:  [98.1 °F (36.7 °C)-98.6 °F (37 °C)] 98.1 °F (36.7 °C)  Heart Rate:  [] 116  Resp:  [20] 20  BP: (140-228)/(100-159) 224/141  SpO2:  [94 %-100 %] 100 %  on   ;   Device (Oxygen Therapy): room air  Body mass index is 55.16 kg/m².    Physical Exam  Vitals and nursing note reviewed.   Constitutional:       General: She is not in acute distress.     Appearance: She is well-developed. She is obese. She is not toxic-appearing.   HENT:      Head: Normocephalic and atraumatic.   Eyes:      General: No scleral icterus.        Right eye: No discharge.         Left eye: No discharge.      Conjunctiva/sclera: Conjunctivae normal.   Neck:      Vascular: No JVD.   Cardiovascular:      Rate and Rhythm: Regular rhythm. Tachycardia present.      Heart sounds: Normal heart sounds. No murmur heard.   No friction rub. No gallop.    Pulmonary:      Effort: Pulmonary effort is normal. No respiratory distress.      Breath sounds: Decreased breath sounds present. No wheezing or rales.   Abdominal:      General: Bowel sounds are normal. There is no distension.      Palpations: Abdomen is soft.      Tenderness: There is no abdominal tenderness. There is no guarding.      Comments: Obese     Musculoskeletal:         General: No tenderness or deformity. Normal range of motion.      Cervical back: Normal range of motion and neck supple.      " Right lower le+ Edema present.      Left lower le+ Edema present.   Skin:     General: Skin is warm and dry.      Capillary Refill: Capillary refill takes less than 2 seconds.   Neurological:      Mental Status: She is alert and oriented to person, place, and time.   Psychiatric:         Behavior: Behavior normal.       Results Review:  I reviewed the patient's new clinical results.    Lab Results (last 24 hours)     Procedure Component Value Units Date/Time    CBC & Differential [[id number]]  (Abnormal) Collected: 21    Specimen: Blood Updated: 21    Narrative:      The following orders were created for panel order CBC & Differential.  Procedure                               Abnormality         Status                     ---------                               -----------         ------                     CBC Auto Differential[[id number]]        Abnormal            Final result                 Please view results for these tests on the individual orders.    Comprehensive Metabolic Panel [[id number]]  (Abnormal) Collected: 21    Specimen: Blood Updated: 21     Glucose 138 mg/dL      BUN 80 mg/dL      Creatinine 12.67 mg/dL      Sodium 139 mmol/L      Potassium 4.8 mmol/L      Chloride 106 mmol/L      CO2 18.9 mmol/L      Calcium 7.3 mg/dL      Total Protein 6.0 g/dL      Albumin 3.10 g/dL      ALT (SGPT) 15 U/L      AST (SGOT) 10 U/L      Alkaline Phosphatase 84 U/L      Total Bilirubin 0.3 mg/dL      eGFR Non African Amer 3 mL/min/1.73      Comment: <15 Indicative of kidney failure.        eGFR  African Amer 4 mL/min/1.73      Comment: <15 Indicative of kidney failure.        Globulin 2.9 gm/dL      A/G Ratio 1.1 g/dL      BUN/Creatinine Ratio 6.3     Anion Gap 14.1 mmol/L     Narrative:      GFR Normal >60  Chronic Kidney Disease <60  Kidney Failure <15      BNP [[id number]]  (Abnormal) Collected: 21    Specimen: Blood Updated: 21     proBNP  33,099.0 pg/mL     Narrative:      Among patients with dyspnea, NT-proBNP is highly sensitive for the detection of acute congestive heart failure. In addition NT-proBNP of <300 pg/ml effectively rules out acute congestive heart failure with 99% negative predictive value.    Results may be falsely decreased if patient taking Biotin.      Troponin [[id number]]  (Abnormal) Collected: 06/17/21 1740    Specimen: Blood Updated: 06/17/21 2148     Troponin T 0.061 ng/mL     Narrative:      Troponin T Reference Range:  <= 0.03 ng/mL-   Negative for AMI  >0.03 ng/mL-     Abnormal for myocardial necrosis.  Clinicians would have to utilize clinical acumen, EKG, Troponin and serial changes to determine if it is an Acute Myocardial Infarction or myocardial injury due to an underlying chronic condition.       Results may be falsely decreased if patient taking Biotin.      hCG, Serum, Qualitative [[id number]]  (Normal) Collected: 06/17/21 1740    Specimen: Blood Updated: 06/17/21 1814     HCG Qualitative Negative    CBC Auto Differential [[id number]]  (Abnormal) Collected: 06/17/21 1740    Specimen: Blood Updated: 06/17/21 1845     WBC 12.47 10*3/mm3      RBC 3.58 10*6/mm3      Hemoglobin 10.7 g/dL      Hematocrit 33.2 %      MCV 92.7 fL      MCH 29.9 pg      MCHC 32.2 g/dL      RDW 13.3 %      RDW-SD 45.8 fl      MPV 10.1 fL      Platelets 304 10*3/mm3     Manual Differential [[id number]]  (Abnormal) Collected: 06/17/21 1740    Specimen: Blood Updated: 06/17/21 1845     Neutrophil % 73.5 %      Lymphocyte % 4.1 %      Monocyte % 5.1 %      Eosinophil % 17.3 %      Neutrophils Absolute 9.17 10*3/mm3      Lymphocytes Absolute 0.51 10*3/mm3      Monocytes Absolute 0.64 10*3/mm3      Eosinophils Absolute 2.16 10*3/mm3      [place] Cells Mod/2+     Poikilocytes Mod/2+     WBC Morphology Normal     Platelet Morphology Normal    COVID PRE-OP / PRE-PROCEDURE SCREENING ORDER (NO ISOLATION) - Swab, Nasopharynx [[id number]]  (Normal) Collected:  06/17/21 2117    Specimen: Swab from Nasopharynx Updated: 06/17/21 2324    Narrative:      The following orders were created for panel order COVID PRE-OP / PRE-PROCEDURE SCREENING ORDER (NO ISOLATION) - Swab, Nasopharynx.  Procedure                               Abnormality         Status                     ---------                               -----------         ------                     COVID-19,[facility]-HOUSE...[[id number]]  Normal              Final result                 Please view results for these tests on the individual orders.    COVID-19,[facility] CEPHEID/MARTIN NP SWAB IN TRANSPORT MEDIA 8-12 HR TAT - Swab, Nasopharynx [[id number]]  (Normal) Collected: 06/17/21 2117    Specimen: Swab from Nasopharynx Updated: 06/17/21 2324     COVID19 Not Detected    Narrative:      Fact sheet for providers: https://www.fda.gov/media/429327/download     Fact sheet for patients: https://www.fda.gov/media/603187/download    Troponin [[id number]]  (Abnormal) Collected: 06/17/21 2354    Specimen: Blood Updated: 06/18/21 0117     Troponin T 0.056 ng/mL     Narrative:      Troponin T Reference Range:  <= 0.03 ng/mL-   Negative for AMI  >0.03 ng/mL-     Abnormal for myocardial necrosis.  Clinicians would have to utilize clinical acumen, EKG, Troponin and serial changes to determine if it is an Acute Myocardial Infarction or myocardial injury due to an underlying chronic condition.       Results may be falsely decreased if patient taking Biotin.            Imaging Results (Last 24 Hours)     Procedure Component Value Units Date/Time    US Renal Bilateral [[id number]] Collected: 06/17/21 2313     Updated: 06/17/21 2318    Narrative:      RENAL ULTRASOUND     HISTORY: Acute kidney injury     COMPARISON: 11/07/2019     TECHNIQUE: Grayscale and color Doppler sonographic images were obtained  through the kidneys and bladder.     FINDINGS:  Right kidney measures 10.2 x 4.3 x 4.7 cm. Left kidney measures 9.3 x  5.8 x 4.9 cm. No  hydronephrosis is seen on either side. I think both  kidneys appear echogenic, suggesting chronic medical renal disease.  Urinary bladder cannot be seen, as the patient recently voided.       Impression:      Overall echogenic appearance to the kidneys, suggesting chronic medical  renal disease. This is a new finding when compared to the patient's  prior study from 11/07/2019.      This report was finalized on 6/17/2021 11:15 PM by Dr. Ping Quick M.D.       XR Chest 2 View [[id number]] Collected: 06/17/21 1830     Updated: 06/17/21 1834    Narrative:      XR CHEST 2 VW-  06/17/2021     HISTORY: Shortness of breath.     Heart size is mildly enlarged. Lungs appear free of acute infiltrates.  Bones and soft tissues are unremarkable.       Impression:      Mild cardiomegaly.     This report was finalized on 6/17/2021 6:31 PM by Dr. Nelson Flaherty M.D.             Results for orders placed during the hospital encounter of 11/06/19    Adult Transthoracic Echo Complete W/ Cont if Necessary Per Protocol    Interpretation Summary  · Left ventricular systolic function is normal. Estimated EF appears to be in the range of 56 - 60%. Normal left ventricular cavity size noted.  · Left ventricular wall thickness is consistent with mild concentric hypertrophy  · Left ventricular diastolic dysfunction is noted (grade II w/high LAP) consistent with pseudonormalization. Elevated left atrial pressure.  · The mitral valve is grossly normal in structure. Moderate mitral valve regurgitation is present. No significant mitral valve stenosis is present.  · Mild to moderate tricuspid valve regurgitation is present. Estimated right ventricular systolic pressure from tricuspid regurgitation is markedly elevated (>55 mmHg).      ECG 12 Lead   Preliminary Result   HEART RATE= 113  bpm   RR Interval= 532  ms   VA Interval= 137  ms   P Horizontal Axis= -9  deg   P Front Axis= 62  deg   QRSD Interval= 88  ms   QT Interval= 364  ms   QRS  Axis= 76  deg   T Wave Axis= 15  deg   - ABNORMAL ECG -   Sinus tachycardia   Prolonged QT interval   Electronically Signed By:    Date and Time of Study: 2021-06-17 20:33:41           Assessment/Plan     Active Hospital Problems    Diagnosis  POA   • **Acute renal failure (CMS/[facility]) [N17.9]  Yes   • Primary hypothyroidism [E03.9]  Yes   • CKD (chronic kidney disease) stage 3, GFR 30-59 ml/min (CMS/[facility]) [N18.30]  Yes   • Nonrheumatic mitral valve regurgitation [I34.0]  Yes   • Essential hypertension [I10]  Yes   • Type 2 diabetes mellitus with renal complication (CMS/[facility]) [E11.29]  Yes   • Obesity, morbid, BMI 50 or higher (CMS/[facility]) [E66.01]  Yes   • Chronic diastolic congestive heart failure (CMS/[facility]) [I50.32]  Yes      Resolved Hospital Problems   No resolved problems to display.       Ms. Dyson is a 36 y.o. non-smoker with a history of type 2 diabetes, HTN, obesity, chronic diastolic CHF, CKD stage III, hypothyroidism who presents with dyspnea and bilateral lower extremity edema who was found to have acute renal failure.    Acute renal failure  -With history of CKD stage III.  Creat 12.67, eGFR 3.  Patient's creatinine on 1/10/2020 was 2.45 with a eGFR of 28  -Nephrology consultation placed.  Spoke with the bedside RN to page nephrology stat regarding patient's creatinine level. Potassium is 4.8.  -Renal ultrasound in a.m.  -Avoid nephrotoxins  -Trend BMP    Acute on chronic diastolic congestive heart failure  -Bilateral lower extremity pitting edema with elevated BNP of 33,000, although, chest x-ray shows no vascular congestion.  Patient reports she has not taken her Lasix since it was originally prescribed due to some dizziness.  -Cardiology consultation  -Daily weights  -Strict I's and O    Elevated troponin  -Troponin 0.061 and 0.056 respectively, patient denies chest pain.  Troponin likely elevated due to to elevated creatinine.  Cardiology to follow.    Essential hypertension  -Patient's systolic blood  pressure 224, diastolic 141.  Will give a dose of IV labetalol x1 as she is also tachycardic.  Patient reports compliance with her antihypertensives.  Will resume.    Type 2 diabetes mellitus  -Accu-Cheks 4 times a day with meals and at bedtime  -Moderate dose correctional factor with hypoglycemic protocol  -Check hemoglobin A1c  -Hold oral diabetic medication    Hypothyroidism  -Check TSH, free T4 in a.m.  -Resume Synthroid therapy      I discussed the patient's findings and my recommendations with patient and nursing staff.    VTE Prophylaxis - SCDs.  Code Status - Full code.       DANIKA Main  [facility]  06/18/21  00:15 EDT

## 2021-06-18 NOTE — ANESTHESIA POSTPROCEDURE EVALUATION
"Patient: Gisela Dyson    Procedure Summary     Date: 06/18/21 Room / Location: [facility] OR [address] [place] [address] MAIN OR    Anesthesia Start: 1647 Anesthesia Stop: 1740    Procedure: TUNNEL DIALYSIS, PALINDROME CATH (Right ) Diagnosis:     Surgeons: Ancelmo Menendez MD Provider: Louis Montes De Oca MD    Anesthesia Type: MAC ASA Status: 4          Anesthesia Type: MAC    Vitals  Vitals Value Taken Time   /81 06/18/21 1825   Temp 36.6 °C (97.9 °F) 06/18/21 1800   Pulse 76 06/18/21 1825   Resp 16 06/18/21 1800   SpO2 94 % 06/18/21 1825   Vitals shown include unvalidated device data.        Post Anesthesia Care and Evaluation    Patient location during evaluation: bedside  Patient participation: complete - patient participated  Level of consciousness: awake and alert  Pain score: 0  Pain management: adequate  Airway patency: patent  Anesthetic complications: No anesthetic complications    Cardiovascular status: acceptable  Respiratory status: acceptable  Hydration status: acceptable    Comments: /84   Pulse 78   Temp 36.6 °C (97.9 °F)   Resp 16   Ht 160 cm (62.99\")   Wt (!) 139 kg (306 lb 7 oz)   SpO2 97%   BMI 54.30 kg/m²       "

## 2021-06-18 NOTE — ANESTHESIA PREPROCEDURE EVALUATION
Anesthesia Evaluation     Patient summary reviewed and Nursing notes reviewed   NPO Solid Status: > 8 hours             Airway   Mallampati: II  TM distance: >3 FB  Neck ROM: full  no difficulty expected  Dental - normal exam     Pulmonary - negative pulmonary ROS and normal exam   Cardiovascular - normal exam    (+) hypertension, valvular problems/murmurs MR, CHF ,       Neuro/Psych- negative ROS  GI/Hepatic/Renal/Endo    (+) morbid obesity,  renal disease ESRD, diabetes mellitus,     Musculoskeletal (-) negative ROS    Abdominal  - normal exam   Substance History - negative use     OB/GYN negative ob/gyn ROS         Other                        Anesthesia Plan    ASA 4     MAC     intravenous induction     Anesthetic plan, all risks, benefits, and alternatives have been provided, discussed and informed consent has been obtained with: patient.    Plan discussed with CRNA.

## 2021-06-19 ENCOUNTER — APPOINTMENT (OUTPATIENT)
Dept: CARDIOLOGY | Facility: HOSPITAL | Age: 36
End: 2021-06-19

## 2021-06-19 LAB
25(OH)D3 SERPL-MCNC: 10.7 NG/ML (ref 30–100)
ALBUMIN SERPL-MCNC: 2.4 G/DL (ref 3.5–5.2)
ANION GAP SERPL CALCULATED.3IONS-SCNC: 14.9 MMOL/L (ref 5–15)
BH CV VAS MEAS BASILIC ANTECUBITAL FOSSA LEFT: 0.26 CM
BH CV VAS MEAS BASILIC ANTECUBITAL FOSSA RIGHT: 0.31 CM
BH CV VAS MEAS BASILIC FOREARM LEFT - DIST: 0.11 CM
BH CV VAS MEAS BASILIC FOREARM LEFT - MID: 0.13 CM
BH CV VAS MEAS BASILIC FOREARM LEFT - PROX: 0.15 CM
BH CV VAS MEAS BASILIC FOREARM RIGHT - DIST: 0.13 CM
BH CV VAS MEAS BASILIC FOREARM RIGHT - MID: 0.13 CM
BH CV VAS MEAS BASILIC FOREARM RIGHT - PROX: 0.23 CM
BH CV VAS MEAS BASILIC UPPER ARM LEFT - DIST: 0.21 CM
BH CV VAS MEAS BASILIC UPPER ARM LEFT - MID: 0.17 CM
BH CV VAS MEAS BASILIC UPPER ARM LEFT - PROX: 0.22 CM
BH CV VAS MEAS BASILIC UPPER ARM RIGHT - DIST: 0.26 CM
BH CV VAS MEAS BASILIC UPPER ARM RIGHT - MID: 0.23 CM
BH CV VAS MEAS BASILIC UPPER ARM RIGHT - PROX: 0.24 CM
BH CV VAS MEAS CEPHALIC ANTECUBITAL FOSSA LEFT: 0.32 CM
BH CV VAS MEAS CEPHALIC ANTECUBITAL FOSSA RIGHT: 0.44 CM
BH CV VAS MEAS CEPHALIC FOREARM LEFT - DIST: 0.17 CM
BH CV VAS MEAS CEPHALIC FOREARM LEFT - MID: 0.16 CM
BH CV VAS MEAS CEPHALIC FOREARM LEFT - PROX: 0.21 CM
BH CV VAS MEAS CEPHALIC FOREARM RIGHT - DIST: 0.18 CM
BH CV VAS MEAS CEPHALIC FOREARM RIGHT - MID: 0.16 CM
BH CV VAS MEAS CEPHALIC FOREARM RIGHT - PROX: 0.18 CM
BH CV VAS MEAS CEPHALIC UPPER ARM LEFT - DIST: 0.28 CM
BH CV VAS MEAS CEPHALIC UPPER ARM LEFT - MID: 0.23 CM
BH CV VAS MEAS CEPHALIC UPPER ARM LEFT - PROX: 0.4 CM
BH CV VAS MEAS CEPHALIC UPPER ARM RIGHT - DIST: 0.36 CM
BH CV VAS MEAS CEPHALIC UPPER ARM RIGHT - MID: 0.29 CM
BH CV VAS MEAS CEPHALIC UPPER ARM RIGHT - PROX: 0.34 CM
BH CV VAS MEAS RADIAL UPPER ARM LEFT - DIST: 0.18 CM
BH CV VAS MEAS RADIAL UPPER ARM LEFT - MID: 0.2 CM
BH CV VAS MEAS RADIAL UPPER ARM LEFT - PROX: 0.17 CM
BH CV VAS MEAS RADIAL UPPER ARM RIGHT - DIST: 0.18 CM
BH CV VAS MEAS RADIAL UPPER ARM RIGHT - MID: 0.2 CM
BH CV VAS MEAS RADIAL UPPER ARM RIGHT - PROX: 0.23 CM
BUN SERPL-MCNC: 64 MG/DL (ref 6–20)
BUN/CREAT SERPL: 6.3 (ref 7–25)
CA-I BLD-MCNC: 4.1 MG/DL (ref 4.6–5.4)
CA-I SERPL ISE-MCNC: 1.02 MMOL/L (ref 1.15–1.35)
CALCIUM SPEC-SCNC: 7.1 MG/DL (ref 8.6–10.5)
CHLORIDE SERPL-SCNC: 103 MMOL/L (ref 98–107)
CO2 SERPL-SCNC: 20.1 MMOL/L (ref 22–29)
CREAT SERPL-MCNC: 10.18 MG/DL (ref 0.57–1)
DEPRECATED RDW RBC AUTO: 48.5 FL (ref 37–54)
EOSINOPHIL # BLD MANUAL: 1.7 10*3/MM3 (ref 0–0.4)
EOSINOPHIL NFR BLD MANUAL: 20.2 % (ref 0.3–6.2)
ERYTHROCYTE [DISTWIDTH] IN BLOOD BY AUTOMATED COUNT: 13.6 % (ref 12.3–15.4)
FERRITIN SERPL-MCNC: 105 NG/ML (ref 13–150)
GFR SERPL CREATININE-BSD FRML MDRD: 4 ML/MIN/1.73
GFR SERPL CREATININE-BSD FRML MDRD: 5 ML/MIN/1.73
GLUCOSE BLDC GLUCOMTR-MCNC: 104 MG/DL (ref 70–130)
GLUCOSE BLDC GLUCOMTR-MCNC: 130 MG/DL (ref 70–130)
GLUCOSE BLDC GLUCOMTR-MCNC: 133 MG/DL (ref 70–130)
GLUCOSE BLDC GLUCOMTR-MCNC: 149 MG/DL (ref 70–130)
GLUCOSE SERPL-MCNC: 105 MG/DL (ref 65–99)
HCT VFR BLD AUTO: 30.3 % (ref 34–46.6)
HGB BLD-MCNC: 9.4 G/DL (ref 12–15.9)
IRON 24H UR-MRATE: 37 MCG/DL (ref 37–145)
IRON SATN MFR SERPL: 13 % (ref 20–50)
LYMPHOCYTES # BLD MANUAL: 1.02 10*3/MM3 (ref 0.7–3.1)
LYMPHOCYTES NFR BLD MANUAL: 12.1 % (ref 19.6–45.3)
LYMPHOCYTES NFR BLD MANUAL: 3 % (ref 5–12)
MAXIMAL PREDICTED HEART RATE: 186 BPM
MCH RBC QN AUTO: 29.8 PG (ref 26.6–33)
MCHC RBC AUTO-ENTMCNC: 31 G/DL (ref 31.5–35.7)
MCV RBC AUTO: 96.2 FL (ref 79–97)
MONOCYTES # BLD AUTO: 0.25 10*3/MM3 (ref 0.1–0.9)
NEUTROPHILS # BLD AUTO: 5.44 10*3/MM3 (ref 1.7–7)
NEUTROPHILS NFR BLD MANUAL: 64.6 % (ref 42.7–76)
PHOSPHATE SERPL-MCNC: 7.2 MG/DL (ref 2.5–4.5)
PLAT MORPH BLD: NORMAL
PLATELET # BLD AUTO: 226 10*3/MM3 (ref 140–450)
PMV BLD AUTO: 10.1 FL (ref 6–12)
POTASSIUM SERPL-SCNC: 4.1 MMOL/L (ref 3.5–5.2)
PTH-INTACT SERPL-MCNC: 291 PG/ML (ref 15–65)
RBC # BLD AUTO: 3.15 10*6/MM3 (ref 3.77–5.28)
RBC MORPH BLD: NORMAL
SODIUM SERPL-SCNC: 138 MMOL/L (ref 136–145)
STRESS TARGET HR: 158 BPM
TIBC SERPL-MCNC: 286 MCG/DL (ref 298–536)
TRANSFERRIN SERPL-MCNC: 192 MG/DL (ref 200–360)
UPPER ARTERIAL LEFT ARM BRACHIAL LENGTH: 0.4 CM
UPPER ARTERIAL RIGHT ARM BRACHIAL LENGTH: 0.32 CM
WBC # BLD AUTO: 8.42 10*3/MM3 (ref 3.4–10.8)
WBC MORPH BLD: NORMAL

## 2021-06-19 PROCEDURE — 99232 SBSQ HOSP IP/OBS MODERATE 35: CPT | Performed by: INTERNAL MEDICINE

## 2021-06-19 PROCEDURE — 85025 COMPLETE CBC W/AUTO DIFF WBC: CPT | Performed by: SURGERY

## 2021-06-19 PROCEDURE — 85007 BL SMEAR W/DIFF WBC COUNT: CPT | Performed by: SURGERY

## 2021-06-19 PROCEDURE — 83540 ASSAY OF IRON: CPT | Performed by: SURGERY

## 2021-06-19 PROCEDURE — 84466 ASSAY OF TRANSFERRIN: CPT | Performed by: SURGERY

## 2021-06-19 PROCEDURE — 25010000003 CEFAZOLIN IN DEXTROSE 2-4 GM/100ML-% SOLUTION: Performed by: SURGERY

## 2021-06-19 PROCEDURE — 83970 ASSAY OF PARATHORMONE: CPT | Performed by: SURGERY

## 2021-06-19 PROCEDURE — 82306 VITAMIN D 25 HYDROXY: CPT | Performed by: SURGERY

## 2021-06-19 PROCEDURE — 80069 RENAL FUNCTION PANEL: CPT | Performed by: SURGERY

## 2021-06-19 PROCEDURE — 25010000002 HEPARIN (PORCINE) PER 1000 UNITS: Performed by: INTERNAL MEDICINE

## 2021-06-19 PROCEDURE — 82962 GLUCOSE BLOOD TEST: CPT

## 2021-06-19 PROCEDURE — 25010000002 HEPARIN (PORCINE) PER 1000 UNITS: Performed by: SURGERY

## 2021-06-19 PROCEDURE — 82330 ASSAY OF CALCIUM: CPT | Performed by: INTERNAL MEDICINE

## 2021-06-19 PROCEDURE — 82728 ASSAY OF FERRITIN: CPT | Performed by: SURGERY

## 2021-06-19 PROCEDURE — 93985 DUP-SCAN HEMO COMPL BI STD: CPT

## 2021-06-19 RX ORDER — HEPARIN SODIUM 1000 [USP'U]/ML
4000 INJECTION, SOLUTION INTRAVENOUS; SUBCUTANEOUS AS NEEDED
Status: DISCONTINUED | OUTPATIENT
Start: 2021-06-19 | End: 2021-06-22 | Stop reason: HOSPADM

## 2021-06-19 RX ORDER — CEFAZOLIN SODIUM 2 G/100ML
2 INJECTION, SOLUTION INTRAVENOUS EVERY 8 HOURS
Status: DISCONTINUED | OUTPATIENT
Start: 2021-06-19 | End: 2021-06-19

## 2021-06-19 RX ADMIN — HYDROCODONE BITARTRATE AND ACETAMINOPHEN 1 TABLET: 5; 325 TABLET ORAL at 13:52

## 2021-06-19 RX ADMIN — HYDRALAZINE HYDROCHLORIDE 25 MG: 25 TABLET, FILM COATED ORAL at 13:49

## 2021-06-19 RX ADMIN — LEVOTHYROXINE SODIUM 125 MCG: 0.12 TABLET ORAL at 09:22

## 2021-06-19 RX ADMIN — NIFEDIPINE 120 MG: 60 TABLET, FILM COATED, EXTENDED RELEASE ORAL at 09:22

## 2021-06-19 RX ADMIN — HYDRALAZINE HYDROCHLORIDE 25 MG: 25 TABLET, FILM COATED ORAL at 05:56

## 2021-06-19 RX ADMIN — HYDRALAZINE HYDROCHLORIDE 25 MG: 25 TABLET, FILM COATED ORAL at 21:07

## 2021-06-19 RX ADMIN — CARVEDILOL 25 MG: 25 TABLET, FILM COATED ORAL at 18:09

## 2021-06-19 RX ADMIN — SODIUM CHLORIDE, PRESERVATIVE FREE 10 ML: 5 INJECTION INTRAVENOUS at 09:23

## 2021-06-19 RX ADMIN — HEPARIN SODIUM 5000 UNITS: 5000 INJECTION INTRAVENOUS; SUBCUTANEOUS at 21:07

## 2021-06-19 RX ADMIN — CEFAZOLIN SODIUM 2 G: 2 INJECTION, SOLUTION INTRAVENOUS at 05:59

## 2021-06-19 RX ADMIN — CARVEDILOL 25 MG: 25 TABLET, FILM COATED ORAL at 09:22

## 2021-06-19 RX ADMIN — SODIUM CHLORIDE, PRESERVATIVE FREE 10 ML: 5 INJECTION INTRAVENOUS at 21:08

## 2021-06-19 RX ADMIN — HEPARIN SODIUM 5000 UNITS: 5000 INJECTION INTRAVENOUS; SUBCUTANEOUS at 09:22

## 2021-06-19 RX ADMIN — ALLOPURINOL 100 MG: 100 TABLET ORAL at 09:22

## 2021-06-19 RX ADMIN — HEPARIN SODIUM 4000 UNITS: 1000 INJECTION INTRAVENOUS; SUBCUTANEOUS at 05:51

## 2021-06-19 NOTE — PROGRESS NOTES
Name: Gisela Dyson ADMIT: 2021   : 1985  PCP: Emery Silveira MD    MRN: [id number] LOS: 2 days   AGE/SEX: 36 y.o. female  ROOM: [facility]     Subjective   Subjective     Patient resting comfortably.  No new problems reported.    Review of Systems   Constitutional: Negative.    Respiratory: Negative.    Cardiovascular: Negative.         Objective   Objective   Vital Signs  Temp:  [96.5 °F (35.8 °C)-98.3 °F (36.8 °C)] 96.5 °F (35.8 °C)  Heart Rate:  [67-86] 68  Resp:  [16-20] 20  BP: (121-146)/(78-91) 121/86  SpO2:  [97 %-100 %] 98 %  on   ;   Device (Oxygen Therapy): room air  Body mass index is 54.73 kg/m².  Physical Exam  Vitals and nursing note reviewed.   HENT:      Head: Normocephalic and atraumatic.      Mouth/Throat:      Pharynx: Oropharynx is clear.   Eyes:      Extraocular Movements: Extraocular movements intact.   Cardiovascular:      Rate and Rhythm: Normal rate and regular rhythm.      Pulses: Normal pulses.      Heart sounds: Normal heart sounds.   Pulmonary:      Effort: Pulmonary effort is normal.      Breath sounds: Normal breath sounds.   Abdominal:      General: Abdomen is flat. Bowel sounds are normal.      Palpations: Abdomen is soft.   Musculoskeletal:         General: Normal range of motion.      Cervical back: Normal range of motion and neck supple.   Skin:     General: Skin is warm and dry.      Capillary Refill: Capillary refill takes less than 2 seconds.   Neurological:      General: No focal deficit present.      Mental Status: She is alert.   Psychiatric:         Mood and Affect: Mood normal.         Results Review     I reviewed the patient's new clinical results.  Results from last 7 days   Lab Units 21  0913 21  0610 21  1740   WBC 10*3/mm3 8.42 10.80 12.47*   HEMOGLOBIN g/dL 9.4* 9.7* 10.7*   PLATELETS 10*3/mm3 226 258 304     Results from last 7 days   Lab Units 21  0913 21  0610 21  1740   SODIUM mmol/L 138 142 139   POTASSIUM mmol/L  4.1 4.1 4.8   CHLORIDE mmol/L 103 109* 106   CO2 mmol/L 20.1* 15.0* 18.9*   BUN mg/dL 64* 82* 80*   CREATININE mg/dL 10.18* 12.56* 12.67*   GLUCOSE mg/dL 105* 108* 138*   Estimated Creatinine Clearance: 10.7 mL/min (A) (by C-G formula based on SCr of 10.18 mg/dL (H)).  Results from last 7 days   Lab Units 06/19/21  0913 06/17/21  1740   ALBUMIN g/dL 2.40* 3.10*   BILIRUBIN mg/dL  --  0.3   ALK PHOS U/L  --  84   AST (SGOT) U/L  --  10   ALT (SGPT) U/L  --  15     Results from last 7 days   Lab Units 06/19/21  0913 06/18/21  0610 06/17/21  1740   CALCIUM mg/dL 7.1* 7.2* 7.3*   ALBUMIN g/dL 2.40*  --  3.10*   PHOSPHORUS mg/dL 7.2*  --   --        COVID19   Date Value Ref Range Status   06/17/2021 Not Detected Not Detected - Ref. Range Final     Hemoglobin A1C   Date/Time Value Ref Range Status   06/18/2021 0610 6.00 (H) 4.80 - 5.60 % Final     Glucose   Date/Time Value Ref Range Status   06/19/2021 1605 133 (H) 70 - 130 mg/dL Final   06/19/2021 1102 130 70 - 130 mg/dL Final   06/19/2021 0656 104 70 - 130 mg/dL Final   06/18/2021 2044 188 (H) 70 - 130 mg/dL Final   06/18/2021 1550 131 (H) 70 - 130 mg/dL Final   06/18/2021 1116 123 70 - 130 mg/dL Final   06/18/2021 0610 120 70 - 130 mg/dL Final       XR Chest 1 View  Narrative: XR CHEST 1 VW-     HISTORY: Female who is 36 years-old,  Central line placement     TECHNIQUE: Frontal view of the chest     COMPARISON: 06/17/2021     FINDINGS: Right-sided dual-lumen central venous catheter extends to the  superior vena cava. The heart is enlarged. Pulmonary vasculature is  unremarkable. No focal pulmonary consolidation, pleural effusion, or  pneumothorax. No acute osseous process.     Impression: Central line placement. No pneumothorax. Cardiomegaly.     This report was finalized on 6/18/2021 6:19 PM by Dr. Emery Cook M.D.     [facility] Only  This procedure was auto-finalized with no dictation required.    Scheduled Medications  allopurinol, 100 mg,  Oral, Daily  carvedilol, 25 mg, Oral, BID With Meals  heparin (porcine), 5,000 Units, Subcutaneous, Q12H  hydrALAZINE, 25 mg, Oral, Q8H  insulin lispro, 0-14 Units, Subcutaneous, 4x Daily With Meals & Nightly  levothyroxine, 125 mcg, Oral, Daily  NIFEdipine CC, 120 mg, Oral, Q24H  sodium chloride, 10 mL, Intravenous, Q12H    Infusions   Diet  Diet Regular; Consistent Carbohydrate, Renal, Cardiac       Assessment/Plan     Active Hospital Problems    Diagnosis  POA   • **ESRD on hemodialysis (CMS/[facility]) [N18.6, Z99.2]  Not Applicable   • Acute renal failure (CMS/[facility]) [N17.9]  Yes   • Primary hypothyroidism [E03.9]  Yes   • Nonrheumatic mitral valve regurgitation [I34.0]  Yes   • Essential hypertension [I10]  Yes   • Type 2 diabetes mellitus with renal complication (CMS/[facility]) [E11.29]  Yes   • Obesity, morbid, BMI 50 or higher (CMS/[facility]) [E66.01]  Yes   • Chronic diastolic congestive heart failure (CMS/[facility]) [I50.32]  Yes      Resolved Hospital Problems    Diagnosis Date Resolved POA   • CKD (chronic kidney disease) stage 3, GFR 30-59 ml/min (CMS/[facility]) [N18.30] 06/18/2021 Yes       36 y.o. female admitted with ESRD on hemodialysis (CMS/[facility]).     end-stage renal disease:  Hemodialysis catheter has been placed. Patient with volume overload and metabolic acidosis to begin hemodialysis later this afternoon.      Acute on chronic diastolic congestive heart  Failure.  Echocardiogram pending cardiology following.      Poorly-controlled hypertension:  Blood pressure  Control much improved after admission and continuation of her home medications.  Patient reports that she is compliant with her meds.      Diabetes mellitus type 2:  Oral diabetic meds on hold.  Continue Accu-Chek and moderate dose correctional insulin.    Hemoglobin A1c 6.0 reflecting adequate blood sugar control.     hypothyroidism:  Continue Synthroid.        Brian Mcelroy MD  [facility]  06/19/21  16:51 EDT

## 2021-06-19 NOTE — PLAN OF CARE
Goal Outcome Evaluation:  Plan of Care Reviewed With: patient        Progress: no change  Outcome Summary: Patient A&O x 4 w/complaints of mild pain in right neck. Tunnel cath was placed yesterday 6/18 for dialysis access.  Dialysis started this morning at 03:15. Glucose monitoring AC/HS with sliding scale coverage per provider order.  Pt. up with standby assist walking in room.  Vitals stable.  Will continue to monitor.     One dose of Cefazolin given after dialysis per pharmacist.  One liter of fluid removed during dialysis.  Patient reports feeling well afterward.

## 2021-06-19 NOTE — PROGRESS NOTES
Name: Gisela Dyson ADMIT: 2021   : 1985  PCP: Emery Silveira MD    MRN: [id number] LOS: 2 days   AGE/SEX: 36 y.o. female  ROOM: [address]    [place] [employer] [facility] [employer]    Chief Complaint   Patient presents with   • Leg Swelling     CC: Feel better with dialysis  Subjective     36 y.o. female and had stent tunneled dialysis catheter placed yesterday.  Patient with acute on chronic renal sufficiency.  Plan arm vein mapping to assess future access needs.  Discussed with patient.  Plans per medicine and nephrology.  We will follow-up as outpatient for scheduling of fistula in future.    Review of Systems   Respiratory: Negative.    Cardiovascular: Negative.    Gastrointestinal: Negative.        Objective     Scheduled Medications:   allopurinol, 100 mg, Oral, Daily  carvedilol, 25 mg, Oral, BID With Meals  ceFAZolin, 2 g, Intravenous, Q8H  heparin (porcine), 5,000 Units, Subcutaneous, Q12H  hydrALAZINE, 25 mg, Oral, Q8H  insulin lispro, 0-14 Units, Subcutaneous, 4x Daily With Meals & Nightly  levothyroxine, 125 mcg, Oral, Daily  NIFEdipine CC, 120 mg, Oral, Q24H  sodium chloride, 10 mL, Intravenous, Q12H        Active Infusions:       As Needed Medications:  •  acetaminophen **OR** acetaminophen **OR** acetaminophen  •  dextrose  •  dextrose  •  glucagon (human recombinant)  •  heparin (porcine)  •  HYDROcodone-acetaminophen  •  labetalol  •  nitroglycerin  •  ondansetron  •  sodium chloride  •  sodium chloride    Vital Signs  Vital Signs Patient Vitals for the past 24 hrs:   BP Temp Temp src Pulse Resp SpO2 Height Weight   21 0600 133/81 -- -- 67 18 -- -- --   21 0500 -- -- -- -- -- -- -- (!) 140 kg (308 lb 13.8 oz)   21 0006 135/87 97.6 °F (36.4 °C) Oral 74 18 98 % -- --   21 141/91 98.3 °F (36.8 °C) Oral 86 18 97 % -- --   21 1800 132/84 97.9 °F (36.6 °C) -- 78 16 97 % -- --   215 131/80 -- -- 76 -- 97 % -- --   210 133/78 -- -- 80  -- 97 % -- --   06/18/21 1745 146/91 -- -- 79 16 99 % -- --   06/18/21 1740 141/84 -- -- 78 16 100 % -- --   06/18/21 1737 141/90 97.8 °F (36.6 °C) Oral 76 16 100 % -- --   06/18/21 1642 -- -- -- 84 -- 97 % -- --   06/18/21 1641 -- -- -- 83 -- 98 % -- --   06/18/21 1640 -- -- -- 82 -- 100 % -- --   06/18/21 1638 -- -- -- 80 -- 100 % -- --   06/18/21 1637 (!) 178/102 -- -- 84 -- 98 % -- --   06/18/21 1636 -- -- -- 83 -- 98 % -- --   06/18/21 1635 -- -- -- 82 -- 98 % -- --   06/18/21 1633 -- -- -- 81 -- 98 % -- --   06/18/21 1632 -- -- -- 86 -- 97 % -- --   06/18/21 1631 -- -- -- 85 -- 98 % -- --   06/18/21 1630 -- -- -- 84 -- 98 % -- --   06/18/21 1628 -- -- -- 85 -- 99 % -- --   06/18/21 1627 -- -- -- 86 -- 99 % -- --   06/18/21 1626 -- -- -- 86 -- 100 % -- --   06/18/21 1625 -- 97.7 °F (36.5 °C) Oral 84 18 100 % -- --   06/18/21 1623 -- -- -- 85 -- 98 % -- --   06/18/21 1622 -- -- -- 85 -- 99 % -- --   06/18/21 1621 -- -- -- -- -- 99 % -- --   06/18/21 1620 -- -- -- -- -- 98 % -- --   06/18/21 1619 -- -- -- -- -- 98 % -- --   06/18/21 1556 -- -- -- 81 -- -- -- --   06/18/21 1555 -- -- -- 84 -- -- -- --   06/18/21 1554 -- -- -- 88 -- -- -- --   06/18/21 1553 -- -- -- 86 -- -- -- --   06/18/21 1551 -- -- -- 82 -- -- -- --   06/18/21 1550 -- -- -- 84 -- -- -- --   06/18/21 1549 -- -- -- 83 -- -- -- --   06/18/21 1548 -- -- -- 82 -- -- -- --   06/18/21 1547 -- -- -- 84 -- -- -- --   06/18/21 1545 -- -- -- 82 -- -- -- --   06/18/21 1544 -- -- -- 82 -- -- -- --   06/18/21 1543 -- -- -- 84 -- -- -- --   06/18/21 1542 -- -- -- 83 -- -- -- --   06/18/21 1540 -- -- -- 83 -- -- -- --   06/18/21 1539 -- -- -- 84 -- -- -- --   06/18/21 1538 -- -- -- 84 -- -- -- --   06/18/21 1537 -- -- -- 87 -- -- -- --   06/18/21 1536 -- -- -- 87 -- -- -- --   06/18/21 1535 -- -- -- 84 -- -- -- --   06/18/21 1533 -- -- -- 83 -- -- -- --   06/18/21 1532 -- -- -- 84 -- -- -- --   06/18/21 1531 -- -- -- 80 -- -- -- --   06/18/21 1530 -- -- -- 86  "-- -- -- --   06/18/21 1528 -- -- -- 81 -- -- -- --   06/18/21 1527 -- -- -- 81 -- -- -- --   06/18/21 1526 -- -- -- 86 -- -- -- --   06/18/21 1525 -- -- -- 83 -- -- -- --   06/18/21 1524 -- -- -- 86 -- -- -- --   06/18/21 1521 -- -- -- 85 -- -- -- --   06/18/21 1520 -- -- -- 85 -- -- -- --   06/18/21 1519 -- -- -- 83 -- -- -- --   06/18/21 1518 -- -- -- 85 -- -- -- --   06/18/21 1517 -- -- -- 84 -- -- -- --   06/18/21 1516 135/82 98.2 °F (36.8 °C) Oral 84 18 -- -- --   06/18/21 1512 135/82 -- -- -- -- -- -- --   06/18/21 1403 122/90 -- -- 86 -- 99 % 160 cm (62.99\") (!) 139 kg (306 lb 7 oz)   06/18/21 1117 (!) 161/102 98 °F (36.7 °C) Oral 87 20 -- -- --   06/18/21 0929 (!) 191/124 -- -- 101 -- -- -- --       Physical Exam:  Physical Exam  Vitals reviewed.   Neck:      Comments: Right tunneled dialysis catheter site clean with no hematoma  Cardiovascular:      Rate and Rhythm: Normal rate.      Pulses: Normal pulses.   Pulmonary:      Effort: Pulmonary effort is normal.   Abdominal:      General: Abdomen is flat. Bowel sounds are normal.      Palpations: Abdomen is soft.   Musculoskeletal:         General: Swelling (Mild bilaterally no change) present.      Cervical back: Neck supple.   Neurological:      Mental Status: She is alert.          Results Review:     CBC    Results from last 7 days   Lab Units 06/18/21  0610 06/17/21  1740   WBC 10*3/mm3 10.80 12.47*   HEMOGLOBIN g/dL 9.7* 10.7*   PLATELETS 10*3/mm3 258 304     BMP   Results from last 7 days   Lab Units 06/18/21  0610 06/17/21  1740   SODIUM mmol/L 142 139   POTASSIUM mmol/L 4.1 4.8   CHLORIDE mmol/L 109* 106   CO2 mmol/L 15.0* 18.9*   BUN mg/dL 82* 80*   CREATININE mg/dL 12.56* 12.67*   GLUCOSE mg/dL 108* 138*     Radiology(recent) XR Chest 2 View    Result Date: 6/17/2021  Mild cardiomegaly.  This report was finalized on 6/17/2021 6:31 PM by Dr. Nelson Flaherty M.D.      XR Chest 1 View    Result Date: 6/18/2021  Central line placement. No " pneumothorax. Cardiomegaly.  This report was finalized on 6/18/2021 6:19 PM by Dr. Emery Cook M.D.      US Renal Bilateral    Result Date: 6/17/2021  Overall echogenic appearance to the kidneys, suggesting chronic medical renal disease. This is a new finding when compared to the patient's prior study from 11/07/2019.  This report was finalized on 6/17/2021 11:15 PM by Dr. Ping Quick M.D.        Assessment/Plan     Assessment & Plan      ESRD on hemodialysis (CMS/HCC)    Chronic diastolic congestive heart failure (CMS/HCC)    Obesity, morbid, BMI 50 or higher (CMS/HCC)    Type 2 diabetes mellitus with renal complication (CMS/HCC)    Essential hypertension    Nonrheumatic mitral valve regurgitation    Primary hypothyroidism    Acute renal failure (CMS/HCC)      36 y.o. female doing well after tunneled dialysis catheter and hemodialysis.  Check vein mapping today.  We will follow-up as outpatient in office for fistula creation in future.  Patient is right-handed.      Personal protective equipment used for this patient encounter:  Patient wearing surgical mask [x]    Provider wearing a surgical mask [x]    Gloves [x]    Eye protection [x]    Face Shield []    Gown []    N 95 respirator or CAPR/PAPR []   Duration of interaction 10 minutes    Ancelmo Menendez MD  06/19/21  08:53 EDT    Please call my office with any question: [phone]    Active Hospital Problems    Diagnosis  POA   • **ESRD on hemodialysis (CMS/[facility]) [N18.6, Z99.2]  Not Applicable   • Acute renal failure (CMS/HCC) [N17.9]  Yes   • Primary hypothyroidism [E03.9]  Yes   • Nonrheumatic mitral valve regurgitation [I34.0]  Yes   • Essential hypertension [I10]  Yes   • Type 2 diabetes mellitus with renal complication (CMS/HCC) [E11.29]  Yes   • Obesity, morbid, BMI 50 or higher (CMS/HCC) [E66.01]  Yes   • Chronic diastolic congestive heart failure (CMS/HCC) [I50.32]  Yes      Resolved Hospital Problems    Diagnosis Date Resolved POA   •  CKD (chronic kidney disease) stage 3, GFR 30-59 ml/min (CMS/[facility]) [N18.30] 06/18/2021 Yes           Vein mapping reviewed with bilateral upper extremity cephalic veins adequate for fistula creation.  We will get patient office appointment to see Dr. Luis Ag in 3 weeks to discuss fistula creation as outpatient.  We will see as needed.

## 2021-06-19 NOTE — SIGNIFICANT NOTE
06/19/21 1101   OTHER   Discipline physical therapist   Rehab Time/Intention   Session Not Performed other (see comments)  (per Roxanna RN, pt does not need PT as she is at baseline LOF; PT will sign off)

## 2021-06-19 NOTE — PROGRESS NOTES
[facility]  Progress note: 2021    Patient Identification:  Name:Gisela Dyson  Age:36 y.o.  Sex: female  :  1985  MRN: [id number]           CC:  Heart failure, hypertension    Interval history:  Echo just completed results unavailable.  Blood pressure much improved.  No chest pain.  Complains of chest wall pain following catheter placed    Vital Signs:   Temp:  [96.5 °F (35.8 °C)-98.3 °F (36.8 °C)] 96.5 °F (35.8 °C)  Heart Rate:  [67-86] 68  Resp:  [16-20] 20  BP: (121-178)/() 121/86    Intake/Output Summary (Last 24 hours) at 2021 1600  Last data filed at 2021 0600  Gross per 24 hour   Intake 300 ml   Output 1000 ml   Net -700 ml       Physical Examination:    General Appearance No acute distress   Neck No adenopathy, supple, trachea midline, no thyromegaly, no carotid bruit, no JVD   Lungs Clear to auscultation,respirations regular, even and unlabored   Heart Regular rhythm and normal rate, normal S1 and S2, no murmur, no gallop, no rub, no click   Chest wall No abnormalities observed   Abdomen Normal bowel sounds, no masses, no hepatomegaly, soft   Extremities Moves all extremities well, no edema, no cyanosis, no redness   Neurological Alert and oriented x 3     Lab Review:  Personally reviewed the labs, radiology imaging and other cardiac procedures.   Results from last 7 days   Lab Units 21  0913 21  1740   SODIUM mmol/L 138 139   POTASSIUM mmol/L 4.1 4.8   CHLORIDE mmol/L 103 106   CO2 mmol/L 20.1* 18.9*   BUN mg/dL 64* 80*   CREATININE mg/dL 10.18* 12.67*   CALCIUM mg/dL 7.1* 7.3*   BILIRUBIN mg/dL  --  0.3   ALK PHOS U/L  --  84   ALT (SGPT) U/L  --  15   AST (SGOT) U/L  --  10   GLUCOSE mg/dL 105* 138*     Results from last 7 days   Lab Units 21  0610 21  2354 21  1740   TROPONIN T ng/mL 0.061* 0.056* 0.061*     Results from last 7 days   Lab Units 21  0913 21  0610 21  1740   WBC 10*3/mm3 8.42 10.80 12.47*   HEMOGLOBIN  g/dL 9.4* 9.7* 10.7*   HEMATOCRIT % 30.3* 30.2* 33.2*   PLATELETS 10*3/mm3 226 258 304         Medication Review:   Meds reviewed  Scheduled Meds:allopurinol, 100 mg, Oral, Daily  carvedilol, 25 mg, Oral, BID With Meals  heparin (porcine), 5,000 Units, Subcutaneous, Q12H  hydrALAZINE, 25 mg, Oral, Q8H  insulin lispro, 0-14 Units, Subcutaneous, 4x Daily With Meals & Nightly  levothyroxine, 125 mcg, Oral, Daily  NIFEdipine CC, 120 mg, Oral, Q24H  sodium chloride, 10 mL, Intravenous, Q12H      I personally viewed and interpreted the patient's EKG/Telemetry data    Assessment and Plan  1.  Acute on chronic diastolic heart failure mild, echo pending.  2.  Acute on chronic renal failure.  Recommendations per nephrology.  Plans for possible dialysis today  3.  Hypertensive urgency with hypertensive heart disease.  Much better blood pressure control.  Continue current regimen  4.  Elevated troponin likely due to renal insufficiency and demand related stress  5.  Morbid obesity  6.  Chronic normocytic anemia  7.  Moderate mitral regurgitation  8.  Medical noncompliance      Mini Zayas  6/19/202116:00 EDT  25min spent in reviewing records, discussion and examination of the patient and discussion with other members of the patient's medical team.     Dictated utilizing Dragon dictation

## 2021-06-19 NOTE — PLAN OF CARE
Goal Outcome Evaluation:              Outcome Summary: Pt A&Ox4, VSS, and pain well controlled with Lortab. Vein mapping complete for future fistula. HD tomorrow - ordersw called. Will CTM.

## 2021-06-19 NOTE — PROGRESS NOTES
[facility] Progress Note      Patient Name: Gisela Dyson  : 1985  MRN: [id number]  Primary Care Physician:  Emery Silveira MD  Date of admission: 2021    Subjective     Interval History:   Tunneled catheter has been placed    Review of Systems:   Denies fever chills  Denies shortness of breath  Denies chest pain  Denies nausea vomiting  Denies skin rashes    Objective     Vitals:   Temp:  [97.6 °F (36.4 °C)-98.3 °F (36.8 °C)] 97.6 °F (36.4 °C)  Heart Rate:  [67-88] 67  Resp:  [16-20] 18  BP: (122-178)/() 133/81    Intake/Output Summary (Last 24 hours) at 2021 0951  Last data filed at 2021 0600  Gross per 24 hour   Intake 300 ml   Output 1000 ml   Net -700 ml       Physical Exam:    General Appearance: alert, oriented x 3, no acute distress   Skin: warm and dry  HEENT: oral mucosa normal, nonicteric sclera  Neck: supple, no JVD, right IJ tunnel catheter  Lungs: CTA  Heart: RRR, normal S1 and S2  Abdomen: soft, nontender, nondistended  : no palpable bladder  Extremities: Trace lower extremity edema, cyanosis or clubbing  Neuro: normal speech and mental status     Scheduled Meds:     allopurinol, 100 mg, Oral, Daily  carvedilol, 25 mg, Oral, BID With Meals  ceFAZolin, 2 g, Intravenous, Q8H  heparin (porcine), 5,000 Units, Subcutaneous, Q12H  hydrALAZINE, 25 mg, Oral, Q8H  insulin lispro, 0-14 Units, Subcutaneous, 4x Daily With Meals & Nightly  levothyroxine, 125 mcg, Oral, Daily  NIFEdipine CC, 120 mg, Oral, Q24H  sodium chloride, 10 mL, Intravenous, Q12H      IV Meds:        Results from last 7 days   Lab Units 21  0610 21  1740   SODIUM mmol/L 142 139   POTASSIUM mmol/L 4.1 4.8   CHLORIDE mmol/L 109* 106   CO2 mmol/L 15.0* 18.9*   BUN mg/dL 82* 80*   CREATININE mg/dL 12.56* 12.67*   CALCIUM mg/dL 7.2* 7.3*   BILIRUBIN mg/dL  --  0.3   ALK PHOS U/L  --  84   ALT (SGPT) U/L  --  15   AST (SGOT) U/L  --  10   GLUCOSE mg/dL 108* 138*       Estimated  Creatinine Clearance: 8.7 mL/min (A) (by C-G formula based on SCr of 12.56 mg/dL (H)).                Results from last 7 days   Lab Units 06/18/21  0610 06/17/21  1740   WBC 10*3/mm3 10.80 12.47*   HEMOGLOBIN g/dL 9.7* 10.7*   PLATELETS 10*3/mm3 258 304             Assessment / Plan     ASSESSMENT:  -ESRD. Tunnel catheter has been placed.  -Volume overload  -Metabolic acidosis  -Diabetes mellitus. Protein creatinine ratio - 10  -Hypertension  -Hypocalcemia    PLAN:  -Hemodialysis has been scheduled for today  -Check ionized calcium, vitamin D    Thank you for involving us in the care of Gisela Dyson.  Please feel free to call with any questions.    Nilton Moran MD  06/19/21  09:51 EDT    [facility]  [phone]      Much of this encounter note is an electronic transcription/translation of spoken language to printed text. The electronic translation of spoken language may permit erroneous, or at times, nonsensical words or phrases to be inadvertently transcribed; Although I have reviewed the note for such errors, some may still exist.

## 2021-06-20 LAB
ANION GAP SERPL CALCULATED.3IONS-SCNC: 15 MMOL/L (ref 5–15)
AORTIC ARCH: 1.7 CM
AORTIC DIMENSIONLESS INDEX: 0.6 (DI)
ASCENDING AORTA: 3.2 CM
BH CV ECHO MEAS - ACS: 2 CM
BH CV ECHO MEAS - AO ACC TIME: 0.1 SEC
BH CV ECHO MEAS - AO MAX PG (FULL): 6.4 MMHG
BH CV ECHO MEAS - AO MAX PG: 10.8 MMHG
BH CV ECHO MEAS - AO MEAN PG (FULL): 4 MMHG
BH CV ECHO MEAS - AO MEAN PG: 6 MMHG
BH CV ECHO MEAS - AO ROOT AREA (BSA CORRECTED): 1.3
BH CV ECHO MEAS - AO ROOT AREA: 7.5 CM^2
BH CV ECHO MEAS - AO ROOT DIAM: 3.1 CM
BH CV ECHO MEAS - AO V2 MAX: 164 CM/SEC
BH CV ECHO MEAS - AO V2 MEAN: 120 CM/SEC
BH CV ECHO MEAS - AO V2 VTI: 31 CM
BH CV ECHO MEAS - ASC AORTA: 3.2 CM
BH CV ECHO MEAS - AVA(I,A): 2.4 CM^2
BH CV ECHO MEAS - AVA(I,D): 2.4 CM^2
BH CV ECHO MEAS - AVA(V,A): 2.4 CM^2
BH CV ECHO MEAS - AVA(V,D): 2.4 CM^2
BH CV ECHO MEAS - BSA(HAYCOCK): 2.6 M^2
BH CV ECHO MEAS - BSA: 2.3 M^2
BH CV ECHO MEAS - BZI_BMI: 54.3 KILOGRAMS/M^2
BH CV ECHO MEAS - BZI_METRIC_HEIGHT: 160 CM
BH CV ECHO MEAS - BZI_METRIC_WEIGHT: 139 KG
BH CV ECHO MEAS - EDV(CUBED): 175.6 ML
BH CV ECHO MEAS - EDV(MOD-SP2): 179 ML
BH CV ECHO MEAS - EDV(MOD-SP4): 139 ML
BH CV ECHO MEAS - EDV(TEICH): 153.7 ML
BH CV ECHO MEAS - EF(CUBED): 68.8 %
BH CV ECHO MEAS - EF(MOD-BP): 48.8 %
BH CV ECHO MEAS - EF(MOD-SP2): 50.3 %
BH CV ECHO MEAS - EF(MOD-SP4): 50.4 %
BH CV ECHO MEAS - EF(TEICH): 59.7 %
BH CV ECHO MEAS - ESV(CUBED): 54.9 ML
BH CV ECHO MEAS - ESV(MOD-SP2): 89 ML
BH CV ECHO MEAS - ESV(MOD-SP4): 69 ML
BH CV ECHO MEAS - ESV(TEICH): 62 ML
BH CV ECHO MEAS - FS: 32.1 %
BH CV ECHO MEAS - IVS/LVPW: 1.4
BH CV ECHO MEAS - IVSD: 1.8 CM
BH CV ECHO MEAS - LAT PEAK E' VEL: 8.3 CM/SEC
BH CV ECHO MEAS - LV DIASTOLIC VOL/BSA (35-75): 60 ML/M^2
BH CV ECHO MEAS - LV MASS(C)D: 402.4 GRAMS
BH CV ECHO MEAS - LV MASS(C)DI: 173.6 GRAMS/M^2
BH CV ECHO MEAS - LV MAX PG: 4.3 MMHG
BH CV ECHO MEAS - LV MEAN PG: 2 MMHG
BH CV ECHO MEAS - LV SYSTOLIC VOL/BSA (12-30): 29.8 ML/M^2
BH CV ECHO MEAS - LV V1 MAX: 104 CM/SEC
BH CV ECHO MEAS - LV V1 MEAN: 68.1 CM/SEC
BH CV ECHO MEAS - LV V1 VTI: 19.6 CM
BH CV ECHO MEAS - LVIDD: 5.6 CM
BH CV ECHO MEAS - LVIDS: 3.8 CM
BH CV ECHO MEAS - LVLD AP2: 9.8 CM
BH CV ECHO MEAS - LVLD AP4: 9 CM
BH CV ECHO MEAS - LVLS AP2: 8.1 CM
BH CV ECHO MEAS - LVLS AP4: 6.9 CM
BH CV ECHO MEAS - LVOT AREA (M): 3.8 CM^2
BH CV ECHO MEAS - LVOT AREA: 3.8 CM^2
BH CV ECHO MEAS - LVOT DIAM: 2.2 CM
BH CV ECHO MEAS - LVPWD: 1.3 CM
BH CV ECHO MEAS - MED PEAK E' VEL: 6.9 CM/SEC
BH CV ECHO MEAS - MR MAX PG: 111.5 MMHG
BH CV ECHO MEAS - MR MAX VEL: 528 CM/SEC
BH CV ECHO MEAS - MR MEAN PG: 75 MMHG
BH CV ECHO MEAS - MR MEAN VEL: 412 CM/SEC
BH CV ECHO MEAS - MR VTI: 168 CM
BH CV ECHO MEAS - MV A DUR: 0.07 SEC
BH CV ECHO MEAS - MV A MAX VEL: 64 CM/SEC
BH CV ECHO MEAS - MV DEC SLOPE: 711 CM/SEC^2
BH CV ECHO MEAS - MV DEC TIME: 254 SEC
BH CV ECHO MEAS - MV E MAX VEL: 138 CM/SEC
BH CV ECHO MEAS - MV E/A: 2.2
BH CV ECHO MEAS - MV MAX PG: 9.9 MMHG
BH CV ECHO MEAS - MV MEAN PG: 4 MMHG
BH CV ECHO MEAS - MV P1/2T MAX VEL: 158 CM/SEC
BH CV ECHO MEAS - MV P1/2T: 65.1 MSEC
BH CV ECHO MEAS - MV V2 MAX: 157 CM/SEC
BH CV ECHO MEAS - MV V2 MEAN: 89.4 CM/SEC
BH CV ECHO MEAS - MV V2 VTI: 33.9 CM
BH CV ECHO MEAS - MVA P1/2T LCG: 1.4 CM^2
BH CV ECHO MEAS - MVA(P1/2T): 3.4 CM^2
BH CV ECHO MEAS - MVA(VTI): 2.2 CM^2
BH CV ECHO MEAS - PA ACC TIME: 0.15 SEC
BH CV ECHO MEAS - PA MAX PG (FULL): 3.1 MMHG
BH CV ECHO MEAS - PA MAX PG: 4.8 MMHG
BH CV ECHO MEAS - PA PR(ACCEL): 12.4 MMHG
BH CV ECHO MEAS - PA V2 MAX: 110 CM/SEC
BH CV ECHO MEAS - PULM A REVS DUR: 0.08 SEC
BH CV ECHO MEAS - PULM A REVS VEL: 18.9 CM/SEC
BH CV ECHO MEAS - PULM DIAS VEL: 34.8 CM/SEC
BH CV ECHO MEAS - PULM S/D: 1.1
BH CV ECHO MEAS - PULM SYS VEL: 39.7 CM/SEC
BH CV ECHO MEAS - PVA(V,A): 2.7 CM^2
BH CV ECHO MEAS - PVA(V,D): 2.7 CM^2
BH CV ECHO MEAS - QP/QS: 0.9
BH CV ECHO MEAS - RAP SYSTOLE: 15 MMHG
BH CV ECHO MEAS - RV MAX PG: 1.7 MMHG
BH CV ECHO MEAS - RV MEAN PG: 1 MMHG
BH CV ECHO MEAS - RV V1 MAX: 65.9 CM/SEC
BH CV ECHO MEAS - RV V1 MEAN: 43.5 CM/SEC
BH CV ECHO MEAS - RV V1 VTI: 14.9 CM
BH CV ECHO MEAS - RVOT AREA: 4.5 CM^2
BH CV ECHO MEAS - RVOT DIAM: 2.4 CM
BH CV ECHO MEAS - RVSP: 50 MMHG
BH CV ECHO MEAS - SI(AO): 100.9 ML/M^2
BH CV ECHO MEAS - SI(CUBED): 52.1 ML/M^2
BH CV ECHO MEAS - SI(LVOT): 32.1 ML/M^2
BH CV ECHO MEAS - SI(MOD-SP2): 38.8 ML/M^2
BH CV ECHO MEAS - SI(MOD-SP4): 30.2 ML/M^2
BH CV ECHO MEAS - SI(TEICH): 39.6 ML/M^2
BH CV ECHO MEAS - SUP REN AO DIAM: 3.5 CM
BH CV ECHO MEAS - SV(AO): 234 ML
BH CV ECHO MEAS - SV(CUBED): 120.7 ML
BH CV ECHO MEAS - SV(LVOT): 74.5 ML
BH CV ECHO MEAS - SV(MOD-SP2): 90 ML
BH CV ECHO MEAS - SV(MOD-SP4): 70 ML
BH CV ECHO MEAS - SV(RVOT): 67.4 ML
BH CV ECHO MEAS - SV(TEICH): 91.7 ML
BH CV ECHO MEAS - TAPSE (>1.6): 2.8 CM
BH CV ECHO MEAS - TR MAX VEL: 296 CM/SEC
BH CV ECHO MEASUREMENTS AVERAGE E/E' RATIO: 18.16
BH CV VAS BP LEFT ARM: NORMAL MMHG
BH CV XLRA - RV BASE: 3.9 CM
BH CV XLRA - RV LENGTH: 8.6 CM
BH CV XLRA - RV MID: 3.3 CM
BH CV XLRA - TDI S': 11 CM/SEC
BUN SERPL-MCNC: 64 MG/DL (ref 6–20)
BUN/CREAT SERPL: 6 (ref 7–25)
CALCIUM SPEC-SCNC: 6.9 MG/DL (ref 8.6–10.5)
CHLORIDE SERPL-SCNC: 104 MMOL/L (ref 98–107)
CO2 SERPL-SCNC: 18 MMOL/L (ref 22–29)
CREAT SERPL-MCNC: 10.7 MG/DL (ref 0.57–1)
DEPRECATED RDW RBC AUTO: 44.4 FL (ref 37–54)
ERYTHROCYTE [DISTWIDTH] IN BLOOD BY AUTOMATED COUNT: 13.3 % (ref 12.3–15.4)
GFR SERPL CREATININE-BSD FRML MDRD: 4 ML/MIN/1.73
GFR SERPL CREATININE-BSD FRML MDRD: 5 ML/MIN/1.73
GLUCOSE BLDC GLUCOMTR-MCNC: 113 MG/DL (ref 70–130)
GLUCOSE BLDC GLUCOMTR-MCNC: 118 MG/DL (ref 70–130)
GLUCOSE BLDC GLUCOMTR-MCNC: 130 MG/DL (ref 70–130)
GLUCOSE BLDC GLUCOMTR-MCNC: 134 MG/DL (ref 70–130)
GLUCOSE SERPL-MCNC: 114 MG/DL (ref 65–99)
HCT VFR BLD AUTO: 29.9 % (ref 34–46.6)
HGB BLD-MCNC: 9.6 G/DL (ref 12–15.9)
LEFT ATRIUM VOLUME INDEX: 23.9 ML/M2
LV EF 2D ECHO EST: 49 %
MCH RBC QN AUTO: 29.5 PG (ref 26.6–33)
MCHC RBC AUTO-ENTMCNC: 32.1 G/DL (ref 31.5–35.7)
MCV RBC AUTO: 92 FL (ref 79–97)
PLATELET # BLD AUTO: 249 10*3/MM3 (ref 140–450)
PMV BLD AUTO: 10.2 FL (ref 6–12)
POTASSIUM SERPL-SCNC: 4.4 MMOL/L (ref 3.5–5.2)
QT INTERVAL: 364 MS
RBC # BLD AUTO: 3.25 10*6/MM3 (ref 3.77–5.28)
SINUS: 2.7 CM
SODIUM SERPL-SCNC: 137 MMOL/L (ref 136–145)
STJ: 2.5 CM
WBC # BLD AUTO: 9.33 10*3/MM3 (ref 3.4–10.8)

## 2021-06-20 PROCEDURE — 85027 COMPLETE CBC AUTOMATED: CPT | Performed by: INTERNAL MEDICINE

## 2021-06-20 PROCEDURE — 80048 BASIC METABOLIC PNL TOTAL CA: CPT | Performed by: INTERNAL MEDICINE

## 2021-06-20 PROCEDURE — 5A1D70Z PERFORMANCE OF URINARY FILTRATION, INTERMITTENT, LESS THAN 6 HOURS PER DAY: ICD-10-PCS | Performed by: INTERNAL MEDICINE

## 2021-06-20 PROCEDURE — 25010000002 HEPARIN (PORCINE) PER 1000 UNITS: Performed by: INTERNAL MEDICINE

## 2021-06-20 PROCEDURE — 87040 BLOOD CULTURE FOR BACTERIA: CPT | Performed by: INTERNAL MEDICINE

## 2021-06-20 PROCEDURE — 82962 GLUCOSE BLOOD TEST: CPT

## 2021-06-20 PROCEDURE — 99232 SBSQ HOSP IP/OBS MODERATE 35: CPT | Performed by: INTERNAL MEDICINE

## 2021-06-20 PROCEDURE — 25010000002 HEPARIN (PORCINE) PER 1000 UNITS: Performed by: SURGERY

## 2021-06-20 RX ORDER — MELATONIN
2000 DAILY
Status: DISCONTINUED | OUTPATIENT
Start: 2021-06-20 | End: 2021-06-22 | Stop reason: HOSPADM

## 2021-06-20 RX ADMIN — SODIUM CHLORIDE, PRESERVATIVE FREE 10 ML: 5 INJECTION INTRAVENOUS at 10:03

## 2021-06-20 RX ADMIN — HEPARIN SODIUM 5000 UNITS: 5000 INJECTION INTRAVENOUS; SUBCUTANEOUS at 21:36

## 2021-06-20 RX ADMIN — CARVEDILOL 25 MG: 25 TABLET, FILM COATED ORAL at 09:42

## 2021-06-20 RX ADMIN — HYDRALAZINE HYDROCHLORIDE 25 MG: 25 TABLET, FILM COATED ORAL at 16:19

## 2021-06-20 RX ADMIN — NIFEDIPINE 120 MG: 60 TABLET, FILM COATED, EXTENDED RELEASE ORAL at 09:41

## 2021-06-20 RX ADMIN — HEPARIN SODIUM 4000 UNITS: 1000 INJECTION INTRAVENOUS; SUBCUTANEOUS at 22:58

## 2021-06-20 RX ADMIN — LEVOTHYROXINE SODIUM 125 MCG: 0.12 TABLET ORAL at 09:41

## 2021-06-20 RX ADMIN — Medication 2000 UNITS: at 09:41

## 2021-06-20 RX ADMIN — SODIUM CHLORIDE, PRESERVATIVE FREE 10 ML: 5 INJECTION INTRAVENOUS at 21:37

## 2021-06-20 RX ADMIN — CARVEDILOL 25 MG: 25 TABLET, FILM COATED ORAL at 17:49

## 2021-06-20 RX ADMIN — HYDRALAZINE HYDROCHLORIDE 25 MG: 25 TABLET, FILM COATED ORAL at 06:35

## 2021-06-20 RX ADMIN — ALLOPURINOL 100 MG: 100 TABLET ORAL at 09:42

## 2021-06-20 RX ADMIN — HEPARIN SODIUM 5000 UNITS: 5000 INJECTION INTRAVENOUS; SUBCUTANEOUS at 09:42

## 2021-06-20 NOTE — PROGRESS NOTES
Name: Gisela Dyson ADMIT: 2021   : 1985  PCP: Emery Silveira MD    MRN: [id number] LOS: 3 days   AGE/SEX: 36 y.o. female  ROOM: [facility] [[email or web address]     Subjective   Subjective   No new problems noted last evening.  Patient denies chest pain shortness of air cough fever chills nausea vomiting    Review of Systems   Constitutional: Negative.    Respiratory: Negative.    Cardiovascular: Negative.    Gastrointestinal: Negative.         Objective   Objective   Vital Signs  Temp:  [96.5 °F (35.8 °C)-98.3 °F (36.8 °C)] 98.3 °F (36.8 °C)  Heart Rate:  [61-73] 70  Resp:  [16-20] 16  BP: (109-142)/(75-89) 115/76  SpO2:  [95 %-96 %] 95 %  on   ;   Device (Oxygen Therapy): room air  Body mass index is 54.73 kg/m².  Physical Exam  Vitals and nursing note reviewed.   Constitutional:       General: She is not in acute distress.  HENT:      Head: Normocephalic and atraumatic.      Mouth/Throat:      Pharynx: Oropharynx is clear.   Eyes:      General: No scleral icterus.     Extraocular Movements: Extraocular movements intact.   Cardiovascular:      Rate and Rhythm: Normal rate and regular rhythm.      Pulses: Normal pulses.      Heart sounds: Normal heart sounds.   Pulmonary:      Effort: Pulmonary effort is normal.      Breath sounds: Normal breath sounds.   Abdominal:      General: Abdomen is flat. Bowel sounds are normal.      Palpations: Abdomen is soft.   Musculoskeletal:         General: Swelling present. Normal range of motion.      Cervical back: Normal range of motion and neck supple.   Skin:     General: Skin is warm and dry.      Capillary Refill: Capillary refill takes less than 2 seconds.   Neurological:      General: No focal deficit present.      Mental Status: She is alert and oriented to person, place, and time.   Psychiatric:         Mood and Affect: Mood normal.         Results Review     I reviewed the patient's new clinical results.  Results from last 7 days   Lab Units 21  0816 21  0925  06/18/21  0610 06/17/21  1740   WBC 10*3/mm3 9.33 8.42 10.80 12.47*   HEMOGLOBIN g/dL 9.6* 9.4* 9.7* 10.7*   PLATELETS 10*3/mm3 249 226 258 304     Results from last 7 days   Lab Units 06/20/21  0816 06/19/21  0913 06/18/21  0610 06/17/21  1740   SODIUM mmol/L 137 138 142 139   POTASSIUM mmol/L 4.4 4.1 4.1 4.8   CHLORIDE mmol/L 104 103 109* 106   CO2 mmol/L 18.0* 20.1* 15.0* 18.9*   BUN mg/dL 64* 64* 82* 80*   CREATININE mg/dL 10.70* 10.18* 12.56* 12.67*   GLUCOSE mg/dL 114* 105* 108* 138*   Estimated Creatinine Clearance: 10.2 mL/min (A) (by C-G formula based on SCr of 10.7 mg/dL (H)).  Results from last 7 days   Lab Units 06/19/21  0913 06/17/21  1740   ALBUMIN g/dL 2.40* 3.10*   BILIRUBIN mg/dL  --  0.3   ALK PHOS U/L  --  84   AST (SGOT) U/L  --  10   ALT (SGPT) U/L  --  15     Results from last 7 days   Lab Units 06/20/21  0816 06/19/21  0913 06/18/21  0610 06/17/21  1740   CALCIUM mg/dL 6.9* 7.1* 7.2* 7.3*   ALBUMIN g/dL  --  2.40*  --  3.10*   PHOSPHORUS mg/dL  --  7.2*  --   --        COVID19   Date Value Ref Range Status   06/17/2021 Not Detected Not Detected - Ref. Range Final     Hemoglobin A1C   Date/Time Value Ref Range Status   06/18/2021 0610 6.00 (H) 4.80 - 5.60 % Final     Glucose   Date/Time Value Ref Range Status   06/20/2021 0631 113 70 - 130 mg/dL Final   06/19/2021 2044 149 (H) 70 - 130 mg/dL Final   06/19/2021 1605 133 (H) 70 - 130 mg/dL Final   06/19/2021 1102 130 70 - 130 mg/dL Final   06/19/2021 0656 104 70 - 130 mg/dL Final   06/18/2021 2044 188 (H) 70 - 130 mg/dL Final   06/18/2021 1550 131 (H) 70 - 130 mg/dL Final       Duplex Initial Vein Mapping Hemodialysis Access Bilateral CAR  · The right cephalic vein is patent and of adequate size in the upper arm.  · The left cephalic vein is patent and of adequate size in the upper arm.  · The left basilic vein is patent and of adequate size in the upper arm.       Scheduled Medications  allopurinol, 100 mg, Oral, Daily  carvedilol, 25 mg, Oral,  BID With Meals  cholecalciferol, 2,000 Units, Oral, Daily  heparin (porcine), 5,000 Units, Subcutaneous, Q12H  hydrALAZINE, 25 mg, Oral, Q8H  insulin lispro, 0-14 Units, Subcutaneous, 4x Daily With Meals & Nightly  levothyroxine, 125 mcg, Oral, Daily  NIFEdipine CC, 120 mg, Oral, Q24H  sodium chloride, 10 mL, Intravenous, Q12H    Infusions   Diet  Diet Regular; Consistent Carbohydrate, Renal, Cardiac       Assessment/Plan     Active Hospital Problems    Diagnosis  POA   • **ESRD on hemodialysis (CMS/[facility]) [N18.6, Z99.2]  Not Applicable   • Acute renal failure (CMS/[facility]) [N17.9]  Yes   • Primary hypothyroidism [E03.9]  Yes   • Nonrheumatic mitral valve regurgitation [I34.0]  Yes   • Essential hypertension [I10]  Yes   • Type 2 diabetes mellitus with renal complication (CMS/[facility]) [E11.29]  Yes   • Obesity, morbid, BMI 50 or higher (CMS/HCC) [E66.01]  Yes   • Chronic diastolic congestive heart failure (CMS/HCC) [I50.32]  Yes      Resolved Hospital Problems    Diagnosis Date Resolved POA   • CKD (chronic kidney disease) stage 3, GFR 30-59 ml/min (CMS/[facility]) [N18.30] 06/18/2021 Yes       36 y.o. female admitted with ESRD on hemodialysis (CMS/[facility]).    End-stage renal disease: Tunneled dialysis catheter placed right jugular 6/18/2021.  Vascular has discussed with patient and family that she will need to have fistula placement and is to follow-up with vascular in 2 to 3 weeks after discharge.  Hemodialysis initiated yesterday with metabolic acidosis and volume overload potassium remains normal.    Hypertension: Blood pressure control much improved over the past 24 to 48 hours.    Diabetes mellitus type 2 with renal complications: Hemoglobin A1c 6.0 blood sugars remain well controlled.  Patient was previously on Tresiba and Trulicity these have been held since admission and she is currently on moderate correctional dose.  Blood sugars in the past 24 hours ranged between 105 and 130.  We will continue to follow blood sugars  closely and will likely have to adjust dose of her Trulicity prior to discharge.    Acute on chronic diastolic congestive heart failure: Cardiology following.  Echocardiogram pending.    Elevated troponin: Likely related to renal disease creased demand from hypertensive emergency.    Hypothyroidism: Continue current dose of Synthroid will check TSH.      · Heparin SC for DVT prophylaxis.  · Full code.  · Discussed with patient and nursing staff.  · Anticipate discharge home with family timing yet to be determined.      Brian Mcelroy MD  [facility]  06/20/21  10:10 EDT

## 2021-06-20 NOTE — PROGRESS NOTES
[facility] Progress Note      Patient Name: Gisela Dyson  : 1985  MRN: [id number]  Primary Care Physician:  Emery Silveira MD  Date of admission: 2021    Subjective     Interval History:   No acute events.     Review of Systems:   Denies fever chills  Denies shortness of breath  Denies chest pain  Denies nausea vomiting  Denies skin rashes    Objective     Vitals:   Temp:  [96.5 °F (35.8 °C)-98.3 °F (36.8 °C)] 98.3 °F (36.8 °C)  Heart Rate:  [61-73] 61  Resp:  [16-20] 16  BP: (109-142)/(75-89) 115/76    Intake/Output Summary (Last 24 hours) at 2021 0814  Last data filed at 2021  Gross per 24 hour   Intake 240 ml   Output --   Net 240 ml       Physical Exam:    General Appearance: alert, oriented x 3, no acute distress   Skin: warm and dry  HEENT: oral mucosa normal, nonicteric sclera  Neck: supple, no JVD, right IJ tunnel catheter  Lungs: CTA  Heart: RRR, normal S1 and S2  Abdomen: soft, nontender, nondistended  : no palpable bladder  Extremities: Trace lower extremity edema, cyanosis or clubbing  Neuro: normal speech and mental status     Scheduled Meds:     allopurinol, 100 mg, Oral, Daily  carvedilol, 25 mg, Oral, BID With Meals  heparin (porcine), 5,000 Units, Subcutaneous, Q12H  hydrALAZINE, 25 mg, Oral, Q8H  insulin lispro, 0-14 Units, Subcutaneous, 4x Daily With Meals & Nightly  levothyroxine, 125 mcg, Oral, Daily  NIFEdipine CC, 120 mg, Oral, Q24H  sodium chloride, 10 mL, Intravenous, Q12H      IV Meds:        Results from last 7 days   Lab Units 21  0913 21  0610 21  1740   SODIUM mmol/L 138 142 139   POTASSIUM mmol/L 4.1 4.1 4.8   CHLORIDE mmol/L 103 109* 106   CO2 mmol/L 20.1* 15.0* 18.9*   BUN mg/dL 64* 82* 80*   CREATININE mg/dL 10.18* 12.56* 12.67*   CALCIUM mg/dL 7.1* 7.2* 7.3*   BILIRUBIN mg/dL  --   --  0.3   ALK PHOS U/L  --   --  84   ALT (SGPT) U/L  --   --  15   AST (SGOT) U/L  --   --  10   GLUCOSE mg/dL 105* 108*  138*       Estimated Creatinine Clearance: 10.7 mL/min (A) (by C-G formula based on SCr of 10.18 mg/dL (H)).    Results from last 7 days   Lab Units 06/19/21  0913   PHOSPHORUS mg/dL 7.2*             Results from last 7 days   Lab Units 06/19/21  0913 06/18/21  0610 06/17/21  1740   WBC 10*3/mm3 8.42 10.80 12.47*   HEMOGLOBIN g/dL 9.4* 9.7* 10.7*   PLATELETS 10*3/mm3 226 258 304             Assessment / Plan     ASSESSMENT:  -ESRD. Tunnel catheter has been placed.  -Volume overload  -Metabolic acidosis  -Diabetes mellitus. Protein creatinine ratio - 10  -Hypertension  -Hypocalcemia. Vit D 10.7    PLAN:  -Hemodialysis today, Ca bath to 3.0.  -Start Vit D 3    Thank you for involving us in the care of Gisela Dyson.  Please feel free to call with any questions.    Nilton Moran MD  06/20/21  08:14 EDT    [facility]  [phone]      Much of this encounter note is an electronic transcription/translation of spoken language to printed text. The electronic translation of spoken language may permit erroneous, or at times, nonsensical words or phrases to be inadvertently transcribed; Although I have reviewed the note for such errors, some may still exist.

## 2021-06-20 NOTE — PROGRESS NOTES
Name: Gisela Dyson ADMIT: 2021   : 1985  PCP: Emery Silveira MD    MRN: [id number] LOS: 3 days   AGE/SEX: 36 y.o. female  ROOM: [address]    [place] [employer]    Chief Complaint   Patient presents with   • Leg Swelling     CC: Did well with dialysis  Subjective     36 y.o. female with right jugular tunneled dialysis catheter and tolerated hemodialysis well without problems.    Review of Systems   Constitutional: Positive for fatigue.   All other systems reviewed and are negative.      Objective     Scheduled Medications:   allopurinol, 100 mg, Oral, Daily  carvedilol, 25 mg, Oral, BID With Meals  cholecalciferol, 2,000 Units, Oral, Daily  heparin (porcine), 5,000 Units, Subcutaneous, Q12H  hydrALAZINE, 25 mg, Oral, Q8H  insulin lispro, 0-14 Units, Subcutaneous, 4x Daily With Meals & Nightly  levothyroxine, 125 mcg, Oral, Daily  NIFEdipine CC, 120 mg, Oral, Q24H  sodium chloride, 10 mL, Intravenous, Q12H        Active Infusions:       As Needed Medications:  •  acetaminophen **OR** acetaminophen **OR** acetaminophen  •  dextrose  •  dextrose  •  glucagon (human recombinant)  •  heparin (porcine)  •  HYDROcodone-acetaminophen  •  labetalol  •  nitroglycerin  •  ondansetron  •  sodium chloride  •  sodium chloride    Vital Signs  Vital Signs Patient Vitals for the past 24 hrs:   BP Temp Temp src Pulse Resp SpO2   21 0941 -- -- -- 70 -- --   21 0722 115/76 98.3 °F (36.8 °C) Oral 61 16 95 %   21 0012 114/77 97.7 °F (36.5 °C) Oral 72 18 96 %   21 1959 109/75 97.9 °F (36.6 °C) Oral 73 18 96 %   21 1809 -- -- -- 70 -- --   21 1404 121/86 96.5 °F (35.8 °C) Oral -- 20 --   21 1349 142/89 -- -- 68 -- --       Physical Exam:  Physical Exam  Vitals reviewed.   Constitutional:       Appearance: She is morbidly obese.   Neck:      Comments: Right total catheter site clean  Cardiovascular:      Rate and Rhythm: Normal rate and regular rhythm.       Pulses: Normal pulses.      Heart sounds: Normal heart sounds.   Pulmonary:      Effort: Pulmonary effort is normal.      Breath sounds: Normal breath sounds.   Abdominal:      General: Abdomen is flat. Bowel sounds are normal.      Palpations: Abdomen is soft.   Musculoskeletal:      Cervical back: Neck supple.   Neurological:      General: No focal deficit present.      Mental Status: She is alert.          Results Review:     CBC    Results from last 7 days   Lab Units 06/20/21  0816 06/19/21  0913 06/18/21  0610 06/17/21  1740   WBC 10*3/mm3 9.33 8.42 10.80 12.47*   HEMOGLOBIN g/dL 9.6* 9.4* 9.7* 10.7*   PLATELETS 10*3/mm3 249 226 258 304     BMP   Results from last 7 days   Lab Units 06/20/21  0816 06/19/21  0913 06/18/21  0610 06/17/21  1740   SODIUM mmol/L 137 138 142 139   POTASSIUM mmol/L 4.4 4.1 4.1 4.8   CHLORIDE mmol/L 104 103 109* 106   CO2 mmol/L 18.0* 20.1* 15.0* 18.9*   BUN mg/dL 64* 64* 82* 80*   CREATININE mg/dL 10.70* 10.18* 12.56* 12.67*   GLUCOSE mg/dL 114* 105* 108* 138*   PHOSPHORUS mg/dL  --  7.2*  --   --      Radiology(recent) XR Chest 1 View    Result Date: 6/18/2021  Central line placement. No pneumothorax. Cardiomegaly.  This report was finalized on 6/18/2021 6:19 PM by Dr. Emery Cook M.D.        Assessment/Plan     Assessment & Plan      ESRD on hemodialysis (CMS/[facility])    Chronic diastolic congestive heart failure (CMS/[facility])    Obesity, morbid, BMI 50 or higher (CMS/[facility]ly [facility])    Type 2 diabetes mellitus with renal complication (CMS/[facility])    Essential hypertension    Nonrheumatic mitral valve regurgitation    Primary hypothyroidism    Acute renal failure (CMS/[facility])      36 y.o. female doing well after tunneled dialysis catheter placement.  Discussed at length with patient and her father today at bedside need for future fistula creation.  I have given patient appointment in 2 to 3 weeks with my associate Dr. Luis Ag to discuss.  Patient is right-handed to likely proceed with left  brachial artery cephalic vein fistula creation.  Discussed possible need for superficial cessation in this obese patient.  Patient's mother has been on dialysis and does have a kidney transplant and her and her father are very familiar with hemodialysis process.  Discussed need time for maturation.  Discussed importance of keeping the catheter clean and dry with no showers and no swimming pool until catheter is removed because of risk of infection and thrombosis risks of catheter.  Patient was not happy with this but understands.  Father is fully aware.  All questions answered.      Personal protective equipment used for this patient encounter:  Patient wearing surgical mask [x]    Provider wearing a surgical mask [x]    Gloves [x]    Eye protection [x]    Face Shield []    Gown []    N 95 respirator or CAPR/PAPR []   Duration of interaction 20 minutes    Ancelmo Menendez MD  06/20/21  09:48 EDT    Please call my office with any question: [phone]    Active Hospital Problems    Diagnosis  POA   • **ESRD on hemodialysis (CMS/[facility]) [N18.6, Z99.2]  Not Applicable   • Acute renal failure (CMS/[facility]) [N17.9]  Yes   • Primary hypothyroidism [E03.9]  Yes   • Nonrheumatic mitral valve regurgitation [I34.0]  Yes   • Essential hypertension [I10]  Yes   • Type 2 diabetes mellitus with renal complication (CMS/[facility]) [E11.29]  Yes   • Obesity, morbid, BMI 50 or higher (CMS/[facility]) [E66.01]  Yes   • Chronic diastolic congestive heart failure (CMS/[facility]) [I50.32]  Yes      Resolved Hospital Problems    Diagnosis Date Resolved POA   • CKD (chronic kidney disease) stage 3, GFR 30-59 ml/min (CMS/[facility]) [N18.30] 06/18/2021 Yes

## 2021-06-20 NOTE — PROGRESS NOTES
[facility]  Progress note: 2021    Patient Identification:  Name:Gisela Dyson  Age:36 y.o.  Sex: female  :  1985  MRN: [id number]           CC:  Congestive heart failure, mitral vegetation, pulmonary hypertension    Interval history:  Blood pressure controlled and no significant shortness of breath.  Echocardiogram with normal systolic function, diastolic dysfunction and moderate severe mitral valve regurgitation.     Vital Signs:   Temp:  [97.6 °F (36.4 °C)-98.3 °F (36.8 °C)] 97.6 °F (36.4 °C)  Heart Rate:  [61-73] 66  Resp:  [16-18] 18  BP: (109-115)/(74-77) 112/74    Intake/Output Summary (Last 24 hours) at 2021 1559  Last data filed at 2021  Gross per 24 hour   Intake 240 ml   Output --   Net 240 ml       Physical Examination:    General Appearance No acute distress   Neck No adenopathy, supple, trachea midline, no thyromegaly, no carotid bruit   Lungs Clear to auscultation,respirations regular, even and unlabored   Heart Regular rhythm and normal rate, normal S1 and S2, 2/6 murmur, no gallop, no rub, no click   Chest wall No abnormalities observed   Abdomen Normal bowel sounds, no masses, no hepatomegaly, soft   Extremities Moves all extremities well, no edema, no cyanosis, no redness   Neurological Alert and oriented x 3     Lab Review:  Personally reviewed the labs, radiology imaging and other cardiac procedures.   Results from last 7 days   Lab Units 21  0816 21  1740   SODIUM mmol/L 137 139   POTASSIUM mmol/L 4.4 4.8   CHLORIDE mmol/L 104 106   CO2 mmol/L 18.0* 18.9*   BUN mg/dL 64* 80*   CREATININE mg/dL 10.70* 12.67*   CALCIUM mg/dL 6.9* 7.3*   BILIRUBIN mg/dL  --  0.3   ALK PHOS U/L  --  84   ALT (SGPT) U/L  --  15   AST (SGOT) U/L  --  10   GLUCOSE mg/dL 114* 138*     Results from last 7 days   Lab Units 21  0610 21  2354 21  1740   TROPONIN T ng/mL 0.061* 0.056* 0.061*     Results from last 7 days   Lab Units 21  0816  06/19/21  0913 06/18/21  0610   WBC 10*3/mm3 9.33 8.42 10.80   HEMOGLOBIN g/dL 9.6* 9.4* 9.7*   HEMATOCRIT % 29.9* 30.3* 30.2*   PLATELETS 10*3/mm3 249 226 258         Medication Review:   Meds reviewed  Scheduled Meds:allopurinol, 100 mg, Oral, Daily  carvedilol, 25 mg, Oral, BID With Meals  cholecalciferol, 2,000 Units, Oral, Daily  heparin (porcine), 5,000 Units, Subcutaneous, Q12H  hydrALAZINE, 25 mg, Oral, Q8H  insulin lispro, 0-14 Units, Subcutaneous, 4x Daily With Meals & Nightly  levothyroxine, 125 mcg, Oral, Daily  NIFEdipine CC, 120 mg, Oral, Q24H  sodium chloride, 10 mL, Intravenous, Q12H      I personally viewed and interpreted the patient's EKG/Telemetry data    Assessment and Plan  1.  Acute on chronic diastolic heart failure.  Echo with normal systolic function grade 2 diastolic function and moderate severe mitral valve regurgitation with severe pulmonary hypertension.  The regurgitation is worse from 2019 but was felt to moderate at that point.  Hypertension likely playing a role in progression.  Discussed further evaluation with transesophageal echocardiogram versus repeat transthoracic imaging with better medical management of hypertension.  She really prefers the latter.  We will check blood cultures and if needed these are negative we will plan on outpatient follow-up transthoracic imaging as long as she has no worsening shortness of breath  2.  Moderate severe mitral valve regurgitation.  As above we will check blood culture  3.  [place] hypertension.  RV systolic pressure slightly lower this echo.  As above needs better medical management of renal failure and hypertension.  4.  Acute on chronic renal failure.  Recommendations per nephrology.  To have dialysis today  5.  Hypertensive urgency with hypertensive heart disease.  Much better blood pressure control.  Continue current regimen  6.  Elevated troponin likely due to renal insufficiency and demand related stress  7.  Morbid obesity  8.   Chronic normocytic anem    Mini Zayas  6/20/202115:59 EDT  25min spent in reviewing records, discussion and examination of the patient and discussion with other members of the patient's medical team.     Dictated utilizing Dragon dictation

## 2021-06-21 LAB
ANION GAP SERPL CALCULATED.3IONS-SCNC: 12.2 MMOL/L (ref 5–15)
BUN SERPL-MCNC: 47 MG/DL (ref 6–20)
BUN/CREAT SERPL: 5.4 (ref 7–25)
CALCIUM SPEC-SCNC: 7.5 MG/DL (ref 8.6–10.5)
CHLORIDE SERPL-SCNC: 102 MMOL/L (ref 98–107)
CO2 SERPL-SCNC: 22.8 MMOL/L (ref 22–29)
CREAT SERPL-MCNC: 8.63 MG/DL (ref 0.57–1)
DEPRECATED RDW RBC AUTO: 43.9 FL (ref 37–54)
ERYTHROCYTE [DISTWIDTH] IN BLOOD BY AUTOMATED COUNT: 13.3 % (ref 12.3–15.4)
GFR SERPL CREATININE-BSD FRML MDRD: 5 ML/MIN/1.73
GFR SERPL CREATININE-BSD FRML MDRD: 6 ML/MIN/1.73
GLUCOSE SERPL-MCNC: 106 MG/DL (ref 65–99)
HCT VFR BLD AUTO: 30.2 % (ref 34–46.6)
HGB BLD-MCNC: 9.8 G/DL (ref 12–15.9)
MCH RBC QN AUTO: 29.5 PG (ref 26.6–33)
MCHC RBC AUTO-ENTMCNC: 32.5 G/DL (ref 31.5–35.7)
MCV RBC AUTO: 91 FL (ref 79–97)
PLATELET # BLD AUTO: 216 10*3/MM3 (ref 140–450)
PMV BLD AUTO: 10.6 FL (ref 6–12)
POTASSIUM SERPL-SCNC: 4.2 MMOL/L (ref 3.5–5.2)
RBC # BLD AUTO: 3.32 10*6/MM3 (ref 3.77–5.28)
SODIUM SERPL-SCNC: 137 MMOL/L (ref 136–145)
WBC # BLD AUTO: 8.69 10*3/MM3 (ref 3.4–10.8)

## 2021-06-21 PROCEDURE — 99232 SBSQ HOSP IP/OBS MODERATE 35: CPT | Performed by: INTERNAL MEDICINE

## 2021-06-21 PROCEDURE — 80048 BASIC METABOLIC PNL TOTAL CA: CPT | Performed by: INTERNAL MEDICINE

## 2021-06-21 PROCEDURE — 25010000002 HEPARIN (PORCINE) PER 1000 UNITS: Performed by: SURGERY

## 2021-06-21 PROCEDURE — 85027 COMPLETE CBC AUTOMATED: CPT | Performed by: INTERNAL MEDICINE

## 2021-06-21 RX ORDER — LISINOPRIL 10 MG/1
10 TABLET ORAL NIGHTLY
Status: DISCONTINUED | OUTPATIENT
Start: 2021-06-21 | End: 2021-06-22 | Stop reason: HOSPADM

## 2021-06-21 RX ORDER — CALCIUM ACETATE 667 MG/1
667 CAPSULE ORAL
Status: DISCONTINUED | OUTPATIENT
Start: 2021-06-21 | End: 2021-06-22 | Stop reason: HOSPADM

## 2021-06-21 RX ORDER — NIFEDIPINE 60 MG/1
60 TABLET, EXTENDED RELEASE ORAL
Status: DISCONTINUED | OUTPATIENT
Start: 2021-06-21 | End: 2021-06-22

## 2021-06-21 RX ADMIN — CARVEDILOL 25 MG: 25 TABLET, FILM COATED ORAL at 08:42

## 2021-06-21 RX ADMIN — HYDRALAZINE HYDROCHLORIDE 25 MG: 25 TABLET, FILM COATED ORAL at 06:05

## 2021-06-21 RX ADMIN — CALCIUM ACETATE 667 MG: 667 CAPSULE ORAL at 08:42

## 2021-06-21 RX ADMIN — SODIUM CHLORIDE, PRESERVATIVE FREE 10 ML: 5 INJECTION INTRAVENOUS at 21:01

## 2021-06-21 RX ADMIN — LEVOTHYROXINE SODIUM 125 MCG: 0.12 TABLET ORAL at 08:42

## 2021-06-21 RX ADMIN — HYDRALAZINE HYDROCHLORIDE 25 MG: 25 TABLET, FILM COATED ORAL at 14:19

## 2021-06-21 RX ADMIN — Medication 2000 UNITS: at 08:43

## 2021-06-21 RX ADMIN — CARVEDILOL 25 MG: 25 TABLET, FILM COATED ORAL at 17:05

## 2021-06-21 RX ADMIN — ALLOPURINOL 100 MG: 100 TABLET ORAL at 08:42

## 2021-06-21 RX ADMIN — CALCIUM ACETATE 667 MG: 667 CAPSULE ORAL at 17:04

## 2021-06-21 RX ADMIN — HEPARIN SODIUM 5000 UNITS: 5000 INJECTION INTRAVENOUS; SUBCUTANEOUS at 08:43

## 2021-06-21 RX ADMIN — LISINOPRIL 10 MG: 10 TABLET ORAL at 20:53

## 2021-06-21 RX ADMIN — CALCIUM ACETATE 667 MG: 667 CAPSULE ORAL at 11:25

## 2021-06-21 RX ADMIN — SODIUM CHLORIDE, PRESERVATIVE FREE 10 ML: 5 INJECTION INTRAVENOUS at 08:41

## 2021-06-21 RX ADMIN — NIFEDIPINE 60 MG: 60 TABLET, FILM COATED, EXTENDED RELEASE ORAL at 08:42

## 2021-06-21 NOTE — PLAN OF CARE
Goal Outcome Evaluation:              Outcome Summary: Pt has been refusing blood draws and vital sign checks today, but would be agreeable if procedure was explained and discussed she will comply.  BP good today.  NO co pain.  Right hand swollen, will continue to monitor.

## 2021-06-21 NOTE — PROGRESS NOTES
Name: Gisela Dyson ADMIT: 2021   : 1985  PCP: No primary care provider on file.    MRN: [id number] LOS: 4 days   AGE/SEX: 36 y.o. female  ROOM: [facility]     Subjective   Subjective     Patient is lying on the bed in no major distress.  Denies nausea, vomiting abdominal pain, chest pain, shortness of breath.    Review of Systems   Constitutional: Negative.    Respiratory: Negative.    Cardiovascular: Negative.    Gastrointestinal: Negative.         Objective   Objective   Vital Signs  Temp:  [97.6 °F (36.4 °C)-97.8 °F (36.6 °C)] 97.7 °F (36.5 °C)  Heart Rate:  [66-80] 75  Resp:  [16-18] 16  BP: (109-139)/(65-81) 114/65  SpO2:  [96 %] 96 %  on   ;   Device (Oxygen Therapy): room air  Body mass index is 52.85 kg/m².  Physical Exam  Vitals and nursing note reviewed.   Constitutional:       General: She is not in acute distress.  HENT:      Head: Normocephalic and atraumatic.      Mouth/Throat:      Pharynx: Oropharynx is clear.   Eyes:      General: No scleral icterus.     Extraocular Movements: Extraocular movements intact.   Cardiovascular:      Rate and Rhythm: Normal rate and regular rhythm.      Pulses: Normal pulses.      Heart sounds: Normal heart sounds.   Pulmonary:      Effort: Pulmonary effort is normal.      Breath sounds: Normal breath sounds.   Abdominal:      General: Abdomen is flat. Bowel sounds are normal.      Palpations: Abdomen is soft.   Musculoskeletal:         General: Swelling present. Normal range of motion.      Cervical back: Normal range of motion and neck supple.   Skin:     General: Skin is warm and dry.      Capillary Refill: Capillary refill takes less than 2 seconds.   Neurological:      General: No focal deficit present.      Mental Status: She is alert and oriented to person, place, and time.   Psychiatric:         Mood and Affect: Mood normal.         Results Review     I reviewed the patient's new clinical results.  Results from last 7 days   Lab Units 21  6710  06/20/21  0816 06/19/21  0913 06/18/21  0610   WBC 10*3/mm3 8.69 9.33 8.42 10.80   HEMOGLOBIN g/dL 9.8* 9.6* 9.4* 9.7*   PLATELETS 10*3/mm3 216 249 226 258     Results from last 7 days   Lab Units 06/21/21  1434 06/20/21  0816 06/19/21  0913 06/18/21  0610   SODIUM mmol/L 137 137 138 142   POTASSIUM mmol/L 4.2 4.4 4.1 4.1   CHLORIDE mmol/L 102 104 103 109*   CO2 mmol/L 22.8 18.0* 20.1* 15.0*   BUN mg/dL 47* 64* 64* 82*   CREATININE mg/dL 8.63* 10.70* 10.18* 12.56*   GLUCOSE mg/dL 106* 114* 105* 108*   Estimated Creatinine Clearance: 12.4 mL/min (A) (by C-G formula based on SCr of 8.63 mg/dL (H)).  Results from last 7 days   Lab Units 06/19/21  0913 06/17/21  1740   ALBUMIN g/dL 2.40* 3.10*   BILIRUBIN mg/dL  --  0.3   ALK PHOS U/L  --  84   AST (SGOT) U/L  --  10   ALT (SGPT) U/L  --  15     Results from last 7 days   Lab Units 06/21/21  1434 06/20/21  0816 06/19/21  0913 06/18/21  0610 06/17/21  1740   CALCIUM mg/dL 7.5* 6.9* 7.1* 7.2* 7.3*   ALBUMIN g/dL  --   --  2.40*  --  3.10*   PHOSPHORUS mg/dL  --   --  7.2*  --   --        COVID19   Date Value Ref Range Status   06/17/2021 Not Detected Not Detected - Ref. Range Final     Glucose   Date/Time Value Ref Range Status   06/20/2021 2046 118 70 - 130 mg/dL Final   06/20/2021 1631 130 70 - 130 mg/dL Final   06/20/2021 1138 134 (H) 70 - 130 mg/dL Final   06/20/2021 0631 113 70 - 130 mg/dL Final   06/19/2021 2044 149 (H) 70 - 130 mg/dL Final   06/19/2021 1605 133 (H) 70 - 130 mg/dL Final   06/19/2021 1102 130 70 - 130 mg/dL Final       Adult Transthoracic Echo Complete W/ Cont if Necessary Per Protocol  · Calculated left ventricular EF = 48.8% Estimated left ventricular EF =   49% Estimated left ventricular EF was in agreement with the calculated   left ventricular EF. Left ventricular systolic function is mildly   decreased. The left ventricular cavity is moderately dilated. Left   ventricular wall thickness is consistent with moderate concentric   hypertrophy. All  left ventricular wall segments contract normally. Left   ventricular diastolic function is consistent with (grade II w/high LAP)   pseudonormalization.  · The mitral valve is grossly normal in structure. Moderate to severe   mitral valve regurgitation is present  · Moderate tricuspid valve regurgitation is present. Calculated right   ventricular systolic pressure from tricuspid regurgitation is 50.0 mmHg.  · There is mild pulmonic valve regurgitation present.       Scheduled Medications  allopurinol, 100 mg, Oral, Daily  calcium acetate, 667 mg, Oral, TID With Meals  carvedilol, 25 mg, Oral, BID With Meals  cholecalciferol, 2,000 Units, Oral, Daily  heparin (porcine), 5,000 Units, Subcutaneous, Q12H  hydrALAZINE, 25 mg, Oral, Q8H  insulin lispro, 0-14 Units, Subcutaneous, 4x Daily With Meals & Nightly  levothyroxine, 125 mcg, Oral, Daily  lisinopril, 10 mg, Oral, Nightly  NIFEdipine CC, 60 mg, Oral, Q24H  sodium chloride, 10 mL, Intravenous, Q12H    Infusions   Diet  Diet Regular; Consistent Carbohydrate, Renal, Cardiac       Assessment/Plan     Active Hospital Problems    Diagnosis  POA   • **ESRD on hemodialysis (CMS/[facility]) [N18.6, Z99.2]  Not Applicable   • Acute renal failure (CMS/[facility]) [N17.9]  Yes   • Primary hypothyroidism [E03.9]  Yes   • Nonrheumatic mitral valve regurgitation [I34.0]  Yes   • Essential hypertension [I10]  Yes   • Type 2 diabetes mellitus with renal complication (CMS/[facility]) [E11.29]  Yes   • Obesity, morbid, BMI 50 or higher (CMS/[facility]) [E66.01]  Yes   • Chronic diastolic congestive heart failure (CMS/[facility]) [I50.32]  Yes      Resolved Hospital Problems    Diagnosis Date Resolved POA   • CKD (chronic kidney disease) stage 3, GFR 30-59 ml/min (CMS/[facility]) [N18.30] 06/18/2021 Yes       36 y.o. female admitted with ESRD on hemodialysis (CMS/[facility]).    End-stage renal disease: Tunneled dialysis catheter placed right jugular 6/18/2021.  Vascular has discussed with patient and family that she will need to have  fistula placement and is to follow-up with vascular in 2 to 3 weeks after discharge.  Hemodialysis initiated on 06/19/2021 and will need an outpatient dialysis spot and CCP is following along.    Hypertension:  Blood pressure is better controlled and she is currently on Coreg, hydralazine, lisinopril, nifedipine.    Diabetes mellitus type 2 with renal complications:   Hemoglobin A1c 6 and blood sugars are reasonably well controlled and currently patient is on corrective dose insulin.  Blood sugars are reasonably well controlled.  Normally was taking Tresiba and Trulicity as an outpatient which is which are on hold for the moment.    Acute on chronic diastolic congestive heart failure:   Echo revealed ejection fraction of 48% and appears to be compensated.  Will continue with Coreg as well as lisinopril.    Elevated troponin:   Likely secondary to renal failure not secondary to MI.    Hypothyroidism:  Continue with Synthroid.    · Heparin SC for DVT prophylaxis.  · Full code.  · Discussed with patient and nursing staff.  · Anticipate discharge home with family timing yet to be determined.      Ehsan Toledo MD  [facility]  06/21/21  16:28 EDT

## 2021-06-21 NOTE — PROGRESS NOTES
Discharge Planning Assessment  [facility]     Patient Name: Gisela Dyson  MRN: [id number]  Today's Date: 6/21/2021    Admit Date: 6/17/2021    Discharge Needs Assessment    No documentation.       Discharge Plan     Row Name 06/21/21 1931       Plan    Plan  [facility]    Plan Comments  Spoke with patient at bedside side she lives with her father Tony Dyson [phone] and works at [employer] part time. Patient will need outpatient Hemodialysis. [facility] Friday [facility]. Patient stated her father will provide transportation to Hemodialysis. CCP faxed all required documents to [facility]. Call received from [facility] she will be the  at [facility] her phone numbler is [phone] she has received all documents and is waiting on confirmation from [facility].  Damon STEVENSON        Continued Care and Services - Admitted Since 6/17/2021    Coordination has not been started for this encounter.         Demographic Summary     Row Name 06/21/21 1928       General Information    Admission Type  inpatient    Referral Source  admission list        Functional Status    No documentation.       Psychosocial    No documentation.       Abuse/Neglect    No documentation.       Legal    No documentation.       Substance Abuse    No documentation.       Patient Forms    No documentation.           Connie Kwok, RN

## 2021-06-21 NOTE — PROGRESS NOTES
"   LOS: 4 days    Patient Care Team:  Emery Silveira MD as PCP - General (Internal Medicine)    Chief Complaint:    Chief Complaint   Patient presents with   • Leg Swelling     Follow UP ESRD  Subjective     Interval History:   Dialyzed yesterday, 2L removed, harjit well  Remains reluctant to talk in general but father arrives and has many questions  She denies dyspnea or nausea    Objective     Vital Signs  Temp:  [97.6 °F (36.4 °C)-97.8 °F (36.6 °C)] 97.7 °F (36.5 °C)  Heart Rate:  [66-80] 80  Resp:  [16-18] 18  BP: (109-139)/(74-81) 139/81    Flowsheet Rows      First Filed Value   Admission Height  160 cm (63\") Documented at 06/17/2021 2337   Admission Weight  (!) 141 kg (311 lb 6.4 oz) Documented at 06/17/2021 2337          No intake/output data recorded.  I/O last 3 completed shifts:  In: 240 [P.O.:240]  Out: 2000 [Other:2000]    Intake/Output Summary (Last 24 hours) at 6/21/2021 0726  Last data filed at 6/20/2021 2335  Gross per 24 hour   Intake --   Output 2000 ml   Net -2000 ml       Physical Exam:  General Appearance: alert, comfortable, no distress  Neck supple no JVD, RIJ TDC  Lungs CTA bilat no rales  CV RRR no m/g  abd soft NT/ND  vasc 1+ BLE pedal/ankle edema 2+ radial pulses       Results Review:    Results from last 7 days   Lab Units 06/20/21  0816 06/19/21  0913 06/18/21  0610 06/17/21  1740   SODIUM mmol/L 137 138 142 139   POTASSIUM mmol/L 4.4 4.1 4.1 4.8   CHLORIDE mmol/L 104 103 109* 106   CO2 mmol/L 18.0* 20.1* 15.0* 18.9*   BUN mg/dL 64* 64* 82* 80*   CREATININE mg/dL 10.70* 10.18* 12.56* 12.67*   CALCIUM mg/dL 6.9* 7.1* 7.2* 7.3*   BILIRUBIN mg/dL  --   --   --  0.3   ALK PHOS U/L  --   --   --  84   ALT (SGPT) U/L  --   --   --  15   AST (SGOT) U/L  --   --   --  10   GLUCOSE mg/dL 114* 105* 108* 138*       Estimated Creatinine Clearance: 10 mL/min (A) (by C-G formula based on SCr of 10.7 mg/dL (H)).    Results from last 7 days   Lab Units 06/19/21  0913   PHOSPHORUS mg/dL 7.2*         "     Results from last 7 days   Lab Units 06/20/21  0816 06/19/21  0913 06/18/21  0610 06/17/21  1740   WBC 10*3/mm3 9.33 8.42 10.80 12.47*   HEMOGLOBIN g/dL 9.6* 9.4* 9.7* 10.7*   PLATELETS 10*3/mm3 249 226 258 304               Imaging Results (Last 24 Hours)     ** No results found for the last 24 hours. **        allopurinol, 100 mg, Oral, Daily  carvedilol, 25 mg, Oral, BID With Meals  cholecalciferol, 2,000 Units, Oral, Daily  heparin (porcine), 5,000 Units, Subcutaneous, Q12H  hydrALAZINE, 25 mg, Oral, Q8H  insulin lispro, 0-14 Units, Subcutaneous, 4x Daily With Meals & Nightly  levothyroxine, 125 mcg, Oral, Daily  NIFEdipine CC, 120 mg, Oral, Q24H  sodium chloride, 10 mL, Intravenous, Q12H           Medication Review:   Current Facility-Administered Medications   Medication Dose Route Frequency Provider Last Rate Last Admin   • acetaminophen (TYLENOL) tablet 650 mg  650 mg Oral Q4H PRN Ancelmo Menendez MD        Or   • acetaminophen (TYLENOL) 160 MG/5ML solution 650 mg  650 mg Oral Q4H PRN Ancelmo Menendez MD        Or   • acetaminophen (TYLENOL) suppository 650 mg  650 mg Rectal Q4H PRN Ancelmo Menendez MD       • allopurinol (ZYLOPRIM) tablet 100 mg  100 mg Oral Daily Omid Newell MD   100 mg at 06/20/21 0942   • carvedilol (COREG) tablet 25 mg  25 mg Oral BID With Meals Ancelmo Menendez MD   25 mg at 06/20/21 1749   • cholecalciferol (VITAMIN D3) tablet 2,000 Units  2,000 Units Oral Daily Nilton Moran MD   2,000 Units at 06/20/21 0941   • dextrose (D50W) 25 g/ 50mL Intravenous Solution 25 g  25 g Intravenous Q15 Min PRAncelmo Quinones MD       • dextrose (GLUTOSE) oral gel 15 g  15 g Oral Q15 Min PRAncelmo Quinones MD       • glucagon (human recombinant) (GLUCAGEN DIAGNOSTIC) injection 1 mg  1 mg Subcutaneous PRN Ancelmo Menendez MD       • heparin (porcine) 5000 UNIT/ML injection 5,000 Units  5,000 Units Subcutaneous Q12H Ancelmo Menendez,  MD   5,000 Units at 06/20/21 2136   • heparin (porcine) injection 4,000 Units  4,000 Units Intracatheter PRN Omid Newell MD   4,000 Units at 06/20/21 2258   • hydrALAZINE (APRESOLINE) tablet 25 mg  25 mg Oral Q8H Omid Newell MD   25 mg at 06/21/21 0605   • HYDROcodone-acetaminophen (NORCO) 5-325 MG per tablet 1 tablet  1 tablet Oral Q4H PRN Ancelmo Menendez MD   1 tablet at 06/19/21 1352   • insulin lispro (ADMELOG) injection 0-14 Units  0-14 Units Subcutaneous 4x Daily With Meals & Nightly Ancelmo Menendez MD   3 Units at 06/18/21 2324   • labetalol (NORMODYNE,TRANDATE) injection 10 mg  10 mg Intravenous Q6H PRN Ancelmo Menendez MD       • levothyroxine (SYNTHROID, LEVOTHROID) tablet 125 mcg  125 mcg Oral Daily Ancelmo Menendez MD   125 mcg at 06/20/21 0941   • NIFEdipine XL (PROCARDIA XL) 24 hr tablet 120 mg  120 mg Oral Q24H Ancelmo Menendez MD   120 mg at 06/20/21 0941   • nitroglycerin (NITROSTAT) SL tablet 0.4 mg  0.4 mg Sublingual Q5 Min PRN Ancelmo Menendez MD       • ondansetron (ZOFRAN) injection 4 mg  4 mg Intravenous Q6H PRN Ancelmo Menendez MD   4 mg at 06/18/21 1659   • sodium chloride 0.9 % flush 10 mL  10 mL Intravenous PRN Ancelmo Menendez MD       • sodium chloride 0.9 % flush 10 mL  10 mL Intravenous Q12H Ancelmo Menendez MD   10 mL at 06/20/21 2137   • sodium chloride 0.9 % flush 10 mL  10 mL Intravenous PRAncelmo Quinones MD           Assessment/Plan   -ESRD. Due to diabetic nephropathy.  HD initiated 6/18 via RIJ TDC placement, dialyzed again 6/20 harjit well.  Plan for outpatient AVF.  Though I encouraged consideration of transitional care unit for further education about transition to home therapy, patient/father uninterested at this time.  Labs today pending  -Volume overload - improved with UF; no dyspnea, less edema  -Metabolic acidosis  -Diabetes mellitus. Protein creatinine ratio - 10  -Hypertension - also improved with  UF; on nifedipine 120 mg, factor re: periph edema  -Hypocalcemia. Vit D 10.7; D3 added; iPTH 291, can start calcitriol at dialysis center  -anemia of CKD - hb 9.6; can start ELSA at chronic unit; TAT low 13%, ferritin 105    Plan  - follow up labs  - HD tomorrow, venofer with treatment   - add phoslo   - add ACEi and halve CCB dose  - CCP for placement at dialysis center   - ok with discharge once dialysis finalized     Addendum: BP a bit overcorrected, above still applies but will DC hydralazine.      ESRD on hemodialysis (CMS/[facility])    Chronic diastolic congestive heart failure (CMS/HCC)    Obesity, morbid, BMI 50 or higher (CMS/[facility])    Type 2 diabetes mellitus with renal complication (CMS/[facility])    Essential hypertension    Nonrheumatic mitral valve regurgitation    Primary hypothyroidism    Acute renal failure (CMS/[facility])              Omid Newell MD  06/21/21  07:26 EDT

## 2021-06-21 NOTE — PROGRESS NOTES
"Patient Name: Gisela Dyson  :1985  36 y.o.      No care team member to display    Interval History:   Troponins remain chronically elevated    Subjective:  Following for numerous medical issues.    Objective   Vital Signs  Temp:  [97.6 °F (36.4 °C)-97.8 °F (36.6 °C)] 97.7 °F (36.5 °C)  Heart Rate:  [66-80] 75  Resp:  [16-18] 18  BP: (109-139)/(74-81) 128/75    Intake/Output Summary (Last 24 hours) at 2021 1052  Last data filed at 2021 2335  Gross per 24 hour   Intake --   Output 2000 ml   Net -2000 ml     Flowsheet Rows      First Filed Value   Admission Height  160 cm (63\") Documented at 2021 2337   Admission Weight  (!) 141 kg (311 lb 6.4 oz) Documented at 2021 2337          Physical Exam:   General Appearance:    Alert, cooperative, in no acute distress   Lungs:     Clear to auscultation.  Normal respiratory effort and rate.      Heart:    Regular rhythm and normal rate, normal S1 and S2, no murmurs, gallops or rubs.     Chest Wall:    No abnormalities observed   Abdomen:     Soft, nontender, positive bowel sounds.     Extremities:   no cyanosis, clubbing or edema.  No marked joint deformities.  Adequate musculoskeletal strength.       Results Review:    Results from last 7 days   Lab Units 21  0816   SODIUM mmol/L 137   POTASSIUM mmol/L 4.4   CHLORIDE mmol/L 104   CO2 mmol/L 18.0*   BUN mg/dL 64*   CREATININE mg/dL 10.70*   GLUCOSE mg/dL 114*   CALCIUM mg/dL 6.9*     Results from last 7 days   Lab Units 21  0610 21  2354 21  1740   TROPONIN T ng/mL 0.061* 0.056* 0.061*     Results from last 7 days   Lab Units 21  0816   WBC 10*3/mm3 9.33   HEMOGLOBIN g/dL 9.6*   HEMATOCRIT % 29.9*   PLATELETS 10*3/mm3 249                         Medication Review:   allopurinol, 100 mg, Oral, Daily  calcium acetate, 667 mg, Oral, TID With Meals  carvedilol, 25 mg, Oral, BID With Meals  cholecalciferol, 2,000 Units, Oral, Daily  heparin (porcine), 5,000 Units, " Subcutaneous, Q12H  hydrALAZINE, 25 mg, Oral, Q8H  insulin lispro, 0-14 Units, Subcutaneous, 4x Daily With Meals & Nightly  levothyroxine, 125 mcg, Oral, Daily  lisinopril, 10 mg, Oral, Nightly  NIFEdipine CC, 60 mg, Oral, Q24H  sodium chloride, 10 mL, Intravenous, Q12H              Assessment/Plan     1.  Acute on chronic diastolic heart failure.  Grade 2 diastolic dysfunction with moderate to severe mitral regurgitation.  2.  Severe pulmonary hypertension.  3.  Hypertension  4.  Elevated troponin.  5.  Chronic kidney disease.  Status post tunneled catheter.  6.  Morbid obesity  7.  Anemia  8.  Cardiomyopathy.  Ejection fraction of 49%.  9.  Moderate to severe mitral regurgitation    Heart rate and blood pressure controlled.  Continue current medications.  Looks like plans are for dialysis.  She will need a repeat echocardiogram once her volume status is stabilized.  She will probably need a transesophageal echocardiogram as an outpatient to evaluate her mitral regurgitation.  Nothing else to add at this point in time.  I will have her follow-up with Dr. Zayas or her nurse practitioner after discharge to determine further testing plans.    Sigrid Conroy MD, [facility]  06/21/21  10:52 EDT      f

## 2021-06-21 NOTE — PLAN OF CARE
Goal Outcome Evaluation:  Plan of Care Reviewed With: patient        Progress: no change  Outcome Summary: VSS. Pt complains of no pain and has shown no signs of distress. HD this shift, 2L removed. Will continue plan of care and will continue to monitor.   Pt is refusing blood sugar check and tried refusing heparin injection at beginning of shift, educated pt and was able to give.

## 2021-06-22 VITALS
HEIGHT: 63 IN | DIASTOLIC BLOOD PRESSURE: 80 MMHG | SYSTOLIC BLOOD PRESSURE: 153 MMHG | OXYGEN SATURATION: 96 % | HEART RATE: 82 BPM | TEMPERATURE: 98.4 F | RESPIRATION RATE: 16 BRPM | WEIGHT: 293 LBS | BODY MASS INDEX: 51.91 KG/M2

## 2021-06-22 LAB
ALBUMIN SERPL-MCNC: 3.2 G/DL (ref 3.5–5.2)
ANION GAP SERPL CALCULATED.3IONS-SCNC: 12 MMOL/L (ref 5–15)
BASOPHILS # BLD AUTO: 0.05 10*3/MM3 (ref 0–0.2)
BASOPHILS NFR BLD AUTO: 0.5 % (ref 0–1.5)
BUN SERPL-MCNC: 20 MG/DL (ref 6–20)
BUN/CREAT SERPL: 3.8 (ref 7–25)
CALCIUM SPEC-SCNC: 8.9 MG/DL (ref 8.6–10.5)
CHLORIDE SERPL-SCNC: 103 MMOL/L (ref 98–107)
CO2 SERPL-SCNC: 24 MMOL/L (ref 22–29)
CREAT SERPL-MCNC: 5.2 MG/DL (ref 0.57–1)
DEPRECATED RDW RBC AUTO: 43.9 FL (ref 37–54)
EOSINOPHIL # BLD AUTO: 1.89 10*3/MM3 (ref 0–0.4)
EOSINOPHIL NFR BLD AUTO: 20.3 % (ref 0.3–6.2)
ERYTHROCYTE [DISTWIDTH] IN BLOOD BY AUTOMATED COUNT: 13.4 % (ref 12.3–15.4)
GFR SERPL CREATININE-BSD FRML MDRD: 11 ML/MIN/1.73
GFR SERPL CREATININE-BSD FRML MDRD: 9 ML/MIN/1.73
GLUCOSE SERPL-MCNC: 110 MG/DL (ref 65–99)
HCT VFR BLD AUTO: 30.9 % (ref 34–46.6)
HGB BLD-MCNC: 10.3 G/DL (ref 12–15.9)
IMM GRANULOCYTES # BLD AUTO: 0.02 10*3/MM3 (ref 0–0.05)
IMM GRANULOCYTES NFR BLD AUTO: 0.2 % (ref 0–0.5)
LYMPHOCYTES # BLD AUTO: 0.7 10*3/MM3 (ref 0.7–3.1)
LYMPHOCYTES NFR BLD AUTO: 7.5 % (ref 19.6–45.3)
MCH RBC QN AUTO: 30.2 PG (ref 26.6–33)
MCHC RBC AUTO-ENTMCNC: 33.3 G/DL (ref 31.5–35.7)
MCV RBC AUTO: 90.6 FL (ref 79–97)
MONOCYTES # BLD AUTO: 0.62 10*3/MM3 (ref 0.1–0.9)
MONOCYTES NFR BLD AUTO: 6.7 % (ref 5–12)
NEUTROPHILS NFR BLD AUTO: 6.01 10*3/MM3 (ref 1.7–7)
NEUTROPHILS NFR BLD AUTO: 64.8 % (ref 42.7–76)
NRBC BLD AUTO-RTO: 0 /100 WBC (ref 0–0.2)
PHOSPHATE SERPL-MCNC: 3.6 MG/DL (ref 2.5–4.5)
PLATELET # BLD AUTO: 222 10*3/MM3 (ref 140–450)
PMV BLD AUTO: 9.9 FL (ref 6–12)
POTASSIUM SERPL-SCNC: 4 MMOL/L (ref 3.5–5.2)
RBC # BLD AUTO: 3.41 10*6/MM3 (ref 3.77–5.28)
SODIUM SERPL-SCNC: 139 MMOL/L (ref 136–145)
WBC # BLD AUTO: 9.29 10*3/MM3 (ref 3.4–10.8)

## 2021-06-22 PROCEDURE — 80069 RENAL FUNCTION PANEL: CPT | Performed by: INTERNAL MEDICINE

## 2021-06-22 PROCEDURE — 25010000002 HEPARIN (PORCINE) PER 1000 UNITS: Performed by: SURGERY

## 2021-06-22 PROCEDURE — 25010000002 IRON SUCROSE PER 1 MG: Performed by: INTERNAL MEDICINE

## 2021-06-22 PROCEDURE — 25010000002 HEPARIN (PORCINE) PER 1000 UNITS: Performed by: INTERNAL MEDICINE

## 2021-06-22 PROCEDURE — 85025 COMPLETE CBC W/AUTO DIFF WBC: CPT | Performed by: INTERNAL MEDICINE

## 2021-06-22 RX ORDER — LISINOPRIL 10 MG/1
10 TABLET ORAL NIGHTLY
Qty: 30 TABLET | Refills: 0 | Status: SHIPPED | OUTPATIENT
Start: 2021-06-22 | End: 2021-07-22

## 2021-06-22 RX ORDER — ALBUMIN (HUMAN) 12.5 G/50ML
12.5 SOLUTION INTRAVENOUS AS NEEDED
Status: DISCONTINUED | OUTPATIENT
Start: 2021-06-22 | End: 2021-06-22 | Stop reason: HOSPADM

## 2021-06-22 RX ADMIN — HEPARIN SODIUM 4000 UNITS: 1000 INJECTION INTRAVENOUS; SUBCUTANEOUS at 12:46

## 2021-06-22 RX ADMIN — Medication 2000 UNITS: at 08:12

## 2021-06-22 RX ADMIN — SODIUM CHLORIDE, PRESERVATIVE FREE 10 ML: 5 INJECTION INTRAVENOUS at 08:13

## 2021-06-22 RX ADMIN — NIFEDIPINE 60 MG: 60 TABLET, FILM COATED, EXTENDED RELEASE ORAL at 08:12

## 2021-06-22 RX ADMIN — LEVOTHYROXINE SODIUM 125 MCG: 0.12 TABLET ORAL at 08:12

## 2021-06-22 RX ADMIN — CALCIUM ACETATE 667 MG: 667 CAPSULE ORAL at 08:12

## 2021-06-22 RX ADMIN — ALLOPURINOL 100 MG: 100 TABLET ORAL at 08:12

## 2021-06-22 RX ADMIN — IRON SUCROSE 200 MG: 20 INJECTION, SOLUTION INTRAVENOUS at 10:16

## 2021-06-22 RX ADMIN — CARVEDILOL 25 MG: 25 TABLET, FILM COATED ORAL at 08:12

## 2021-06-22 NOTE — PROGRESS NOTES
Continued Stay Note  [facility]     Patient Name: Gisela Dyson  MRN: [id number]  Today's Date: 6/22/2021    Admit Date: 6/17/2021    Discharge Plan     Row Name 06/22/21 1332       Plan    Plan  [facility] Thursday and Saturday at 12 noon [facility]    Plan Comments  [facility] Boles [facility] Thursday and Saturday this week then change to MWF next week.  [facility] faxed letter for patient and given to GRAHAM Cuenca as she will dc when back from HD.  Dr Boles discussed schedule with the patient in HD.  Patient denies other needs......................Rashida Gaxiola RN        Discharge Codes    No documentation.       Expected Discharge Date and Time     Expected Discharge Date Expected Discharge Time    Jun 22, 2021             Rashida Gaxiola RN

## 2021-06-22 NOTE — PROGRESS NOTES
"   LOS: 5 days    No care team member to display    Chief Complaint:    Chief Complaint   Patient presents with   • Leg Swelling     Follow UP ESRD  Subjective     Interval History:     On dialysis.  Qb 300.  Feels much better. Talking on phone with her boss.  Eating. Very anxious to go home .    Review of Systems:   As noted above    Objective     Vital Signs  Temp:  [97.3 °F (36.3 °C)-98.3 °F (36.8 °C)] 97.3 °F (36.3 °C)  Heart Rate:  [73-81] 73  Resp:  [16-18] 16  BP: (108-137)/(65-79) 137/74    Flowsheet Rows      First Filed Value   Admission Height  160 cm (63\") Documented at 06/17/2021 2337   Admission Weight  (!) 141 kg (311 lb 6.4 oz) Documented at 06/17/2021 2337          No intake/output data recorded.  I/O last 3 completed shifts:  In: -   Out: 2000 [Other:2000]  No intake or output data in the 24 hours ending 06/22/21 1150    Physical Exam:  General Appearance: alert, oriented x 3, no acute distress, on dialysis.   Qb 300. TDC. . Obese.   Skin: warm and dry  HEENT: pupils round and reactive to light, oral mucosa normal, nonicteric sclera  Neck RIJ TDC  Lungs: Clear to auscultation, no wheezing. Lying flat no distress  Heart: RRR, normal S1 and S2, no S3, no rub  Abdomen: soft, nontender, + bs  Extremities: 1+ lower ext edema .  Neuro: normal speech and mental status       Results Review:    Results from last 7 days   Lab Units 06/21/21  1434 06/20/21  0816 06/19/21  0913 06/17/21  1740   SODIUM mmol/L 137 137 138 139   POTASSIUM mmol/L 4.2 4.4 4.1 4.8   CHLORIDE mmol/L 102 104 103 106   CO2 mmol/L 22.8 18.0* 20.1* 18.9*   BUN mg/dL 47* 64* 64* 80*   CREATININE mg/dL 8.63* 10.70* 10.18* 12.67*   CALCIUM mg/dL 7.5* 6.9* 7.1* 7.3*   BILIRUBIN mg/dL  --   --   --  0.3   ALK PHOS U/L  --   --   --  84   ALT (SGPT) U/L  --   --   --  15   AST (SGOT) U/L  --   --   --  10   GLUCOSE mg/dL 106* 114* 105* 138*       Estimated Creatinine Clearance: 12.4 mL/min (A) (by C-G formula based on SCr of 8.63 mg/dL " (H)).    Results from last 7 days   Lab Units 06/19/21  0913   PHOSPHORUS mg/dL 7.2*             Results from last 7 days   Lab Units 06/21/21  1434 06/20/21  0816 06/19/21  0913 06/18/21  0610 06/17/21  1740   WBC 10*3/mm3 8.69 9.33 8.42 10.80 12.47*   HEMOGLOBIN g/dL 9.8* 9.6* 9.4* 9.7* 10.7*   PLATELETS 10*3/mm3 216 249 226 258 304               Imaging Results (Last 24 Hours)     ** No results found for the last 24 hours. **        allopurinol, 100 mg, Oral, Daily  calcium acetate, 667 mg, Oral, TID With Meals  carvedilol, 25 mg, Oral, BID With Meals  cholecalciferol, 2,000 Units, Oral, Daily  heparin (porcine), 5,000 Units, Subcutaneous, Q12H  insulin lispro, 0-14 Units, Subcutaneous, 4x Daily With Meals & Nightly  levothyroxine, 125 mcg, Oral, Daily  lisinopril, 10 mg, Oral, Nightly  NIFEdipine CC, 60 mg, Oral, Q24H  sodium chloride, 10 mL, Intravenous, Q12H           Medication Review:   Current Facility-Administered Medications   Medication Dose Route Frequency Provider Last Rate Last Admin   • acetaminophen (TYLENOL) tablet 650 mg  650 mg Oral Q4H PRN Ancelmo Menendez MD        Or   • acetaminophen (TYLENOL) 160 MG/5ML solution 650 mg  650 mg Oral Q4H PRN Ancelmo Menendez MD        Or   • acetaminophen (TYLENOL) suppository 650 mg  650 mg Rectal Q4H PRN Ancelmo Menendez MD       • albumin human 25 % IV SOLN 12.5 g  12.5 g Intravenous PRN Omid Newell MD       • allopurinol (ZYLOPRIM) tablet 100 mg  100 mg Oral Daily Omid Newell MD   100 mg at 06/22/21 0812   • calcium acetate (PHOS BINDER)) capsule 667 mg  667 mg Oral TID With Meals Omid Newell MD   667 mg at 06/22/21 0812   • carvedilol (COREG) tablet 25 mg  25 mg Oral BID With Meals Ancelmo Menendez MD   25 mg at 06/22/21 0812   • cholecalciferol (VITAMIN D3) tablet 2,000 Units  2,000 Units Oral Daily Nilton Moran MD   2,000 Units at 06/22/21 0812   • dextrose (D50W) 25 g/ 50mL  Intravenous Solution 25 g  25 g Intravenous Q15 Min PRAncelmo Quinones MD       • dextrose (GLUTOSE) oral gel 15 g  15 g Oral Q15 Min PRN Ancelmo Menendez MD       • glucagon (human recombinant) (GLUCAGEN DIAGNOSTIC) injection 1 mg  1 mg Subcutaneous PRN Ancelmo Menendez MD       • heparin (porcine) 5000 UNIT/ML injection 5,000 Units  5,000 Units Subcutaneous Q12H Ancelmo Menendez MD   5,000 Units at 06/21/21 0843   • heparin (porcine) injection 4,000 Units  4,000 Units Intracatheter PRN Omid Newell MD   4,000 Units at 06/20/21 2258   • HYDROcodone-acetaminophen (NORCO) 5-325 MG per tablet 1 tablet  1 tablet Oral Q4H PRN Ancelmo Menendez MD   1 tablet at 06/19/21 1352   • insulin lispro (ADMELOG) injection 0-14 Units  0-14 Units Subcutaneous 4x Daily With Meals & Nightly Ancelmo Menendez MD   3 Units at 06/18/21 2324   • labetalol (NORMODYNE,TRANDATE) injection 10 mg  10 mg Intravenous Q6H PRN Ancelmo Menendez MD       • levothyroxine (SYNTHROID, LEVOTHROID) tablet 125 mcg  125 mcg Oral Daily Ancelmo Menendez MD   125 mcg at 06/22/21 0812   • lisinopril (PRINIVIL,ZESTRIL) tablet 10 mg  10 mg Oral Nightly Omid Newell MD   10 mg at 06/21/21 2053   • NIFEdipine XL (PROCARDIA XL) 24 hr tablet 60 mg  60 mg Oral Q24H Omid Newell MD   60 mg at 06/22/21 0812   • nitroglycerin (NITROSTAT) SL tablet 0.4 mg  0.4 mg Sublingual Q5 Min PRN Ancelmo Menendez MD       • ondansetron (ZOFRAN) injection 4 mg  4 mg Intravenous Q6H PRN Ancelmo Menendez MD   4 mg at 06/18/21 1659   • sodium chloride 0.9 % flush 10 mL  10 mL Intravenous PRN Ancelmo Menendez MD       • sodium chloride 0.9 % flush 10 mL  10 mL Intravenous Q12H Ancelmo Menendez MD   10 mL at 06/22/21 0813   • sodium chloride 0.9 % flush 10 mL  10 mL Intravenous PRN Ancelmo Menendez MD           Assessment/Plan   1. ESRD.  Dialysis initiated.  Has outpatient spot at [facility]  week TTS at noon then will begin MWF next week 11 am.  She agrees to this.   2. Acute on chronic diastolic heart failure, moderate MR,  Pulm HTN.  3. HTN controlled with removal of volume with HD. Continue coreg and lisinopril.  DC procardia XL.   4. Anemia CKD>  IV iron today with dialysis .  ELSA as outpt with dialysis .    I called orders to [facility].       Sowmya Boles MD  06/22/21  11:50 EDT

## 2021-06-22 NOTE — DISCHARGE SUMMARY
Patient Name: Gisela Dyson  : 1985  MRN: [id number]    Date of Admission: 2021  Date of Discharge:  2021  Primary Care Physician: No primary care provider on file.      Chief Complaint:   Leg Swelling      Discharge Diagnoses     Active Hospital Problems    Diagnosis  POA   • **ESRD on hemodialysis (CMS/[facility]) [N18.6, Z99.2]  Not Applicable   • Acute renal failure (CMS/[facility]) [N17.9]  Yes   • Primary hypothyroidism [E03.9]  Yes   • Nonrheumatic mitral valve regurgitation [I34.0]  Yes   • Essential hypertension [I10]  Yes   • Type 2 diabetes mellitus with renal complication (CMS/[facility]) [E11.29]  Yes   • Obesity, morbid, BMI 50 or higher (CMS/[facility]) [E66.01]  Yes   • Chronic diastolic congestive heart failure (CMS/[facility]) [I50.32]  Yes      Resolved Hospital Problems    Diagnosis Date Resolved POA   • CKD (chronic kidney disease) stage 3, GFR 30-59 ml/min (CMS/[facility]) [N18.30] 2021 Yes        Hospital Course     Ms. Dyson is a 36 y.o. non-smoker with a history of type 2 diabetes, hypothyroidism, morbid obesity, essential hypertension, CKD stage III, chronic diastolic CHF, and nonrheumatic mitral valve regurgitation that presents to [facility] complaining of bilateral leg swelling and shortness of breath.  Patient reports that she was admitted back in 2019 for the same complaints.  Patient reports shortness of breath with exertion intermittently since her diagnosis in 2019.  She states that she works at [email or web address] and is on her feet constantly.  She denies any chest pain or dizziness.  Work-up in the emergency department revealed a creat of 12.67.  Patient reports he was prescribed Lasix upon discharge and has not taken the medication since 2019 due to dizziness upon taking the medication.  She reports that she occasionally takes over-the-counter Motrin but denies any other daily NSAID use.  Chest x-ray shows mild cardiomegaly.  Patient has been kept for further evaluation and  treatment.    End-stage renal disease: Tunneled dialysis catheter placed right jugular 6/18/2021.  Vascular has discussed with patient and family that she will need to have fistula placement and is to follow-up with vascular in 2 to 3 weeks after discharge.  Hemodialysis initiated on 06/19/2021 and will need an outpatient dialysis spot and CCP is following along.  Patient is undergoing dialysis today prior to discharge and she will be on Thursday and Saturday this weekend starting next week she will be on Monday Wednesday and Friday dialysis at 11 AM.  Nephrology did clear her to be discharged.     Hypertension: Blood pressure is reasonably well controlled on Coreg and lisinopril.     Diabetes mellitus type 2 with renal complications:   Hemoglobin A1c 6 and blood sugars are reasonably well controlled and currently patient is on corrective dose insulin.  Blood sugars are reasonably well controlled.  Normally was taking Tresiba and Trulicity as an outpatient which have been discontinued given that blood sugars are ranging in 100s.  Have advised patient to keep a log of blood sugars at home so the necessary adjustments could be made as an outpatient basis.     Acute on chronic diastolic congestive heart failure:   Echo revealed ejection fraction of 48% and appears to be compensated.  Will continue with Coreg as well as lisinopril.  Cardiology did evaluate her during this hospital stay and recommended outpatient follow-up and she may need LUPE to further assess her mitral valve.     Elevated troponin:   Likely secondary to renal failure not secondary to MI.     Hypothyroidism:  Continue with Synthroid.    Day of Discharge         Physical Exam:  Temp:  [97.3 °F (36.3 °C)-98.3 °F (36.8 °C)] 97.3 °F (36.3 °C)  Heart Rate:  [73-81] 73  Resp:  [16-18] 16  BP: (108-137)/(65-79) 137/74  Body mass index is 53.07 kg/m².  Physical Exam  Constitutional:       General: She is not in acute distress.  HENT:      Head: Normocephalic  and atraumatic.      Mouth/Throat:      Pharynx: Oropharynx is clear.   Eyes:      General: No scleral icterus.     Extraocular Movements: Extraocular movements intact.   Cardiovascular:      Rate and Rhythm: Normal rate and regular rhythm.      Pulses: Normal pulses.      Heart sounds: Normal heart sounds.   Pulmonary:      Effort: Pulmonary effort is normal.      Breath sounds: Normal breath sounds.   Abdominal:      General: Abdomen is flat. Bowel sounds are normal.      Palpations: Abdomen is soft.   Musculoskeletal:         General:  Normal range of motion.      Cervical back: Normal range of motion and neck supple.   Skin:     General: Skin is warm and dry.      Capillary Refill: Capillary refill takes less than 2 seconds.   Neurological:      General: No focal deficit present.      Mental Status: She is alert and oriented to person, place, and time.   Psychiatric:         Mood and Affect: Mood normal.       Consultants     Consult Orders (all) (From admission, onward)     Start     Ordered    06/18/21 1058  Inpatient Case Management  Consult  Once     Provider:  (Not yet assigned)    06/18/21 1057    06/18/21 1042  Inpatient Vascular Surgery Consult  STAT     Specialty:  Vascular Surgery  Provider:  Ancelmo Menendez MD    06/18/21 1043    06/18/21 0328  Inpatient Cardiology Consult  Once     Specialty:  Cardiology  Provider:  Reji Rudd MD    06/18/21 0327    06/18/21 0213  Inpatient Nephrology Consult  Once     Specialty:  Nephrology  Provider:  Mitch Salazar MD    06/18/21 0212    06/18/21 0100  Inpatient Consult to Advance Care Planning  Once     Provider:  (Not yet assigned)    06/18/21 0100    06/17/21 2136  Inpatient Nephrology Consult  Once,   Status:  Canceled     Specialty:  Nephrology  Provider:  Hayden Hunter MD    06/17/21 2136 06/17/21 2108  [facility] (on-call MD unless specified) Details  Once     Specialty:  Hospitalist  Provider:  (Not yet assigned)     06/17/21 2107              Procedures     TUNNEL DIALYSIS, PALINDROME CATH      Imaging Results (All)     Procedure Component Value Units Date/Time    XR Chest 1 View [[id number]] Collected: 06/18/21 1818     Updated: 06/18/21 1822    Narrative:      XR CHEST 1 VW-     HISTORY: Female who is 36 years-old,  Central line placement     TECHNIQUE: Frontal view of the chest     COMPARISON: 06/17/2021     FINDINGS: Right-sided dual-lumen central venous catheter extends to the  superior vena cava. The heart is enlarged. Pulmonary vasculature is  unremarkable. No focal pulmonary consolidation, pleural effusion, or  pneumothorax. No acute osseous process.       Impression:      Central line placement. No pneumothorax. Cardiomegaly.     This report was finalized on 6/18/2021 6:19 PM by Dr. Emery Cook M.D.       IR PICC W Fluoro Surgery Only [[id number]] Resulted: 06/18/21 1732     Updated: 06/18/21 1732    Narrative:      This procedure was auto-finalized with no dictation required.    US Renal Bilateral [[id number]] Collected: 06/17/21 2313     Updated: 06/17/21 2318    Narrative:      RENAL ULTRASOUND     HISTORY: Acute kidney injury     COMPARISON: 11/07/2019     TECHNIQUE: Grayscale and color Doppler sonographic images were obtained  through the kidneys and bladder.     FINDINGS:  Right kidney measures 10.2 x 4.3 x 4.7 cm. Left kidney measures 9.3 x  5.8 x 4.9 cm. No hydronephrosis is seen on either side. I think both  kidneys appear echogenic, suggesting chronic medical renal disease.  Urinary bladder cannot be seen, as the patient recently voided.       Impression:      Overall echogenic appearance to the kidneys, suggesting chronic medical  renal disease. This is a new finding when compared to the patient's  prior study from 11/07/2019.      This report was finalized on 6/17/2021 11:15 PM by Dr. Ping Quick M.D.       XR Chest 2 View [[id number]] Collected: 06/17/21 1830     Updated: 06/17/21 1834     Narrative:      XR CHEST 2 VW-  06/17/2021     HISTORY: Shortness of breath.     Heart size is mildly enlarged. Lungs appear free of acute infiltrates.  Bones and soft tissues are unremarkable.       Impression:      Mild cardiomegaly.     This report was finalized on 6/17/2021 6:31 PM by Dr. Nelson Flaherty M.D.           Results for orders placed during the hospital encounter of 06/17/21    Duplex Initial Vein Mapping Hemodialysis Access Bilateral CAR    Interpretation Summary  · The right cephalic vein is patent and of adequate size in the upper arm.  · The left cephalic vein is patent and of adequate size in the upper arm.  · The left basilic vein is patent and of adequate size in the upper arm.    Results for orders placed during the hospital encounter of 06/17/21    Adult Transthoracic Echo Complete W/ Cont if Necessary Per Protocol    Interpretation Summary  · Calculated left ventricular EF = 48.8% Estimated left ventricular EF = 49% Estimated left ventricular EF was in agreement with the calculated left ventricular EF. Left ventricular systolic function is mildly decreased. The left ventricular cavity is moderately dilated. Left ventricular wall thickness is consistent with moderate concentric hypertrophy. All left ventricular wall segments contract normally. Left ventricular diastolic function is consistent with (grade II w/high LAP) pseudonormalization.  · The mitral valve is grossly normal in structure. Moderate to severe mitral valve regurgitation is present  · Moderate tricuspid valve regurgitation is present. Calculated right ventricular systolic pressure from tricuspid regurgitation is 50.0 mmHg.  · There is mild pulmonic valve regurgitation present.    Pertinent Labs     Results from last 7 days   Lab Units 06/21/21  1434 06/20/21  0816 06/19/21  0913 06/18/21  0610   WBC 10*3/mm3 8.69 9.33 8.42 10.80   HEMOGLOBIN g/dL 9.8* 9.6* 9.4* 9.7*   PLATELETS 10*3/mm3 216 249 226 258     Results from last  7 days   Lab Units 06/21/21  1434 06/20/21  0816 06/19/21  0913 06/18/21  0610   SODIUM mmol/L 137 137 138 142   POTASSIUM mmol/L 4.2 4.4 4.1 4.1   CHLORIDE mmol/L 102 104 103 109*   CO2 mmol/L 22.8 18.0* 20.1* 15.0*   BUN mg/dL 47* 64* 64* 82*   CREATININE mg/dL 8.63* 10.70* 10.18* 12.56*   GLUCOSE mg/dL 106* 114* 105* 108*   Estimated Creatinine Clearance: 12.4 mL/min (A) (by C-G formula based on SCr of 8.63 mg/dL (H)).  Results from last 7 days   Lab Units 06/19/21  0913 06/17/21  1740   ALBUMIN g/dL 2.40* 3.10*   BILIRUBIN mg/dL  --  0.3   ALK PHOS U/L  --  84   AST (SGOT) U/L  --  10   ALT (SGPT) U/L  --  15     Results from last 7 days   Lab Units 06/21/21  1434 06/20/21  0816 06/19/21  0913 06/18/21  0610 06/17/21  1740   CALCIUM mg/dL 7.5* 6.9* 7.1* 7.2* 7.3*   ALBUMIN g/dL  --   --  2.40*  --  3.10*   PHOSPHORUS mg/dL  --   --  7.2*  --   --        Results from last 7 days   Lab Units 06/18/21  0610 06/17/21  2354 06/17/21  1740   TROPONIN T ng/mL 0.061* 0.056* 0.061*   PROBNP pg/mL  --   --  33,099.0*     Results from last 7 days   Lab Units 06/18/21  1119   CREATININE UR mg/dL 59.6   PROTEIN TOTAL URINE mg/dL 650.0   PROT/CREAT RATIO UR mg/G Crea 10,906.0*         Invalid input(s): LDLCALC  Results from last 7 days   Lab Units 06/20/21  1902 06/20/21  1857   BLOODCX  No growth at 24 hours No growth at 24 hours     Results from last 7 days   Lab Units 06/17/21  2117   COVID19  Not Detected       Test Results Pending at Discharge     Pending Labs     Order Current Status    Blood Culture - Blood, Arm, Left Preliminary result    Blood Culture - Blood, Arm, Right Preliminary result          Discharge Details        Discharge Medications      New Medications      Instructions Start Date   lisinopril 10 MG tablet  Commonly known as: PRINIVIL,ZESTRIL   10 mg, Oral, Nightly         Continue These Medications      Instructions Start Date   allopurinol 300 MG tablet  Commonly known as: ZYLOPRIM   300 mg, Oral,  "Daily      carvedilol 25 MG tablet  Commonly known as: COREG   25 mg, Oral, 2 Times Daily With Meals      ERGOCAL PO   1.25 mg, Oral, 3 Times Weekly      levothyroxine 125 MCG tablet  Commonly known as: SYNTHROID, LEVOTHROID   125 mcg, Oral, Daily      montelukast 10 MG tablet  Commonly known as: SINGULAIR   10 mg, Oral, Nightly      Needle (Disp) 30G X 1/2\" misc  Commonly known as: BD Disp Needles   Use daily for injection of Tresiba.         Stop These Medications    Bydureon BCise 2 MG/0.85ML auto-injector injection  Generic drug: exenatide er     cefdinir 300 MG capsule  Commonly known as: OMNICEF     furosemide 80 MG tablet  Commonly known as: LASIX     hydrALAZINE 25 MG tablet  Commonly known as: APRESOLINE     NIFEdipine CC 60 MG 24 hr tablet  Commonly known as: ADALAT CC     Tresiba FlexTouch 200 UNIT/ML solution pen-injector pen injection  Generic drug: Insulin Degludec     Trulicity 1.5 MG/0.5ML solution pen-injector  Generic drug: Dulaglutide            No Known Allergies      Discharge Disposition:  Home or Self Care    Discharge Diet:  Diet Order   Procedures   • Diet Regular; Consistent Carbohydrate, Renal, Cardiac       Discharge Activity:   Activity Instructions     Activity as Tolerated            CODE STATUS:    Code Status and Medical Interventions:   Ordered at: 06/17/21 2136     Code Status:    CPR     Medical Interventions (Level of Support Prior to Arrest):    Full       Future Appointments   Date Time Provider Department [place]   7/21/2021 11:00 AM Isabella Hunter DNP, APRN [facility]     Additional Instructions for the Follow-ups that You Need to Schedule     Discharge Follow-up with PCP   As directed       Currently Documented PCP:    No primary care provider on file.    PCP Phone Number:    None     Follow Up Details: 2 weeks           Follow-up Information     Luis Ag MD. Call in 3 week(s).    Specialty: Vascular Surgery  Contact information:  [address] [address]  [place] [address]  [phone]                   Additional Instructions for the Follow-ups that You Need to Schedule     Discharge Follow-up with PCP   As directed       Currently Documented PCP:    No primary care provider on file.    PCP Phone Number:    None     Follow Up Details: 2 weeks           Time Spent on Discharge:  Greater than 30 minutes      Ehsan Toledo MD  [facility]  06/22/21  12:41 EDT

## 2021-06-22 NOTE — PAYOR COMM NOTE
"Gsiela Ulrich (36 y.o. Female)     ATTN: CONTINUED STAY CLINICALS FOR REVIEW:  [id number]    PLEASE REPLY WITH APPROVED DAYS TO UR DEPT:    JUANITO RODRIGUEZ RN, CCP:  [phone],  [phone]        Date of Birth Social Security Number Address Home Phone MRN    1985  [address] [place] [address] [phone] [id number]    [place] Marital Status          None Single       Admission Date Admission Type Admitting Provider Attending Provider Department, Room/Bed    6/17/21 Emergency Francisco Mcclure MD Reddy, Rahul Kandada, MD [address], [address]    Discharge Date Discharge Disposition Discharge Destination                       Attending Provider: Ehsan Toledo MD    Allergies: No Known Allergies    Isolation: None   Infection: None   Code Status: CPR    Ht: 160 cm (62.99\")   Wt: 136 kg (299 lb 7.9 oz)    Admission Cmt: None   Principal Problem: ESRD on hemodialysis (CMS/[facility]) [N18.6,Z99.2]                 Active Insurance as of 6/17/2021     Primary Coverage     Payor Plan Insurance Group Employer/Plan Group    [employer] PPO [id number]     Payor Plan Address Payor Plan Phone Number Payor Plan Fax Number Effective Dates    [address] [phone]  1/1/2021 - None Entered    [place] [id number]       Subscriber Name Subscriber Birth Date Member ID       GISELA ULRICH 1985 [id number]                 Emergency Contacts      (Rel.) Home Phone Work Phone Mobile Phone    FILOMENA ULRICH (KEVIN) (Father) [phone] -- [phone]            Vital Signs (last 7 days)     Date/Time   Temp   Temp src   Pulse   Resp   BP   Patient Position   SpO2    06/22/21 0905   97.3 (36.3)   Temporal   73   16   137/74   Lying   --    06/22/21 0730   97.6 (36.4)   Oral   77   18   123/79   Lying   --    06/21/21 2306   98.3 (36.8)   Oral   77   18   115/75   Lying   --    06/21/21 1925   --   --   76   16   " 108/68   Lying   --    06/21/21 1705   --   --   81   --   118/72   --   --    06/21/21 1419   --   --   75   --   114/65   --   --    06/21/21 1345   --   --   74   16   --   --   96    Temp: the patient refuses on vitals at 06/21/21 1345    BP: refused at 06/21/21 1345    06/21/21 0842   --   --   75   --   128/75   --   --    06/21/21 0713   97.7 (36.5)   Oral   80   18   --   --   --    BP: she dont want to the BP taken at 06/21/21 0713    06/20/21 2335   97.8 (36.6)   Oral   80   16   139/81   Lying   --    06/20/21 2010   97.6 (36.4)   Oral   66   18   109/80   Lying   --    06/20/21 1315   97.6 (36.4)   Oral   66   18   112/74   Lying   (!) 89    06/20/21 0941   --   --   70   --   --   --   --    06/20/21 0722   98.3 (36.8)   Oral   61   16   115/76   Lying   95    06/20/21 0012   97.7 (36.5)   Oral   72   18   114/77   Lying   96    06/19/21 1959   97.9 (36.6)   Oral   73   18   109/75   Lying   96    06/19/21 1809   --   --   70   --   --   --   --    06/19/21 1404   96.5 (35.8)   Oral   --   20   121/86   Lying   --    06/19/21 1349   --   --   68   --   142/89   --   --    06/19/21 0830   --   --   --   20   --   Lying   --    06/19/21 0600   --   --   67   18   133/81   Lying   --    06/19/21 0006   97.6 (36.4)   Oral   74   18   135/87   Lying   98    06/18/21 2010   98.3 (36.8)   Oral   86   18   141/91   Lying   97    06/18/21 1800   97.9 (36.6)   --   78   16   132/84   --   97    06/18/21 1755   --   --   76   --   131/80   --   97    06/18/21 1750   --   --   80   --   133/78   --   97    06/18/21 1745   --   --   79   16   146/91   --   99    06/18/21 1740   --   --   78   16   141/84   --   100    06/18/21 1737   97.8 (36.6)   Oral   76   16   141/90   --   100    06/18/21 1642   --   --   84   --   --   --   97    06/18/21 1641   --   --   83   --   --   --   98    06/18/21 1640   --   --   82   --   --   --   100    06/18/21 1638   --   --   80   --   --   --   100    06/18/21 1637   --   --    84   --   (!) 178/102   --   98    06/18/21 1636   --   --   83   --   --   --   98    06/18/21 1635   --   --   82   --   --   --   98    06/18/21 1633   --   --   81   --   --   --   98    06/18/21 1632   --   --   86   --   --   --   97    06/18/21 1631   --   --   85   --   --   --   98    06/18/21 1630   --   --   84   --   --   --   98    06/18/21 1628   --   --   85   --   --   --   99    06/18/21 1627   --   --   86   --   --   --   99    06/18/21 1626   --   --   86   --   --   --   100    06/18/21 1625   97.7 (36.5)   Oral   84   18   --   --   100    06/18/21 1623   --   --   85   --   --   --   98    06/18/21 1622   --   --   85   --   --   --   99    06/18/21 1621   --   --   --   --   --   --   99    06/18/21 1620   --   --   --   --   --   --   98    06/18/21 1619   --   --   --   --   --   --   98    06/18/21 1556   --   --   81   --   --   --   --    06/18/21 1555   --   --   84   --   --   --   --    06/18/21 1554   --   --   88   --   --   --   --    06/18/21 1553   --   --   86   --   --   --   --    06/18/21 1551   --   --   82   --   --   --   --    06/18/21 1550   --   --   84   --   --   --   --    06/18/21 1549   --   --   83   --   --   --   --    06/18/21 1548   --   --   82   --   --   --   --    06/18/21 1547   --   --   84   --   --   --   --    06/18/21 1545   --   --   82   --   --   --   --    06/18/21 1544   --   --   82   --   --   --   --    06/18/21 1543   --   --   84   --   --   --   --    06/18/21 1542   --   --   83   --   --   --   --    06/18/21 1540   --   --   83   --   --   --   --    06/18/21 1539   --   --   84   --   --   --   --    06/18/21 1538   --   --   84   --   --   --   --    06/18/21 1537   --   --   87   --   --   --   --    06/18/21 1536   --   --   87   --   --   --   --    06/18/21 1535   --   --   84   --   --   --   --    06/18/21 1533   --   --   83   --   --   --   --    06/18/21 1532   --   --   84   --   --   --   --    06/18/21 1531   --   --   80    --   --   --   --    06/18/21 1530   --   --   86   --   --   --   --    06/18/21 1528   --   --   81   --   --   --   --    06/18/21 1527   --   --   81   --   --   --   --    06/18/21 1526   --   --   86   --   --   --   --    06/18/21 1525   --   --   83   --   --   --   --    06/18/21 1524   --   --   86   --   --   --   --    06/18/21 1521   --   --   85   --   --   --   --    06/18/21 1520   --   --   85   --   --   --   --    06/18/21 1519   --   --   83   --   --   --   --    06/18/21 1518   --   --   85   --   --   --   --    06/18/21 1517   --   --   84   --   --   --   --    06/18/21 1516   98.2 (36.8)   Oral   84   18   135/82   Lying   --    06/18/21 1512   --   --   --   --   135/82   --   --    06/18/21 1403   --   --   86   --   122/90   --   99       Oxygen Therapy (last 7 days)     Date/Time   SpO2   Device (Oxygen Therapy)   Flow (L/min)   Oxygen Concentration (%)   ETCO2 (mmHg)    06/22/21 0730   --   room air   --   --   --    06/21/21 2306   --   room air   --   --   --    06/21/21 2000   --   room air   --   --   --    06/21/21 1925   --   room air   --   --   --    06/21/21 1419   --   room air   --   --   --    06/21/21 1345   96   room air   --   --   --    06/21/21 0842   --   room air   --   --   --    06/21/21 0713   --   room air   --   --   --    06/21/21 0015   --   room air   --   --   --    06/20/21 2007   --   room air   --   --   --    06/20/21 1421   --   room air   --   --   --    06/20/21 1315   (!) 89   room air   --   --   --    06/20/21 0822   --   room air   --   --   --    06/20/21 0722   95   room air   --   --   --    06/20/21 0012   96   --   --   --   --    06/19/21 1959   96   --   --   --   --    06/19/21 1352   --   room air   --   --   --    06/19/21 0902   --   room air   --   --   --    06/19/21 0830   --   room air   --   --   --    06/19/21 0006   98   --   --   --   --    06/18/21 2010   97   --   --   --   --    06/18/21 1845   --   room air   --   --   --     06/18/21 1826   --   room air   --   --   --    06/18/21 1800   97   room air   --   --   --    06/18/21 1755   97   --   --   --   --    06/18/21 1750   97   --   --   --   --    06/18/21 1745   99   room air   --   --   --    06/18/21 1740   100   --   --   --   --    06/18/21 1737   100   room air   --   --   --    06/18/21 1642   97   --   --   --   --    06/18/21 1641   98   --   --   --   --    06/18/21 1640   100   --   --   --   --    06/18/21 1638   100   --   --   --   --    06/18/21 1637   98   --   --   --   --    06/18/21 1636   98   --   --   --   --    06/18/21 1635   98   --   --   --   --    06/18/21 1633   98   --   --   --   --    06/18/21 1632   97   --   --   --   --    06/18/21 1631   98   --   --   --   --    06/18/21 1630   98   --   --   --   --    06/18/21 1628   99   --   --   --   --    06/18/21 1627   99   --   --   --   --    06/18/21 1626   100   --   --   --   --    06/18/21 1625   100   room air   --   --   --    06/18/21 1623   98   --   --   --   --    06/18/21 1622   99   --   --   --   --    06/18/21 1621   99   --   --   --   --    06/18/21 1620   98   --   --   --   --    06/18/21 1619   98   --   --   --   --    06/18/21 1403   99   --   --   --   --    06/18/21 0922   --   room air   --   --   --     Intake & Output (last 7 days)       06/15 0701 - 06/16 0700 06/16 0701 - 06/17 0700 06/17 0701 - 06/18 0700 06/18 0701 - 06/19 0700 06/19 0701 - 06/20 0700 06/20 0701 - 06/21 0700 06/21 0701 - 06/22 0700 06/22 0701 - 06/23 0700    P.O.     240       I.V. (mL/kg)    200 (1.4)        IV Piggyback    100        Total Intake(mL/kg)    300 (2.1) 240 (1.7)       Urine (mL/kg/hr)   600 0 (0)        Other    1000  2000      Stool   0         Total Output   600 1000  2000      Net   -600 -700 +240 -2000                  Urine Unmeasured Occurrence   1 x 1 x 2 x  1 x     Stool Unmeasured Occurrence   1 x               Lines, Drains & Airways    Active LDAs     Name:   Placement date:    Placement time:   Site:   Days:    Peripheral IV 06/17/21 2002 Right Antecubital   06/17/21 2002    Antecubital   4    Hemodialysis Cath Double   06/18/21    --    Internal Jugular   4              Facility-Administered Medications as of 6/22/2021   Medication Dose Route Frequency Provider Last Rate Last Admin   • acetaminophen (TYLENOL) tablet 650 mg  650 mg Oral Q4H PRN Ancelmo Menendez MD        Or   • acetaminophen (TYLENOL) 160 MG/5ML solution 650 mg  650 mg Oral Q4H PRN Ancelmo Menendez MD        Or   • acetaminophen (TYLENOL) suppository 650 mg  650 mg Rectal Q4H PRN Ancelmo Menendez MD       • albumin human 25 % IV SOLN 12.5 g  12.5 g Intravenous PRN Omid Newell MD       • allopurinol (ZYLOPRIM) tablet 100 mg  100 mg Oral Daily Omid Newell MD   100 mg at 06/22/21 0812   • calcium acetate (PHOS BINDER)) capsule 667 mg  667 mg Oral TID With Meals Omid Newell MD   667 mg at 06/22/21 0812   • carvedilol (COREG) tablet 25 mg  25 mg Oral BID With Meals Ancelmo Menendez MD   25 mg at 06/22/21 0812   • [COMPLETED] ceFAZolin in Sodium Chloride (ANCEF) IVPB solution 3 g  3 g Intravenous 30 Min Pre-Op Ancelmo Menendez MD   Stopped at 06/18/21 1701   • cholecalciferol (VITAMIN D3) tablet 2,000 Units  2,000 Units Oral Daily Nilton Moran MD   2,000 Units at 06/22/21 0812   • dextrose (D50W) 25 g/ 50mL Intravenous Solution 25 g  25 g Intravenous Q15 Min PRN Ancelmo Menendez MD       • dextrose (GLUTOSE) oral gel 15 g  15 g Oral Q15 Min PRN Ancelmo Menendez MD       • glucagon (human recombinant) (GLUCAGEN DIAGNOSTIC) injection 1 mg  1 mg Subcutaneous PRN Ancelmo Menendez MD       • heparin (porcine) 5000 UNIT/ML injection 5,000 Units  5,000 Units Subcutaneous Q12H Ancelmo Menendez MD   5,000 Units at 06/21/21 0843   • heparin (porcine) injection 4,000 Units  4,000 Units Intracatheter Omid Browning MD   4,000  Units at 06/20/21 2258   • [COMPLETED] hydrALAZINE (APRESOLINE) injection 20 mg  20 mg Intravenous Once MinnieRufus ramesh MD   20 mg at 06/17/21 2113   • HYDROcodone-acetaminophen (NORCO) 5-325 MG per tablet 1 tablet  1 tablet Oral Q4H PRN Ancelmo Menendez MD   1 tablet at 06/19/21 1352   • insulin lispro (ADMELOG) injection 0-14 Units  0-14 Units Subcutaneous 4x Daily With Meals & Nightly Ancelmo Menendez MD   3 Units at 06/18/21 2324   • [COMPLETED] iron sucrose (VENOFER) 200 mg in sodium chloride 0.9 % 100 mL IVPB  200 mg Intravenous Once in Dialysis Omid Newell MD   200 mg at 06/22/21 1016   • [COMPLETED] labetalol (NORMODYNE,TRANDATE) injection 10 mg  10 mg Intravenous Once Rosalva Vizcarra APRN   10 mg at 06/18/21 0220   • labetalol (NORMODYNE,TRANDATE) injection 10 mg  10 mg Intravenous Q6H PRN Ancelmo Menendez MD       • levothyroxine (SYNTHROID, LEVOTHROID) tablet 125 mcg  125 mcg Oral Daily Ancelmo Menendez MD   125 mcg at 06/22/21 0812   • lisinopril (PRINIVIL,ZESTRIL) tablet 10 mg  10 mg Oral Nightly Omid Newell MD   10 mg at 06/21/21 2053   • NIFEdipine XL (PROCARDIA XL) 24 hr tablet 60 mg  60 mg Oral Q24H Omid Newell MD   60 mg at 06/22/21 0812   • nitroglycerin (NITROSTAT) SL tablet 0.4 mg  0.4 mg Sublingual Q5 Min PRN Ancelmo Menendez MD       • ondansetron (ZOFRAN) injection 4 mg  4 mg Intravenous Q6H PRN Ancelmo Menendez MD   4 mg at 06/18/21 1659   • sodium chloride 0.9 % flush 10 mL  10 mL Intravenous PRN Ancelmo Menendez MD       • sodium chloride 0.9 % flush 10 mL  10 mL Intravenous Q12H Ancelmo Menendez MD   10 mL at 06/22/21 0813   • sodium chloride 0.9 % flush 10 mL  10 mL Intravenous PRN Ancelmo Menendez MD           Lab Results (last 7 days)     Procedure Component Value Units Date/Time    Blood Culture - Blood, Arm, Right [[id number]] Collected: 06/20/21 1857    Specimen: Blood from Arm, Right Updated:  06/21/21 1930     Blood Culture No growth at 24 hours    Blood Culture - Blood, Arm, Left [[id number]] Collected: 06/20/21 1902    Specimen: Blood from Arm, Left Updated: 06/21/21 1930     Blood Culture No growth at 24 hours    Basic Metabolic Panel [[id number]]  (Abnormal) Collected: 06/21/21 1434    Specimen: Blood Updated: 06/21/21 1604     Glucose 106 mg/dL      BUN 47 mg/dL      Creatinine 8.63 mg/dL      Sodium 137 mmol/L      Potassium 4.2 mmol/L      Chloride 102 mmol/L      CO2 22.8 mmol/L      Calcium 7.5 mg/dL      eGFR  African Amer 6 mL/min/1.73      Comment: <15 Indicative of kidney failure.        eGFR Non African Amer 5 mL/min/1.73      Comment: <15 Indicative of kidney failure.        BUN/Creatinine Ratio 5.4     Anion Gap 12.2 mmol/L     Narrative:      GFR Normal >60  Chronic Kidney Disease <60  Kidney Failure <15      CBC (No Diff) [[id number]]  (Abnormal) Collected: 06/21/21 1434    Specimen: Blood Updated: 06/21/21 1539     WBC 8.69 10*3/mm3      RBC 3.32 10*6/mm3      Hemoglobin 9.8 g/dL      Hematocrit 30.2 %      MCV 91.0 fL      MCH 29.5 pg      MCHC 32.5 g/dL      RDW 13.3 %      RDW-SD 43.9 fl      MPV 10.6 fL      Platelets 216 10*3/mm3     POC Glucose Once [[id number]]  (Normal) Collected: 06/20/21 2046    Specimen: Blood Updated: 06/20/21 2049     Glucose 118 mg/dL     POC Glucose Once [[id number]]  (Normal) Collected: 06/20/21 1631    Specimen: Blood Updated: 06/20/21 1632     Glucose 130 mg/dL     POC Glucose Once [[id number]]  (Abnormal) Collected: 06/20/21 1138    Specimen: Blood Updated: 06/20/21 1140     Glucose 134 mg/dL     Basic Metabolic Panel [[id number]]  (Abnormal) Collected: 06/20/21 0816    Specimen: Blood Updated: 06/20/21 0900     Glucose 114 mg/dL      BUN 64 mg/dL      Creatinine 10.70 mg/dL      Sodium 137 mmol/L      Potassium 4.4 mmol/L      Chloride 104 mmol/L      CO2 18.0 mmol/L      Calcium 6.9 mg/dL      eGFR  African Amer 5 mL/min/1.73      Comment: <15 Indicative  of kidney failure.        eGFR Non African Amer 4 mL/min/1.73      Comment: <15 Indicative of kidney failure.        BUN/Creatinine Ratio 6.0     Anion Gap 15.0 mmol/L     Narrative:      GFR Normal >60  Chronic Kidney Disease <60  Kidney Failure <15      CBC (No Diff) [[id number]]  (Abnormal) Collected: 06/20/21 0816    Specimen: Blood Updated: 06/20/21 0852     WBC 9.33 10*3/mm3      RBC 3.25 10*6/mm3      Hemoglobin 9.6 g/dL      Hematocrit 29.9 %      MCV 92.0 fL      MCH 29.5 pg      MCHC 32.1 g/dL      RDW 13.3 %      RDW-SD 44.4 fl      MPV 10.2 fL      Platelets 249 10*3/mm3     POC Glucose Once [[id number]]  (Normal) Collected: 06/20/21 0631    Specimen: Blood Updated: 06/20/21 0632     Glucose 113 mg/dL     POC Glucose Once [[id number]]  (Abnormal) Collected: 06/19/21 2044    Specimen: Blood Updated: 06/19/21 2045     Glucose 149 mg/dL     POC Glucose Once [[id number]]  (Abnormal) Collected: 06/19/21 1605    Specimen: Blood Updated: 06/19/21 1607     Glucose 133 mg/dL     PTH, Intact [[id number]]  (Abnormal) Collected: 06/19/21 0913    Specimen: Blood Updated: 06/19/21 1429     PTH, Intact 291.0 pg/mL     Narrative:      Results may be falsely decreased if patient taking Biotin.      Calcium, Ionized [[id number]]  (Abnormal) Collected: 06/19/21 1011    Specimen: Blood Updated: 06/19/21 1344     Ionized Calcium 1.02 mmol/L      Ionized Calcium 4.1 mg/dL     POC Glucose Once [[id number]]  (Normal) Collected: 06/19/21 1102    Specimen: Blood Updated: 06/19/21 1104     Glucose 130 mg/dL     Manual Differential [[id number]]  (Abnormal) Collected: 06/19/21 0913    Specimen: Blood Updated: 06/19/21 1055     Neutrophil % 64.6 %      Lymphocyte % 12.1 %      Monocyte % 3.0 %      Eosinophil % 20.2 %      Neutrophils Absolute 5.44 10*3/mm3      Lymphocytes Absolute 1.02 10*3/mm3      Monocytes Absolute 0.25 10*3/mm3      Eosinophils Absolute 1.70 10*3/mm3      RBC Morphology Normal     WBC Morphology Normal     Platelet  Morphology Normal    CBC & Differential [[id number]]  (Abnormal) Collected: 06/19/21 0913    Specimen: Blood Updated: 06/19/21 1055    Narrative:      The following orders were created for panel order CBC & Differential.  Procedure                               Abnormality         Status                     ---------                               -----------         ------                     CBC Auto Differential[[id number]]        Abnormal            Final result                 Please view results for these tests on the individual orders.    CBC Auto Differential [[id number]]  (Abnormal) Collected: 06/19/21 0913    Specimen: Blood Updated: 06/19/21 1055     WBC 8.42 10*3/mm3      RBC 3.15 10*6/mm3      Hemoglobin 9.4 g/dL      Hematocrit 30.3 %      MCV 96.2 fL      MCH 29.8 pg      MCHC 31.0 g/dL      RDW 13.6 %      RDW-SD 48.5 fl      MPV 10.1 fL      Platelets 226 10*3/mm3     Renal Function Panel [[id number]]  (Abnormal) Collected: 06/19/21 0913    Specimen: Blood Updated: 06/19/21 1053     Glucose 105 mg/dL      BUN 64 mg/dL      Creatinine 10.18 mg/dL      Sodium 138 mmol/L      Potassium 4.1 mmol/L      Chloride 103 mmol/L      CO2 20.1 mmol/L      Calcium 7.1 mg/dL      Albumin 2.40 g/dL      Phosphorus 7.2 mg/dL      Anion Gap 14.9 mmol/L      BUN/Creatinine Ratio 6.3     eGFR Non African Amer 4 mL/min/1.73      Comment: <15 Indicative of kidney failure.        eGFR  African Amer 5 mL/min/1.73      Comment: <15 Indicative of kidney failure.       Narrative:      GFR Normal >60  Chronic Kidney Disease <60  Kidney Failure <15      Ferritin [[id number]]  (Normal) Collected: 06/19/21 0913    Specimen: Blood Updated: 06/19/21 1038     Ferritin 105.00 ng/mL     Narrative:      Results may be falsely decreased if patient taking Biotin.      Iron Profile [[id number]]  (Abnormal) Collected: 06/19/21 0913    Specimen: Blood Updated: 06/19/21 1033     Iron 37 mcg/dL      Iron Saturation 13 %      Transferrin 192 mg/dL       TIBC 286 mcg/dL     Vitamin D 25 Hydroxy [[id number]]  (Abnormal) Collected: 06/19/21 0913    Specimen: Blood Updated: 06/19/21 1031     25 Hydroxy, Vitamin D 10.7 ng/ml     Narrative:      Reference Range for Total Vitamin D 25(OH)     Deficiency <20.0 ng/mL   Insufficiency 21-29 ng/mL   Sufficiency  ng/mL  Toxicity >100 ng/ml    Results may be falsely increased if patient taking Biotin.      POC Glucose Once [[id number]]  (Normal) Collected: 06/19/21 0656    Specimen: Blood Updated: 06/19/21 0657     Glucose 104 mg/dL     POC Glucose Once [[id number]]  (Abnormal) Collected: 06/18/21 2044    Specimen: Blood Updated: 06/18/21 2046     Glucose 188 mg/dL     POC Glucose Once [[id number]]  (Abnormal) Collected: 06/18/21 1550    Specimen: Blood Updated: 06/18/21 1553     Glucose 131 mg/dL     Protein / Creatinine Ratio, Urine - Urine, Clean Catch [[id number]]  (Abnormal) Collected: 06/18/21 1119    Specimen: Urine, Clean Catch Updated: 06/18/21 1333     Protein/Creatinine Ratio, Urine 10,906.0 mg/G Crea      Creatinine, Urine 59.6 mg/dL      Total Protein, Urine 650.0 mg/dL     Urinalysis With Microscopic If Indicated (No Culture) - Urine, Clean Catch [[id number]]  (Abnormal) Collected: 06/18/21 1119    Specimen: Urine, Clean Catch Updated: 06/18/21 1205     Color, UA Yellow     Appearance, UA Clear     pH, UA 6.0     Specific Gravity, UA 1.014     Glucose,  mg/dL (Trace)     Ketones, UA Negative     Bilirubin, UA Negative     Blood, UA Small (1+)     Protein, UA >=300 mg/dL (3+)     Leuk Esterase, UA Negative     Nitrite, UA Negative     Urobilinogen, UA 0.2 E.U./dL    Urinalysis, Microscopic Only - Urine, Clean Catch [[id number]]  (Abnormal) Collected: 06/18/21 1119    Specimen: Urine, Clean Catch Updated: 06/18/21 1205     RBC, UA 0-2 /HPF      WBC, UA 6-12 /HPF      Bacteria, UA None Seen /HPF      Squamous Epithelial Cells, UA 0-2 /HPF      Hyaline Casts, UA None Seen /LPF      Methodology Automated  Microscopy    POC Glucose Once [[id number]]  (Normal) Collected: 06/18/21 1116    Specimen: Blood Updated: 06/18/21 1131     Glucose 123 mg/dL     Hepatitis B Surface Antigen [[id number]]  (Normal) Collected: 06/17/21 1740    Specimen: Blood Updated: 06/18/21 0809     Hepatitis B Surface Ag Non-Reactive    Troponin [[id number]]  (Abnormal) Collected: 06/18/21 0610    Specimen: Blood Updated: 06/18/21 0712     Troponin T 0.061 ng/mL     Narrative:      Troponin T Reference Range:  <= 0.03 ng/mL-   Negative for AMI  >0.03 ng/mL-     Abnormal for myocardial necrosis.  Clinicians would have to utilize clinical acumen, EKG, Troponin and serial changes to determine if it is an Acute Myocardial Infarction or myocardial injury due to an underlying chronic condition.       Results may be falsely decreased if patient taking Biotin.      TSH [[id number]]  (Normal) Collected: 06/18/21 0610    Specimen: Blood Updated: 06/18/21 0700     TSH 2.870 uIU/mL     T4, Free [[id number]]  (Normal) Collected: 06/18/21 0610    Specimen: Blood Updated: 06/18/21 0700     Free T4 1.05 ng/dL     Narrative:      Results may be falsely increased if patient taking Biotin.      Basic Metabolic Panel [[id number]]  (Abnormal) Collected: 06/18/21 0610    Specimen: Blood Updated: 06/18/21 0647     Glucose 108 mg/dL      BUN 82 mg/dL      Creatinine 12.56 mg/dL      Sodium 142 mmol/L      Potassium 4.1 mmol/L      Chloride 109 mmol/L      CO2 15.0 mmol/L      Calcium 7.2 mg/dL      eGFR  African Amer 4 mL/min/1.73      Comment: <15 Indicative of kidney failure.        eGFR Non African Amer 3 mL/min/1.73      Comment: <15 Indicative of kidney failure.        BUN/Creatinine Ratio 6.5     Anion Gap 18.0 mmol/L     Narrative:      GFR Normal >60  Chronic Kidney Disease <60  Kidney Failure <15       ECG/EMG Results (last 72 hours)     Procedure Component Value Units Date/Time    ECG 12 Lead [[id number]] Collected: 06/17/21 2033     Updated: 06/20/21 1456     QT  Interval 364 ms     Narrative:      HEART RATE= 113  bpm  RR Interval= 532  ms  TN Interval= 137  ms  P Horizontal Axis= -9  deg  P Front Axis= 62  deg  QRSD Interval= 88  ms  QT Interval= 364  ms  QRS Axis= 76  deg  T Wave Axis= 15  deg  - ABNORMAL ECG -  Sinus tachycardia  Prolonged QT interval  NO PRIOR TRACING AVAILABLE FOR COMPARISON  Electronically Signed By: Brittany Lay ([facility]) 20-Jun-2021 14:54:49  Date and Time of Study: 2021-06-17 20:33:41    Adult Transthoracic Echo Complete W/ Cont if Necessary Per Protocol [[id number]] Collected: 06/18/21 1402     Updated: 06/20/21 1611     BSA 2.3 m^2      IVSd 1.8 cm      LVIDd 5.6 cm      LVIDs 3.8 cm      LVPWd 1.3 cm      IVS/LVPW 1.4     FS 32.1 %      EDV(Teich) 153.7 ml      ESV(Teich) 62.0 ml      EF(Teich) 59.7 %      EDV(cubed) 175.6 ml      ESV(cubed) 54.9 ml      EF(cubed) 68.8 %      LV mass(C)d 402.4 grams      LV mass(C)dI 173.6 grams/m^2      SV(Teich) 91.7 ml      SI(Teich) 39.6 ml/m^2      SV(cubed) 120.7 ml      SI(cubed) 52.1 ml/m^2      Ao root diam 3.1 cm      Ao root area 7.5 cm^2      ACS 2.0 cm      asc Aorta Diam 3.2 cm      LVOT diam 2.2 cm      LVOT area 3.8 cm^2      LVOT area(traced) 3.8 cm^2      RVOT diam 2.4 cm      RVOT area 4.5 cm^2      LVLd ap4 9.0 cm      EDV(MOD-sp4) 139.0 ml      LVLs ap4 6.9 cm      ESV(MOD-sp4) 69.0 ml      EF(MOD-sp4) 50.4 %      LVLd ap2 9.8 cm      EDV(MOD-sp2) 179.0 ml      LVLs ap2 8.1 cm      ESV(MOD-sp2) 89.0 ml      EF(MOD-sp2) 50.3 %      SV(MOD-sp4) 70.0 ml      SI(MOD-sp4) 30.2 ml/m^2      SV(MOD-sp2) 90.0 ml      SI(MOD-sp2) 38.8 ml/m^2      Ao root area (BSA corrected) 1.3     LV Elder Vol (BSA corrected) 60.0 ml/m^2      LV Sys Vol (BSA corrected) 29.8 ml/m^2      EF(MOD-bp) 48.8 %      MV A dur 0.07 sec      MV E max eduardo 138.0 cm/sec      MV A max eduardo 64.0 cm/sec      MV E/A 2.2     MV V2 max 157.0 cm/sec      MV max PG 9.9 mmHg      MV V2 mean 89.4 cm/sec      MV mean PG 4.0 mmHg      MV  V2 VTI 33.9 cm      MVA(VTI) 2.2 cm^2      MV P1/2t max dandy 158.0 cm/sec      MV P1/2t 65.1 msec      MVA(P1/2t) 3.4 cm^2      MV dec slope 711.0 cm/sec^2      MV dec time 254 sec      Ao pk dandy 164.0 cm/sec      Ao max PG 10.8 mmHg      Ao max PG (full) 6.4 mmHg      Ao V2 mean 120.0 cm/sec      Ao mean PG 6.0 mmHg      Ao mean PG (full) 4.0 mmHg      Ao V2 VTI 31.0 cm      SUMAN(I,A) 2.4 cm^2      SUMAN(I,D) 2.4 cm^2      SUMAN(V,A) 2.4 cm^2      SUMAN(V,D) 2.4 cm^2      Ao acc time 0.1 sec      LV V1 max PG 4.3 mmHg      LV V1 mean PG 2.0 mmHg      LV V1 max 104.0 cm/sec      LV V1 mean 68.1 cm/sec      LV V1 VTI 19.6 cm      MR max dandy 528.0 cm/sec      MR max .5 mmHg      MR mean dandy 412.0 cm/sec      MR mean PG 75.0 mmHg      MR .0 cm      SV(Ao) 234.0 ml      SI(Ao) 100.9 ml/m^2      SV(LVOT) 74.5 ml      SV(RVOT) 67.4 ml      SI(LVOT) 32.1 ml/m^2      PA V2 max 110.0 cm/sec      PA max PG 4.8 mmHg      PA max PG (full) 3.1 mmHg      BH CV ECHO GEETHA - PVA(V,A) 2.7 cm^2      BH CV ECHO GEETHA - PVA(V,D) 2.7 cm^2      PA acc time 0.15 sec      RV V1 max PG 1.7 mmHg      RV V1 mean PG 1.0 mmHg      RV V1 max 65.9 cm/sec      RV V1 mean 43.5 cm/sec      RV V1 VTI 14.9 cm      TR max dandy 296.0 cm/sec      RVSP(TR) 50.0 mmHg      RAP systole 15.0 mmHg      PA pr(Accel) 12.4 mmHg      Pulm Sys Dandy 39.7 cm/sec      Pulm Elder Dandy 34.8 cm/sec      Pulm S/D 1.1     Qp/Qs 0.9     Pulm A Revs Dur 0.08 sec      Pulm A Revs Dandy 18.9 cm/sec      MVA P1/2T LCG 1.4 cm^2      RV Base 3.9 cm      RV Length 8.6 cm      RV Mid 3.3 cm      Lat Peak E' Dandy 8.3 cm/sec      Med Peak E' Dandy 6.9 cm/sec      RV S' 11.0 cm/sec       CV ECHO GEETHA - BZI_BMI 54.3 kilograms/m^2       CV ECHO GEETHA - BSA([facility]) 2.6 m^2       CV ECHO GEETHA - BZI_METRIC_WEIGHT 139.0 kg       CV ECHO GEETHA - BZI_METRIC_HEIGHT 160.0 cm      Avg E/e' ratio 18.16      CV VAS BP LEFT /90 mmHg      Sinus 2.7 cm      STJ 2.5 cm      Ascending aorta  3.2 cm      Aortic arch 1.7 cm      Dimensionless Index 0.60 (DI)      LA Volume Index 23.9 mL/m2      Abdo Ao Diam 3.5 cm      TAPSE (>1.6) 2.80 cm      Echo EF Estimated 49 %     Narrative:      · Calculated left ventricular EF = 48.8% Estimated left ventricular EF =   49% Estimated left ventricular EF was in agreement with the calculated   left ventricular EF. Left ventricular systolic function is mildly   decreased. The left ventricular cavity is moderately dilated. Left   ventricular wall thickness is consistent with moderate concentric   hypertrophy. All left ventricular wall segments contract normally. Left   ventricular diastolic function is consistent with (grade II w/high LAP)   pseudonormalization.  · The mitral valve is grossly normal in structure. Moderate to severe   mitral valve regurgitation is present  · Moderate tricuspid valve regurgitation is present. Calculated right   ventricular systolic pressure from tricuspid regurgitation is 50.0 mmHg.  · There is mild pulmonic valve regurgitation present.           Orders (last 72 hrs)      Start     Ordered    06/22/21 0900  iron sucrose (VENOFER) 200 mg in sodium chloride 0.9 % 100 mL IVPB  Once in Dialysis      06/22/21 0811    06/22/21 0811  albumin human 25 % IV SOLN 12.5 g  As Needed      06/22/21 0811    06/22/21 0600  CBC & Differential  Morning Draw      06/21/21 0751    06/22/21 0600  Renal Function Panel  Morning Draw      06/21/21 0751    06/22/21 0600  CBC Auto Differential  PROCEDURE ONCE      06/22/21 0005    06/22/21 0000  Hemodialysis Inpatient  Once     Comments: Heparin to lock catheter; albumin PRN to keep SBP > 100; give venofer 200 mg on dialysis    06/21/21 0751    06/21/21 2100  lisinopril (PRINIVIL,ZESTRIL) tablet 10 mg  Nightly      06/21/21 0744    06/21/21 0900  NIFEdipine XL (PROCARDIA XL) 24 hr tablet 60 mg  Every 24 Hours Scheduled      06/21/21 0744    06/21/21 0845  calcium acetate (PHOS BINDER)) capsule 667 mg  3 Times  Daily With Meals      06/21/21 0749    06/21/21 0600  Basic Metabolic Panel  Morning Draw      06/20/21 0818    06/21/21 0600  CBC (No Diff)  Morning Draw      06/20/21 0818    06/20/21 2050  POC Glucose Once  Once      06/20/21 2046    06/20/21 1633  POC Glucose Once  Once      06/20/21 1631    06/20/21 1621  Blood Culture - Blood, Arm, Right  Once      06/20/21 1620    06/20/21 1621  Blood Culture - Blood, Arm, Left  Once     Comments: From a different site than #1.      06/20/21 1620    06/20/21 1530  No needle sticks, IV's or blood drawl left arm to protect for future fistula creation for hemodialysis, please make sign for room  Nursing Communication  Once     Comments: No needle sticks, IV's or blood drawl left arm to protect for future fistula creation for hemodialysis, please make sign for room    06/20/21 1529    06/20/21 1141  POC Glucose Once  Once      06/20/21 1138    06/20/21 1000  cholecalciferol (VITAMIN D3) tablet 2,000 Units  Daily      06/20/21 0818    06/20/21 0819  Hemodialysis Inpatient  Once     Comments: Lock catheter with heparin    06/20/21 0818    06/20/21 0800  Hemodialysis Inpatient  Once,   Status:  Canceled     Comments: Lock catheter with heparin    06/19/21 0931    06/20/21 0633  POC Glucose Once  Once      06/20/21 0631    06/20/21 0600  Basic Metabolic Panel  Morning Draw      06/19/21 0957    06/20/21 0600  CBC (No Diff)  Morning Draw      06/19/21 0957    06/19/21 2046  POC Glucose Once  Once      06/19/21 2044    06/19/21 1608  POC Glucose Once  Once      06/19/21 1605    06/19/21 1400  ceFAZolin in dextrose (ANCEF) IVPB solution 2 g  Every 8 Hours,   Status:  Discontinued     Note to Pharmacy: Last dose of original order    06/19/21 1116    06/19/21 1200  Hemodialysis Inpatient  Once,   Status:  Canceled     Comments: Lock catheter with heparin    06/18/21 1659    06/19/21 0900  heparin (porcine) 5000 UNIT/ML injection 5,000 Units  Every 12 Hours Scheduled      06/18/21 1915     06/19/21 0900  allopurinol (ZYLOPRIM) tablet 100 mg  Daily      06/18/21 1943 06/19/21 0800  Ambulate Patient  2 Times Daily      06/18/21 1915 06/19/21 0800  Up in Chair  2 Times Daily      06/18/21 1915 06/19/21 0441  heparin (porcine) injection 4,000 Units  As Needed      06/19/21 0443 06/18/21 2200  hydrALAZINE (APRESOLINE) tablet 25 mg  Every 8 Hours Scheduled,   Status:  Discontinued      06/18/21 1946 06/18/21 1915  HYDROcodone-acetaminophen (NORCO) 5-325 MG per tablet 1 tablet  Every 4 Hours PRN      06/18/21 1915 06/18/21 0900  levothyroxine (SYNTHROID, LEVOTHROID) tablet 125 mcg  Daily      06/18/21 0213 06/18/21 0900  NIFEdipine XL (PROCARDIA XL) 24 hr tablet 120 mg  Every 24 Hours Scheduled,   Status:  Discontinued      06/18/21 0213 06/18/21 0800  insulin lispro (ADMELOG) injection 0-14 Units  4 Times Daily With Meals & Nightly      06/17/21 2354 06/18/21 0800  carvedilol (COREG) tablet 25 mg  2 Times Daily With Meals      06/18/21 0213 06/18/21 0211  labetalol (NORMODYNE,TRANDATE) injection 10 mg  Every 6 Hours PRN      06/18/21 0211 06/18/21 0000  Vital Signs  Every 4 Hours      06/17/21 2136 06/17/21 2200  Strict Intake & Output  Every Hour      06/17/21 2136 06/17/21 2200  POC Glucose 4x Daily AC & at Bedtime  4 Times Daily Before Meals & at Bedtime     Comments: If bedtime blood glucose is greater than 350 mg/dl, call MD.      06/17/21 2136 06/17/21 2138  sodium chloride 0.9 % flush 10 mL  Every 12 Hours Scheduled      06/17/21 2136 06/17/21 2137  Daily Weights  Daily      06/17/21 2136 06/17/21 2136  Intake & Output  Every Shift      06/17/21 2136 06/17/21 2135  ondansetron (ZOFRAN) injection 4 mg  Every 6 Hours PRN      06/17/21 2136 06/17/21 2135  nitroglycerin (NITROSTAT) SL tablet 0.4 mg  Every 5 Minutes PRN      06/17/21 2136 06/17/21 2135  dextrose (D50W) 25 g/ 50mL Intravenous Solution 25 g  Every 15 Minutes PRN      06/17/21 2136     21  glucagon (human recombinant) (GLUCAGEN DIAGNOSTIC) injection 1 mg  As Needed      21  sodium chloride 0.9 % flush 10 mL  As Needed      21  acetaminophen (TYLENOL) tablet 650 mg  Every 4 Hours PRN      21  acetaminophen (TYLENOL) 160 MG/5ML solution 650 mg  Every 4 Hours PRN      21  acetaminophen (TYLENOL) suppository 650 mg  Every 4 Hours PRN      21 213  dextrose (GLUTOSE) oral gel 15 g  Every 15 Minutes PRN      21 172  sodium chloride 0.9 % flush 10 mL  As Needed      21    Unscheduled  ECG 12 Lead  As Needed     Comments: Nurse to Release if Patient Expericences Acute Chest Pain or Dysrhythmias    21    Unscheduled  Potassium  As Needed     Comments: For Ventricular Arrhythmias      21    Unscheduled  Magnesium  As Needed     Comments: For Ventricular Arrhythmias      21    Unscheduled  Troponin  As Needed     Comments: For Chest Pain      21    Unscheduled  Blood Gas, Arterial -  As Needed     Comments: Per O2 PolicyNotify Physician      21    Unscheduled  Up With Assistance  As Needed      21    Unscheduled  Oxygen Therapy- Nasal Cannula; Titrate for SPO2: per policy  Continuous PRN      21 1736    --  Ergocalciferol (ERGOCAL PO)  3 Times Weekly      21    --  cefdinir (OMNICEF) 300 MG capsule  Daily      21    --  allopurinol (ZYLOPRIM) 300 MG tablet  Daily      21    --  levothyroxine (SYNTHROID, LEVOTHROID) 125 MCG tablet  Daily      21    --  SCANNED - TELEMETRY        21 0000                   Physician Progress Notes (last 7 days) (Notes from 06/15/21 1123 through 21 1123)      Ehsan Toledo MD at 21 1628              Name: Gisela Dyson ADMIT: 2021   : 1985  PCP: Cris  primary care provider on file.    MRN: [id number] LOS: 4 days   AGE/SEX: 36 y.o. female  ROOM: S413/1     Subjective   Subjective     Patient is lying on the bed in no major distress.  Denies nausea, vomiting abdominal pain, chest pain, shortness of breath.    Review of Systems   Constitutional: Negative.    Respiratory: Negative.    Cardiovascular: Negative.    Gastrointestinal: Negative.        Objective   Objective   Vital Signs  Temp:  [97.6 °F (36.4 °C)-97.8 °F (36.6 °C)] 97.7 °F (36.5 °C)  Heart Rate:  [66-80] 75  Resp:  [16-18] 16  BP: (109-139)/(65-81) 114/65  SpO2:  [96 %] 96 %  on   ;   Device (Oxygen Therapy): room air  Body mass index is 52.85 kg/m².  Physical Exam  Vitals and nursing note reviewed.   Constitutional:       General: She is not in acute distress.  HENT:      Head: Normocephalic and atraumatic.      Mouth/Throat:      Pharynx: Oropharynx is clear.   Eyes:      General: No scleral icterus.     Extraocular Movements: Extraocular movements intact.   Cardiovascular:      Rate and Rhythm: Normal rate and regular rhythm.      Pulses: Normal pulses.      Heart sounds: Normal heart sounds.   Pulmonary:      Effort: Pulmonary effort is normal.      Breath sounds: Normal breath sounds.   Abdominal:      General: Abdomen is flat. Bowel sounds are normal.      Palpations: Abdomen is soft.   Musculoskeletal:         General: Swelling present. Normal range of motion.      Cervical back: Normal range of motion and neck supple.   Skin:     General: Skin is warm and dry.      Capillary Refill: Capillary refill takes less than 2 seconds.   Neurological:      General: No focal deficit present.      Mental Status: She is alert and oriented to person, place, and time.   Psychiatric:         Mood and Affect: Mood normal.     Results Review     I reviewed the patient's new clinical results.  Results from last 7 days   Lab Units 06/21/21  1434 06/20/21  0816 06/19/21  0913 06/18/21  0610   WBC 10*3/mm3 8.69 9.33  8.42 10.80   HEMOGLOBIN g/dL 9.8* 9.6* 9.4* 9.7*   PLATELETS 10*3/mm3 216 249 226 258     Results from last 7 days   Lab Units 06/21/21  1434 06/20/21  0816 06/19/21  0913 06/18/21  0610   SODIUM mmol/L 137 137 138 142   POTASSIUM mmol/L 4.2 4.4 4.1 4.1   CHLORIDE mmol/L 102 104 103 109*   CO2 mmol/L 22.8 18.0* 20.1* 15.0*   BUN mg/dL 47* 64* 64* 82*   CREATININE mg/dL 8.63* 10.70* 10.18* 12.56*   GLUCOSE mg/dL 106* 114* 105* 108*   Estimated Creatinine Clearance: 12.4 mL/min (A) (by C-G formula based on SCr of 8.63 mg/dL (H)).  Results from last 7 days   Lab Units 06/19/21  0913 06/17/21  1740   ALBUMIN g/dL 2.40* 3.10*   BILIRUBIN mg/dL  --  0.3   ALK PHOS U/L  --  84   AST (SGOT) U/L  --  10   ALT (SGPT) U/L  --  15     Results from last 7 days   Lab Units 06/21/21  1434 06/20/21  0816 06/19/21  0913 06/18/21  0610 06/17/21  1740   CALCIUM mg/dL 7.5* 6.9* 7.1* 7.2* 7.3*   ALBUMIN g/dL  --   --  2.40*  --  3.10*   PHOSPHORUS mg/dL  --   --  7.2*  --   --        COVID19   Date Value Ref Range Status   06/17/2021 Not Detected Not Detected - Ref. Range Final     Glucose   Date/Time Value Ref Range Status   06/20/2021 2046 118 70 - 130 mg/dL Final   06/20/2021 1631 130 70 - 130 mg/dL Final   06/20/2021 1138 134 (H) 70 - 130 mg/dL Final   06/20/2021 0631 113 70 - 130 mg/dL Final   06/19/2021 2044 149 (H) 70 - 130 mg/dL Final   06/19/2021 1605 133 (H) 70 - 130 mg/dL Final   06/19/2021 1102 130 70 - 130 mg/dL Final       Adult Transthoracic Echo Complete W/ Cont if Necessary Per Protocol  · Calculated left ventricular EF = 48.8% Estimated left ventricular EF =   49% Estimated left ventricular EF was in agreement with the calculated   left ventricular EF. Left ventricular systolic function is mildly   decreased. The left ventricular cavity is moderately dilated. Left   ventricular wall thickness is consistent with moderate concentric   hypertrophy. All left ventricular wall segments contract normally. Left   ventricular  diastolic function is consistent with (grade II w/high LAP)   pseudonormalization.  · The mitral valve is grossly normal in structure. Moderate to severe   mitral valve regurgitation is present  · Moderate tricuspid valve regurgitation is present. Calculated right   ventricular systolic pressure from tricuspid regurgitation is 50.0 mmHg.  · There is mild pulmonic valve regurgitation present.       Scheduled Medications  allopurinol, 100 mg, Oral, Daily  calcium acetate, 667 mg, Oral, TID With Meals  carvedilol, 25 mg, Oral, BID With Meals  cholecalciferol, 2,000 Units, Oral, Daily  heparin (porcine), 5,000 Units, Subcutaneous, Q12H  hydrALAZINE, 25 mg, Oral, Q8H  insulin lispro, 0-14 Units, Subcutaneous, 4x Daily With Meals & Nightly  levothyroxine, 125 mcg, Oral, Daily  lisinopril, 10 mg, Oral, Nightly  NIFEdipine CC, 60 mg, Oral, Q24H  sodium chloride, 10 mL, Intravenous, Q12H    Infusions   Diet  Diet Regular; Consistent Carbohydrate, Renal, Cardiac      Assessment/Plan     Active Hospital Problems    Diagnosis  POA   • **ESRD on hemodialysis (CMS/[facility]) [N18.6, Z99.2]  Not Applicable   • Acute renal failure (CMS/[facility]) [N17.9]  Yes   • Primary hypothyroidism [E03.9]  Yes   • Nonrheumatic mitral valve regurgitation [I34.0]  Yes   • Essential hypertension [I10]  Yes   • Type 2 diabetes mellitus with renal complication (CMS/[facility]) [E11.29]  Yes   • Obesity, morbid, BMI 50 or higher (CMS/[facility]) [E66.01]  Yes   • Chronic diastolic congestive heart failure (CMS/[facility]) [I50.32]  Yes      Resolved Hospital Problems    Diagnosis Date Resolved POA   • CKD (chronic kidney disease) stage 3, GFR 30-59 ml/min (CMS/[facility]) [N18.30] 06/18/2021 Yes       36 y.o. female admitted with ESRD on hemodialysis (CMS/[facility]).    End-stage renal disease: Tunneled dialysis catheter placed right jugular 6/18/2021.  Vascular has discussed with patient and family that she will need to have fistula placement and is to follow-up with vascular in 2 to 3 weeks  "after discharge.  Hemodialysis initiated on 2021 and will need an outpatient dialysis spot and [facility] is following along.    Hypertension:  Blood pressure is better controlled and she is currently on Coreg, hydralazine, lisinopril, nifedipine.    Diabetes mellitus type 2 with renal complications:   Hemoglobin A1c 6 and blood sugars are reasonably well controlled and currently patient is on corrective dose insulin.  Blood sugars are reasonably well controlled.  Normally was taking Tresiba and Trulicity as an outpatient which is which are on hold for the moment.    Acute on chronic diastolic congestive heart failure:   Echo revealed ejection fraction of 48% and appears to be compensated.  Will continue with Coreg as well as lisinopril.    Elevated troponin:   Likely secondary to renal failure not secondary to MI.    Hypothyroidism:  Continue with Synthroid.    · Heparin SC for DVT prophylaxis.  · Full code.  · Discussed with patient and nursing staff.  · Anticipate discharge home with family timing yet to be determined.      Ehsan Toledo MD  [facility]  21  16:28 EDT        Electronically signed by Ehsan Toledo MD at 21 1631     Sigrid Conroy MD at 21 1052          Patient Name: Gisela Dyson  :1985  36 y.o.      No care team member to display    Interval History:   Troponins remain chronically elevated    Subjective:  Following for numerous medical issues.    Objective   Vital Signs  Temp:  [97.6 °F (36.4 °C)-97.8 °F (36.6 °C)] 97.7 °F (36.5 °C)  Heart Rate:  [66-80] 75  Resp:  [16-18] 18  BP: (109-139)/(74-81) 128/75    Intake/Output Summary (Last 24 hours) at 2021 1052  Last data filed at 2021 2335  Gross per 24 hour   Intake --   Output 2000 ml   Net -2000 ml     Flowsheet Rows      First Filed Value   Admission Height  160 cm (63\") Documented at 2021 2337   Admission Weight  (!) 141 kg (311 lb 6.4 oz) Documented at 2021 " 8089          Physical Exam:   General Appearance:    Alert, cooperative, in no acute distress   Lungs:     Clear to auscultation.  Normal respiratory effort and rate.      Heart:    Regular rhythm and normal rate, normal S1 and S2, no murmurs, gallops or rubs.     Chest Wall:    No abnormalities observed   Abdomen:     Soft, nontender, positive bowel sounds.     Extremities:   no cyanosis, clubbing or edema.  No marked joint deformities.  Adequate musculoskeletal strength.       Results Review:    Results from last 7 days   Lab Units 06/20/21  0816   SODIUM mmol/L 137   POTASSIUM mmol/L 4.4   CHLORIDE mmol/L 104   CO2 mmol/L 18.0*   BUN mg/dL 64*   CREATININE mg/dL 10.70*   GLUCOSE mg/dL 114*   CALCIUM mg/dL 6.9*     Results from last 7 days   Lab Units 06/18/21  0610 06/17/21  2354 06/17/21  1740   TROPONIN T ng/mL 0.061* 0.056* 0.061*     Results from last 7 days   Lab Units 06/20/21  0816   WBC 10*3/mm3 9.33   HEMOGLOBIN g/dL 9.6*   HEMATOCRIT % 29.9*   PLATELETS 10*3/mm3 249                         Medication Review:   allopurinol, 100 mg, Oral, Daily  calcium acetate, 667 mg, Oral, TID With Meals  carvedilol, 25 mg, Oral, BID With Meals  cholecalciferol, 2,000 Units, Oral, Daily  heparin (porcine), 5,000 Units, Subcutaneous, Q12H  hydrALAZINE, 25 mg, Oral, Q8H  insulin lispro, 0-14 Units, Subcutaneous, 4x Daily With Meals & Nightly  levothyroxine, 125 mcg, Oral, Daily  lisinopril, 10 mg, Oral, Nightly  NIFEdipine CC, 60 mg, Oral, Q24H  sodium chloride, 10 mL, Intravenous, Q12H              Assessment/Plan     1.  Acute on chronic diastolic heart failure.  Grade 2 diastolic dysfunction with moderate to severe mitral regurgitation.  2.  Severe pulmonary hypertension.  3.  Hypertension  4.  Elevated troponin.  5.  Chronic kidney disease.  Status post tunneled catheter.  6.  Morbid obesity  7.  Anemia  8.  Cardiomyopathy.  Ejection fraction of 49%.  9.  Moderate to severe mitral regurgitation    Heart rate and  "blood pressure controlled.  Continue current medications.  Looks like plans are for dialysis.  She will need a repeat echocardiogram once her volume status is stabilized.  She will probably need a transesophageal echocardiogram as an outpatient to evaluate her mitral regurgitation.  Nothing else to add at this point in time.  I will have her follow-up with Dr. Zayas or her nurse practitioner after discharge to determine further testing plans.    Sigrid Conroy MD, [facility]  06/21/21  10:52 EDT      Electronically signed by Sigrid Conroy MD at 06/21/21 1129     Omid Newell MD at 06/21/21 0763             LOS: 4 days    Patient Care Team:  Emery Silveira MD as PCP - General (Internal Medicine)    Chief Complaint:    Chief Complaint   Patient presents with   • Leg Swelling     Follow UP ESRD  Subjective     Interval History:   Dialyzed yesterday, 2L removed, harjit well  Remains reluctant to talk in general but father arrives and has many questions  She denies dyspnea or nausea    Objective     Vital Signs  Temp:  [97.6 °F (36.4 °C)-97.8 °F (36.6 °C)] 97.7 °F (36.5 °C)  Heart Rate:  [66-80] 80  Resp:  [16-18] 18  BP: (109-139)/(74-81) 139/81    Flowsheet Rows      First Filed Value   Admission Height  160 cm (63\") Documented at 06/17/2021 2337   Admission Weight  (!) 141 kg (311 lb 6.4 oz) Documented at 06/17/2021 2337          No intake/output data recorded.  I/O last 3 completed shifts:  In: 240 [P.O.:240]  Out: 2000 [Other:2000]    Intake/Output Summary (Last 24 hours) at 6/21/2021 0726  Last data filed at 6/20/2021 2335  Gross per 24 hour   Intake --   Output 2000 ml   Net -2000 ml       Physical Exam:  General Appearance: alert, comfortable, no distress  Neck supple no JVD, RIJ TDC  Lungs CTA bilat no rales  CV RRR no m/g  abd soft NT/ND  vasc 1+ BLE pedal/ankle edema 2+ radial pulses         Estimated Creatinine Clearance: 10 mL/min (A) (by C-G formula based on SCr of " 10.7 mg/dL (H)).    allopurinol, 100 mg, Oral, Daily  carvedilol, 25 mg, Oral, BID With Meals  cholecalciferol, 2,000 Units, Oral, Daily  heparin (porcine), 5,000 Units, Subcutaneous, Q12H  hydrALAZINE, 25 mg, Oral, Q8H  insulin lispro, 0-14 Units, Subcutaneous, 4x Daily With Meals & Nightly  levothyroxine, 125 mcg, Oral, Daily  NIFEdipine CC, 120 mg, Oral, Q24H  sodium chloride, 10 mL, Intravenous, Q12H             Assessment/Plan   -ESRD. Due to diabetic nephropathy.  HD initiated 6/18 via RIJ TDC placement, dialyzed again 6/20 harjit well.  Plan for outpatient AVF.  Though I encouraged consideration of transitional care unit for further education about transition to home therapy, patient/father uninterested at this time.  Labs today pending  -Volume overload - improved with UF; no dyspnea, less edema  -Metabolic acidosis  -Diabetes mellitus. Protein creatinine ratio - 10  -Hypertension - also improved with UF; on nifedipine 120 mg, factor re: periph edema  -Hypocalcemia. Vit D 10.7; D3 added; iPTH 291, can start calcitriol at dialysis center  -anemia of CKD - hb 9.6; can start ELSA at chronic unit; TAT low 13%, ferritin 105    Plan  - follow up labs  - HD tomorrow, venofer with treatment   - add phoslo   - add ACEi and halve CCB dose  - CCP for placement at dialysis center   - ok with discharge once dialysis finalized     Addendum: BP a bit overcorrected, above still applies but will DC hydralazine.      ESRD on hemodialysis (CMS/[facility])    Chronic diastolic congestive heart failure (CMS/[facility])    Obesity, morbid, BMI 50 or higher (CMS/[facility])    Type 2 diabetes mellitus with renal complication (CMS/[facility])    Essential hypertension    Nonrheumatic mitral valve regurgitation    Primary hypothyroidism    Acute renal failure (CMS/[facility])    Omid Newell MD  06/21/21  07:26 EDT      Electronically signed by Omid Newell MD at 06/21/21 2947     Elisabeth Zayas MD at 06/20/21 1288          [place]  [facility]  Progress note: 2021    Patient Identification:  Name:Gisela Dyson  Age:36 y.o.  Sex: female  :  1985  MRN: [id number]           CC:  Congestive heart failure, mitral vegetation, pulmonary hypertension    Interval history:  Blood pressure controlled and no significant shortness of breath.  Echocardiogram with normal systolic function, diastolic dysfunction and moderate severe mitral valve regurgitation.     Vital Signs:   Temp:  [97.6 °F (36.4 °C)-98.3 °F (36.8 °C)] 97.6 °F (36.4 °C)  Heart Rate:  [61-73] 66  Resp:  [16-18] 18  BP: (109-115)/(74-77) 112/74    Intake/Output Summary (Last 24 hours) at 2021 1559  Last data filed at 2021  Gross per 24 hour   Intake 240 ml   Output --   Net 240 ml       Physical Examination:    General Appearance No acute distress   Neck No adenopathy, supple, trachea midline, no thyromegaly, no carotid bruit   Lungs Clear to auscultation,respirations regular, even and unlabored   Heart Regular rhythm and normal rate, normal S1 and S2, 2/6 murmur, no gallop, no rub, no click   Chest wall No abnormalities observed   Abdomen Normal bowel sounds, no masses, no hepatomegaly, soft   Extremities Moves all extremities well, no edema, no cyanosis, no redness   Neurological Alert and oriented x 3     I personally viewed and interpreted the patient's EKG/Telemetry data    Assessment and Plan  1.  Acute on chronic diastolic heart failure.  Echo with normal systolic function grade 2 diastolic function and moderate severe mitral valve regurgitation with severe pulmonary hypertension.  The regurgitation is worse from 2019 but was felt to moderate at that point.  Hypertension likely playing a role in progression.  Discussed further evaluation with transesophageal echocardiogram versus repeat transthoracic imaging with better medical management of hypertension.  She really prefers the latter.  We will check blood cultures and if needed these are negative we will  plan on outpatient follow-up transthoracic imaging as long as she has no worsening shortness of breath  2.  Moderate severe mitral valve regurgitation.  As above we will check blood culture  3.  [place] hypertension.  RV systolic pressure slightly lower this echo.  As above needs better medical management of renal failure and hypertension.  4.  Acute on chronic renal failure.  Recommendations per nephrology.  To have dialysis today  5.  Hypertensive urgency with hypertensive heart disease.  Much better blood pressure control.  Continue current regimen  6.  Elevated troponin likely due to renal insufficiency and demand related stress  7.  Morbid obesity  8.  Chronic normocytic anem    Elisabeth Zayas  115:59 EDT  25min spent in reviewing records, discussion and examination of the patient and discussion with other members of the patient's medical team.     Dictated utilizing Dragon dictation    Electronically signed by Elisabeth Zayas MD at 21     Brian Mcelroy MD at 21 1010              Name: Gisela Dyson ADMIT: 2021   : 1985  PCP: Emery Silveira MD    MRN: [id number] LOS: 3 days   AGE/SEX: 36 y.o. female  ROOM: [facility]     Subjective   Subjective   No new problems noted last evening.  Patient denies chest pain shortness of air cough fever chills nausea vomiting    Review of Systems   Constitutional: Negative.    Respiratory: Negative.    Cardiovascular: Negative.    Gastrointestinal: Negative.        Objective   Objective   Vital Signs  Temp:  [96.5 °F (35.8 °C)-98.3 °F (36.8 °C)] 98.3 °F (36.8 °C)  Heart Rate:  [61-73] 70  Resp:  [16-20] 16  BP: (109-142)/(75-89) 115/76  SpO2:  [95 %-96 %] 95 %  on   ;   Device (Oxygen Therapy): room air  Body mass index is 54.73 kg/m².  Physical Exam  Vitals and nursing note reviewed.   Constitutional:       General: She is not in acute distress.  HENT:      Head: Normocephalic and atraumatic.      Mouth/Throat:      Pharynx: Oropharynx is clear.    Eyes:      General: No scleral icterus.     Extraocular Movements: Extraocular movements intact.   Cardiovascular:      Rate and Rhythm: Normal rate and regular rhythm.      Pulses: Normal pulses.      Heart sounds: Normal heart sounds.   Pulmonary:      Effort: Pulmonary effort is normal.      Breath sounds: Normal breath sounds.   Abdominal:      General: Abdomen is flat. Bowel sounds are normal.      Palpations: Abdomen is soft.   Musculoskeletal:         General: Swelling present. Normal range of motion.      Cervical back: Normal range of motion and neck supple.   Skin:     General: Skin is warm and dry.      Capillary Refill: Capillary refill takes less than 2 seconds.   Neurological:      General: No focal deficit present.      Mental Status: She is alert and oriented to person, place, and time.   Psychiatric:         Mood and Affect: Mood normal.     Results Review     I reviewed the patient's new clinical results.    Duplex Initial Vein Mapping Hemodialysis Access Bilateral CAR  · The right cephalic vein is patent and of adequate size in the upper arm.  · The left cephalic vein is patent and of adequate size in the upper arm.  · The left basilic vein is patent and of adequate size in the upper arm.     Scheduled Medications  allopurinol, 100 mg, Oral, Daily  carvedilol, 25 mg, Oral, BID With Meals  cholecalciferol, 2,000 Units, Oral, Daily  heparin (porcine), 5,000 Units, Subcutaneous, Q12H  hydrALAZINE, 25 mg, Oral, Q8H  insulin lispro, 0-14 Units, Subcutaneous, 4x Daily With Meals & Nightly  levothyroxine, 125 mcg, Oral, Daily  NIFEdipine CC, 120 mg, Oral, Q24H  sodium chloride, 10 mL, Intravenous, Q12H    Infusions   Diet  Diet Regular; Consistent Carbohydrate, Renal, Cardiac      Assessment/Plan     Active Hospital Problems    Diagnosis  POA   • **ESRD on hemodialysis (CMS/[facility]) [N18.6, Z99.2]  Not Applicable   • Acute renal failure (CMS/[facility]) [N17.9]  Yes   • Primary hypothyroidism [E03.9]  Yes   •  Nonrheumatic mitral valve regurgitation [I34.0]  Yes   • Essential hypertension [I10]  Yes   • Type 2 diabetes mellitus with renal complication (CMS/[facility]) [E11.29]  Yes   • Obesity, morbid, BMI 50 or higher (CMS/[facility]) [E66.01]  Yes   • Chronic diastolic congestive heart failure (CMS/[facility]) [I50.32]  Yes      Resolved Hospital Problems    Diagnosis Date Resolved POA   • CKD (chronic kidney disease) stage 3, GFR 30-59 ml/min (CMS/[facility]) [N18.30] 06/18/2021 Yes       36 y.o. female admitted with ESRD on hemodialysis (CMS/[facility]).    End-stage renal disease: Tunneled dialysis catheter placed right jugular 6/18/2021.  Vascular has discussed with patient and family that she will need to have fistula placement and is to follow-up with vascular in 2 to 3 weeks after discharge.  Hemodialysis initiated yesterday with metabolic acidosis and volume overload potassium remains normal.    Hypertension: Blood pressure control much improved over the past 24 to 48 hours.    Diabetes mellitus type 2 with renal complications: Hemoglobin A1c 6.0 blood sugars remain well controlled.  Patient was previously on Tresiba and Trulicity these have been held since admission and she is currently on moderate correctional dose.  Blood sugars in the past 24 hours ranged between 105 and 130.  We will continue to follow blood sugars closely and will likely have to adjust dose of her Trulicity prior to discharge.    Acute on chronic diastolic congestive heart failure: Cardiology following.  Echocardiogram pending.    Elevated troponin: Likely related to renal disease creased demand from hypertensive emergency.    Hypothyroidism: Continue current dose of Synthroid will check TSH.      · Heparin SC for DVT prophylaxis.  · Full code.  · Discussed with patient and nursing staff.  · Anticipate discharge home with family timing yet to be determined.      Brian Mcelroy MD  [facility]  06/20/21  10:10 EDT        Electronically signed by  Brian Mcelroy MD at 21 1021     Ancelmo Menendez MD at 21 0947          Name: Gisela Dyson ADMIT: 2021   : 1985  PCP: Emery Silveira MD    MRN: [id number] LOS: 3 days   AGE/SEX: 36 y.o. female  ROOM: [address]    [place]    Chief Complaint   Patient presents with   • Leg Swelling     CC: Did well with dialysis  Subjective     36 y.o. female with right jugular tunneled dialysis catheter and tolerated hemodialysis well without problems.    Review of Systems   Constitutional: Positive for fatigue.   All other systems reviewed and are negative.    Objective     Scheduled Medications:   allopurinol, 100 mg, Oral, Daily  carvedilol, 25 mg, Oral, BID With Meals  cholecalciferol, 2,000 Units, Oral, Daily  heparin (porcine), 5,000 Units, Subcutaneous, Q12H  hydrALAZINE, 25 mg, Oral, Q8H  insulin lispro, 0-14 Units, Subcutaneous, 4x Daily With Meals & Nightly  levothyroxine, 125 mcg, Oral, Daily  NIFEdipine CC, 120 mg, Oral, Q24H  sodium chloride, 10 mL, Intravenous, Q12H      Active Infusions:       As Needed Medications:  •  acetaminophen **OR** acetaminophen **OR** acetaminophen  •  dextrose  •  dextrose  •  glucagon (human recombinant)  •  heparin (porcine)  •  HYDROcodone-acetaminophen  •  labetalol  •  nitroglycerin  •  ondansetron  •  sodium chloride  •  sodium chloride    Vital Signs  Vital Signs Patient Vitals for the past 24 hrs:   BP Temp Temp src Pulse Resp SpO2   21 0941 -- -- -- 70 -- --   21 0722 115/76 98.3 °F (36.8 °C) Oral 61 16 95 %   21 0012 114/77 97.7 °F (36.5 °C) Oral 72 18 96 %   21 1959 109/75 97.9 °F (36.6 °C) Oral 73 18 96 %   21 1809 -- -- -- 70 -- --   21 1404 121/86 96.5 °F (35.8 °C) Oral -- 20 --   21 1349 142/89 -- -- 68 -- --       Physical Exam:  Physical Exam  Vitals reviewed.   Constitutional:       Appearance: She is morbidly obese.   Neck:      Comments: Right total catheter site  clean  Cardiovascular:      Rate and Rhythm: Normal rate and regular rhythm.      Pulses: Normal pulses.      Heart sounds: Normal heart sounds.   Pulmonary:      Effort: Pulmonary effort is normal.      Breath sounds: Normal breath sounds.   Abdominal:      General: Abdomen is flat. Bowel sounds are normal.      Palpations: Abdomen is soft.   Musculoskeletal:      Cervical back: Neck supple.   Neurological:      General: No focal deficit present.      Mental Status: She is alert.      Results Review:     Radiology(recent) XR Chest 1 View    Result Date: 6/18/2021  Central line placement. No pneumothorax. Cardiomegaly.  This report was finalized on 6/18/2021 6:19 PM by Dr. Emery Cook M.D.        Assessment/Plan     Assessment & Plan      ESRD on hemodialysis (CMS/HCC)    Chronic diastolic congestive heart failure (CMS/HCC)    Obesity, morbid, BMI 50 or higher (CMS/HCC)    Type 2 diabetes mellitus with renal complication (CMS/HCC)    Essential hypertension    Nonrheumatic mitral valve regurgitation    Primary hypothyroidism    Acute renal failure (CMS/HCC)      36 y.o. female doing well after tunneled dialysis catheter placement.  Discussed at length with patient and her father today at bedside need for future fistula creation.  I have given patient appointment in 2 to 3 weeks with my associate Dr. Luis Ag to discuss.  Patient is right-handed to likely proceed with left brachial artery cephalic vein fistula creation.  Discussed possible need for superficial cessation in this obese patient.  Patient's mother has been on dialysis and does have a kidney transplant and her and her father are very familiar with hemodialysis process.  Discussed need time for maturation.  Discussed importance of keeping the catheter clean and dry with no showers and no swimming pool until catheter is removed because of risk of infection and thrombosis risks of catheter.  Patient was not happy with this but understands.   Father is fully aware.  All questions answered.      Personal protective equipment used for this patient encounter:  Patient wearing surgical mask [x]    Provider wearing a surgical mask [x]    Gloves [x]    Eye protection [x]    Face Shield []    Gown []    N 95 respirator or CAPR/PAPR []   Duration of interaction 20 minutes    Ancelmo Menendez MD  21  09:48 EDT    Please call my office with any question: [phone]    Active Hospital Problems    Diagnosis  POA   • **ESRD on hemodialysis (CMS/[facility]) [N18.6, Z99.2]  Not Applicable   • Acute renal failure (CMS/[facility]) [N17.9]  Yes   • Primary hypothyroidism [E03.9]  Yes   • Nonrheumatic mitral valve regurgitation [I34.0]  Yes   • Essential hypertension [I10]  Yes   • Type 2 diabetes mellitus with renal complication (CMS/[facility]) [E11.29]  Yes   • Obesity, morbid, BMI 50 or higher (CMS/[facility]) [E66.01]  Yes   • Chronic diastolic congestive heart failure (CMS/[facility]) [I50.32]  Yes      Resolved Hospital Problems    Diagnosis Date Resolved POA   • CKD (chronic kidney disease) stage 3, GFR 30-59 ml/min (CMS/[facility]) [N18.30] 2021 Yes             Electronically signed by Ancelmo Menendez MD at 21 0950     Nilton Moran MD at 21 0814              [facility] Progress Note      Patient Name: Gisela Dyson  : 1985  MRN: [id number]  Primary Care Physician:  Emery Silveira MD  Date of admission: 2021    Subjective     Interval History:   No acute events.     Review of Systems:   Denies fever chills  Denies shortness of breath  Denies chest pain  Denies nausea vomiting  Denies skin rashes    Objective     Vitals:   Temp:  [96.5 °F (35.8 °C)-98.3 °F (36.8 °C)] 98.3 °F (36.8 °C)  Heart Rate:  [61-73] 61  Resp:  [16-20] 16  BP: (109-142)/(75-89) 115/76    Intake/Output Summary (Last 24 hours) at 2021 0814  Last data filed at 2021  Gross per 24 hour   Intake 240 ml   Output --   Net 240 ml        Physical Exam:    General Appearance: alert, oriented x 3, no acute distress   Skin: warm and dry  HEENT: oral mucosa normal, nonicteric sclera  Neck: supple, no JVD, right IJ tunnel catheter  Lungs: CTA  Heart: RRR, normal S1 and S2  Abdomen: soft, nontender, nondistended  : no palpable bladder  Extremities: Trace lower extremity edema, cyanosis or clubbing  Neuro: normal speech and mental status     Scheduled Meds:     allopurinol, 100 mg, Oral, Daily  carvedilol, 25 mg, Oral, BID With Meals  heparin (porcine), 5,000 Units, Subcutaneous, Q12H  hydrALAZINE, 25 mg, Oral, Q8H  insulin lispro, 0-14 Units, Subcutaneous, 4x Daily With Meals & Nightly  levothyroxine, 125 mcg, Oral, Daily  NIFEdipine CC, 120 mg, Oral, Q24H  sodium chloride, 10 mL, Intravenous, Q12H      IV Meds:          Estimated Creatinine Clearance: 10.7 mL/min (A) (by C-G formula based on SCr of 10.18 mg/dL (H)).      Assessment / Plan     ASSESSMENT:  -ESRD. Tunnel catheter has been placed.  -Volume overload  -Metabolic acidosis  -Diabetes mellitus. Protein creatinine ratio - 10  -Hypertension  -Hypocalcemia. Vit D 10.7    PLAN:  -Hemodialysis today, Ca bath to 3.0.  -Start Vit D 3    Thank you for involving us in the care of Gisela Dyson.  Please feel free to call with any questions.    Nilton Moran MD  21  08:14 EDT    [facility]  [phone]      Much of this encounter note is an electronic transcription/translation of spoken language to printed text. The electronic translation of spoken language may permit erroneous, or at times, nonsensical words or phrases to be inadvertently transcribed; Although I have reviewed the note for such errors, some may still exist.         Electronically signed by Nilton Moran MD at 21 0816     Brian Mcelroy MD at 21 8044              Name: Gisela Dyson ADMIT: 2021   : 1985  PCP: Emery Silveira MD    MRN:  [id number] LOS: 2 days   AGE/SEX: 36 y.o. female  ROOM: [facility]     Subjective   Subjective     Patient resting comfortably.  No new problems reported.    Review of Systems   Constitutional: Negative.    Respiratory: Negative.    Cardiovascular: Negative.        Objective   Objective   Vital Signs  Temp:  [96.5 °F (35.8 °C)-98.3 °F (36.8 °C)] 96.5 °F (35.8 °C)  Heart Rate:  [67-86] 68  Resp:  [16-20] 20  BP: (121-146)/(78-91) 121/86  SpO2:  [97 %-100 %] 98 %  on   ;   Device (Oxygen Therapy): room air  Body mass index is 54.73 kg/m².  Physical Exam  Vitals and nursing note reviewed.   HENT:      Head: Normocephalic and atraumatic.      Mouth/Throat:      Pharynx: Oropharynx is clear.   Eyes:      Extraocular Movements: Extraocular movements intact.   Cardiovascular:      Rate and Rhythm: Normal rate and regular rhythm.      Pulses: Normal pulses.      Heart sounds: Normal heart sounds.   Pulmonary:      Effort: Pulmonary effort is normal.      Breath sounds: Normal breath sounds.   Abdominal:      General: Abdomen is flat. Bowel sounds are normal.      Palpations: Abdomen is soft.   Musculoskeletal:         General: Normal range of motion.      Cervical back: Normal range of motion and neck supple.   Skin:     General: Skin is warm and dry.      Capillary Refill: Capillary refill takes less than 2 seconds.   Neurological:      General: No focal deficit present.      Mental Status: She is alert.   Psychiatric:         Mood and Affect: Mood normal.     Results Review     I reviewed the patient's new clinical results.    XR Chest 1 View  Narrative: XR CHEST 1 VW-     HISTORY: Female who is 36 years-old,  Central line placement     TECHNIQUE: Frontal view of the chest     COMPARISON: 06/17/2021     FINDINGS: Right-sided dual-lumen central venous catheter extends to the  superior vena cava. The heart is enlarged. Pulmonary vasculature is  unremarkable. No focal pulmonary consolidation, pleural effusion, or  pneumothorax.  No acute osseous process.     Impression: Central line placement. No pneumothorax. Cardiomegaly.     This report was finalized on 6/18/2021 6:19 PM by Dr. Emery Cook M.D.     IR PICC W Fluoro Surgery Only  This procedure was auto-finalized with no dictation required.    Infusions   Diet  Diet Regular; Consistent Carbohydrate, Renal, Cardiac      Assessment/Plan     Active Hospital Problems    Diagnosis  POA   • **ESRD on hemodialysis (CMS/[facility]) [N18.6, Z99.2]  Not Applicable   • Acute renal failure (CMS/[facility]) [N17.9]  Yes   • Primary hypothyroidism [E03.9]  Yes   • Nonrheumatic mitral valve regurgitation [I34.0]  Yes   • Essential hypertension [I10]  Yes   • Type 2 diabetes mellitus with renal complication (CMS/HCC) [E11.29]  Yes   • Obesity, morbid, BMI 50 or higher (CMS/[facility]) [E66.01]  Yes   • Chronic diastolic congestive heart failure (CMS/HCC) [I50.32]  Yes      Resolved Hospital Problems    Diagnosis Date Resolved POA   • CKD (chronic kidney disease) stage 3, GFR 30-59 ml/min (CMS/[facility]) [N18.30] 06/18/2021 Yes       36 y.o. female admitted with ESRD on hemodialysis (CMS/[facility]).     end-stage renal disease:  Hemodialysis catheter has been placed. Patient with volume overload and metabolic acidosis to begin hemodialysis later this afternoon.      Acute on chronic diastolic congestive heart  Failure.  Echocardiogram pending cardiology following.      Poorly-controlled hypertension:  Blood pressure  Control much improved after admission and continuation of her home medications.  Patient reports that she is compliant with her meds.      Diabetes mellitus type 2:  Oral diabetic meds on hold.  Continue Accu-Chek and moderate dose correctional insulin.    Hemoglobin A1c 6.0 reflecting adequate blood sugar control.     hypothyroidism:  Continue Synthroid.        Brian Mcelroy MD  [facility]  06/19/21  16:51 EDT        Electronically signed by Brian Mcelroy MD at 06/19/21 5890     Elisabeth Zayas  MD ANICETO at 21 1559          [facility]  Progress note: 2021    Patient Identification:  Name:Gisela Dyson  Age:36 y.o.  Sex: female  :  1985  MRN: [id number]           CC:  Heart failure, hypertension    Interval history:  Echo just completed results unavailable.  Blood pressure much improved.  No chest pain.  Complains of chest wall pain following catheter placed    Vital Signs:   Temp:  [96.5 °F (35.8 °C)-98.3 °F (36.8 °C)] 96.5 °F (35.8 °C)  Heart Rate:  [67-86] 68  Resp:  [16-20] 20  BP: (121-178)/() 121/86    Intake/Output Summary (Last 24 hours) at 2021 1600  Last data filed at 2021 0600  Gross per 24 hour   Intake 300 ml   Output 1000 ml   Net -700 ml       Physical Examination:    General Appearance No acute distress   Neck No adenopathy, supple, trachea midline, no thyromegaly, no carotid bruit, no JVD   Lungs Clear to auscultation,respirations regular, even and unlabored   Heart Regular rhythm and normal rate, normal S1 and S2, no murmur, no gallop, no rub, no click   Chest wall No abnormalities observed   Abdomen Normal bowel sounds, no masses, no hepatomegaly, soft   Extremities Moves all extremities well, no edema, no cyanosis, no redness   Neurological Alert and oriented x 3     Lab Review:  Personally reviewed the labs, radiology imaging and other cardiac procedures.     I personally viewed and interpreted the patient's EKG/Telemetry data    Assessment and Plan  1.  Acute on chronic diastolic heart failure mild, echo pending.  2.  Acute on chronic renal failure.  Recommendations per nephrology.  Plans for possible dialysis today  3.  Hypertensive urgency with hypertensive heart disease.  Much better blood pressure control.  Continue current regimen  4.  Elevated troponin likely due to renal insufficiency and demand related stress  5.  Morbid obesity  6.  Chronic normocytic anemia  7.  Moderate mitral regurgitation  8.  Medical noncompliance      Mini  Marquez  116:00 EDT  25min spent in reviewing records, discussion and examination of the patient and discussion with other members of the patient's medical team.     Dictated utilizing Dragon dictation    Electronically signed by Elisabeth Zayas MD at 21 1612     Nilton Moran MD at 21 0951              [facility] Progress Note      Patient Name: Gisela Dyson  : 1985  MRN: [id number]  Primary Care Physician:  Emery Silveira MD  Date of admission: 2021    Subjective     Interval History:   Tunneled catheter has been placed    Review of Systems:   Denies fever chills  Denies shortness of breath  Denies chest pain  Denies nausea vomiting  Denies skin rashes    Objective     Vitals:   Temp:  [97.6 °F (36.4 °C)-98.3 °F (36.8 °C)] 97.6 °F (36.4 °C)  Heart Rate:  [67-88] 67  Resp:  [16-20] 18  BP: (122-178)/() 133/81    Intake/Output Summary (Last 24 hours) at 2021 0951  Last data filed at 2021 0600  Gross per 24 hour   Intake 300 ml   Output 1000 ml   Net -700 ml       Physical Exam:    General Appearance: alert, oriented x 3, no acute distress   Skin: warm and dry  HEENT: oral mucosa normal, nonicteric sclera  Neck: supple, no JVD, right IJ tunnel catheter  Lungs: CTA  Heart: RRR, normal S1 and S2  Abdomen: soft, nontender, nondistended  : no palpable bladder  Extremities: Trace lower extremity edema, cyanosis or clubbing  Neuro: normal speech and mental status     Scheduled Meds:     allopurinol, 100 mg, Oral, Daily  carvedilol, 25 mg, Oral, BID With Meals  ceFAZolin, 2 g, Intravenous, Q8H  heparin (porcine), 5,000 Units, Subcutaneous, Q12H  hydrALAZINE, 25 mg, Oral, Q8H  insulin lispro, 0-14 Units, Subcutaneous, 4x Daily With Meals & Nightly  levothyroxine, 125 mcg, Oral, Daily  NIFEdipine CC, 120 mg, Oral, Q24H  sodium chloride, 10 mL, Intravenous, Q12H    IV Meds:         Assessment / Plan     ASSESSMENT:  -ESRD. Tunnel catheter  has been placed.  -Volume overload  -Metabolic acidosis  -Diabetes mellitus. Protein creatinine ratio - 10  -Hypertension  -Hypocalcemia    PLAN:  -Hemodialysis has been scheduled for today  -Check ionized calcium, vitamin D    Thank you for involving us in the care of Gisela Dyson.  Please feel free to call with any questions.    Nilton Moran MD  21  09:51 EDT    [facility]  [phone]      Much of this encounter note is an electronic transcription/translation of spoken language to printed text. The electronic translation of spoken language may permit erroneous, or at times, nonsensical words or phrases to be inadvertently transcribed; Although I have reviewed the note for such errors, some may still exist.       Electronically signed by Nilton Moran MD at 21 0956     Ancelmo Menendez MD at 21 0853          Name: Gisela Dyson ADMIT: 2021   : 1985  PCP: Emery Silveira MD    MRN: [id number] LOS: 2 days   AGE/SEX: 36 y.o. female  ROOM: [address]    [place] [employer]    Chief Complaint   Patient presents with   • Leg Swelling     CC: Feel better with dialysis  Subjective     36 y.o. female and had stent tunneled dialysis catheter placed yesterday.  Patient with acute on chronic renal sufficiency.  Plan arm vein mapping to assess future access needs.  Discussed with patient.  Plans per medicine and nephrology.  We will follow-up as outpatient for scheduling of fistula in future.    Review of Systems   Respiratory: Negative.    Cardiovascular: Negative.    Gastrointestinal: Negative.      Vital Signs  Vital Signs Patient Vitals for the past 24 hrs:   BP Temp Temp src Pulse Resp SpO2 Height Weight   21 0600 133/81 -- -- 67 18 -- -- --   21 0500 -- -- -- -- -- -- -- (!) 140 kg (308 lb 13.8 oz)   21 0006 135/87 97.6 °F (36.4 °C) Oral 74 18 98 % -- --   21 141/91 98.3 °F  "(36.8 °C) Oral 86 18 97 % -- --   06/18/21 1800 132/84 97.9 °F (36.6 °C) -- 78 16 97 % -- --   06/18/21 1755 131/80 -- -- 76 -- 97 % -- --   06/18/21 1750 133/78 -- -- 80 -- 97 % -- --   06/18/21 1745 146/91 -- -- 79 16 99 % -- --   06/18/21 1740 141/84 -- -- 78 16 100 % -- --   06/18/21 1737 141/90 97.8 °F (36.6 °C) Oral 76 16 100 % -- --   06/18/21 1642 -- -- -- 84 -- 97 % -- --   06/18/21 1637 (!) 178/102 -- -- 84 -- 98 % -- --   06/18/21 1636 -- -- -- 83 -- 98 % -- --   06/18/21 1635 -- -- -- 82 -- 98 % -- --   06/18/21 1633 -- -- -- 81 -- 98 % -- --   06/18/21 1632 -- -- -- 86 -- 97 % -- --   06/18/21 1631 -- -- -- 85 -- 98 % -- --   06/18/21 1630 -- -- -- 84 -- 98 % -- --   06/18/21 1628 -- -- -- 85 -- 99 % -- --   06/18/21 1627 -- -- -- 86 -- 99 % -- --   06/18/21 1626 -- -- -- 86 -- 100 % -- --   06/18/21 1625 -- 97.7 °F (36.5 °C) Oral 84 18 100 % -- --   06/18/21 1623 -- -- -- 85 -- 98 % -- --   06/18/21 1622 -- -- -- 85 -- 99 % -- --   06/18/21 1621 -- -- -- -- -- 99 % -- --   06/18/21 1620 -- -- -- -- -- 98 % -- --   06/18/21 1619 -- -- -- -- -- 98 % -- --   06/18/21 1556 -- -- -- 81 -- -- -- --   06/18/21 1555 -- -- -- 84 -- -- -- --   06/18/21 1554 -- -- -- 88 -- -- -- --   06/18/21 1545 -- -- -- 82 -- -- -- --   06/18/21 1536 -- -- -- 87 -- -- -- --   06/18/21 1535 -- -- -- 84 -- -- -- --   06/18/21 1533 -- -- -- 83 -- -- -- --   06/18/21 1532 -- -- -- 84 -- -- -- --   06/18/21 1531 -- -- -- 80 -- -- -- --   06/18/21 1530 -- -- -- 86 -- -- -- --   06/18/21 1528 -- -- -- 81 -- -- -- --   06/18/21 1527 -- -- -- 81 -- -- -- --   06/18/21 1519 -- -- -- 83 -- -- -- --   06/18/21 1518 -- -- -- 85 -- -- -- --   06/18/21 1517 -- -- -- 84 -- -- -- --   06/18/21 1516 135/82 98.2 °F (36.8 °C) Oral 84 18 -- -- --   06/18/21 1512 135/82 -- -- -- -- -- -- --   06/18/21 1403 122/90 -- -- 86 -- 99 % 160 cm (62.99\") (!) 139 kg (306 lb 7 oz)   06/18/21 1117 (!) 161/102 98 °F (36.7 °C) Oral 87 20 -- -- --   06/18/21 0929 " (!) 191/124 -- -- 101 -- -- -- --       Physical Exam:  Physical Exam  Vitals reviewed.   Neck:      Comments: Right tunneled dialysis catheter site clean with no hematoma  Cardiovascular:      Rate and Rhythm: Normal rate.      Pulses: Normal pulses.   Pulmonary:      Effort: Pulmonary effort is normal.   Abdominal:      General: Abdomen is flat. Bowel sounds are normal.      Palpations: Abdomen is soft.   Musculoskeletal:         General: Swelling (Mild bilaterally no change) present.      Cervical back: Neck supple.   Neurological:      Mental Status: She is alert.          Results Review:     Radiology(recent) XR Chest 2 View    Result Date: 6/17/2021  Mild cardiomegaly.  This report was finalized on 6/17/2021 6:31 PM by Dr. Nelson Flaherty M.D.      XR Chest 1 View    Result Date: 6/18/2021  Central line placement. No pneumothorax. Cardiomegaly.  This report was finalized on 6/18/2021 6:19 PM by Dr. Emery Cook M.D.      US Renal Bilateral    Result Date: 6/17/2021  Overall echogenic appearance to the kidneys, suggesting chronic medical renal disease. This is a new finding when compared to the patient's prior study from 11/07/2019.  This report was finalized on 6/17/2021 11:15 PM by Dr. Ping Quick M.D.      Assessment/Plan     Assessment & Plan      ESRD on hemodialysis (CMS/HCC)    Chronic diastolic congestive heart failure (CMS/HCC)    Obesity, morbid, BMI 50 or higher (CMS/[facility])    Type 2 diabetes mellitus with renal complication (CMS/[facility])    Essential hypertension    Nonrheumatic mitral valve regurgitation    Primary hypothyroidism    Acute renal failure (CMS/HCC)    36 y.o. female doing well after tunneled dialysis catheter and hemodialysis.  Check vein mapping today.  We will follow-up as outpatient in office for fistula creation in future.  Patient is right-handed.      Personal protective equipment used for this patient encounter:  Patient wearing surgical mask [x]    Provider wearing a  surgical mask [x]    Gloves [x]    Eye protection [x]    Face Shield []    Gown []    N 95 respirator or CAPR/PAPR []   Duration of interaction 10 minutes    Ancelmo Menendez MD  21  08:53 EDT    Please call my office with any question: [phone]    Active Hospital Problems    Diagnosis  POA   • **ESRD on hemodialysis (CMS/[facility]) [N18.6, Z99.2]  Not Applicable   • Acute renal failure (CMS/[facility]) [N17.9]  Yes   • Primary hypothyroidism [E03.9]  Yes   • Nonrheumatic mitral valve regurgitation [I34.0]  Yes   • Essential hypertension [I10]  Yes   • Type 2 diabetes mellitus with renal complication (CMS/[facility]) [E11.29]  Yes   • Obesity, morbid, BMI 50 or higher (CMS/[facility]) [E66.01]  Yes   • Chronic diastolic congestive heart failure (CMS/[facility]) [I50.32]  Yes      Resolved Hospital Problems    Diagnosis Date Resolved POA   • CKD (chronic kidney disease) stage 3, GFR 30-59 ml/min (CMS/[facility]) [N18.30] 2021 Yes           Vein mapping reviewed with bilateral upper extremity cephalic veins adequate for fistula creation.  We will get patient office appointment to see Dr. Luis Ag in 3 weeks to discuss fistula creation as outpatient.  We will see as needed.      Electronically signed by Ancelmo Menendez MD at 21 1658          Consult Notes  (Notes from 21 through 21)        Kamaljit Navarrete at 21 1039      Consult Orders    1. Inpatient Consult to Advance Care Planning [[id number]] ordered by Francisco Mcclure MD at 21 0100             Pt did not request Adv Care.  No action necessary.    Electronically signed by Kamaljit Navarrete at 21 1040     Ancelmo Menendez MD at 21 1554      Consult Orders    1. Inpatient Vascular Surgery Consult [[id number]] ordered by Omid Newell MD               Name: Gisela Dyson ADMIT: 2021   : 1985  PCP: Emery Silveira MD    MRN: [id number] LOS: 1 days   AGE/SEX: 36 y.o. female  ROOM[facility]  [place]      Patient Care Team:  Emery Silveira MD as PCP - General (Internal Medicine)  Chief Complaint   Patient presents with   • Leg Swelling     CC: Need catheter placed to start dialysis    Subjective     Consults  36-year-old female asked to see for urgent tunneled catheter placement to start dialysis.  Potassium 4.  Patient acidotic with bicarb 15.  Patient alert and oriented.  Patient is very reluctant to start hemodialysis.  She has never had vein mapping.  Discussed with her at length.  Discussed with nurse to talk with the nephrologist.  Patient is very young and recommended to her to proceed with tunneled dialysis catheter.  Renal failure is related to underlying diabetes hypertension and coronary disease.  Discussed with patient would need to have arm vein mapping and arm access in future.  She does have increased risk because of morbid obesity and BMI of 54.  All questions answered with patient.  She has had a negative Covid test yesterday.  We will plan to proceed with surgery today as she has been n.p.o.  Risks of surgery discussed.  Need to keep catheter clean and dry discussed.  She has a family member who is under gone hemodialysis and very familiar with it.  All questions answered.    Leg Swelling      Review of Systems   Cardiovascular: Positive for leg swelling.   All other systems reviewed and are negative.      bumetanide, 4 mg, Intravenous, Q8H  carvedilol, 25 mg, Oral, BID With Meals  ceFAZolin, 3 g, Intravenous, 30 Min Pre-Op  hydrALAZINE, 50 mg, Oral, Q8H  insulin lispro, 0-14 Units, Subcutaneous, 4x Daily With Meals & Nightly  levothyroxine, 125 mcg, Oral, Daily  NIFEdipine CC, 120 mg, Oral, Q24H  sodium chloride, 10 mL, Intravenous, Q12H         •  acetaminophen **OR** acetaminophen **OR** acetaminophen  •  dextrose  •  dextrose  •  glucagon (human recombinant)  •  labetalol  •  nitroglycerin  •  ondansetron  •  sodium chloride  •  sodium chloride  Patient has no known  "allergies.    Objective     Physical Exam:  Physical Exam  Vitals reviewed.   Constitutional:       Appearance: Normal appearance. She is morbidly obese.   Eyes:      Extraocular Movements: Extraocular movements intact.      Pupils: Pupils are equal, round, and reactive to light.   Cardiovascular:      Rate and Rhythm: Normal rate and regular rhythm.      Pulses: Normal pulses.      Heart sounds: Normal heart sounds.   Pulmonary:      Effort: Pulmonary effort is normal.      Breath sounds: Normal breath sounds.   Abdominal:      General: Abdomen is flat. Bowel sounds are normal.      Palpations: Abdomen is soft.   Musculoskeletal:         General: Swelling (Mild both legs) present.      Cervical back: Neck supple.   Skin:     General: Skin is warm and dry.      Capillary Refill: Capillary refill takes less than 2 seconds.   Neurological:      General: No focal deficit present.      Mental Status: She is alert and oriented to person, place, and time.      Cranial Nerves: No cranial nerve deficit.      Sensory: No sensory deficit.      Motor: No weakness.   Psychiatric:         Mood and Affect: Mood normal.         Behavior: Behavior normal.         Thought Content: Thought content normal.         Judgment: Judgment normal.          Vital Signs and Labs:  Vital Signs Patient Vitals for the past 24 hrs:   BP Temp Temp src Pulse Resp SpO2 Height Weight   06/18/21 1516 135/82 98.2 °F (36.8 °C) Oral 84 18 -- -- --   06/18/21 1403 122/90 -- -- 86 -- 99 % 160 cm (62.99\") (!) 139 kg (306 lb 7 oz)   06/18/21 1117 (!) 161/102 98 °F (36.7 °C) Oral 87 20 -- -- --   06/18/21 0929 (!) 191/124 -- -- 101 -- -- -- --   06/18/21 0740 (!) 192/123 98.2 °F (36.8 °C) Oral 103 20 98 % -- --   06/18/21 0645 -- -- -- -- -- -- -- (!) 139 kg (307 lb 8 oz)   06/18/21 0643 (!) 179/115 -- -- 101 -- -- -- --   06/18/21 0240 (!) 182/119 -- -- -- -- 94 % -- --   06/18/21 0159 (!) 200/132 -- -- -- -- 98 % -- --   06/17/21 9527 (!) 224/141 98.1 °F " "(36.7 °C) Oral 116 20 100 % 160 cm (63\") (!) 141 kg (311 lb 6.4 oz)   06/17/21 2300 (!) 228/135 -- -- 116 -- 98 % -- --   06/17/21 2249 (!) 204/133 -- -- 89 -- 94 % -- --   06/17/21 2130 (!) 213/132 -- -- 113 -- 98 % -- --   06/17/21 2108 (!) 219/138 -- -- 112 -- 98 % -- --   06/17/21 2000 (!) 219/159 -- -- 110 -- 100 % -- --   06/17/21 1953 (!) 140/125 -- -- -- -- -- -- --   06/17/21 1749 (!) 198/100 -- -- -- -- -- -- --   06/17/21 1728 -- 98.6 °F (37 °C) Tympanic 108 20 100 % -- --       Radiology(recent) XR Chest 2 View    Result Date: 6/17/2021  Mild cardiomegaly.  This report was finalized on 6/17/2021 6:31 PM by Dr. Nelson Flaherty M.D.      US Renal Bilateral    Result Date: 6/17/2021  Overall echogenic appearance to the kidneys, suggesting chronic medical renal disease. This is a new finding when compared to the patient's prior study from 11/07/2019.  This report was finalized on 6/17/2021 11:15 PM by Dr. Ping Quick M.D.        Active Hospital Problems    Diagnosis  POA   • **Acute renal failure (CMS/HCC) [N17.9]  Yes   • Primary hypothyroidism [E03.9]  Yes   • CKD (chronic kidney disease) stage 3, GFR 30-59 ml/min (CMS/HCC) [N18.30]  Yes   • Nonrheumatic mitral valve regurgitation [I34.0]  Yes   • Essential hypertension [I10]  Yes   • Type 2 diabetes mellitus with renal complication (CMS/HCC) [E11.29]  Yes   • Obesity, morbid, BMI 50 or higher (CMS/HCC) [E66.01]  Yes   • Chronic diastolic congestive heart failure (CMS/HCC) [I50.32]  Yes      Resolved Hospital Problems   No resolved problems to display.     Problem Points:  4:  Patient has a new problem, with additional work-up planned  Total problem points:4 or more    Data Points:  1:  I have reviewed or order clinical lab test  1:  I have reviewed or order radiology test (except heart catheterization or echo)  2:  I have personally and independently review of image, tracing, or specimen  1:  I have personally decided to obtain of records  2:  I " have reviewed and summation of old records and/or discussed the patients care with another health care provider  Total data points:4 or more    Risk Points:  High:  Any illness that poses threat to life or body funciton    MDM requires 2/3 (Problem points, Data points and Risk)  MDM Prob point Data point Risk   SF 1 1 Minimal   Low 2 2 Low   Mod 3 3 Moderate   High 4 4 High     Code requires 3/3 (MDM, History and Exam)  Code MDM History Exam Time   41495 SF/Low Detailed Detailed 30   45418 Mod Comprehensive Comprehensive 50   83033 High Comprehensive Comprehensive 70     Detailed history:  4 elements HPI or status of 3 chronic problems; 2-9 system ROS  Comprehensive:  4 elements HPI or status of 3 chronic problems;  10 system ROS    Detailed Exam:  12 findings from any organ system  Comprehensive Exam:  2 findings from each of 9 systems.   73988    Assessment/Plan       Acute renal failure (CMS/HCC)    Chronic diastolic congestive heart failure (CMS/[facility])    Obesity, morbid, BMI 50 or higher (CMS/[facility])    Type 2 diabetes mellitus with renal complication (CMS/[facility])    Essential hypertension    Nonrheumatic mitral valve regurgitation    CKD (chronic kidney disease) stage 3, GFR 30-59 ml/min (CMS/[facility])    Primary hypothyroidism      36 y.o. female with acute on chronic renal failure for tunneled dialysis catheter today.  Plan future arm access vein mapping.  See discussion above in this note.    Personal protective equipment used for this patient encounter:  Patient wearing surgical mask [x]    Provider wearing a surgical mask [x]    Gloves [x]    Eye protection [x]    Face Shield []    Gown []    N 95 respirator or CAPR/PAPR []   Duration of interaction 25 minutes    I discussed the patients findings and my recommendations with patient, nursing staff and consulting provider.    Ancelmo Menendez MD  06/18/21  15:54 EDT    Please call my office with any question: [phone]              Electronically signed by  Ancelmo Menendez MD at 06/18/21 5029

## 2021-06-22 NOTE — PLAN OF CARE
Goal Outcome Evaluation:  Plan of Care Reviewed With: patient        Progress: no change  Outcome Summary: Pt restful overnight.  Pt continues to refuse assessments and tx, refused BGL, heparin, morning labs and most vitals. Pt educated on reasoning behind aforementioned items, however continued to decline.  Pt A&Ox4.  Will continue to monitor and assist pt as needed.

## 2021-06-23 ENCOUNTER — READMISSION MANAGEMENT (OUTPATIENT)
Dept: CALL CENTER | Facility: HOSPITAL | Age: 36
End: 2021-06-23

## 2021-06-23 NOTE — PAYOR COMM NOTE
"Gisela Ulrich (36 y.o. Female)     ATTN:  DISCHARGE SUMMARY TO REVIEW:  [id number]    PLEASE REPLY WITH APPROVED DAYS TO UR DEPT: JUANITO RODRIGUEZ RN,CCP:    [phone],  [phone]          Date of Birth Social Security Number Address Home Phone MRN    1985  [address] [place] [id number] [phone] [id number]    M[employer]on Marital Status          None Single       Admission Date Admission Type Admitting Provider Attending Provider Department, Room/Bed    6/17/21 Emergency Francisco Mcclure MD  [address], S413/1    Discharge Date Discharge Disposition Discharge Destination        6/22/2021 Home or Self Care              Attending Provider: (none)   Allergies: No Known Allergies    Isolation: None   Infection: None   Code Status: Prior    Ht: 160 cm (62.99\")   Wt: 136 kg (299 lb 7.9 oz)    Admission Cmt: None   Principal Problem: ESRD on hemodialysis (CMS/[facility]) [N18.6,Z99.2]                 Active Insurance as of 6/17/2021     Primary Coverage     Payor Plan Insurance Group Employer/Plan Group    ANTHEM BLUE CROSS ANTHEM BLUE CROSS BLUE SHIELD PPO [id number]     Payor Plan Address Payor Plan Phone Number Payor Plan Fax Number Effective Dates    [address] [phone]  1/1/2021 - None Entered    [place] [address]       Subscriber Name Subscriber Birth Date Member ID       GISELA ULRICH 1985 [id number]                 Emergency Contacts      (Rel.) Home Phone Work Phone Mobile Phone    FILOMENA ULRICH (KEVIN) (Father) [phone] -- [phone]               Operative/Procedure Notes (all)      Ancelmo Menendez MD at 06/18/21 1713  Version 1 of 1              Gisela Ulrich  Admission date: 6/17/2021  Date of operation: 6/18/2021    Location: [facility]    Pre-op Diagnosis:   Acute on chronic renal failure, ESRD on hemodialysis    Post-Op Diagnosis Codes:  Same    Procedure performed: Ultrasound-guided access right jugular vein, " 23 cm tunneled dialysis palindrome catheter placement    Surgeon: Dr. Ancelmo Menendez    Assistants:  None, Provided critical assistance in exposure, retraction, and suction that overall decrease blood loss and operative time.    Anesthesia: Monitored Anesthesia Care with [facility]    Staff:   Circulator: Aliya Herrera RN  Radiology Technologist: Omid Valenzuela Person: Majo Restrepo    Indications: Pleasant female starting dialysis per nephrology.  She has had no previous procedures.  Plan tunneled dialysis catheter and dialysis later on tonight per nephrology orders.  Patient needs arm vein mapping in future fistula access.  Risk discussed with patient and her father and agreed to proceed.       Procedure Details right neck prepped and draped in usual fashion.  1% Xylocaine with Marcaine used local anesthesia.  Under ultrasound guidance right jugular vein percutaneously accessed without problems.  Wire advanced to the inferior vena cava without difficulty.  23 cm tunneled catheter placed from the anterior chest wall to the neck incision.  Dilator and peel-away sheath placed over the wire.  The dilator and wire removed.  This palindrome catheter placed through the peel-away sheath.  The peel-away sheath removed in its entirety.  Tip of the palindrome catheter placed at the right atrium superior vena cava junction.  There was normal contour at the neck.  The catheter was flushed with heparin saline.  It was flushed with high concentration heparin and capped.  Catheter secured to the skin with nylon suture.  Neck incision closed with subcuticular Vicryl stitch.  Glue applied.  Biopatch and dressing applied to exit site of catheter on chest wall.  Patient tolerated procedure well and taken to recovery in stable condition.    Radiographic interpretation: Normal contour catheter at neck with tip of right atrium superior vena cava junction    Findings: See above    Estimated Blood Loss:  minimal    Specimens: * No orders in the log *      Drains: * No LDAs found *    Complications: None    Condition: stable    Disposition: To recovery room    Ancelmo Menendez MD     Date: 2021  Time: 17:45 EDT    Active Hospital Problems    Diagnosis  POA   • **Acute renal failure (CMS/HCC) [N17.9]  Yes   • Primary hypothyroidism [E03.9]  Yes   • CKD (chronic kidney disease) stage 3, GFR 30-59 ml/min (CMS/HCC) [N18.30]  Yes   • Nonrheumatic mitral valve regurgitation [I34.0]  Yes   • Essential hypertension [I10]  Yes   • Type 2 diabetes mellitus with renal complication (CMS/HCC) [E11.29]  Yes   • Obesity, morbid, BMI 50 or higher (CMS/HCC) [E66.01]  Yes   • Chronic diastolic congestive heart failure (CMS/HCC) [I50.32]  Yes      Resolved Hospital Problems   No resolved problems to display.       Electronically signed by Ancelmo Menendez MD at 21 5614          Discharge Summary      Ehsan Toledo MD at 21 1241              Patient Name: Gisela Dyson  : 1985  MRN: [id number]    Date of Admission: 2021  Date of Discharge:  2021  Primary Care Physician: No primary care provider on file.      Chief Complaint:   Leg Swelling      Discharge Diagnoses     Active Hospital Problems    Diagnosis  POA   • **ESRD on hemodialysis (CMS/HCC) [N18.6, Z99.2]  Not Applicable   • Acute renal failure (CMS/HCC) [N17.9]  Yes   • Primary hypothyroidism [E03.9]  Yes   • Nonrheumatic mitral valve regurgitation [I34.0]  Yes   • Essential hypertension [I10]  Yes   • Type 2 diabetes mellitus with renal complication (CMS/HCC) [E11.29]  Yes   • Obesity, morbid, BMI 50 or higher (CMS/HCC) [E66.01]  Yes   • Chronic diastolic congestive heart failure (CMS/HCC) [I50.32]  Yes      Resolved Hospital Problems    Diagnosis Date Resolved POA   • CKD (chronic kidney disease) stage 3, GFR 30-59 ml/min (CMS/HCC) [N18.30] 2021 Yes        Hospital Course     Ms. Dyson is a 36 y.o. non-smoker with a history  of type 2 diabetes, hypothyroidism, morbid obesity, essential hypertension, CKD stage III, chronic diastolic CHF, and nonrheumatic mitral valve regurgitation that presents to [facility] complaining of bilateral leg swelling and shortness of breath.  Patient reports that she was admitted back in 2019 for the same complaints.  Patient reports shortness of breath with exertion intermittently since her diagnosis in 2019.  She states that she works at [employer] and is on her feet constantly.  She denies any chest pain or dizziness.  Work-up in the emergency department revealed a creat of 12.67.  Patient reports he was prescribed Lasix upon discharge and has not taken the medication since 2019 due to dizziness upon taking the medication.  She reports that she occasionally takes over-the-counter Motrin but denies any other daily NSAID use.  Chest x-ray shows mild cardiomegaly.  Patient has been kept for further evaluation and treatment.    End-stage renal disease: Tunneled dialysis catheter placed right jugular 6/18/2021.  Vascular has discussed with patient and family that she will need to have fistula placement and is to follow-up with vascular in 2 to 3 weeks after discharge.  Hemodialysis initiated on 06/19/2021 and will need an outpatient dialysis spot and CCP is following along.  Patient is undergoing dialysis today prior to discharge and she will be on Thursday and Saturday this weekend starting next week she will be on Monday Wednesday and Friday dialysis at 11 AM.  Nephrology did clear her to be discharged.     Hypertension: Blood pressure is reasonably well controlled on Coreg and lisinopril.     Diabetes mellitus type 2 with renal complications:   Hemoglobin A1c 6 and blood sugars are reasonably well controlled and currently patient is on corrective dose insulin.  Blood sugars are reasonably well controlled.  Normally was taking Tresiba and Trulicity as an outpatient which have been discontinued  given that blood sugars are ranging in 100s.  Have advised patient to keep a log of blood sugars at home so the necessary adjustments could be made as an outpatient basis.     Acute on chronic diastolic congestive heart failure:   Echo revealed ejection fraction of 48% and appears to be compensated.  Will continue with Coreg as well as lisinopril.  Cardiology did evaluate her during this hospital stay and recommended outpatient follow-up and she may need LUPE to further assess her mitral valve.     Elevated troponin:   Likely secondary to renal failure not secondary to MI.     Hypothyroidism:  Continue with Synthroid.    Day of Discharge         Physical Exam:  Temp:  [97.3 °F (36.3 °C)-98.3 °F (36.8 °C)] 97.3 °F (36.3 °C)  Heart Rate:  [73-81] 73  Resp:  [16-18] 16  BP: (108-137)/(65-79) 137/74  Body mass index is 53.07 kg/m².  Physical Exam  Constitutional:       General: She is not in acute distress.  HENT:      Head: Normocephalic and atraumatic.      Mouth/Throat:      Pharynx: Oropharynx is clear.   Eyes:      General: No scleral icterus.     Extraocular Movements: Extraocular movements intact.   Cardiovascular:      Rate and Rhythm: Normal rate and regular rhythm.      Pulses: Normal pulses.      Heart sounds: Normal heart sounds.   Pulmonary:      Effort: Pulmonary effort is normal.      Breath sounds: Normal breath sounds.   Abdominal:      General: Abdomen is flat. Bowel sounds are normal.      Palpations: Abdomen is soft.   Musculoskeletal:         General:  Normal range of motion.      Cervical back: Normal range of motion and neck supple.   Skin:     General: Skin is warm and dry.      Capillary Refill: Capillary refill takes less than 2 seconds.   Neurological:      General: No focal deficit present.      Mental Status: She is alert and oriented to person, place, and time.   Psychiatric:         Mood and Affect: Mood normal.       Consultants     Consult Orders (all) (From admission, onward)     Start      Ordered    06/18/21 1058  Inpatient Case Management  Consult  Once     Provider:  (Not yet assigned)    06/18/21 1057    06/18/21 1042  Inpatient Vascular Surgery Consult  STAT     Specialty:  Vascular Surgery  Provider:  Ancelmo Menendez MD    06/18/21 1043    06/18/21 0328  Inpatient Cardiology Consult  Once     Specialty:  Cardiology  Provider:  Reji Rudd MD    06/18/21 0327    06/18/21 0213  Inpatient Nephrology Consult  Once     Specialty:  Nephrology  Provider:  Mitch Salazar MD    06/18/21 0212    06/18/21 0100  Inpatient Consult to Advance Care Planning  Once     Provider:  (Not yet assigned)    06/18/21 0100    06/17/21 2136  Inpatient Nephrology Consult  Once,   Status:  Canceled     Specialty:  Nephrology  Provider:  Hayden Hunter MD    06/17/21 2136 06/17/21 2108  [facility] (on-call MD unless specified) Details  Once     Specialty:  Hospitalist  Provider:  (Not yet assigned)    06/17/21 2107              Procedures     TUNNEL DIALYSIS, PALINDROME CATH      Imaging Results (All)     Procedure Component Value Units Date/Time    XR Chest 1 View [[id number]] Collected: 06/18/21 1818     Updated: 06/18/21 1822    Narrative:      XR CHEST 1 VW-     HISTORY: Female who is 36 years-old,  Central line placement     TECHNIQUE: Frontal view of the chest     COMPARISON: 06/17/2021     FINDINGS: Right-sided dual-lumen central venous catheter extends to the  superior vena cava. The heart is enlarged. Pulmonary vasculature is  unremarkable. No focal pulmonary consolidation, pleural effusion, or  pneumothorax. No acute osseous process.       Impression:      Central line placement. No pneumothorax. Cardiomegaly.     This report was finalized on 6/18/2021 6:19 PM by Dr. Emery Cook M.D.        PICC W Fluoro Surgery Only [[id number]] Resulted: 06/18/21 1732     Updated: 06/18/21 1732    Narrative:      This procedure was auto-finalized with no dictation required.     US Renal Bilateral [[id number]] Collected: 06/17/21 2313     Updated: 06/17/21 2318    Narrative:      RENAL ULTRASOUND     HISTORY: Acute kidney injury     COMPARISON: 11/07/2019     TECHNIQUE: Grayscale and color Doppler sonographic images were obtained  through the kidneys and bladder.     FINDINGS:  Right kidney measures 10.2 x 4.3 x 4.7 cm. Left kidney measures 9.3 x  5.8 x 4.9 cm. No hydronephrosis is seen on either side. I think both  kidneys appear echogenic, suggesting chronic medical renal disease.  Urinary bladder cannot be seen, as the patient recently voided.       Impression:      Overall echogenic appearance to the kidneys, suggesting chronic medical  renal disease. This is a new finding when compared to the patient's  prior study from 11/07/2019.      This report was finalized on 6/17/2021 11:15 PM by Dr. Ping Quick M.D.       XR Chest 2 View [[id number]] Collected: 06/17/21 1830     Updated: 06/17/21 1834    Narrative:      XR CHEST 2 VW-  06/17/2021     HISTORY: Shortness of breath.     Heart size is mildly enlarged. Lungs appear free of acute infiltrates.  Bones and soft tissues are unremarkable.       Impression:      Mild cardiomegaly.     This report was finalized on 6/17/2021 6:31 PM by Dr. Nelson Flaherty M.D.           Results for orders placed during the hospital encounter of 06/17/21    Duplex Initial Vein Mapping Hemodialysis Access Bilateral CAR    Interpretation Summary  · The right cephalic vein is patent and of adequate size in the upper arm.  · The left cephalic vein is patent and of adequate size in the upper arm.  · The left basilic vein is patent and of adequate size in the upper arm.    Results for orders placed during the hospital encounter of 06/17/21    Adult Transthoracic Echo Complete W/ Cont if Necessary Per Protocol    Interpretation Summary  · Calculated left ventricular EF = 48.8% Estimated left ventricular EF = 49% Estimated left ventricular EF was in agreement  with the calculated left ventricular EF. Left ventricular systolic function is mildly decreased. The left ventricular cavity is moderately dilated. Left ventricular wall thickness is consistent with moderate concentric hypertrophy. All left ventricular wall segments contract normally. Left ventricular diastolic function is consistent with (grade II w/high LAP) pseudonormalization.  · The mitral valve is grossly normal in structure. Moderate to severe mitral valve regurgitation is present  · Moderate tricuspid valve regurgitation is present. Calculated right ventricular systolic pressure from tricuspid regurgitation is 50.0 mmHg.  · There is mild pulmonic valve regurgitation present.    Pertinent Labs     Results from last 7 days   Lab Units 06/21/21  1434 06/20/21  0816 06/19/21  0913 06/18/21  0610   WBC 10*3/mm3 8.69 9.33 8.42 10.80   HEMOGLOBIN g/dL 9.8* 9.6* 9.4* 9.7*   PLATELETS 10*3/mm3 216 249 226 258     Results from last 7 days   Lab Units 06/21/21  1434 06/20/21  0816 06/19/21  0913 06/18/21  0610   SODIUM mmol/L 137 137 138 142   POTASSIUM mmol/L 4.2 4.4 4.1 4.1   CHLORIDE mmol/L 102 104 103 109*   CO2 mmol/L 22.8 18.0* 20.1* 15.0*   BUN mg/dL 47* 64* 64* 82*   CREATININE mg/dL 8.63* 10.70* 10.18* 12.56*   GLUCOSE mg/dL 106* 114* 105* 108*   Estimated Creatinine Clearance: 12.4 mL/min (A) (by C-G formula based on SCr of 8.63 mg/dL (H)).  Results from last 7 days   Lab Units 06/19/21  0913 06/17/21  1740   ALBUMIN g/dL 2.40* 3.10*   BILIRUBIN mg/dL  --  0.3   ALK PHOS U/L  --  84   AST (SGOT) U/L  --  10   ALT (SGPT) U/L  --  15     Results from last 7 days   Lab Units 06/21/21  1434 06/20/21  0816 06/19/21  0913 06/18/21  0610 06/17/21  1740   CALCIUM mg/dL 7.5* 6.9* 7.1* 7.2* 7.3*   ALBUMIN g/dL  --   --  2.40*  --  3.10*   PHOSPHORUS mg/dL  --   --  7.2*  --   --        Results from last 7 days   Lab Units 06/18/21  0610 06/17/21  2354 06/17/21  1740   TROPONIN T ng/mL 0.061* 0.056* 0.061*   PROBNP  "pg/mL  --   --  33,099.0*     Results from last 7 days   Lab Units 06/18/21  1119   CREATININE UR mg/dL 59.6   PROTEIN TOTAL URINE mg/dL 650.0   PROT/CREAT RATIO UR mg/G Crea 10,906.0*         Invalid input(s): LDLCALC  Results from last 7 days   Lab Units 06/20/21  1902 06/20/21  1857   BLOODCX  No growth at 24 hours No growth at 24 hours     Results from last 7 days   Lab Units 06/17/21  2117   COVID19  Not Detected       Test Results Pending at Discharge     Pending Labs     Order Current Status    Blood Culture - Blood, Arm, Left Preliminary result    Blood Culture - Blood, Arm, Right Preliminary result          Discharge Details        Discharge Medications      New Medications      Instructions Start Date   lisinopril 10 MG tablet  Commonly known as: PRINIVIL,ZESTRIL   10 mg, Oral, Nightly         Continue These Medications      Instructions Start Date   allopurinol 300 MG tablet  Commonly known as: ZYLOPRIM   300 mg, Oral, Daily      carvedilol 25 MG tablet  Commonly known as: COREG   25 mg, Oral, 2 Times Daily With Meals      ERGOCAL PO   1.25 mg, Oral, 3 Times Weekly      levothyroxine 125 MCG tablet  Commonly known as: SYNTHROID, LEVOTHROID   125 mcg, Oral, Daily      montelukast 10 MG tablet  Commonly known as: SINGULAIR   10 mg, Oral, Nightly      Needle (Disp) 30G X 1/2\" misc  Commonly known as: BD Disp Needles   Use daily for injection of Tresiba.         Stop These Medications    Bydureon BCise 2 MG/0.85ML auto-injector injection  Generic drug: exenatide er     cefdinir 300 MG capsule  Commonly known as: OMNICEF     furosemide 80 MG tablet  Commonly known as: LASIX     hydrALAZINE 25 MG tablet  Commonly known as: APRESOLINE     NIFEdipine CC 60 MG 24 hr tablet  Commonly known as: ADALAT CC     Tresiba FlexTouch 200 UNIT/ML solution pen-injector pen injection  Generic drug: Insulin Degludec     Trulicity 1.5 MG/0.5ML solution pen-injector  Generic drug: Dulaglutide            No Known " Allergies      Discharge Disposition:  Home or Self Care    Discharge Diet:  Diet Order   Procedures   • Diet Regular; Consistent Carbohydrate, Renal, Cardiac       Discharge Activity:   Activity Instructions     Activity as Tolerated            CODE STATUS:    Code Status and Medical Interventions:   Ordered at: 06/17/21 2136     Code Status:    CPR     Medical Interventions (Level of Support Prior to Arrest):    Full       Future Appointments   Date Time Provider Department Center   7/21/2021 11:00 AM Isabella Hunter, DNP, APRN [facility]     Additional Instructions for the Follow-ups that You Need to Schedule     Discharge Follow-up with PCP   As directed       Currently Documented PCP:    No primary care provider on file.    PCP Phone Number:    None     Follow Up Details: 2 weeks           Follow-up Information     Luis Ag MD. Call in 3 week(s).    Specialty: Vascular Surgery  Contact information:  [facility] [address]  [phone]                   Additional Instructions for the Follow-ups that You Need to Schedule     Discharge Follow-up with PCP   As directed       Currently Documented PCP:    No primary care provider on file.    PCP Phone Number:    None     Follow Up Details: 2 weeks           Time Spent on Discharge:  Greater than 30 minutes      Ehsan Toledo MD  [facility]  06/22/21  12:41 EDT                Electronically signed by Ehsan Toledo MD at 06/22/21 1244             Rashida Gaxiola, RN      Case Management   Progress Notes      Signed   Date of Service:  06/22/21 1337   Creation Time:  06/22/21 1337            Signed             Show:Clear all  []Manual[x]Template[]Copied    Added by:  [x]Rashida Gaxiola, RN    []David for details  Continued Stay Note  [facility]     Patient Name: Gisela Dyson             MRN: [id number]  Today's Date: 6/22/2021                     Admit Date: 6/17/2021           Discharge Plan      Row Name 06/22/21 1332           Plan     Plan  [facility] Thursday and Saturday at 12 noon then [facility] to M[facility] schedule next week     Plan Comments  Dr Boles notified that [facility] will start [facility] chair on Thursday and Saturday this week then change to MWF next week.  Meggan faxed letter for patient and given to GRAHAM Cuenca as she will dc when back from HD.  Dr Boles discussed schedule with the patient in HD.  Patient denies other needs......................Rashida Gaxiola RN          Discharge Codes    No documentation.              Expected Discharge Date and Time      Expected Discharge Date Expected Discharge Time     Jun 22, 2021                  Rashida Gaxiola RN

## 2021-06-23 NOTE — OUTREACH NOTE
Prep Survey      Responses   [facility] patient discharged from?  [facility]   Is LACE score < 7 ?  No   Emergency Room discharge w/ pulse ox?  No   Eligibility  Readm Mgmt   Discharge diagnosis   bilateral leg swelling and shortness of breath/ESRD   Does the patient have one of the following disease processes/diagnoses(primary or secondary)?  Other   Does the patient have Home health ordered?  No   Is there a DME ordered?  No   Comments regarding appointments  see avs   Medication alerts for this patient  see avs   General alerts for this patient   [facility]   Prep survey completed?  Yes          Jenn Dong RN

## 2021-06-25 ENCOUNTER — READMISSION MANAGEMENT (OUTPATIENT)
Dept: CALL CENTER | Facility: HOSPITAL | Age: 36
End: 2021-06-25

## 2021-06-25 LAB
BACTERIA SPEC AEROBE CULT: NORMAL
BACTERIA SPEC AEROBE CULT: NORMAL

## 2021-06-25 NOTE — OUTREACH NOTE
Medical Week 1 Survey      Responses   [facility] patient discharged from?  [place]   Does the patient have one of the following disease processes/diagnoses(primary or secondary)?  Other   Week 1 attempt successful?  No   Unsuccessful attempts  Attempt 1   General alerts for this patient   [facility] dialysis   Discharge diagnosis   bilateral leg swelling and shortness of breath/ESRD          Makayla Bhat RN

## 2021-06-28 ENCOUNTER — READMISSION MANAGEMENT (OUTPATIENT)
Dept: CALL CENTER | Facility: HOSPITAL | Age: 36
End: 2021-06-28

## 2021-06-28 NOTE — OUTREACH NOTE
Medical Week 1 Survey      Responses   [facility] patient discharged from?  [place]   Does the patient have one of the following disease processes/diagnoses(primary or secondary)?  Other   Week 1 attempt successful?  No   Unsuccessful attempts  Attempt 2          Alison Art RN

## 2021-06-30 ENCOUNTER — READMISSION MANAGEMENT (OUTPATIENT)
Dept: CALL CENTER | Facility: HOSPITAL | Age: 36
End: 2021-06-30

## 2021-06-30 NOTE — OUTREACH NOTE
Medical Week 1 Survey      Responses   [facility] patient discharged from?  [place]   Does the patient have one of the following disease processes/diagnoses(primary or secondary)?  Other   Week 1 attempt successful?  No   Unsuccessful attempts  Attempt 3          Yamila Youssef RN

## 2021-07-08 ENCOUNTER — READMISSION MANAGEMENT (OUTPATIENT)
Dept: CALL CENTER | Facility: HOSPITAL | Age: 36
End: 2021-07-08

## 2021-07-08 ENCOUNTER — TELEPHONE (OUTPATIENT)
Dept: CARDIOLOGY | Facility: CLINIC | Age: 36
End: 2021-07-08

## 2021-07-08 NOTE — TELEPHONE ENCOUNTER
7/8/21  Emery Dyson (Bill), pt's father called - states pt has an appt with Isabella on 7/21 but Dialysis clinic harjit her to cancel that appt - he does not know why.  Gisela is at work and can not receive personal calls, so he asked that you call him at [phone]/any

## 2021-07-08 NOTE — TELEPHONE ENCOUNTER
7/8/21  I spoke with Mr. Dyson - pt does have dialysis on wednesdays at noon.  I explained to him that she could reschedule to another day and or an earlier appt if it is on a dialysis day.  He will call back and reschedule the appt/any

## 2021-07-08 NOTE — TELEPHONE ENCOUNTER
If she is getting dialysis on Monday, Wednesday, Fridays then may be they thought that this would interfere with her dialysis?  Would have him call and find out from the dialysis clinic why they were wanting them to cancel that appointment.  If it is going to interfere with dialysis then okay to change to a Tuesday or Thursday appointment but still needs to be seen around 7/21 for hospital follow-up appointment.  If I do not have any openings within a week of 7/21 then may need to see another APRN as I will be out of town for about 2 weeks around that time.

## 2021-07-08 NOTE — OUTREACH NOTE
Medical Week 2 Survey      Responses   [facility] patient discharged from?  [place]   Does the patient have one of the following disease processes/diagnoses(primary or secondary)?  Other   Week 2 attempt successful?  No   Unsuccessful attempts  Attempt 1          Dieter Marina RN

## 2021-07-12 ENCOUNTER — READMISSION MANAGEMENT (OUTPATIENT)
Dept: CALL CENTER | Facility: HOSPITAL | Age: 36
End: 2021-07-12

## 2021-07-12 NOTE — OUTREACH NOTE
Medical Week 2 Survey      Responses   [facility] patient discharged from?  [place]   Does the patient have one of the following disease processes/diagnoses(primary or secondary)?  Other   Week 2 attempt successful?  No   Unsuccessful attempts  Attempt 2          Chasity Cummings RN

## 2021-07-20 ENCOUNTER — READMISSION MANAGEMENT (OUTPATIENT)
Dept: CALL CENTER | Facility: HOSPITAL | Age: 36
End: 2021-07-20

## 2021-07-20 NOTE — OUTREACH NOTE
Medical Week 3 Survey      Responses   [facility] patient discharged from?  [place]   Does the patient have one of the following disease processes/diagnoses(primary or secondary)?  Other   Week 3 attempt successful?  No   Unsuccessful attempts  Attempt 1   Rescheduled  Revoked   Revoke  Decline to participate [UTR x6-revoked]          Yamila Youssef RN

## 2021-09-07 ENCOUNTER — HOSPITAL ENCOUNTER (OUTPATIENT)
Facility: HOSPITAL | Age: 36
Setting detail: HOSPITAL OUTPATIENT SURGERY
End: 2021-09-07
Attending: SURGERY | Admitting: SURGERY

## 2021-10-18 ENCOUNTER — APPOINTMENT (OUTPATIENT)
Dept: PREADMISSION TESTING | Facility: HOSPITAL | Age: 36
End: 2021-10-18

## 2021-10-27 ENCOUNTER — APPOINTMENT (OUTPATIENT)
Dept: PREADMISSION TESTING | Facility: HOSPITAL | Age: 36
End: 2021-10-27

## 2021-10-28 RX ORDER — CEFAZOLIN SODIUM 2 G/100ML
2 INJECTION, SOLUTION INTRAVENOUS ONCE
Status: CANCELLED | OUTPATIENT
Start: 2021-10-29 | End: 2021-10-28

## 2021-10-29 ENCOUNTER — ANESTHESIA (OUTPATIENT)
Dept: PERIOP | Facility: HOSPITAL | Age: 36
End: 2021-10-29

## 2021-10-29 ENCOUNTER — ANESTHESIA EVENT (OUTPATIENT)
Dept: PERIOP | Facility: HOSPITAL | Age: 36
End: 2021-10-29

## 2022-01-09 ENCOUNTER — HOSPITAL ENCOUNTER (EMERGENCY)
Facility: HOSPITAL | Age: 37
Discharge: HOME | End: 2022-01-09
Attending: EMERGENCY MEDICINE | Admitting: EMERGENCY MEDICINE

## 2022-01-09 ENCOUNTER — APPOINTMENT (OUTPATIENT)
Dept: GENERAL RADIOLOGY | Facility: HOSPITAL | Age: 37
End: 2022-01-09

## 2022-01-09 VITALS
HEIGHT: 63 IN | TEMPERATURE: 98.1 F | SYSTOLIC BLOOD PRESSURE: 168 MMHG | HEART RATE: 103 BPM | DIASTOLIC BLOOD PRESSURE: 105 MMHG | OXYGEN SATURATION: 96 % | RESPIRATION RATE: 18 BRPM | BODY MASS INDEX: 53.05 KG/M2

## 2022-01-09 DIAGNOSIS — R73.03 PREDIABETES: ICD-10-CM

## 2022-01-09 DIAGNOSIS — Z99.2 ESRD ON HEMODIALYSIS: ICD-10-CM

## 2022-01-09 DIAGNOSIS — I50.9 CONGESTIVE HEART FAILURE, UNSPECIFIED HF CHRONICITY, UNSPECIFIED HEART FAILURE TYPE: ICD-10-CM

## 2022-01-09 DIAGNOSIS — I10 HYPERTENSION, UNSPECIFIED TYPE: ICD-10-CM

## 2022-01-09 DIAGNOSIS — N18.6 ESRD ON HEMODIALYSIS: ICD-10-CM

## 2022-01-09 DIAGNOSIS — U07.1 COVID-19 VIRUS INFECTION: Primary | ICD-10-CM

## 2022-01-09 LAB
ALBUMIN SERPL-MCNC: 3.4 G/DL (ref 3.5–5.2)
ALBUMIN/GLOB SERPL: 1.2 G/DL
ALP SERPL-CCNC: 68 U/L (ref 39–117)
ALT SERPL W P-5'-P-CCNC: 16 U/L (ref 1–33)
ANION GAP SERPL CALCULATED.3IONS-SCNC: 21 MMOL/L (ref 5–15)
APTT PPP: 33.6 SECONDS (ref 22.7–35.4)
AST SERPL-CCNC: 19 U/L (ref 1–32)
B PARAPERT DNA SPEC QL NAA+PROBE: NOT DETECTED
B PERT DNA SPEC QL NAA+PROBE: NOT DETECTED
BASOPHILS # BLD AUTO: 0.02 10*3/MM3 (ref 0–0.2)
BASOPHILS NFR BLD AUTO: 0.3 % (ref 0–1.5)
BILIRUB SERPL-MCNC: 0.7 MG/DL (ref 0–1.2)
BUN SERPL-MCNC: 48 MG/DL (ref 6–20)
BUN/CREAT SERPL: 4.8 (ref 7–25)
C PNEUM DNA NPH QL NAA+NON-PROBE: NOT DETECTED
CALCIUM SPEC-SCNC: 7.7 MG/DL (ref 8.6–10.5)
CHLORIDE SERPL-SCNC: 93 MMOL/L (ref 98–107)
CO2 SERPL-SCNC: 18 MMOL/L (ref 22–29)
CREAT SERPL-MCNC: 10.02 MG/DL (ref 0.57–1)
CRP SERPL-MCNC: 3.64 MG/DL (ref 0–0.5)
D-LACTATE SERPL-SCNC: 1.2 MMOL/L (ref 0.5–2)
DEPRECATED RDW RBC AUTO: 46.2 FL (ref 37–54)
EOSINOPHIL # BLD AUTO: 0.02 10*3/MM3 (ref 0–0.4)
EOSINOPHIL NFR BLD AUTO: 0.3 % (ref 0.3–6.2)
ERYTHROCYTE [DISTWIDTH] IN BLOOD BY AUTOMATED COUNT: 14 % (ref 12.3–15.4)
FLUAV SUBTYP SPEC NAA+PROBE: NOT DETECTED
FLUBV RNA ISLT QL NAA+PROBE: NOT DETECTED
GFR SERPL CREATININE-BSD FRML MDRD: 4 ML/MIN/1.73
GFR SERPL CREATININE-BSD FRML MDRD: 5 ML/MIN/1.73
GLOBULIN UR ELPH-MCNC: 2.8 GM/DL
GLUCOSE SERPL-MCNC: 92 MG/DL (ref 65–99)
HADV DNA SPEC NAA+PROBE: NOT DETECTED
HCOV 229E RNA SPEC QL NAA+PROBE: NOT DETECTED
HCOV HKU1 RNA SPEC QL NAA+PROBE: NOT DETECTED
HCOV NL63 RNA SPEC QL NAA+PROBE: NOT DETECTED
HCOV OC43 RNA SPEC QL NAA+PROBE: NOT DETECTED
HCT VFR BLD AUTO: 32 % (ref 34–46.6)
HGB BLD-MCNC: 10.3 G/DL (ref 12–15.9)
HMPV RNA NPH QL NAA+NON-PROBE: NOT DETECTED
HPIV1 RNA ISLT QL NAA+PROBE: NOT DETECTED
HPIV2 RNA SPEC QL NAA+PROBE: NOT DETECTED
HPIV3 RNA NPH QL NAA+PROBE: NOT DETECTED
HPIV4 P GENE NPH QL NAA+PROBE: NOT DETECTED
IMM GRANULOCYTES # BLD AUTO: 0.04 10*3/MM3 (ref 0–0.05)
IMM GRANULOCYTES NFR BLD AUTO: 0.6 % (ref 0–0.5)
INR PPP: 1.19 (ref 0.9–1.1)
LYMPHOCYTES # BLD AUTO: 0.48 10*3/MM3 (ref 0.7–3.1)
LYMPHOCYTES NFR BLD AUTO: 7.6 % (ref 19.6–45.3)
M PNEUMO IGG SER IA-ACNC: NOT DETECTED
MAGNESIUM SERPL-MCNC: 1.9 MG/DL (ref 1.6–2.6)
MCH RBC QN AUTO: 29 PG (ref 26.6–33)
MCHC RBC AUTO-ENTMCNC: 32.2 G/DL (ref 31.5–35.7)
MCV RBC AUTO: 90.1 FL (ref 79–97)
MONOCYTES # BLD AUTO: 0.4 10*3/MM3 (ref 0.1–0.9)
MONOCYTES NFR BLD AUTO: 6.3 % (ref 5–12)
NEUTROPHILS NFR BLD AUTO: 5.35 10*3/MM3 (ref 1.7–7)
NEUTROPHILS NFR BLD AUTO: 84.9 % (ref 42.7–76)
NRBC BLD AUTO-RTO: 0 /100 WBC (ref 0–0.2)
NT-PROBNP SERPL-MCNC: ABNORMAL PG/ML (ref 0–450)
PLATELET # BLD AUTO: 162 10*3/MM3 (ref 140–450)
PMV BLD AUTO: 10.1 FL (ref 6–12)
POTASSIUM SERPL-SCNC: 4.6 MMOL/L (ref 3.5–5.2)
PROCALCITONIN SERPL-MCNC: 0.56 NG/ML (ref 0–0.25)
PROT SERPL-MCNC: 6.2 G/DL (ref 6–8.5)
PROTHROMBIN TIME: 14.9 SECONDS (ref 11.7–14.2)
QT INTERVAL: 350 MS
RBC # BLD AUTO: 3.55 10*6/MM3 (ref 3.77–5.28)
RHINOVIRUS RNA SPEC NAA+PROBE: NOT DETECTED
RSV RNA NPH QL NAA+NON-PROBE: NOT DETECTED
SARS-COV-2 RNA NPH QL NAA+NON-PROBE: DETECTED
SODIUM SERPL-SCNC: 132 MMOL/L (ref 136–145)
TROPONIN T SERPL-MCNC: 0.12 NG/ML (ref 0–0.03)
TSH SERPL DL<=0.05 MIU/L-ACNC: 1.64 UIU/ML (ref 0.27–4.2)
WBC NRBC COR # BLD: 6.31 10*3/MM3 (ref 3.4–10.8)

## 2022-01-09 PROCEDURE — 85610 PROTHROMBIN TIME: CPT | Performed by: PHYSICIAN ASSISTANT

## 2022-01-09 PROCEDURE — 93005 ELECTROCARDIOGRAM TRACING: CPT | Performed by: PHYSICIAN ASSISTANT

## 2022-01-09 PROCEDURE — 99284 EMERGENCY DEPT VISIT MOD MDM: CPT

## 2022-01-09 PROCEDURE — 85730 THROMBOPLASTIN TIME PARTIAL: CPT | Performed by: PHYSICIAN ASSISTANT

## 2022-01-09 PROCEDURE — 83880 ASSAY OF NATRIURETIC PEPTIDE: CPT | Performed by: PHYSICIAN ASSISTANT

## 2022-01-09 PROCEDURE — 86140 C-REACTIVE PROTEIN: CPT | Performed by: PHYSICIAN ASSISTANT

## 2022-01-09 PROCEDURE — 71045 X-RAY EXAM CHEST 1 VIEW: CPT

## 2022-01-09 PROCEDURE — 83605 ASSAY OF LACTIC ACID: CPT | Performed by: PHYSICIAN ASSISTANT

## 2022-01-09 PROCEDURE — 87040 BLOOD CULTURE FOR BACTERIA: CPT | Performed by: PHYSICIAN ASSISTANT

## 2022-01-09 PROCEDURE — 84145 PROCALCITONIN (PCT): CPT | Performed by: PHYSICIAN ASSISTANT

## 2022-01-09 PROCEDURE — 93010 ELECTROCARDIOGRAM REPORT: CPT | Performed by: INTERNAL MEDICINE

## 2022-01-09 PROCEDURE — 80050 GENERAL HEALTH PANEL: CPT | Performed by: PHYSICIAN ASSISTANT

## 2022-01-09 PROCEDURE — 83735 ASSAY OF MAGNESIUM: CPT | Performed by: PHYSICIAN ASSISTANT

## 2022-01-09 PROCEDURE — 36415 COLL VENOUS BLD VENIPUNCTURE: CPT

## 2022-01-09 PROCEDURE — 84484 ASSAY OF TROPONIN QUANT: CPT | Performed by: PHYSICIAN ASSISTANT

## 2022-01-09 PROCEDURE — 0202U NFCT DS 22 TRGT SARS-COV-2: CPT | Performed by: PHYSICIAN ASSISTANT

## 2022-01-09 RX ORDER — ACETAMINOPHEN 500 MG
1000 TABLET ORAL ONCE
Status: COMPLETED | OUTPATIENT
Start: 2022-01-09 | End: 2022-01-09

## 2022-01-09 RX ORDER — DEXAMETHASONE 1 MG
3 TABLET ORAL 2 TIMES DAILY WITH MEALS
Qty: 60 TABLET | Refills: 0 | Status: SHIPPED | OUTPATIENT
Start: 2022-01-09 | End: 2022-01-19

## 2022-01-09 RX ORDER — LISINOPRIL 10 MG/1
10 TABLET ORAL ONCE
Status: COMPLETED | OUTPATIENT
Start: 2022-01-09 | End: 2022-01-09

## 2022-01-09 RX ORDER — CARVEDILOL 25 MG/1
25 TABLET ORAL 2 TIMES DAILY WITH MEALS
Status: DISCONTINUED | OUTPATIENT
Start: 2022-01-09 | End: 2022-01-09 | Stop reason: HOSPADM

## 2022-01-09 RX ORDER — SODIUM CHLORIDE 0.9 % (FLUSH) 0.9 %
10 SYRINGE (ML) INJECTION AS NEEDED
Status: DISCONTINUED | OUTPATIENT
Start: 2022-01-09 | End: 2022-01-09 | Stop reason: HOSPADM

## 2022-01-09 RX ADMIN — ACETAMINOPHEN 1000 MG: 500 TABLET ORAL at 04:39

## 2022-01-09 RX ADMIN — LISINOPRIL 10 MG: 10 TABLET ORAL at 03:15

## 2022-01-09 RX ADMIN — CARVEDILOL 25 MG: 25 TABLET, FILM COATED ORAL at 05:48

## 2022-01-09 NOTE — ED PROVIDER NOTES
MD ATTESTATION NOTE    The KAR and I have discussed this patient's history, physical exam, and treatment plan.    I provided a substantive portion of the care of this patient. I personally performed the physical exam, in its entirety. The attached note describes my personal findings.      Gisela Dyson is a 36 y.o. female who presents to the ED c/o 2 weeks of cough fever chills.  Patient states she been feeling unwell.  Patient has history of end-stage renal and has been going to dialysis as scheduled.  Patient is febrile on arrival.      On exam:  GENERAL: not distressed  HENT: nares patent  EYES: no scleral icterus  CV: regular rhythm, regular rate  RESPIRATORY: normal effort  ABDOMEN: soft  MUSCULOSKELETAL: no deformity  NEURO: alert, moves all extremities, follows commands  SKIN: warm, dry    Labs  Recent Results (from the past 24 hour(s))   Respiratory Panel PCR w/COVID-19(SARS-CoV-2) [facility]/J[facility]MY/[facility] In-House, NP Swab in UTM/VTM, 3-4 HR TAT - Swab, Nasopharynx    Collection Time: 01/09/22  3:18 AM    Specimen: Nasopharynx; Swab   Result Value Ref Range    ADENOVIRUS, PCR Not Detected Not Detected    Coronavirus 229E Not Detected Not Detected    Coronavirus HKU1 Not Detected Not Detected    Coronavirus NL63 Not Detected Not Detected    Coronavirus OC43 Not Detected Not Detected    COVID19 Detected (C) Not Detected - Ref. Range    Human Metapneumovirus Not Detected Not Detected    Human Rhinovirus/Enterovirus Not Detected Not Detected    Influenza A PCR Not Detected Not Detected    Influenza B PCR Not Detected Not Detected    Parainfluenza Virus 1 Not Detected Not Detected    Parainfluenza Virus 2 Not Detected Not Detected    Parainfluenza Virus 3 Not Detected Not Detected    Parainfluenza Virus 4 Not Detected Not Detected    RSV, PCR Not Detected Not Detected    Bordetella pertussis pcr Not Detected Not Detected    Bordetella parapertussis PCR Not Detected Not Detected    Chlamydophila pneumoniae PCR  Not Detected Not Detected    Mycoplasma pneumo by PCR Not Detected Not Detected   ECG 12 Lead    Collection Time: 01/09/22  3:35 AM   Result Value Ref Range    QT Interval 350 ms   Protime-INR    Collection Time: 01/09/22  3:50 AM    Specimen: Blood   Result Value Ref Range    Protime 14.9 (H) 11.7 - 14.2 Seconds    INR 1.19 (H) 0.90 - 1.10   aPTT    Collection Time: 01/09/22  3:50 AM    Specimen: Blood   Result Value Ref Range    PTT 33.6 22.7 - 35.4 seconds   Troponin    Collection Time: 01/09/22  3:50 AM    Specimen: Blood   Result Value Ref Range    Troponin T 0.124 (C) 0.000 - 0.030 ng/mL   Lactic Acid, Plasma    Collection Time: 01/09/22  3:50 AM    Specimen: Blood   Result Value Ref Range    Lactate 1.2 0.5 - 2.0 mmol/L   Procalcitonin    Collection Time: 01/09/22  3:50 AM    Specimen: Blood   Result Value Ref Range    Procalcitonin 0.56 (H) 0.00 - 0.25 ng/mL   TSH    Collection Time: 01/09/22  3:50 AM    Specimen: Blood   Result Value Ref Range    TSH 1.640 0.270 - 4.200 uIU/mL   CBC Auto Differential    Collection Time: 01/09/22  3:50 AM    Specimen: Blood   Result Value Ref Range    WBC 6.31 3.40 - 10.80 10*3/mm3    RBC 3.55 (L) 3.77 - 5.28 10*6/mm3    Hemoglobin 10.3 (L) 12.0 - 15.9 g/dL    Hematocrit 32.0 (L) 34.0 - 46.6 %    MCV 90.1 79.0 - 97.0 fL    MCH 29.0 26.6 - 33.0 pg    MCHC 32.2 31.5 - 35.7 g/dL    RDW 14.0 12.3 - 15.4 %    RDW-SD 46.2 37.0 - 54.0 fl    MPV 10.1 6.0 - 12.0 fL    Platelets 162 140 - 450 10*3/mm3    Neutrophil % 84.9 (H) 42.7 - 76.0 %    Lymphocyte % 7.6 (L) 19.6 - 45.3 %    Monocyte % 6.3 5.0 - 12.0 %    Eosinophil % 0.3 0.3 - 6.2 %    Basophil % 0.3 0.0 - 1.5 %    Immature Grans % 0.6 (H) 0.0 - 0.5 %    Neutrophils, Absolute 5.35 1.70 - 7.00 10*3/mm3    Lymphocytes, Absolute 0.48 (L) 0.70 - 3.10 10*3/mm3    Monocytes, Absolute 0.40 0.10 - 0.90 10*3/mm3    Eosinophils, Absolute 0.02 0.00 - 0.40 10*3/mm3    Basophils, Absolute 0.02 0.00 - 0.20 10*3/mm3    Immature Grans, Absolute  0.04 0.00 - 0.05 10*3/mm3    nRBC 0.0 0.0 - 0.2 /100 WBC       Radiology  XR Chest 1 View    Result Date: 1/9/2022  SINGLE VIEW OF THE CHEST  HISTORY: Shortness of air  COMPARISON: 06/18/2021  FINDINGS: Right internal jugular vein tunneled dialysis catheter extends into the superior vena cava. Cardiomegaly is present. There is vascular congestion. No pneumothorax is seen. There are bilateral pleural effusions.      Cardiomegaly with vascular congestion.  This report was finalized on 1/9/2022 3:38 AM by Dr. Ping Quick M.D.        Medical Decision Making:  ED Course as of 01/09/22 0449   Sun Jan 09, 2022   0309 Chest x-ray images viewed by me, cardiomegaly is present, no obvious infiltrate, fluid overload, or signs of failure. [SUSANNE]   0426 Patient rechecked, resting comfortably bed with occasional coughing.  O2 sats 97 to 98% on room air.  Discussed results including positive Covid swab.  Patient offered admission, she declined.  Patient given strict return to ER precautions.  Patient expressed understanding and agrees with plan. [SUSANNE]   0438 COVID19(!!): Detected [SUSANNE]   0438 WBC: 6.31 [SUSANNE]   0438 Hemoglobin(!): 10.3 [SUSANNE]   0438 Hematocrit(!): 32.0 [SUSANNE]   0438 Platelets: 162 [SUSANNE]   0438 Lactate: 1.2 [SUSANNE]      ED Course User Index  [SUSANNE] Louis Brasher PA           PPE: Both the patient and I wore a surgical mask throughout the entire patient encounter. I wore protective goggles.     Diagnosis  Final diagnoses:   COVID-19 virus infection   Hypertension, unspecified type   ESRD on hemodialysis (HCC)   Congestive heart failure, unspecified HF chronicity, unspecified heart failure type (HCC)   Prediabetes        Ancelmo Rush MD  01/09/22 0449

## 2022-01-09 NOTE — ED PROVIDER NOTES
EMERGENCY DEPARTMENT ENCOUNTER    Room Number:  01/01  Date of encounter:  1/9/2022  PCP: Emery Silveira MD  Historian: Patient      HPI:  Chief Complaint: Shortness of breath       Context: Gisela Dyson is a 36 y.o. female with past medical history of HTN, CHF, ESRD on HD (MWF), prediabetes, and hypothyroidism who presents to the ED c/o shortness of breath and cough.  Patient states that her symptoms have been going on for 1 to 2 weeks, and are getting progressively worse.  Patient states that her symptoms are worse with exertion, nothing makes it better.  Denies any fever, chest pain, productive cough, body aches, headaches, or known sick contacts.  On arrival to the ER patient does have a fever of 101.0, is tachycardic, but O2 sats are 98% on room air.  Patient states that she has been noncompliant with her medicines but did go to dialysis yesterday without any issues.      PAST MEDICAL HISTORY  Active Ambulatory Problems     Diagnosis Date Noted   • Chronic diastolic congestive heart failure ([facility]) 11/07/2019   • JULISSA (acute kidney injury) ([facility]) 11/07/2019   • Obesity, morbid, BMI 50 or higher ([facility]) 11/07/2019   • Type 2 diabetes mellitus with renal complication ([facility]) 11/07/2019   • Essential hypertension 11/15/2019   • Nonrheumatic mitral valve regurgitation 11/15/2019   • Primary hypothyroidism 01/10/2020   • Hirsutism 01/10/2020   • Alopecia 01/10/2020   • Acute renal failure ([facility]) 06/17/2021   • ESRD on hemodialysis ([facility]) 06/18/2021     Resolved Ambulatory Problems     Diagnosis Date Noted   • CKD (chronic kidney disease) stage 3, GFR 30-59 ml/min ([facility]) 12/13/2019     Past Medical History:   Diagnosis Date   • Diabetes mellitus (CMS/HCC)    • Diastolic CHF (CMS/[facility])    • Type 2 diabetes mellitus (CMS/HCC)          PAST SURGICAL HISTORY  Past Surgical History:   Procedure Laterality Date   • INSERTION HEMODIALYSIS CATHETER Right 6/18/2021    Procedure: TUNNEL DIALYSIS, PALINDROME CATH;  Surgeon: Shon  Ancelmo ELIAS MD;  Location: [facility] MAIN OR;  Service: Vascular;  Laterality: Right;         FAMILY HISTORY  Family History   Adopted: Yes         SOCIAL HISTORY  Social History     Socioeconomic History   • Marital status: Single   Tobacco Use   • Smoking status: Never Smoker   • Smokeless tobacco: Never Used   Vaping Use   • Vaping Use: Never used   Substance and Sexual Activity   • Alcohol use: Yes     Alcohol/week: 1.0 - 2.0 standard drink     Types: 1 - 2 Glasses of wine per week     Comment: socially   • Drug use: No   • Sexual activity: Defer         ALLERGIES  Patient has no known allergies.        REVIEW OF SYSTEMS  Review of Systems     All systems reviewed and negative except for those discussed in HPI.       PHYSICAL EXAM    I have reviewed the triage vital signs and nursing notes.    ED Triage Vitals   Temp Heart Rate Resp BP SpO2   01/09/22 0109 01/09/22 0109 01/09/22 0109 01/09/22 0208 01/09/22 0109   (!) 101 °F (38.3 °C) 117 16 (!) 185/133 98 %      Temp src Heart Rate Source Patient Position BP Location FiO2 (%)   01/09/22 0109 01/09/22 0109 01/09/22 0208 01/09/22 0208 --   Tympanic Monitor Lying Left arm        Physical Exam  GENERAL: Alert and oriented x3, morbidly obese, active dry cough, not distressed  HENT: mask in place  EYES: no scleral icterus, PERRL, EOMI  CV: regular rhythm, tachycardic  RESPIRATORY: normal effort, decreased breath sounds bilaterally  ABDOMEN: soft, obese  MUSCULOSKELETAL: no deformity  NEURO: alert, moves all extremities, follows commands  SKIN: warm, dry        LAB RESULTS  Recent Results (from the past 24 hour(s))   Respiratory Panel PCR w/COVID-19(SARS-CoV-2) [facility]MY/[facility] In-House, NP Swab in UTM/VTM, 3-4 HR TAT - Swab, Nasopharynx    Collection Time: 01/09/22  3:18 AM    Specimen: Nasopharynx; Swab   Result Value Ref Range    ADENOVIRUS, PCR Not Detected Not Detected    Coronavirus 229E Not Detected Not Detected    Coronavirus HKU1 Not Detected Not  Detected    Coronavirus NL63 Not Detected Not Detected    Coronavirus OC43 Not Detected Not Detected    COVID19 Detected (C) Not Detected - Ref. Range    Human Metapneumovirus Not Detected Not Detected    Human Rhinovirus/Enterovirus Not Detected Not Detected    Influenza A PCR Not Detected Not Detected    Influenza B PCR Not Detected Not Detected    Parainfluenza Virus 1 Not Detected Not Detected    Parainfluenza Virus 2 Not Detected Not Detected    Parainfluenza Virus 3 Not Detected Not Detected    Parainfluenza Virus 4 Not Detected Not Detected    RSV, PCR Not Detected Not Detected    Bordetella pertussis pcr Not Detected Not Detected    Bordetella parapertussis PCR Not Detected Not Detected    Chlamydophila pneumoniae PCR Not Detected Not Detected    Mycoplasma pneumo by PCR Not Detected Not Detected   ECG 12 Lead    Collection Time: 01/09/22  3:35 AM   Result Value Ref Range    QT Interval 350 ms   Comprehensive Metabolic Panel    Collection Time: 01/09/22  3:50 AM    Specimen: Blood   Result Value Ref Range    Glucose 92 65 - 99 mg/dL    BUN 48 (H) 6 - 20 mg/dL    Creatinine 10.02 (H) 0.57 - 1.00 mg/dL    Sodium 132 (L) 136 - 145 mmol/L    Potassium 4.6 3.5 - 5.2 mmol/L    Chloride 93 (L) 98 - 107 mmol/L    CO2 18.0 (L) 22.0 - 29.0 mmol/L    Calcium 7.7 (L) 8.6 - 10.5 mg/dL    Total Protein 6.2 6.0 - 8.5 g/dL    Albumin 3.40 (L) 3.50 - 5.20 g/dL    ALT (SGPT) 16 1 - 33 U/L    AST (SGOT) 19 1 - 32 U/L    Alkaline Phosphatase 68 39 - 117 U/L    Total Bilirubin 0.7 0.0 - 1.2 mg/dL    eGFR Non African Amer 4 (L) >60 mL/min/1.73    eGFR  African Amer 5 (L) >60 mL/min/1.73    Globulin 2.8 gm/dL    A/G Ratio 1.2 g/dL    BUN/Creatinine Ratio 4.8 (L) 7.0 - 25.0    Anion Gap 21.0 (H) 5.0 - 15.0 mmol/L   Protime-INR    Collection Time: 01/09/22  3:50 AM    Specimen: Blood   Result Value Ref Range    Protime 14.9 (H) 11.7 - 14.2 Seconds    INR 1.19 (H) 0.90 - 1.10   aPTT    Collection Time: 01/09/22  3:50 AM    Specimen:  Blood   Result Value Ref Range    PTT 33.6 22.7 - 35.4 seconds   BNP    Collection Time: 01/09/22  3:50 AM    Specimen: Blood   Result Value Ref Range    proBNP 33,208.0 (H) 0.0 - 450.0 pg/mL   Troponin    Collection Time: 01/09/22  3:50 AM    Specimen: Blood   Result Value Ref Range    Troponin T 0.124 (C) 0.000 - 0.030 ng/mL   Lactic Acid, Plasma    Collection Time: 01/09/22  3:50 AM    Specimen: Blood   Result Value Ref Range    Lactate 1.2 0.5 - 2.0 mmol/L   Procalcitonin    Collection Time: 01/09/22  3:50 AM    Specimen: Blood   Result Value Ref Range    Procalcitonin 0.56 (H) 0.00 - 0.25 ng/mL   C-reactive Protein    Collection Time: 01/09/22  3:50 AM    Specimen: Blood   Result Value Ref Range    C-Reactive Protein 3.64 (H) 0.00 - 0.50 mg/dL   Magnesium    Collection Time: 01/09/22  3:50 AM    Specimen: Blood   Result Value Ref Range    Magnesium 1.9 1.6 - 2.6 mg/dL   TSH    Collection Time: 01/09/22  3:50 AM    Specimen: Blood   Result Value Ref Range    TSH 1.640 0.270 - 4.200 uIU/mL   CBC Auto Differential    Collection Time: 01/09/22  3:50 AM    Specimen: Blood   Result Value Ref Range    WBC 6.31 3.40 - 10.80 10*3/mm3    RBC 3.55 (L) 3.77 - 5.28 10*6/mm3    Hemoglobin 10.3 (L) 12.0 - 15.9 g/dL    Hematocrit 32.0 (L) 34.0 - 46.6 %    MCV 90.1 79.0 - 97.0 fL    MCH 29.0 26.6 - 33.0 pg    MCHC 32.2 31.5 - 35.7 g/dL    RDW 14.0 12.3 - 15.4 %    RDW-SD 46.2 37.0 - 54.0 fl    MPV 10.1 6.0 - 12.0 fL    Platelets 162 140 - 450 10*3/mm3    Neutrophil % 84.9 (H) 42.7 - 76.0 %    Lymphocyte % 7.6 (L) 19.6 - 45.3 %    Monocyte % 6.3 5.0 - 12.0 %    Eosinophil % 0.3 0.3 - 6.2 %    Basophil % 0.3 0.0 - 1.5 %    Immature Grans % 0.6 (H) 0.0 - 0.5 %    Neutrophils, Absolute 5.35 1.70 - 7.00 10*3/mm3    Lymphocytes, Absolute 0.48 (L) 0.70 - 3.10 10*3/mm3    Monocytes, Absolute 0.40 0.10 - 0.90 10*3/mm3    Eosinophils, Absolute 0.02 0.00 - 0.40 10*3/mm3    Basophils, Absolute 0.02 0.00 - 0.20 10*3/mm3    Immature Grans,  Absolute 0.04 0.00 - 0.05 10*3/mm3    nRBC 0.0 0.0 - 0.2 /100 WBC       Ordered the above labs and independently reviewed the results.        RADIOLOGY  XR Chest 1 View    Result Date: 1/9/2022  SINGLE VIEW OF THE CHEST  HISTORY: Shortness of air  COMPARISON: 06/18/2021  FINDINGS: Right internal jugular vein tunneled dialysis catheter extends into the superior vena cava. Cardiomegaly is present. There is vascular congestion. No pneumothorax is seen. There are bilateral pleural effusions.      Cardiomegaly with vascular congestion.  This report was finalized on 1/9/2022 3:38 AM by Dr. Ping Quick M.D.        I ordered the above noted radiological studies. Reviewed by me and discussed with radiologist.  See dictation for official radiology interpretation.      PROCEDURES    Procedures      MEDICATIONS GIVEN IN ER    Medications   sodium chloride 0.9 % flush 10 mL (has no administration in time range)   carvedilol (COREG) tablet 25 mg (25 mg Oral Given 1/9/22 0548)   lisinopril (PRINIVIL,ZESTRIL) tablet 10 mg (10 mg Oral Given 1/9/22 0315)   acetaminophen (TYLENOL) tablet 1,000 mg (1,000 mg Oral Given 1/9/22 0439)         PROGRESS, DATA ANALYSIS, CONSULTS, AND MEDICAL DECISION MAKING    All labs have been independently reviewed by me.  All radiology studies have been reviewed by me and discussed with radiologist dictating the report.   EKG's independently viewed and interpreted by me.  Discussion below represents my analysis of pertinent findings related to patient's condition, differential diagnosis, treatment plan and final disposition.    DDx: Includes but not limited to pneumonia, COVID, influenza, parainfluenza, sepsis, fluid overload, CHF exacerbation    ED Course as of 01/09/22 0559   Sun Jan 09, 2022   0309 Chest x-ray images viewed by me, cardiomegaly is present, no obvious infiltrate, fluid overload, or signs of failure. [SUSANNE]   0345 EKG          EKG time: 03:35  Rhythm/Rate: Sinus tach at 105 bpm  P  waves and TN: Normal  QRS, axis: Borderline RAD  ST and T waves: No acute ST/T wave changes    Interpreted Contemporaneously by me, independently viewed  Improved but not significantly changed compared to prior EKG of 6/17/2021, the rate is slower and the QT prolongation has improved.   [SUSANNE]   0426 Patient rechecked, resting comfortably bed with occasional coughing.  O2 sats 97 to 98% on room air.  Discussed results including positive Covid swab.  Patient offered admission, she declined.  Patient given strict return to ER precautions.  Patient expressed understanding and agrees with plan. [SUSANNE]   0433 Procalcitonin(!): 0.56 [SUSANNE]   0438 COVID19(!!): Detected [SUSANNE]   0438 WBC: 6.31 [SUSANNE]   0438 Hemoglobin(!): 10.3 [SUSANNE]   0438 Hematocrit(!): 32.0 [SUSANNE]   0438 Platelets: 162 [SUSANNE]   0438 Lactate: 1.2 [SUSANNE]   0503 C-Reactive Protein(!): 3.64 [SUSANNE]   0503 Troponin T(!!): 0.124 [SUSANNE]   0503 proBNP(!): 33,208.0 [SUSANNE]   0503 Glucose: 92 [SUSANNE]   0503 BUN(!): 48 [SUSANNE]   0503 Creatinine(!): 10.02 [SUSANNE]   0503 Sodium(!): 132 [SUSANNE]   0503 Potassium: 4.6 [SUSANNE]   0503 Chloride(!): 93 [SUSANNE]   0503 CO2(!): 18.0 [SUSANNE]   0504 Patient rechecked, temperature improved. Patient requesting discharge. Discussed need for close monitoring of her glucose while on steroids. Patient expressed understanding and agrees this plan. [SUSANNE]      ED Course User Index  [SUSANEN] Louis Brasher PA       MDM: Patient's evaluation was positive for Covid and there is no evidence for pneumonia on chest x-ray. Patient does have some mild vascular congestion as well as elevated BNP and troponin that are most likely due to her chronic renal failure, EKG is nonischemic. Patient just had her dialysis yesterday and her electrolytes are normal. Patient denies any chest pain. She was offered admission but declined, her vital signs are stable and she is safe for discharge. Patient was given strict return to ER precautions.    PPE: The patient wore a surgical mask throughout the entire  patient encounter. I wore an N95.    AS OF 05:59 EST VITALS:    BP - (!) 168/105  HR - 103  TEMP - 98.1 °F (36.7 °C) (Oral)  O2 SATS - 96%        DIAGNOSIS  Final diagnoses:   COVID-19 virus infection   Hypertension, unspecified type   ESRD on hemodialysis (HCC)   Congestive heart failure, unspecified HF chronicity, unspecified heart failure type (HCC)   Prediabetes         DISPOSITION  DISCHARGE    Patient discharged in stable condition.    Reviewed implications of results, diagnosis, meds, responsibility to follow up, warning signs and symptoms of possible worsening, potential complications and reasons to return to ER.    Patient/Family voiced understanding of above instructions.    Discussed plan for discharge, as there is no emergent indication for admission. Patient referred to primary care provider for BP management due to today's BP. Pt/family is agreeable and understands need for follow up and repeat testing.  Pt is aware that discharge does not mean that nothing is wrong but it indicates no emergency is present that requires admission and they must continue care with follow-up as given below or physician of their choice.     FOLLOW-UP  Emery Silveira MD    Schedule an appointment as soon as possible for a visit            Medication List      New Prescriptions    dexamethasone 1 MG tablet  Commonly known as: DECADRON  Take 3 tablets by mouth 2 (Two) Times a Day With Meals for 10 days.           Where to Get Your Medications      These medications were sent to [address] AT [facility] ([facility] - [phone] [facility] [phone] [address], [facility] [address]    Phone: [phone]   · dexamethasone 1 MG tablet                    Louis Brasher PA  01/09/22 0552       Louis Brasher PA  01/09/22 0559

## 2022-01-09 NOTE — ED NOTES
"Pt arrives ambulatory to ED via PV.    Pt states \"I have SOB, and I have a cough that I have had for about 2 weeks.\"    Pt is on dialysis, M,W,F.      Patient was placed in face mask during first look triage.  Patient was wearing a face mask throughout encounter.  I wore personal protective equipment throughout the encounter.  Hand hygiene was performed before and after patient encounter.       Doretha Nettles, RN  01/09/22 0112    "

## 2022-01-09 NOTE — DISCHARGE INSTRUCTIONS
Home, rest, medicine as directed, home medicine as prescribed, watch your sugar closely. Follow up with PCP for recheck. Return to care if become more short of breath, unable to control fevers, or with further concerns.

## 2022-01-14 LAB
BACTERIA SPEC AEROBE CULT: NORMAL
BACTERIA SPEC AEROBE CULT: NORMAL

## 2022-03-11 ENCOUNTER — PRE-ADMISSION TESTING (OUTPATIENT)
Dept: PREADMISSION TESTING | Facility: HOSPITAL | Age: 37
End: 2022-03-11

## 2022-03-11 ENCOUNTER — APPOINTMENT (OUTPATIENT)
Dept: PREADMISSION TESTING | Facility: HOSPITAL | Age: 37
End: 2022-03-11

## 2022-03-11 VITALS
HEART RATE: 107 BPM | BODY MASS INDEX: 47.48 KG/M2 | SYSTOLIC BLOOD PRESSURE: 192 MMHG | DIASTOLIC BLOOD PRESSURE: 140 MMHG | OXYGEN SATURATION: 97 % | WEIGHT: 268 LBS | HEIGHT: 63 IN | RESPIRATION RATE: 20 BRPM | TEMPERATURE: 98.1 F

## 2022-03-11 LAB
ANION GAP SERPL CALCULATED.3IONS-SCNC: 15 MMOL/L (ref 5–15)
BASOPHILS # BLD AUTO: 0.08 10*3/MM3 (ref 0–0.2)
BASOPHILS NFR BLD AUTO: 0.9 % (ref 0–1.5)
BUN SERPL-MCNC: 35 MG/DL (ref 6–20)
BUN/CREAT SERPL: 4.2 (ref 7–25)
CALCIUM SPEC-SCNC: 8.9 MG/DL (ref 8.6–10.5)
CHLORIDE SERPL-SCNC: 98 MMOL/L (ref 98–107)
CO2 SERPL-SCNC: 23 MMOL/L (ref 22–29)
CREAT SERPL-MCNC: 8.41 MG/DL (ref 0.57–1)
DEPRECATED RDW RBC AUTO: 49.4 FL (ref 37–54)
EGFRCR SERPLBLD CKD-EPI 2021: 5.9 ML/MIN/1.73
EOSINOPHIL # BLD AUTO: 0.53 10*3/MM3 (ref 0–0.4)
EOSINOPHIL NFR BLD AUTO: 6 % (ref 0.3–6.2)
ERYTHROCYTE [DISTWIDTH] IN BLOOD BY AUTOMATED COUNT: 14.9 % (ref 12.3–15.4)
GLUCOSE SERPL-MCNC: 129 MG/DL (ref 65–99)
HCG SERPL QL: NEGATIVE
HCT VFR BLD AUTO: 31.5 % (ref 34–46.6)
HGB BLD-MCNC: 10.2 G/DL (ref 12–15.9)
IMM GRANULOCYTES # BLD AUTO: 0.04 10*3/MM3 (ref 0–0.05)
IMM GRANULOCYTES NFR BLD AUTO: 0.5 % (ref 0–0.5)
LYMPHOCYTES # BLD AUTO: 0.97 10*3/MM3 (ref 0.7–3.1)
LYMPHOCYTES NFR BLD AUTO: 11.1 % (ref 19.6–45.3)
MCH RBC QN AUTO: 29.7 PG (ref 26.6–33)
MCHC RBC AUTO-ENTMCNC: 32.4 G/DL (ref 31.5–35.7)
MCV RBC AUTO: 91.8 FL (ref 79–97)
MONOCYTES # BLD AUTO: 0.51 10*3/MM3 (ref 0.1–0.9)
MONOCYTES NFR BLD AUTO: 5.8 % (ref 5–12)
NEUTROPHILS NFR BLD AUTO: 6.64 10*3/MM3 (ref 1.7–7)
NEUTROPHILS NFR BLD AUTO: 75.7 % (ref 42.7–76)
NRBC BLD AUTO-RTO: 0 /100 WBC (ref 0–0.2)
PLATELET # BLD AUTO: 291 10*3/MM3 (ref 140–450)
PMV BLD AUTO: 9.5 FL (ref 6–12)
POTASSIUM SERPL-SCNC: 5.3 MMOL/L (ref 3.5–5.2)
RBC # BLD AUTO: 3.43 10*6/MM3 (ref 3.77–5.28)
SARS-COV-2 RNA RESP QL NAA+PROBE: NOT DETECTED
SODIUM SERPL-SCNC: 136 MMOL/L (ref 136–145)
WBC NRBC COR # BLD: 8.77 10*3/MM3 (ref 3.4–10.8)

## 2022-03-11 PROCEDURE — U0003 INFECTIOUS AGENT DETECTION BY NUCLEIC ACID (DNA OR RNA); SEVERE ACUTE RESPIRATORY SYNDROME CORONAVIRUS 2 (SARS-COV-2) (CORONAVIRUS DISEASE [COVID-19]), AMPLIFIED PROBE TECHNIQUE, MAKING USE OF HIGH THROUGHPUT TECHNOLOGIES AS DESCRIBED BY CMS-2020-01-R: HCPCS

## 2022-03-11 PROCEDURE — C9803 HOPD COVID-19 SPEC COLLECT: HCPCS

## 2022-03-11 PROCEDURE — 36415 COLL VENOUS BLD VENIPUNCTURE: CPT

## 2022-03-11 PROCEDURE — 85025 COMPLETE CBC W/AUTO DIFF WBC: CPT

## 2022-03-11 PROCEDURE — 80048 BASIC METABOLIC PNL TOTAL CA: CPT

## 2022-03-11 PROCEDURE — 84703 CHORIONIC GONADOTROPIN ASSAY: CPT

## 2022-03-11 RX ORDER — FERRIC CITRATE 210 MG/1
420 TABLET, COATED ORAL 3 TIMES DAILY
COMMUNITY

## 2022-03-11 RX ORDER — HYDRALAZINE HYDROCHLORIDE 100 MG/1
100 TABLET, FILM COATED ORAL 2 TIMES DAILY
COMMUNITY

## 2022-03-11 RX ORDER — AMLODIPINE BESYLATE 10 MG/1
10 TABLET ORAL NIGHTLY
COMMUNITY

## 2022-03-11 NOTE — DISCHARGE INSTRUCTIONS
Take the following medications the morning of surgery:  CARVEDILOL AND HYDRALAZINE    If you are on prescription narcotic pain medication to control your pain you may also take that medication the morning of surgery.    General Instructions:  Do not eat solid food after midnight the night before surgery.  You may drink clear liquids day of surgery but must stop at least one hour before your hospital arrival time.  CLEAR LIQUIDS UNTIL 12:00 PM  It is beneficial for you to have a clear drink that contains carbohydrates the day of surgery.  We suggest a 12 to 20 ounce bottle of Gatorade or Powerade for non-diabetic patients or a 12 to 20 ounce bottle of G2 or Powerade Zero for diabetic patients. (Pediatric patients, are not advised to drink a 12 to 20 ounce carbohydrate drink)    Clear liquids are liquids you can see through.  Nothing red in color.     Plain water                               Sports drinks  Sodas                                   Gelatin (Jell-O)  Fruit juices without pulp such as white grape juice and apple juice  Popsicles that contain no fruit or yogurt  Tea or coffee (no cream or milk added)  Gatorade / Powerade  G2 / Powerade Zero    Infants may have breast milk up to four hours before surgery.  Infants drinking formula may drink formula up to six hours before surgery.   Patients who avoid smoking, chewing tobacco and alcohol for 4 weeks prior to surgery have a reduced risk of post-operative complications.  Quit smoking as many days before surgery as you can.  Do not smoke, use chewing tobacco or drink alcohol the day of surgery.   If applicable bring your C-PAP/ BI-PAP machine.  Bring any papers given to you in the doctor’s office.  Wear clean comfortable clothes.  Do not wear contact lenses, false eyelashes or make-up.  Bring a case for your glasses.   Bring crutches or walker if applicable.  Remove all piercings.  Leave jewelry and any other valuables at home.  Hair extensions with metal clips  must be removed prior to surgery.  The Pre-Admission Testing nurse will instruct you to bring medications if unable to obtain an accurate list in Pre-Admission Testing.        If you were given a blood bank ID arm band remember to bring it with you the day of surgery.    Preventing a Surgical Site Infection:  For 2 to 3 days before surgery, avoid shaving with a razor because the razor can irritate skin and make it easier to develop an infection.    Any areas of open skin can increase the risk of a post-operative wound infection by allowing bacteria to enter and travel throughout the body.  Notify your surgeon if you have any skin wounds / rashes even if it is not near the expected surgical site.  The area will need assessed to determine if surgery should be delayed until it is healed.  The night prior to surgery shower using a fresh bar of anti-bacterial soap (such as Dial) and clean washcloth.  Sleep in a clean bed with clean clothing.  Do not allow pets to sleep with you.  Shower on the morning of surgery using a fresh bar of anti-bacterial soap (such as Dial) and clean washcloth.  Dry with a clean towel and dress in clean clothing.  Ask your surgeon if you will be receiving antibiotics prior to surgery.  Make sure you, your family, and all healthcare providers clean their hands with soap and water or an alcohol based hand  before caring for you or your wound.    Day of surgery: 3/14/2022 ARRIVAL TIME 1:00 PM  Your arrival time is approximately two hours before your scheduled surgery time.  Upon arrival, a Pre-op nurse and Anesthesiologist will review your health history, obtain vital signs, and answer questions you may have.  The only belongings needed at this time will be a list of your home medications and if applicable your C-PAP/BI-PAP machine.  A Pre-op nurse will start an IV and you may receive medication in preparation for surgery, including something to help you relax.     Please be aware that  surgery does come with discomfort.  We want to make every effort to control your discomfort so please discuss any uncontrolled symptoms with your nurse.   Your doctor will most likely have prescribed pain medications.      If you are going home after surgery you will receive individualized written care instructions before being discharged.  A responsible adult must drive you to and from the hospital on the day of your surgery and stay with you for 24 hours.  Discharge prescriptions can be filled by the hospital pharmacy during regular pharmacy hours.  If you are having surgery late in the day/evening your prescription may be e-prescribed to your pharmacy.  Please verify your pharmacy hours or chose a 24 hour pharmacy to avoid not having access to your prescription because your pharmacy has closed for the day.    If you are staying overnight following surgery, you will be transported to your hospital room following the recovery period.  [facility] has all private rooms.    If you have any questions please call Pre-Admission Testing at [phone].  Deductibles and co-payments are collected on the day of service. Please be prepared to pay the required co-pay, deductible or deposit on the day of service as defined by your plan.    Patient Education for Self-Quarantine Process    Following your COVID testing, we strongly recommend that you wear a mask when you are with other people and practice social distancing.   Limit your activities to only required outings.  Wash your hands with soap and water frequently for at least 20 seconds.   Avoid touching your eyes, nose and mouth with unwashed hands.  Do not share anything - utensils, drinking glasses, food from the same bowl.   Sanitize household surfaces daily. Include all high touch areas (door handles, light switches, phones, countertops, etc.)    Call your surgeon immediately if you experience any of the following symptoms:  Sore Throat  Shortness of  Breath or difficulty breathing  Cough  Chills  Body soreness or muscle pain  Headache  Fever  New loss of taste or smell  Do not arrive for your surgery ill.  Your procedure will need to be rescheduled to another time.  You will need to call your physician before the day of surgery to avoid any unnecessary exposure to hospital staff as well as other patients.   CHLORHEXIDINE CLOTH INSTRUCTIONS  The morning of surgery follow these instructions using the Chlorhexidine cloths you've been given.  These steps reduce bacteria on the body.  Do not use the cloths near your eyes, ears mouth, genitalia or on open wounds.  Throw the cloths away after use but do not try to flush them down a toilet.      Open and remove one cloth at a time from the package.    Leave the cloth unfolded and begin the bathing.  Massage the skin with the cloths using gentle pressure to remove bacteria.  Do not scrub harshly.   Follow the steps below with one 2% CHG cloth per area (6 total cloths).  One cloth for neck, shoulders and chest.  One cloth for both arms, hands, fingers and underarms (do underarms last).  One cloth for the abdomen followed by groin.  One cloth for right leg and foot including between the toes.  One cloth for left leg and foot including between the toes.  The last cloth is to be used for the back of the neck, back and buttocks.    Allow the CHG to air dry 3 minutes on the skin which will give it time to work and decrease the chance of irritation.  The skin may feel sticky until it is dry.  Do not rinse with water or any other liquid or you will lose the beneficial effects of the CHG.  If mild skin irritation occurs, do rinse the skin to remove the CHG.  Report this to the nurse at time of admission.  Do not apply lotions, creams, ointments, deodorants or perfumes after using the clothes. Dress in clean clothes before coming to the hospital.

## 2022-03-14 ENCOUNTER — HOSPITAL ENCOUNTER (OUTPATIENT)
Facility: HOSPITAL | Age: 37
Setting detail: HOSPITAL OUTPATIENT SURGERY
Discharge: HOME | End: 2022-03-14
Attending: SURGERY | Admitting: SURGERY

## 2022-03-14 ENCOUNTER — ANESTHESIA EVENT (OUTPATIENT)
Dept: PERIOP | Facility: HOSPITAL | Age: 37
End: 2022-03-14

## 2022-03-14 ENCOUNTER — ANESTHESIA (OUTPATIENT)
Dept: PERIOP | Facility: HOSPITAL | Age: 37
End: 2022-03-14

## 2022-03-14 VITALS
HEIGHT: 63 IN | RESPIRATION RATE: 16 BRPM | TEMPERATURE: 97.7 F | OXYGEN SATURATION: 96 % | DIASTOLIC BLOOD PRESSURE: 84 MMHG | BODY MASS INDEX: 46.46 KG/M2 | WEIGHT: 262.2 LBS | HEART RATE: 75 BPM | SYSTOLIC BLOOD PRESSURE: 128 MMHG

## 2022-03-14 DIAGNOSIS — N18.6 ESRD ON HEMODIALYSIS: Primary | ICD-10-CM

## 2022-03-14 DIAGNOSIS — Z99.2 ESRD ON HEMODIALYSIS: Primary | ICD-10-CM

## 2022-03-14 LAB
GLUCOSE BLDC GLUCOMTR-MCNC: 105 MG/DL (ref 70–130)
GLUCOSE BLDC GLUCOMTR-MCNC: 86 MG/DL (ref 70–130)
POTASSIUM SERPL-SCNC: 3.8 MMOL/L (ref 3.5–5.2)

## 2022-03-14 PROCEDURE — C1889 IMPLANT/INSERT DEVICE, NOC: HCPCS | Performed by: SURGERY

## 2022-03-14 PROCEDURE — 25010000002 MIDAZOLAM PER 1 MG: Performed by: ANESTHESIOLOGY

## 2022-03-14 PROCEDURE — 25010000002 ROPIVACAINE PER 1 MG: Performed by: ANESTHESIOLOGY

## 2022-03-14 PROCEDURE — 0 LIDOCAINE 1 % SOLUTION 20 ML VIAL: Performed by: SURGERY

## 2022-03-14 PROCEDURE — 25010000002 PROTAMINE SULFATE PER 10 MG: Performed by: ANESTHESIOLOGY

## 2022-03-14 PROCEDURE — 25010000002 PROPOFOL 10 MG/ML EMULSION: Performed by: ANESTHESIOLOGY

## 2022-03-14 PROCEDURE — 25010000002 HEPARIN (PORCINE) PER 1000 UNITS: Performed by: SURGERY

## 2022-03-14 PROCEDURE — 25010000002 CEFAZOLIN PER 500 MG: Performed by: SURGERY

## 2022-03-14 PROCEDURE — 84132 ASSAY OF SERUM POTASSIUM: CPT | Performed by: SURGERY

## 2022-03-14 PROCEDURE — 25010000002 FENTANYL CITRATE (PF) 50 MCG/ML SOLUTION: Performed by: ANESTHESIOLOGY

## 2022-03-14 PROCEDURE — 25010000002 HEPARIN (PORCINE) PER 1000 UNITS: Performed by: ANESTHESIOLOGY

## 2022-03-14 PROCEDURE — 82962 GLUCOSE BLOOD TEST: CPT

## 2022-03-14 PROCEDURE — 76942 ECHO GUIDE FOR BIOPSY: CPT | Performed by: SURGERY

## 2022-03-14 PROCEDURE — 0 MEPIVACAINE HCL (PF) 1.5 % SOLUTION: Performed by: ANESTHESIOLOGY

## 2022-03-14 DEVICE — LIGACLIP MCA MULTIPLE CLIP APPLIERS, 20 SMALL CLIPS
Type: IMPLANTABLE DEVICE | Site: ARM | Status: FUNCTIONAL
Brand: LIGACLIP

## 2022-03-14 DEVICE — FLOSEAL HEMOSTATIC MATRIX, 5ML
Type: IMPLANTABLE DEVICE | Site: ARM | Status: FUNCTIONAL
Brand: FLOSEAL HEMOSTATIC MATRIX

## 2022-03-14 RX ORDER — SODIUM CHLORIDE 0.9 % (FLUSH) 0.9 %
3 SYRINGE (ML) INJECTION EVERY 12 HOURS SCHEDULED
Status: DISCONTINUED | OUTPATIENT
Start: 2022-03-14 | End: 2022-03-14 | Stop reason: HOSPADM

## 2022-03-14 RX ORDER — HYDROCODONE BITARTRATE AND ACETAMINOPHEN 7.5; 325 MG/1; MG/1
1 TABLET ORAL ONCE AS NEEDED
Status: DISCONTINUED | OUTPATIENT
Start: 2022-03-14 | End: 2022-03-14 | Stop reason: HOSPADM

## 2022-03-14 RX ORDER — PROMETHAZINE HYDROCHLORIDE 25 MG/1
25 TABLET ORAL ONCE AS NEEDED
Status: DISCONTINUED | OUTPATIENT
Start: 2022-03-14 | End: 2022-03-14 | Stop reason: HOSPADM

## 2022-03-14 RX ORDER — LABETALOL HYDROCHLORIDE 5 MG/ML
5 INJECTION, SOLUTION INTRAVENOUS
Status: DISCONTINUED | OUTPATIENT
Start: 2022-03-14 | End: 2022-03-14 | Stop reason: HOSPADM

## 2022-03-14 RX ORDER — HYDRALAZINE HYDROCHLORIDE 20 MG/ML
5 INJECTION INTRAMUSCULAR; INTRAVENOUS
Status: DISCONTINUED | OUTPATIENT
Start: 2022-03-14 | End: 2022-03-14 | Stop reason: HOSPADM

## 2022-03-14 RX ORDER — FENTANYL CITRATE 50 UG/ML
INJECTION, SOLUTION INTRAMUSCULAR; INTRAVENOUS
Status: COMPLETED | OUTPATIENT
Start: 2022-03-14 | End: 2022-03-14

## 2022-03-14 RX ORDER — FAMOTIDINE 10 MG/ML
20 INJECTION, SOLUTION INTRAVENOUS ONCE
Status: COMPLETED | OUTPATIENT
Start: 2022-03-14 | End: 2022-03-14

## 2022-03-14 RX ORDER — FENTANYL CITRATE 50 UG/ML
50 INJECTION, SOLUTION INTRAMUSCULAR; INTRAVENOUS
Status: DISCONTINUED | OUTPATIENT
Start: 2022-03-14 | End: 2022-03-14 | Stop reason: HOSPADM

## 2022-03-14 RX ORDER — SODIUM CHLORIDE 9 MG/ML
9 INJECTION, SOLUTION INTRAVENOUS ONCE
Status: COMPLETED | OUTPATIENT
Start: 2022-03-14 | End: 2022-03-14

## 2022-03-14 RX ORDER — HYDROCODONE BITARTRATE AND ACETAMINOPHEN 5; 325 MG/1; MG/1
1 TABLET ORAL EVERY 8 HOURS PRN
Qty: 8 TABLET | Refills: 0 | Status: SHIPPED | OUTPATIENT
Start: 2022-03-14

## 2022-03-14 RX ORDER — IBUPROFEN 600 MG/1
600 TABLET ORAL ONCE AS NEEDED
Status: DISCONTINUED | OUTPATIENT
Start: 2022-03-14 | End: 2022-03-14 | Stop reason: HOSPADM

## 2022-03-14 RX ORDER — DIPHENHYDRAMINE HYDROCHLORIDE 50 MG/ML
12.5 INJECTION INTRAMUSCULAR; INTRAVENOUS
Status: DISCONTINUED | OUTPATIENT
Start: 2022-03-14 | End: 2022-03-14 | Stop reason: HOSPADM

## 2022-03-14 RX ORDER — SODIUM CHLORIDE, SODIUM LACTATE, POTASSIUM CHLORIDE, CALCIUM CHLORIDE 600; 310; 30; 20 MG/100ML; MG/100ML; MG/100ML; MG/100ML
9 INJECTION, SOLUTION INTRAVENOUS CONTINUOUS
Status: DISCONTINUED | OUTPATIENT
Start: 2022-03-14 | End: 2022-03-14 | Stop reason: HOSPADM

## 2022-03-14 RX ORDER — MIDAZOLAM HYDROCHLORIDE 1 MG/ML
1 INJECTION INTRAMUSCULAR; INTRAVENOUS
Status: DISCONTINUED | OUTPATIENT
Start: 2022-03-14 | End: 2022-03-14 | Stop reason: HOSPADM

## 2022-03-14 RX ORDER — LIDOCAINE HYDROCHLORIDE 20 MG/ML
INJECTION, SOLUTION INFILTRATION; PERINEURAL AS NEEDED
Status: DISCONTINUED | OUTPATIENT
Start: 2022-03-14 | End: 2022-03-14 | Stop reason: SURG

## 2022-03-14 RX ORDER — OXYCODONE AND ACETAMINOPHEN 7.5; 325 MG/1; MG/1
1 TABLET ORAL EVERY 4 HOURS PRN
Status: DISCONTINUED | OUTPATIENT
Start: 2022-03-14 | End: 2022-03-14 | Stop reason: HOSPADM

## 2022-03-14 RX ORDER — CEFAZOLIN SODIUM IN 0.9 % NACL 3 G/100 ML
3 INTRAVENOUS SOLUTION, PIGGYBACK (ML) INTRAVENOUS ONCE
Status: COMPLETED | OUTPATIENT
Start: 2022-03-14 | End: 2022-03-14

## 2022-03-14 RX ORDER — LIDOCAINE HYDROCHLORIDE 10 MG/ML
0.5 INJECTION, SOLUTION EPIDURAL; INFILTRATION; INTRACAUDAL; PERINEURAL ONCE AS NEEDED
Status: DISCONTINUED | OUTPATIENT
Start: 2022-03-14 | End: 2022-03-14 | Stop reason: HOSPADM

## 2022-03-14 RX ORDER — EPHEDRINE SULFATE 50 MG/ML
5 INJECTION, SOLUTION INTRAVENOUS ONCE AS NEEDED
Status: DISCONTINUED | OUTPATIENT
Start: 2022-03-14 | End: 2022-03-14 | Stop reason: HOSPADM

## 2022-03-14 RX ORDER — DIPHENHYDRAMINE HCL 25 MG
25 CAPSULE ORAL
Status: DISCONTINUED | OUTPATIENT
Start: 2022-03-14 | End: 2022-03-14 | Stop reason: HOSPADM

## 2022-03-14 RX ORDER — LABETALOL HYDROCHLORIDE 5 MG/ML
INJECTION, SOLUTION INTRAVENOUS AS NEEDED
Status: DISCONTINUED | OUTPATIENT
Start: 2022-03-14 | End: 2022-03-14 | Stop reason: SURG

## 2022-03-14 RX ORDER — HEPARIN SODIUM 1000 [USP'U]/ML
INJECTION, SOLUTION INTRAVENOUS; SUBCUTANEOUS AS NEEDED
Status: DISCONTINUED | OUTPATIENT
Start: 2022-03-14 | End: 2022-03-14 | Stop reason: SURG

## 2022-03-14 RX ORDER — ONDANSETRON 2 MG/ML
4 INJECTION INTRAMUSCULAR; INTRAVENOUS ONCE AS NEEDED
Status: DISCONTINUED | OUTPATIENT
Start: 2022-03-14 | End: 2022-03-14 | Stop reason: HOSPADM

## 2022-03-14 RX ORDER — CARVEDILOL 25 MG/1
25 TABLET ORAL ONCE
Status: COMPLETED | OUTPATIENT
Start: 2022-03-14 | End: 2022-03-14

## 2022-03-14 RX ORDER — PROTAMINE SULFATE 10 MG/ML
INJECTION, SOLUTION INTRAVENOUS AS NEEDED
Status: DISCONTINUED | OUTPATIENT
Start: 2022-03-14 | End: 2022-03-14 | Stop reason: SURG

## 2022-03-14 RX ORDER — FLUMAZENIL 0.1 MG/ML
0.2 INJECTION INTRAVENOUS AS NEEDED
Status: DISCONTINUED | OUTPATIENT
Start: 2022-03-14 | End: 2022-03-14 | Stop reason: HOSPADM

## 2022-03-14 RX ORDER — SODIUM CHLORIDE 0.9 % (FLUSH) 0.9 %
3-10 SYRINGE (ML) INJECTION AS NEEDED
Status: DISCONTINUED | OUTPATIENT
Start: 2022-03-14 | End: 2022-03-14 | Stop reason: HOSPADM

## 2022-03-14 RX ORDER — SODIUM CHLORIDE 9 MG/ML
9 INJECTION, SOLUTION INTRAVENOUS CONTINUOUS
Status: DISCONTINUED | OUTPATIENT
Start: 2022-03-14 | End: 2022-03-14 | Stop reason: HOSPADM

## 2022-03-14 RX ORDER — ROPIVACAINE HYDROCHLORIDE 5 MG/ML
INJECTION, SOLUTION EPIDURAL; INFILTRATION; PERINEURAL
Status: COMPLETED | OUTPATIENT
Start: 2022-03-14 | End: 2022-03-14

## 2022-03-14 RX ORDER — MIDAZOLAM HYDROCHLORIDE 1 MG/ML
INJECTION INTRAMUSCULAR; INTRAVENOUS
Status: COMPLETED | OUTPATIENT
Start: 2022-03-14 | End: 2022-03-14

## 2022-03-14 RX ORDER — PROPOFOL 10 MG/ML
VIAL (ML) INTRAVENOUS CONTINUOUS PRN
Status: DISCONTINUED | OUTPATIENT
Start: 2022-03-14 | End: 2022-03-14 | Stop reason: SURG

## 2022-03-14 RX ORDER — PROMETHAZINE HYDROCHLORIDE 25 MG/1
25 SUPPOSITORY RECTAL ONCE AS NEEDED
Status: DISCONTINUED | OUTPATIENT
Start: 2022-03-14 | End: 2022-03-14 | Stop reason: HOSPADM

## 2022-03-14 RX ORDER — HYDROMORPHONE HYDROCHLORIDE 1 MG/ML
0.5 INJECTION, SOLUTION INTRAMUSCULAR; INTRAVENOUS; SUBCUTANEOUS
Status: DISCONTINUED | OUTPATIENT
Start: 2022-03-14 | End: 2022-03-14 | Stop reason: HOSPADM

## 2022-03-14 RX ORDER — NALOXONE HCL 0.4 MG/ML
0.2 VIAL (ML) INJECTION AS NEEDED
Status: DISCONTINUED | OUTPATIENT
Start: 2022-03-14 | End: 2022-03-14 | Stop reason: HOSPADM

## 2022-03-14 RX ADMIN — MIDAZOLAM 1 MG: 1 INJECTION INTRAMUSCULAR; INTRAVENOUS at 15:15

## 2022-03-14 RX ADMIN — MIDAZOLAM 2 MG: 1 INJECTION INTRAMUSCULAR; INTRAVENOUS at 15:04

## 2022-03-14 RX ADMIN — FAMOTIDINE 20 MG: 10 INJECTION INTRAVENOUS at 14:23

## 2022-03-14 RX ADMIN — SODIUM CHLORIDE 9 ML/HR: 9 INJECTION, SOLUTION INTRAVENOUS at 15:34

## 2022-03-14 RX ADMIN — ROPIVACAINE HYDROCHLORIDE 30 ML: 5 INJECTION, SOLUTION EPIDURAL; INFILTRATION; PERINEURAL at 15:14

## 2022-03-14 RX ADMIN — MIDAZOLAM 0.5 MG: 1 INJECTION INTRAMUSCULAR; INTRAVENOUS at 15:15

## 2022-03-14 RX ADMIN — LABETALOL HYDROCHLORIDE 10 MG: 5 INJECTION, SOLUTION INTRAVENOUS at 17:07

## 2022-03-14 RX ADMIN — MEPIVACAINE HYDROCHLORIDE 10 ML: 15 INJECTION, SOLUTION EPIDURAL; INFILTRATION at 15:14

## 2022-03-14 RX ADMIN — CEFAZOLIN SODIUM 3 G: 10 INJECTION, POWDER, FOR SOLUTION INTRAVENOUS at 16:43

## 2022-03-14 RX ADMIN — FENTANYL CITRATE 50 MCG: 0.05 INJECTION, SOLUTION INTRAMUSCULAR; INTRAVENOUS at 15:04

## 2022-03-14 RX ADMIN — SODIUM CHLORIDE 9 ML/HR: 9 INJECTION, SOLUTION INTRAVENOUS at 13:49

## 2022-03-14 RX ADMIN — PROPOFOL 50 MCG/KG/MIN: 10 INJECTION, EMULSION INTRAVENOUS at 16:55

## 2022-03-14 RX ADMIN — CARVEDILOL 25 MG: 25 TABLET, FILM COATED ORAL at 14:17

## 2022-03-14 RX ADMIN — PROTAMINE SULFATE 30 MG: 10 INJECTION, SOLUTION INTRAVENOUS at 17:39

## 2022-03-14 RX ADMIN — HEPARIN SODIUM 5000 UNITS: 1000 INJECTION INTRAVENOUS; SUBCUTANEOUS at 17:18

## 2022-03-14 RX ADMIN — LIDOCAINE HYDROCHLORIDE 60 MG: 20 INJECTION, SOLUTION INFILTRATION; PERINEURAL at 16:55

## 2022-03-14 NOTE — H&P
Name: Gisela Dyson ADMIT: 3/14/2022   : 1985  PCP: Emery Silveira MD    MRN: [id number] LOS: 0 days   AGE/SEX: 36 y.o. female  ROOM: [facility]    Pre-operative H&P    Patient Care Team:  Emery Silveira MD as PCP - General (Internal Medicine)  Larry Sanchez MD as Consulting Physician (Nephrology)  No chief complaint on file.    CC: Preop AV fistula    Subjective     Here for left arm fistula creation.    Review of Systems   All other systems reviewed and are negative.      Past Medical History:   Diagnosis Date   • JULISSA (acute kidney injury) (HCC)    • Diastolic CHF (HCC)    • Disease of thyroid gland    • ESRD (end stage renal disease) on dialysis (HCC)    • Essential hypertension    • Gout    • History of COVID-19 2022   • Neuropathy    • Nonrheumatic mitral valve regurgitation    • Obesity, morbid, BMI 50 or higher (HCC)    • Scratch marcel     ON LEFT ARM WITH SMALL OPEN AREA INSTRUCTED PT TO CALL DR FLEMING IF AREA IS STILL OPEN ON 3/14/2022.   • Tachycardia    • Type 2 diabetes mellitus (HCC)      Past Surgical History:   Procedure Laterality Date   • INSERTION HEMODIALYSIS CATHETER Right 2021    Procedure: TUNNEL DIALYSIS, PALINDROME CATH;  Surgeon: Ancelmo Menendez MD;  Location: [facility];  Service: Vascular;  Laterality: Right;     Family History   Adopted: Yes   Problem Relation Age of Onset   • Malig Hyperthermia Neg Hx      Social History     Tobacco Use   • Smoking status: Former Smoker   • Smokeless tobacco: Never Used   • Tobacco comment: QUIT AGE 32   Vaping Use   • Vaping Use: Never used   Substance Use Topics   • Alcohol use: Yes     Alcohol/week: 1.0 - 2.0 standard drink     Types: 1 - 2 Glasses of wine per week     Comment: socially   • Drug use: No     Medications Prior to Admission   Medication Sig Dispense Refill Last Dose   • allopurinol (ZYLOPRIM) 300 MG tablet Take 300 mg by mouth Daily.      • amLODIPine (NORVASC) 10 MG tablet Take  "10 mg by mouth Every Night.      • carvedilol (COREG) 25 MG tablet Take 1 tablet by mouth 2 (Two) Times a Day With Meals. 60 tablet 0    • CHLORHEXIDINE GLUCONATE CLOTH EX Apply  topically. AS DIRECTED PREOP      • Ergocalciferol (ERGOCAL PO) Take 1.25 mg by mouth 3 (Three) Times a Week.      • Ferric Citrate (Auryxia) 1  MG(Fe) tablet Take 420 mg by mouth 3 (Three) Times a Day. PT STATES HAS NOT BEEN TAKING      • hydrALAZINE (APRESOLINE) 100 MG tablet Take 100 mg by mouth 2 (Two) Times a Day.      • levothyroxine (SYNTHROID, LEVOTHROID) 125 MCG tablet Take 125 mcg by mouth Every Night.      • lisinopril (PRINIVIL,ZESTRIL) 10 MG tablet Take 1 tablet by mouth Every Night for 30 days. 30 tablet 0    • montelukast (SINGULAIR) 10 MG tablet Take 1 tablet by mouth Every Night for 30 days. 30 tablet 0    • Needle, Disp, (BD DISP NEEDLES) 30G X 1/2\" misc Use daily for injection of Tresiba. 100 each 3      ceFAZolin, 3 g, Intravenous, Once           Patient has no known allergies.    Objective     Physical Exam:  Physical Exam  Constitutional:       Appearance: She is well-developed.   Pulmonary:      Effort: Pulmonary effort is normal. No respiratory distress.   Abdominal:      General: There is no distension.      Palpations: Abdomen is soft.   Neurological:      Mental Status: She is alert and oriented to person, place, and time.          Vital Signs and Labs:  Vital Signs No data found.    I/O:  No intake/output data recorded.    CBC    Results from last 7 days   Lab Units 03/11/22  1504   WBC 10*3/mm3 8.77   HEMOGLOBIN g/dL 10.2*   PLATELETS 10*3/mm3 291     BMP   Results from last 7 days   Lab Units 03/11/22  1504   SODIUM mmol/L 136   POTASSIUM mmol/L 5.3*   CHLORIDE mmol/L 98   CO2 mmol/L 23.0   BUN mg/dL 35*   CREATININE mg/dL 8.41*   GLUCOSE mg/dL 129*     Cr Clearance Estimated Creatinine Clearance: 11.9 mL/min (A) (by C-G formula based on SCr of 8.41 mg/dL (H)).  Coag     HbA1C   Lab Results   Component " Value Date    HGBA1C 6.00 (H) 06/18/2021    HGBA1C 8.10 (H) 01/10/2020    HGBA1C 9.20 (H) 11/07/2019     Blood Glucose   Glucose   Date/Time Value Ref Range Status   03/14/2022 1314 86 70 - 130 mg/dL Final     Comment:     Meter: [id number] : [id number] Enoc DAVEY     Infection     CMP   Results from last 7 days   Lab Units 03/11/22  1504   SODIUM mmol/L 136   POTASSIUM mmol/L 5.3*   CHLORIDE mmol/L 98   CO2 mmol/L 23.0   BUN mg/dL 35*   CREATININE mg/dL 8.41*   GLUCOSE mg/dL 129*     ABG      UA      BASHIR  No results found for: POCMETH, POCAMPHET, POCBARBITUR, POCBENZO, POCCOCAINE, POCOPIATES, POCOXYCODO, POCPHENCYC, POCPROPOXY, POCTHC, POCTRICYC  Radiology(recent) No radiology results for the last day    There are no hospital problems to display for this patient.      Assessment/Plan       * No active hospital problems. *      36 y.o. female patient with persistent renal failure on dialysis through a right-sided tunneled catheter.  She comes in today for a left brachiocephalic fistula.  I discussed with her the risk benefits alternatives of fistula creation and the expected postoperative course.  Informed consent obtained.    I discussed the patients findings and my recommendations with patient and nursing staff.    Amador Schilling MD  03/14/22  13:25 EDT    Please call my office with any question: [phone]

## 2022-03-14 NOTE — ANESTHESIA PROCEDURE NOTES
Peripheral Block    Pre-sedation assessment completed: 3/14/2022 3:14 PM    Patient reassessed immediately prior to procedure    Patient location during procedure: holding area  Start time: 3/14/2022 3:14 PM  Stop time: 3/14/2022 3:33 PM  Reason for block: at surgeon's request and post-op pain management  Performed by  Anesthesiologist: Jeancarlos Romero MD  Preanesthetic Checklist  Completed: patient identified, IV checked, site marked, risks and benefits discussed, surgical consent, monitors and equipment checked, pre-op evaluation and timeout performed  Prep:  Pt Position: sitting  Sterile barriers:cap, gloves, gown, mask and sterile barriers  Prep: ChloraPrep  Patient monitoring: blood pressure monitoring, continuous pulse oximetry and EKG  Procedure    Sedation: yes  Performed under: local infiltration  Guidance:ultrasound guided    ULTRASOUND INTERPRETATION.  Using ultrasound guidance a 21 G gauge needle was placed in close proximity to the brachial plexus nerve, at which point, under ultrasound guidance anesthetic was injected in the area of the nerve and spread of the anesthesia was seen on ultrasound in close proximity thereto.  There were no abnormalities seen on ultrasound; a digital image was taken; and the patient tolerated the procedure with no complications. Images:still images obtained    Laterality:left  Block Type:supraclavicular  Injection Technique:single-shot  Needle Type:echogenic  Needle Gauge:21 G      Medications Used: Mepivacaine HCl (PF) (CARBOCAINE) 1.5 % injection, 10 mL  ropivacaine (NAROPIN) 0.5 % injection, 30 mL      Medications  Comment:Ultrasound Interpretation: Using ultrasound guidance, the needle was placed in close proximity to the target nerve and anesthetic was injected in the area of the target nerve and/or bundles, and spread of the anesthetic was seen on ultrasound in close proximity thereto.  There were no abnormalities seen on ultrasound; a digital / physical image  was taken; and the patient tolerated the procedure with no complications.   Block placed for postoperative pain control per surgeon request.    Post Assessment  Injection Assessment: negative aspiration for heme, no paresthesia on injection and incremental injection  Patient Tolerance:comfortable throughout block  Complications:no

## 2022-03-14 NOTE — ANESTHESIA POSTPROCEDURE EVALUATION
"Patient: Gisela Dyson    Procedure Summary     Date: 03/14/22 Room / Location: [facility] OR [address] [place] [address] MAIN OR    Anesthesia Start: 1647 Anesthesia Stop: 1807    Procedure: LEFT ARM BRACHIOCEPHALIC FISTULA CREATION (Left ) Diagnosis:     Surgeons: Amador Schilling MD Provider: Christiano Rivas MD    Anesthesia Type: MAC, regional ASA Status: 3          Anesthesia Type: MAC, regional    Vitals  Vitals Value Taken Time   /88 03/14/22 1816   Temp     Pulse 76 03/14/22 1819   Resp 16 03/14/22 1803   SpO2 98 % 03/14/22 1819   Vitals shown include unvalidated device data.        Post Anesthesia Care and Evaluation    Patient location during evaluation: bedside  Patient participation: complete - patient participated  Level of consciousness: awake and alert  Pain management: adequate  Airway patency: patent  Anesthetic complications: No anesthetic complications  PONV Status: controlled  Cardiovascular status: acceptable and hemodynamically stable  Respiratory status: acceptable, spontaneous ventilation and nonlabored ventilation  Hydration status: acceptable    Comments: /81   Pulse 80   Temp 36.6 °C (97.8 °F) (Oral)   Resp 16   Ht 160 cm (63\")   Wt 119 kg (262 lb 3.2 oz)   LMP 03/03/2022   SpO2 97%   BMI 46.45 kg/m²         "

## 2022-03-14 NOTE — ANESTHESIA PREPROCEDURE EVALUATION
Anesthesia Evaluation     Patient summary reviewed and Nursing notes reviewed                Airway   Mallampati: II  TM distance: >3 FB  Neck ROM: full  Possible difficult intubation and Large neck circumference  Dental - normal exam     Pulmonary - normal exam   Cardiovascular - normal exam    ECG reviewed    (+) hypertension 2 medications or greater, valvular problems/murmurs MR and TI, dysrhythmias Tachycardia, CHF ,     ROS comment: EF 49%, mod-severe MT, mod TR by ECHO 6/21    Neuro/Psych    ROS Comment: Neuropathy  GI/Hepatic/Renal/Endo    (+) obesity, morbid obesity,  renal disease ESRD and dialysis, diabetes mellitus type 2, thyroid problem hypothyroidism    Musculoskeletal     Abdominal   (+) obese,    Substance History      OB/GYN          Other            Phys Exam Other: Hemodialysis catheter right chest              Anesthesia Plan    ASA 3     MAC and regional   (Interscalene/supraclavicular block preop/Propofol drip prn in OR)  intravenous induction     Anesthetic plan, all risks, benefits, and alternatives have been provided, discussed and informed consent has been obtained with: patient.    Plan discussed with CRNA.        CODE STATUS:

## 2022-03-14 NOTE — OP NOTE
Date of Admission:  3/14/2022  Today's Date:  03/14/22  Amador Shcilling MD  [facility]      Preoperative Diagnosis: End Stage Renal Disease    Postoperative Diagnosis: same as above    Procedure Performed: Left brachial-cephalic fistula creation    CPT:  08056    Surgeon: Amador Schilling MD    Assistant:  Tasia Tavarez RN    Anesthesia: MAC with regional    Staff:   Circulator: Barb Stubbs RN  Scrub Person: Mary Chen  Assistant: Tasia Tavarez    Estimated Blood Loss: minimal    Findings:     Vein quality:  Good   Artery quality:  Good   Post operative thrill quality:  Good   Post distal perfusion: Triphasic radial, ulnar, and palmar arch.    Implants:    Implant Name Type Inv. Item Serial No.  Lot No. LRB No. Used Action   CLIPAPPLR M/ ENDO LIGACLIP 9 3/8IN SM - [id number] Implant CLIPAPPLR M/ ENDO LIGACLIP 9 3/8IN   [employer]  DIV OF J AND J [id number] Left 1 Implanted   KT SEAL HEMOS ABS FLOSEAL MATRX FAST/PREP 5ML - [id number] Implant KT SEAL HEMOS ABS FLOSEAL MATRX FAST/PREP 5ML  [facility] [id number] Left 1 Implanted        Specimen: none    Complications: none    Dispo: to PACU    Indication for procedure: 36 y.o. female with renal failure on Dialysis through catheter.  I discussed with her the risk benefits alternatives to fistula creation.  Informed consent obtained.    Description of procedure:   The patient was taken to the operating room and placed in the supine position.  The arm was extended on an arm board and then prepped and draped in the usual sterile fashion.  Preoperative antibiotics were given.  A full surgical timeout was done.       A longitudinal incision was made under local anesthesia and the cephalic vein was dissected out for several centimeters.  Next the deep fascia was opened and the brachial artery was dissected out. The patient was systemically heparinized. The vein was marked for orientation, divided and tied off distally with silk ties.   The vein was flushed with heparinized saline and a coronary dilator was passed proximally to ensure no venous webs.  After the heparin had been allowed to circulate for 5 minutes clamps were applied to the brachial artery.  It was opened longitudinally with an 11 blade and then Prabhakar scissors.  An anastomosis was created using 6-0 running Prolene between the artery and the vein.  Appropriate backbleeding and flushing maneuvers were done.  Cephalic vein branches in the juxta anastomotic segment were ligated.      Flow was then restored.  The field was irrigated with sterile saline.  Hemostasis was obtained.  Incision was closed with multiple layers, the subcutaneous layers using 3-0 Vicryl.  The skin subcuticular layer using 4-0 Vicryl.  The incision was clean and dry and covered in Dermabond.  The patient tolerated procedure well, counts are correct.      Amador Schilling MD  03/14/22    There are no hospital problems to display for this patient.

## 2022-03-14 NOTE — DISCHARGE INSTRUCTIONS
What to expect after a Nerve Block    Nerve blocks administered to block pain affect many types of nerves, including those nerves that control movement, pain, and normal sensation. Following a nerve block, you may notice some bruising at the site where the block was given. You may experience sensations such as: numbness of the affected area or limb, tingling, heaviness (that is the limb feels heavy to you), weakness or inability to move the affected arm or leg, or a feeling as if your arm or leg has “fallen asleep.”     A nerve block can last from 2 to 36 hours depending on the medications used.  Usually the weakness wears off first followed by the tingling and heaviness. As the block wears off, you may begin to notice pain; however, this sequence of events may occur in any order. Typically, you will be able to move your limb before you will feel it. Once a nerve block begins to wear off, the effects are usually completely gone within 60 minutes.  If you experience continued side effects that you believe are block related for longer than 48 hours, please call your healthcare provider. Please see block-specific instructions below.    Instructions for any block involving the shoulder or arm  If you have had any kind of shoulder/arm block, you will go home with your arm in a sling. Wear the sling until the block has completely worn off. You may be required to wear it for a longer period of time per your surgeon’s recommendations.  If you have had a shoulder/arm block, it is a good idea to sleep on a recliner with pillows under your arm.    You may experience symptoms such as:  Shortness of breath  Hoarseness   Blurry vision  Unequal pupils  Drooping of your face on the same side as the block was performed    These are side effects associated with this kind of block and should go away within 12 hours.    Note: If you have severe or prolonged shortness of breath, please seek medical assistance as soon as possible.      Protection of a “blocked” arm or leg (limb)  After a nerve block, you cannot feel pain, pressure, or extremes of temperature in the affected limb. And because of this, your blocked limb is at more risk for injury. For example, it is possible to burn your limb on an extremely hot surface without feeling it.     When resting, it is important to reposition your limb periodically to avoid prolonged pressure on it. This may require the use of pillows and padding.    While sleeping, you should avoid rolling onto the affected limb or putting too much pressure on it.     If you have a cast or tight dressing, check the color of your fingers or toes of the affected limb. Call your surgeon if they look discolored (that is, dusky, dark colored).    Use caution in cold weather. Cover your limb appropriately to protect it from the cold.      Pain Management:    Your surgeon will give you a prescription for pain medication. Begin taking this before the nerve block wears off. Bear in mind that sometimes the block can wear off in the middle of the night.                                                                                                                                                                                                                                    [facility]  Emery Contreras, [place], Shakir Zarate [place], [place]  [address], [address]  [phone]    Post-Operative Instructions for AV Fistula / Graft   Diet: Regular Diet    Medications: Take your regularly scheduled medications on the day of your surgery, unless your doctor has directed you otherwise. You may be sent home with a prescription for pain medication, follow the directions as prescribed.    Activity Restrictions / Driving: Avoid lifting more than 15 pounds or other activities that stress or compress the access area. No driving for the remainder of the day after surgery. You may drive when you no longer are  "taking narcotic pain medications. If a nerve block was done to numb your arm for surgery, you will be placed in an arm sling.  This numbness and inability to move the arm can last for as little as 6 hours but as many as 18.  The sling should be used during this time but can be removed when sensation and movement of your arm is normal and does not need to be used after that. Use of the arm is encouraged after the surgery.    Incision Care: Some bruising is normal. If you have drainage from the incision please notify the office. Dressing should be removed in 48 hours. After dressing is removed, it is OK to shower. Do not submerge incision until cleared by your surgeon (bath or swimming).    Bathing and Showering: You may shower after you remove your dressing.    Follow-up Appointments: You will need to return to the office for a follow-up visit within 1-3 weeks after your surgery. Please make sure you have your appointment scheduled, call [phone].    The patient (you) should:  1. Avoid wearing tight constrictive clothing over that arm.  2. Avoid wearing jewelry that is tight, such as a watch on the access arm.  3. Avoid carrying heavy objects.  4. Avoid purse straps over the fistula.  5. Avoid sleeping on the arm or keeping it bent for extended periods of time.  6. Each day, using your opposite hand, feel over the fistula for the \"thrill\" or vibration that is normally present.    Fistula Information / Care:   It is normal to have swelling in the surgical area. To help control this swelling, you should elevate your arm on a pillow.   Wiggle your fingers and clinch your fist 10 times every hour, while awake, for the first 5-7 days. Also, bend and straighten at the elbow to regain normal range of motion. These exercises are designed to promote circulation in the fingers and aid in draining away the excess fluid accumulation in the immediate area.  No blood pressures or needle sticks in the arm with your access.    Call " the office for the followin. Fever greater than 101.0  2. Uncontrolled pain. This is on a scale of 1-10 (10 being the worst pain imaginable) your pain is a level 7 or above.  3. It is important that you notify our office if you are having numbness and significant pain in the extremity in which you have just had surgery!  4. Decreased or absent thrill.  5. Nausea, diarrhea, and/or vomiting that continue for 12-24 hours.  6. Signs of an infection: redness, increased swelling, drainage, fever and/or chills.  7. Chest pain or difficulty breathing.    The fistula or graft CAN NOT be used until the MD has given written approval. Generally, a graft will be ready to use in 2 weeks, and a fistula will be ready to use in 6-8 weeks.     If you have further questions after reading this handout, the office is open from 8:30am to 5:00pm Monday through Friday. Call [phone].

## 2022-05-16 ENCOUNTER — HOSPITAL ENCOUNTER (EMERGENCY)
Facility: HOSPITAL | Age: 37
Discharge: LEFT BEFORE DISCHARGE | End: 2022-05-16

## 2022-05-16 VITALS
SYSTOLIC BLOOD PRESSURE: 170 MMHG | TEMPERATURE: 96.4 F | DIASTOLIC BLOOD PRESSURE: 100 MMHG | RESPIRATION RATE: 18 BRPM | OXYGEN SATURATION: 98 % | HEART RATE: 95 BPM

## 2022-05-16 LAB — QT INTERVAL: 395 MS

## 2022-05-16 PROCEDURE — 93005 ELECTROCARDIOGRAM TRACING: CPT

## 2022-05-16 PROCEDURE — 93010 ELECTROCARDIOGRAM REPORT: CPT | Performed by: INTERNAL MEDICINE

## 2022-05-16 PROCEDURE — 99211 OFF/OP EST MAY X REQ PHY/QHP: CPT

## 2022-05-16 RX ORDER — SODIUM CHLORIDE 0.9 % (FLUSH) 0.9 %
10 SYRINGE (ML) INJECTION AS NEEDED
Status: DISCONTINUED | OUTPATIENT
Start: 2022-05-16 | End: 2022-05-16 | Stop reason: HOSPADM

## 2022-05-16 NOTE — ED TRIAGE NOTES
All triage performed with this RN wearing appropriate PPE.  Pt placed in mask upon arrival to ED.  Patient c/o syncopal episode during dialysis. She missed 37 minutes of her dialysis.

## 2022-11-04 ENCOUNTER — HOSPITAL ENCOUNTER (EMERGENCY)
Facility: HOSPITAL | Age: 37
Discharge: LEFT BEFORE DISCHARGE | End: 2022-11-04
Attending: EMERGENCY MEDICINE | Admitting: EMERGENCY MEDICINE

## 2022-11-04 VITALS
OXYGEN SATURATION: 99 % | RESPIRATION RATE: 18 BRPM | TEMPERATURE: 97.8 F | DIASTOLIC BLOOD PRESSURE: 100 MMHG | HEART RATE: 102 BPM | SYSTOLIC BLOOD PRESSURE: 182 MMHG

## 2022-11-04 DIAGNOSIS — T82.898A PROBLEM WITH DIALYSIS ACCESS, INITIAL ENCOUNTER: ICD-10-CM

## 2022-11-04 DIAGNOSIS — I10 HYPERTENSION, UNSPECIFIED TYPE: ICD-10-CM

## 2022-11-04 DIAGNOSIS — E87.5 HYPERKALEMIA: Primary | ICD-10-CM

## 2022-11-04 LAB
ALBUMIN SERPL-MCNC: 4 G/DL (ref 3.5–5.2)
ALBUMIN/GLOB SERPL: 1.1 G/DL
ALP SERPL-CCNC: 97 U/L (ref 39–117)
ALT SERPL W P-5'-P-CCNC: 13 U/L (ref 1–33)
ANION GAP SERPL CALCULATED.3IONS-SCNC: 16.8 MMOL/L (ref 5–15)
AST SERPL-CCNC: 15 U/L (ref 1–32)
BILIRUB SERPL-MCNC: 0.5 MG/DL (ref 0–1.2)
BUN SERPL-MCNC: 61 MG/DL (ref 6–20)
BUN/CREAT SERPL: 5.2 (ref 7–25)
CALCIUM SPEC-SCNC: 8.7 MG/DL (ref 8.6–10.5)
CHLORIDE SERPL-SCNC: 97 MMOL/L (ref 98–107)
CO2 SERPL-SCNC: 22.2 MMOL/L (ref 22–29)
CREAT SERPL-MCNC: 11.66 MG/DL (ref 0.57–1)
EGFRCR SERPLBLD CKD-EPI 2021: 4 ML/MIN/1.73
GLOBULIN UR ELPH-MCNC: 3.7 GM/DL
GLUCOSE SERPL-MCNC: 93 MG/DL (ref 65–99)
HOLD SPECIMEN: NORMAL
POTASSIUM SERPL-SCNC: 6.4 MMOL/L (ref 3.5–5.2)
PROT SERPL-MCNC: 7.7 G/DL (ref 6–8.5)
QT INTERVAL: 377 MS
SODIUM SERPL-SCNC: 136 MMOL/L (ref 136–145)

## 2022-11-04 PROCEDURE — 93010 ELECTROCARDIOGRAM REPORT: CPT | Performed by: INTERNAL MEDICINE

## 2022-11-04 PROCEDURE — 36415 COLL VENOUS BLD VENIPUNCTURE: CPT

## 2022-11-04 PROCEDURE — 99284 EMERGENCY DEPT VISIT MOD MDM: CPT

## 2022-11-04 PROCEDURE — 80053 COMPREHEN METABOLIC PANEL: CPT | Performed by: EMERGENCY MEDICINE

## 2022-11-04 PROCEDURE — 93005 ELECTROCARDIOGRAM TRACING: CPT | Performed by: EMERGENCY MEDICINE

## 2022-11-04 RX ORDER — SODIUM CHLORIDE 0.9 % (FLUSH) 0.9 %
10 SYRINGE (ML) INJECTION AS NEEDED
Status: DISCONTINUED | OUTPATIENT
Start: 2022-11-04 | End: 2022-11-04 | Stop reason: HOSPADM

## 2022-11-04 RX ADMIN — SODIUM ZIRCONIUM CYCLOSILICATE 10 G: 10 POWDER, FOR SUSPENSION ORAL at 18:33

## 2022-11-04 NOTE — ED NOTES
Pt goes to dialysis [facility]. Pt completed dialysis on Wednesday, went today, but was unable to receive dialysis d/t to the fistula. Left arms is warm and swollen. Thrill palpated.

## 2022-11-04 NOTE — ED PROVIDER NOTES
EMERGENCY DEPARTMENT ENCOUNTER    CHIEF COMPLAINT  Chief Complaint: Dialysis access issue  History given by: Patient  History limited by: Nothing  Room Number: 11/11  PMD: Emery Silveira MD  Vascular surgeon: Dr. Amador Schilling  Nephrologist: Dr. Kirk Mcneil    HPI:  Pt is a 37 y.o. female with history of end-stage renal disease on dialysis Monday Wednesday Friday presents sent from dialysis center with concern for swelling and tenderness of area around left brachiocephalic fistula for the past 3 days since patient's last dialysis 2 days prior to arrival.  Patient denies headache, visual disturbance, weakness/numbness, fever, redness, chest pain, shortness of air, abdominal pain, nausea/vomiting.  Patient reports she had a full dialysis on Wednesday, 2 days prior to arrival.  Patient denies weakness, numbness, coolness or swelling of left arm below the elbow.  Patient reports similar symptoms in the past which resolved with alternating heat and ice    Duration: 3 days  Associated Symptoms: Tenderness, swelling  Aggravating Factors: Palpation  Alleviating Factors: Nothing  Treatment before arrival: Seen at dialysis today and dialysis nurse refused to do treatment and advised patient to come to ER for further evaluation and treatment.      Upon review the patient's chart is noted:  Patient has left brachiocephalic fistula created 3/14/2022 by Dr. Amador Schilling    PAST MEDICAL HISTORY  Active Ambulatory Problems     Diagnosis Date Noted   • Chronic diastolic congestive heart failure (HCC) 11/07/2019   • JULISSA (acute kidney injury) ([facility]) 11/07/2019   • Obesity, morbid, BMI 50 or higher (HCC) 11/07/2019   • Type 2 diabetes mellitus with renal complication ([facility]) 11/07/2019   • Essential hypertension 11/15/2019   • Nonrheumatic mitral valve regurgitation 11/15/2019   • Primary hypothyroidism 01/10/2020   • Hirsutism 01/10/2020   • Alopecia 01/10/2020   • Acute renal failure (HCC) 06/17/2021   • ESRD on hemodialysis ([facility])  06/18/2021     Resolved Ambulatory Problems     Diagnosis Date Noted   • CKD (chronic kidney disease) stage 3, GFR 30-59 ml/min ([facility]) 12/13/2019     Past Medical History:   Diagnosis Date   • Diastolic CHF ([facility])    • Disease of thyroid gland    • ESRD (end stage renal disease) on dialysis ([facility])    • Gout    • History of COVID-19 01/09/2022   • Neuropathy    • Scratch marcel    • Tachycardia    • Type 2 diabetes mellitus (HCC)        PAST SURGICAL HISTORY  Past Surgical History:   Procedure Laterality Date   • ARTERIOVENOUS FISTULA/SHUNT SURGERY Left 3/14/2022    Procedure: LEFT ARM BRACHIOCEPHALIC FISTULA CREATION;  Surgeon: Amador Schilling MD;  Location: [facility] OR;  Service: Vascular;  Laterality: Left;   • INSERTION HEMODIALYSIS CATHETER Right 6/18/2021    Procedure: TUNNEL DIALYSIS, PALINDROME CATH;  Surgeon: Ancelmo Menendez MD;  Location: [facility] OR;  Service: Vascular;  Laterality: Right;       FAMILY HISTORY  Family History   Adopted: Yes   Problem Relation Age of Onset   • Malig Hyperthermia Neg Hx        SOCIAL HISTORY  Social History     Socioeconomic History   • Marital status: Single   Tobacco Use   • Smoking status: Former     Years: 1.00     Types: Cigarettes   • Smokeless tobacco: Never   • Tobacco comments:     QUIT AGE 32   Vaping Use   • Vaping Use: Never used   Substance and Sexual Activity   • Alcohol use: Yes     Alcohol/week: 1.0 - 2.0 standard drink     Types: 1 - 2 Glasses of wine per week     Comment: socially   • Drug use: No   • Sexual activity: Defer       ALLERGIES  Patient has no known allergies.    REVIEW OF SYSTEMS  Review of Systems   Constitutional: Negative for chills and fever.   HENT: Negative for rhinorrhea and sore throat.    Eyes: Negative for visual disturbance.   Respiratory: Negative for cough and shortness of breath.    Cardiovascular: Negative for chest pain, palpitations and leg swelling.   Gastrointestinal: Negative for abdominal pain, diarrhea and  vomiting.   Endocrine: Negative.    Genitourinary: Negative for decreased urine volume, dysuria and frequency.   Musculoskeletal: Negative for neck pain.   Skin: Negative for rash.        Tenderness and localized swelling around left upper extremity brachiocephalic fistula   Neurological: Negative for syncope and headaches.   Psychiatric/Behavioral: Negative.    All other systems reviewed and are negative.      PHYSICAL EXAM  ED Triage Vitals   Temp Heart Rate Resp BP SpO2   11/04/22 1304 11/04/22 1304 11/04/22 1304 11/04/22 1543 11/04/22 1304   97.6 °F (36.4 °C) 106 18 (!) 202/108 98 %      Temp src Heart Rate Source Patient Position BP Location FiO2 (%)   11/04/22 1304 11/04/22 1304 -- -- --   Tympanic Monitor          Physical Exam  Vitals and nursing note reviewed.   Constitutional:       General: She is in acute distress (mild).      Appearance: She is not toxic-appearing.   HENT:      Head: Normocephalic and atraumatic.   Eyes:      Extraocular Movements: EOM normal.   Cardiovascular:      Rate and Rhythm: Normal rate and regular rhythm.      Pulses: Intact distal pulses.           Posterior tibial pulses are 2+ on the right side and 2+ on the left side.      Heart sounds: Normal heart sounds. No murmur heard.     Comments: Patient has good thrill of left upper arm brachiocephalic fistula, mild tenderness, swelling immediately surrounding the site without redness, fluctuance, obvious hematoma.  No drainage  Pulmonary:      Effort: Pulmonary effort is normal. No respiratory distress.      Breath sounds: Normal breath sounds.   Abdominal:      General: Bowel sounds are normal.      Palpations: Abdomen is soft.      Tenderness: There is no abdominal tenderness. There is no guarding or rebound.   Musculoskeletal:         General: No edema. Normal range of motion.      Cervical back: Normal range of motion and neck supple.   Skin:     General: Skin is warm and dry.   Neurological:      Mental Status: She is alert  and oriented to person, place, and time.   Psychiatric:         Mood and Affect: Affect normal.         LAB RESULTS     CMP significant for creatinine of 11.6, potassium 6.4, bicarb 22       I ordered the above labs and reviewed the results    RADIOLOGY  No orders to display        I ordered the above noted radiological studies. Interpreted by radiologist. Viewed by me in PACS.       PROCEDURES  Procedures      PROGRESS AND CONSULTS  ED Course as of 11/06/22 1313   Fri Nov 04, 2022   1657 Called to the bedside for heart rate 200s, upon assessment of the patient it is noted that the monitor is double counting the patient's EKG complexes and her heart rate is running from .  Blood pressure 190s over 100 [TO]   1723 Dr. White voices understanding of patient's clinical status, agrees to come in and examine the patient in the ER for further recommendations. [TO]   1729 Discussed with Dr. Hayden Hunter who is aware of patient's in the ER, needs rescheduling of dialysis if does not need emergent dialysis today. [TO]   1738 Dr. Osborne has seen and examined the patient, feels that left upper extremity dialysis access is able to be used immediately. [TO]   1821 Patient in ER voices understanding of need for IV access, monitoring, treatment/dialysis for hyperkalemia, voices understanding of death or disability risk without treatment and refuses admission, IV placement, monitoring, refuses to stay on the monitor.  Patient reports she will follow-up as an outpatient with her dialysis center and is encouraged to do so soon as possible.  Patient encouraged to return to the emergency department immediately if she changes her mind, has other questions, concerns or symptoms. [TO]      ED Course User Index  [TO] Yaneth Werner MD         Before discharge patient report she has an appointment for dialysis at her usual center between 10 and 11 AM 11/5/22    Dr. Hayden Hunter returned call after patient left against medical advice,  is aware of clearance of dialysis access in patient's left arm by Dr White who has examined the patient, patient's hyperkalemia, refusal to stay, order of dose of Lokelma prior to discharge and agrees to follow closely to ensure patient has access to dialysis as soon as possible as an outpatient.    EKG          EKG time: 1640  Rhythm/Rate: Sinus rhythm, rate in the 90s  P waves and NH: Normal P waves, normal DAIANA's  QRS, axis: Right axis deviation  ST and T waves: Unremarkable ST/T wave findings    Interpreted Contemporaneously by me, independently viewed  Minimally changed compared to prior 5/6/2022        MEDICAL DECISION MAKING  Results were reviewed/discussed with the patient and they were also made aware of online access. Pt also made aware that some labs, such as cultures, will not be resulted during ER visit and followup with PMD is necessary.       MDM       DIAGNOSIS  Final diagnoses:   Hyperkalemia   Problem with dialysis access, initial encounter (HCC)   Hypertension, unspecified type       DISPOSITION  AMA    Latest Documented Vital Signs:  As of 16:16 EDT  BP- (!) 182/100 HR- 102 Temp- 97.8 °F (36.6 °C) (Oral) O2 sat- 100%    --  Patient was wearing facemask when I entered the room and throughout our encounter. Full protective equipment was worn throughout this patient encounter including a face mask, eye protection and gloves. Hand hygiene was performed before donning protective equipment and after removal when leaving the room.      Yaneth Werner MD  11/06/22 7950

## 2022-11-04 NOTE — ED NOTES
Pt signed AMA form stating she is aware of the risk of leaving and benefits of staying. Pt will f/u tomorrow 11/5/22 at [facility] [place] to receive dialysis.

## 2022-11-04 NOTE — DISCHARGE INSTRUCTIONS
You are advised to follow closely with your primary care provider, Dr. Emery Silveira in 2-3 days for recheck, blood pressure recheck final results of lab work and imaging testing, and further testing/treatment as needed.    Follow with Dr. Kirk Mcneil as soon as possible for recheck, blood pressure recheck further testing, treatment as needed.  Please go to dialysis as soon as possible.    Avoid foods high in potassium  Take your meds as previously directed      Please return to the emergency department immediately if you change your mind regarding treatment or with chest pain, shortness of air, abdominal pain, persistent vomiting/fever, blood in emesis or stool, lightheadedness/fainting, problems with speech, one sided weakness/numbness, new incontinence, problems with vision, altered mental status, or for worsening of symptoms or other concerns.

## 2022-11-04 NOTE — CONSULTS
Name: Gisela Dyson ADMIT: 2022   : 1985  PCP: Emery Silveira MD    MRN: [id number] LOS: 0 days   AGE/SEX: 37 y.o. female  ROOM:    [facility]      Patient Care Team:  Emery Silveira MD as PCP - General (Internal Medicine)  Larry Sanchez MD as Consulting Physician (Nephrology)  Chief Complaint   Patient presents with   • Vascular Access Problem     CC:left arm swelling    Subjective     Inpatient Vascular Surgery Consult  Consult performed by: Justino Osborne II, MD  Consult ordered by: Yaneth Werner MD        History of Present Illness     Patient is a 37-year-old pleasant but disgruntled lady with a history of end-stage renal disease on dialysis via left arm brachiocephalic fistula that Dr. Schilling performed in March of this year.  The patient has been doing well at dialysis but she says today after dialysis her nurse felt that her arm was swollen and insisted she go to the emergency room.  The patient states that she has had some swelling of her arm similar to this previously that she manage on her own with warm compresses and it did resolve.  The patient denies any other symptoms and she is angry at being at the hospital because she is going to be late for work.  She says her arm does not hurt she has no fevers chest pain difficulty breathing or any other abnormalities.  There is very mild tenderness over her fistula but there are no other signs of injury or cellulitis.    Review of Systems       Past Medical History:   Diagnosis Date   • JULISSA (acute kidney injury) ([facility])    • Diastolic CHF ([facility])    • Disease of thyroid gland    • ESRD (end stage renal disease) on dialysis ([facility])    • Essential hypertension    • Gout    • History of COVID-19 2022   • Neuropathy    • Nonrheumatic mitral valve regurgitation    • Obesity, morbid, BMI 50 or higher ([facility])    • Scratch marcel     ON LEFT ARM WITH SMALL OPEN AREA INSTRUCTED PT TO CALL DR FLEMING IF AREA IS STILL OPEN ON 3/14/2022.   •  Tachycardia    • Type 2 diabetes mellitus (HCC)      Past Surgical History:   Procedure Laterality Date   • ARTERIOVENOUS FISTULA/SHUNT SURGERY Left 3/14/2022    Procedure: LEFT ARM BRACHIOCEPHALIC FISTULA CREATION;  Surgeon: Amador Schilling MD;  Location: [facility];  Service: Vascular;  Laterality: Left;   • INSERTION HEMODIALYSIS CATHETER Right 6/18/2021    Procedure: TUNNEL DIALYSIS, PALINDROME CATH;  Surgeon: Ancelmo Menendez MD;  Location: [facility] OR;  Service: Vascular;  Laterality: Right;     Family History   Adopted: Yes   Problem Relation Age of Onset   • Malig Hyperthermia Neg Hx      Social History     Tobacco Use   • Smoking status: Former     Years: 1.00     Types: Cigarettes   • Smokeless tobacco: Never   • Tobacco comments:     QUIT AGE 32   Vaping Use   • Vaping Use: Never used   Substance Use Topics   • Alcohol use: Yes     Alcohol/week: 1.0 - 2.0 standard drink     Types: 1 - 2 Glasses of wine per week     Comment: socially   • Drug use: No     (Not in a hospital admission)          •  Insert peripheral IV **AND** sodium chloride  Patient has no known allergies.    Objective     Physical Exam:  Physical Exam  Constitutional:       General: She is not in acute distress.     Appearance: She is obese.   HENT:      Head: Normocephalic and atraumatic.      Mouth/Throat:      Mouth: Mucous membranes are moist.      Pharynx: Oropharynx is clear.   Eyes:      General: No scleral icterus.  Cardiovascular:      Rate and Rhythm: Normal rate and regular rhythm.      Comments: Palpable radial pulses bilaterally  Pulmonary:      Effort: Pulmonary effort is normal. No respiratory distress.   Abdominal:      Palpations: Abdomen is soft.      Tenderness: There is no abdominal tenderness.   Musculoskeletal:      Comments: Left arm fistula with excellent thrill.  Access sites noted but no erythema/induration/hyperemia.   Mild swelling with maybe some slight ecchymosis but very subtle   Skin:      General: Skin is warm and dry.   Neurological:      General: No focal deficit present.      Mental Status: She is alert and oriented to person, place, and time.   Psychiatric:         Mood and Affect: Mood normal.         Behavior: Behavior normal.          Vital Signs and Labs:  Vital Signs Patient Vitals for the past 24 hrs:   BP Temp Temp src Pulse Resp SpO2   11/04/22 1827 (!) 182/100 -- -- 102 -- --   11/04/22 1723 -- -- -- 98 -- 99 %   11/04/22 1633 (!) 190/107 -- -- 97 18 99 %   11/04/22 1601 (!) 190/112 -- -- 98 18 95 %   11/04/22 1546 -- 97.8 °F (36.6 °C) Oral -- -- --   11/04/22 1543 (!) 202/108 -- -- 102 -- 100 %   11/04/22 1304 -- 97.6 °F (36.4 °C) Tympanic 106 18 98 %     I/O:  No intake/output data recorded.    CBC      BMP   Results from last 7 days   Lab Units 11/04/22  1659   SODIUM mmol/L 136   POTASSIUM mmol/L 6.4*   CHLORIDE mmol/L 97*   CO2 mmol/L 22.2   BUN mg/dL 61*   CREATININE mg/dL 11.66*   GLUCOSE mg/dL 93     Cr Clearance CrCl cannot be calculated (Unknown ideal weight.).  Coag     HbA1C   Lab Results   Component Value Date    HGBA1C 6.00 (H) 06/18/2021    HGBA1C 8.10 (H) 01/10/2020    HGBA1C 9.20 (H) 11/07/2019     Infection     Radiology(recent) No radiology results for the last day    There are no hospital problems to display for this patient.    Problem Points:  1:  Patient has a self-limited or minor (max of 2)  Total problem points:1    Data Points:  2:  I have reviewed and summation of old records and/or discussed the patients care with another health care provider  Total data points:2    Risk Points:  Minimal:  One self-limiting or minor problem    MDM requires 2/3 (Problem points, Data points and Risk)  MDM Prob point Data point Risk   SF 1 1 Minimal   Low 2 2 Low   Mod 3 3 Moderate   High 4 4 High     Code requires 3/3 (MDM, History and Exam)  Code MDM History Exam Time   13211 SF/Low Detailed Detailed 30   94749 Mod Comprehensive Comprehensive 50   92691 High Comprehensive  Comprehensive 70     Detailed history:  4 elements HPI or status of 3 chronic problems; 2-9 system ROS  Comprehensive:  4 elements HPI or status of 3 chronic problems;  10 system ROS    Detailed Exam:  12 findings from any organ system  Comprehensive Exam:  2 findings from each of 9 systems.   13789    Assessment & Plan       * No active hospital problems. *      37 y.o. female who was sent to the ER from dialysis due to some swelling in her arm.  Her fistula has an excellent thrill and has been working well at dialysis.  She has what looks like perhaps a small hematoma but certainly nothing severe/pulsatile/enlarging.   The patient has dealt with this kind of swelling before at home on her own and insists that she needs to leave ASAP.  I did discuss with her the possibility of scheduling an outpatient fistulogram, but patient is opposed to that due to having arm pain after her last fistulogram, and I do not think it is really necessary as long as her fistula is working well at dialysis and her swelling is so mild.  She does not have any cellulitis or any other sign of infection or serious complication with her fistula right now  She already has follow-up scheduled with Dr. Schilling later this month.  I think it safe for her to be discharged from the ER now    I discussed the patients findings and my recommendations with patient and consulting provider.    Justino Osborne II, MD  11/04/22  18:27 EDT    Please call my office with any question: [phone]

## 2023-01-01 ENCOUNTER — READMISSION MANAGEMENT (OUTPATIENT)
Dept: CALL CENTER | Facility: HOSPITAL | Age: 38
End: 2023-01-01
Payer: COMMERCIAL

## 2023-01-01 ENCOUNTER — APPOINTMENT (OUTPATIENT)
Dept: CARDIOLOGY | Facility: HOSPITAL | Age: 38
End: 2023-01-01
Payer: COMMERCIAL

## 2023-01-01 ENCOUNTER — APPOINTMENT (OUTPATIENT)
Dept: CT IMAGING | Facility: HOSPITAL | Age: 38
End: 2023-01-01
Payer: MEDICARE

## 2023-01-01 ENCOUNTER — HOSPITAL ENCOUNTER (OUTPATIENT)
Facility: HOSPITAL | Age: 38
Setting detail: OBSERVATION
End: 2023-11-03
Attending: EMERGENCY MEDICINE | Admitting: INTERNAL MEDICINE
Payer: COMMERCIAL

## 2023-01-01 ENCOUNTER — TELEPHONE (OUTPATIENT)
Dept: INTERNAL MEDICINE | Age: 38
End: 2023-01-01

## 2023-01-01 ENCOUNTER — APPOINTMENT (OUTPATIENT)
Dept: ULTRASOUND IMAGING | Facility: HOSPITAL | Age: 38
End: 2023-01-01
Payer: COMMERCIAL

## 2023-01-01 ENCOUNTER — APPOINTMENT (OUTPATIENT)
Dept: GENERAL RADIOLOGY | Facility: HOSPITAL | Age: 38
End: 2023-01-01
Payer: COMMERCIAL

## 2023-01-01 VITALS
SYSTOLIC BLOOD PRESSURE: 92 MMHG | DIASTOLIC BLOOD PRESSURE: 59 MMHG | HEIGHT: 62 IN | TEMPERATURE: 103.8 F | BODY MASS INDEX: 39.56 KG/M2 | WEIGHT: 214.95 LBS

## 2023-01-01 DIAGNOSIS — R10.32 ACUTE BILATERAL LOWER ABDOMINAL PAIN: Primary | ICD-10-CM

## 2023-01-01 DIAGNOSIS — N18.6 END-STAGE RENAL DISEASE ON HEMODIALYSIS: ICD-10-CM

## 2023-01-01 DIAGNOSIS — Z99.2 END-STAGE RENAL DISEASE ON HEMODIALYSIS: ICD-10-CM

## 2023-01-01 DIAGNOSIS — R10.31 ACUTE BILATERAL LOWER ABDOMINAL PAIN: Primary | ICD-10-CM

## 2023-01-01 DIAGNOSIS — R18.8 OTHER ASCITES: ICD-10-CM

## 2023-01-01 LAB
ALBUMIN SERPL-MCNC: 2.5 G/DL (ref 3.5–5.2)
ALBUMIN SERPL-MCNC: 2.5 G/DL (ref 3.5–5.2)
ALBUMIN SERPL-MCNC: 3.1 G/DL (ref 3.5–5.2)
ALBUMIN/GLOB SERPL: 0.8 G/DL
ALP SERPL-CCNC: 237 U/L (ref 39–117)
ALT SERPL W P-5'-P-CCNC: 8 U/L (ref 1–33)
ANION GAP SERPL CALCULATED.3IONS-SCNC: 10.4 MMOL/L (ref 5–15)
ANION GAP SERPL CALCULATED.3IONS-SCNC: 12 MMOL/L (ref 5–15)
ANION GAP SERPL CALCULATED.3IONS-SCNC: 12 MMOL/L (ref 5–15)
ANION GAP SERPL CALCULATED.3IONS-SCNC: 12.1 MMOL/L (ref 5–15)
AORTIC DIMENSIONLESS INDEX: 0.8 (DI)
AST SERPL-CCNC: 10 U/L (ref 1–32)
BASOPHILS # BLD AUTO: 0.02 10*3/MM3 (ref 0–0.2)
BASOPHILS NFR BLD AUTO: 0.2 % (ref 0–1.5)
BH CV ECHO MEAS - ACS: 1.75 CM
BH CV ECHO MEAS - AO MAX PG: 12.8 MMHG
BH CV ECHO MEAS - AO MEAN PG: 7 MMHG
BH CV ECHO MEAS - AO ROOT DIAM: 3 CM
BH CV ECHO MEAS - AO V2 MAX: 179 CM/SEC
BH CV ECHO MEAS - AO V2 VTI: 31.4 CM
BH CV ECHO MEAS - AVA(I,D): 2.3 CM2
BH CV ECHO MEAS - EDV(CUBED): 102.6 ML
BH CV ECHO MEAS - EDV(MOD-SP2): 124 ML
BH CV ECHO MEAS - EDV(MOD-SP4): 109 ML
BH CV ECHO MEAS - EF(MOD-SP4): 74.3 %
BH CV ECHO MEAS - ESV(CUBED): 41.5 ML
BH CV ECHO MEAS - ESV(MOD-SP4): 28 ML
BH CV ECHO MEAS - FS: 26.1 %
BH CV ECHO MEAS - IVS/LVPW: 1.17 CM
BH CV ECHO MEAS - IVSD: 1.19 CM
BH CV ECHO MEAS - LAT PEAK E' VEL: 10.9 CM/SEC
BH CV ECHO MEAS - LV MASS(C)D: 188.3 GRAMS
BH CV ECHO MEAS - LV MAX PG: 7.2 MMHG
BH CV ECHO MEAS - LV MEAN PG: 4 MMHG
BH CV ECHO MEAS - LV V1 MAX: 134 CM/SEC
BH CV ECHO MEAS - LV V1 VTI: 25.2 CM
BH CV ECHO MEAS - LVIDD: 4.7 CM
BH CV ECHO MEAS - LVIDS: 3.5 CM
BH CV ECHO MEAS - LVOT AREA: 2.9 CM2
BH CV ECHO MEAS - LVOT DIAM: 1.91 CM
BH CV ECHO MEAS - LVPWD: 1.02 CM
BH CV ECHO MEAS - MED PEAK E' VEL: 9.7 CM/SEC
BH CV ECHO MEAS - MR MAX PG: 46.9 MMHG
BH CV ECHO MEAS - MR MAX VEL: 342.5 CM/SEC
BH CV ECHO MEAS - MV A DUR: 0.14 SEC
BH CV ECHO MEAS - MV A MAX VEL: 54 CM/SEC
BH CV ECHO MEAS - MV DEC SLOPE: 577.4 CM/SEC2
BH CV ECHO MEAS - MV DEC TIME: 0.25 SEC
BH CV ECHO MEAS - MV E MAX VEL: 120 CM/SEC
BH CV ECHO MEAS - MV E/A: 2.22
BH CV ECHO MEAS - MV MAX PG: 7 MMHG
BH CV ECHO MEAS - MV MEAN PG: 2.5 MMHG
BH CV ECHO MEAS - MV P1/2T: 78 MSEC
BH CV ECHO MEAS - MV V2 VTI: 28.2 CM
BH CV ECHO MEAS - MVA(P1/2T): 2.8 CM2
BH CV ECHO MEAS - MVA(VTI): 2.6 CM2
BH CV ECHO MEAS - PA V2 MAX: 126.7 CM/SEC
BH CV ECHO MEAS - PI END-D VEL: 96.5 CM/SEC
BH CV ECHO MEAS - PULM A REVS DUR: 0.13 SEC
BH CV ECHO MEAS - PULM A REVS VEL: 32.9 CM/SEC
BH CV ECHO MEAS - PULM DIAS VEL: 40.6 CM/SEC
BH CV ECHO MEAS - PULM S/D: 1.01
BH CV ECHO MEAS - PULM SYS VEL: 41 CM/SEC
BH CV ECHO MEAS - RAP SYSTOLE: 15 MMHG
BH CV ECHO MEAS - RVSP: 65 MMHG
BH CV ECHO MEAS - SV(LVOT): 72.1 ML
BH CV ECHO MEAS - SV(MOD-SP4): 81 ML
BH CV ECHO MEAS - TAPSE (>1.6): 1.03 CM
BH CV ECHO MEAS - TR MAX PG: 49.5 MMHG
BH CV ECHO MEAS - TR MAX VEL: 351.8 CM/SEC
BH CV ECHO MEAS RV FREE WALL STRAIN: -15.2 %
BH CV ECHO MEASUREMENTS AVERAGE E/E' RATIO: 11.65
BH CV XLRA - RV BASE: 4.7 CM
BH CV XLRA - RV LENGTH: 7.4 CM
BH CV XLRA - RV MID: 5 CM
BH CV XLRA - TDI S': 9 CM/SEC
BILIRUB SERPL-MCNC: 1.4 MG/DL (ref 0–1.2)
BUN SERPL-MCNC: 20 MG/DL (ref 6–20)
BUN SERPL-MCNC: 22 MG/DL (ref 6–20)
BUN SERPL-MCNC: 27 MG/DL (ref 6–20)
BUN SERPL-MCNC: 27 MG/DL (ref 6–20)
BUN/CREAT SERPL: 5.4 (ref 7–25)
BUN/CREAT SERPL: 5.4 (ref 7–25)
BUN/CREAT SERPL: 5.8 (ref 7–25)
BUN/CREAT SERPL: 6 (ref 7–25)
CALCIUM SPEC-SCNC: 7.9 MG/DL (ref 8.6–10.5)
CALCIUM SPEC-SCNC: 8.1 MG/DL (ref 8.6–10.5)
CALCIUM SPEC-SCNC: 8.2 MG/DL (ref 8.6–10.5)
CALCIUM SPEC-SCNC: 8.4 MG/DL (ref 8.6–10.5)
CHLORIDE SERPL-SCNC: 94 MMOL/L (ref 98–107)
CHLORIDE SERPL-SCNC: 94 MMOL/L (ref 98–107)
CHLORIDE SERPL-SCNC: 95 MMOL/L (ref 98–107)
CHLORIDE SERPL-SCNC: 98 MMOL/L (ref 98–107)
CO2 SERPL-SCNC: 24.6 MMOL/L (ref 22–29)
CO2 SERPL-SCNC: 25.9 MMOL/L (ref 22–29)
CO2 SERPL-SCNC: 26 MMOL/L (ref 22–29)
CO2 SERPL-SCNC: 29 MMOL/L (ref 22–29)
CREAT SERPL-MCNC: 3.69 MG/DL (ref 0.57–1)
CREAT SERPL-MCNC: 4.05 MG/DL (ref 0.57–1)
CREAT SERPL-MCNC: 4.51 MG/DL (ref 0.57–1)
CREAT SERPL-MCNC: 4.62 MG/DL (ref 0.57–1)
DEPRECATED RDW RBC AUTO: 44 FL (ref 37–54)
DEPRECATED RDW RBC AUTO: 44.5 FL (ref 37–54)
DEPRECATED RDW RBC AUTO: 44.7 FL (ref 37–54)
DEPRECATED RDW RBC AUTO: 44.8 FL (ref 37–54)
EGFRCR SERPLBLD CKD-EPI 2021: 12 ML/MIN/1.73
EGFRCR SERPLBLD CKD-EPI 2021: 12.3 ML/MIN/1.73
EGFRCR SERPLBLD CKD-EPI 2021: 14 ML/MIN/1.73
EGFRCR SERPLBLD CKD-EPI 2021: 15.7 ML/MIN/1.73
EOSINOPHIL # BLD AUTO: 0.33 10*3/MM3 (ref 0–0.4)
EOSINOPHIL NFR BLD AUTO: 3.7 % (ref 0.3–6.2)
ERYTHROCYTE [DISTWIDTH] IN BLOOD BY AUTOMATED COUNT: 13.1 % (ref 12.3–15.4)
ERYTHROCYTE [DISTWIDTH] IN BLOOD BY AUTOMATED COUNT: 13.1 % (ref 12.3–15.4)
ERYTHROCYTE [DISTWIDTH] IN BLOOD BY AUTOMATED COUNT: 13.2 % (ref 12.3–15.4)
ERYTHROCYTE [DISTWIDTH] IN BLOOD BY AUTOMATED COUNT: 13.2 % (ref 12.3–15.4)
GLOBULIN UR ELPH-MCNC: 4 GM/DL
GLUCOSE BLDC GLUCOMTR-MCNC: 112 MG/DL (ref 70–130)
GLUCOSE BLDC GLUCOMTR-MCNC: 124 MG/DL (ref 70–130)
GLUCOSE BLDC GLUCOMTR-MCNC: 85 MG/DL (ref 70–130)
GLUCOSE BLDC GLUCOMTR-MCNC: 85 MG/DL (ref 70–130)
GLUCOSE BLDC GLUCOMTR-MCNC: 88 MG/DL (ref 70–130)
GLUCOSE BLDC GLUCOMTR-MCNC: 91 MG/DL (ref 70–130)
GLUCOSE BLDC GLUCOMTR-MCNC: 95 MG/DL (ref 70–130)
GLUCOSE BLDC GLUCOMTR-MCNC: 96 MG/DL (ref 70–130)
GLUCOSE BLDC GLUCOMTR-MCNC: 97 MG/DL (ref 70–130)
GLUCOSE BLDC GLUCOMTR-MCNC: 98 MG/DL (ref 70–130)
GLUCOSE SERPL-MCNC: 77 MG/DL (ref 65–99)
GLUCOSE SERPL-MCNC: 80 MG/DL (ref 65–99)
GLUCOSE SERPL-MCNC: 85 MG/DL (ref 65–99)
GLUCOSE SERPL-MCNC: 90 MG/DL (ref 65–99)
HCT VFR BLD AUTO: 21 % (ref 34–46.6)
HCT VFR BLD AUTO: 22 % (ref 34–46.6)
HCT VFR BLD AUTO: 22.4 % (ref 34–46.6)
HCT VFR BLD AUTO: 26.1 % (ref 34–46.6)
HGB BLD-MCNC: 6.6 G/DL (ref 12–15.9)
HGB BLD-MCNC: 7 G/DL (ref 12–15.9)
HGB BLD-MCNC: 7.1 G/DL (ref 12–15.9)
HGB BLD-MCNC: 8.4 G/DL (ref 12–15.9)
IMM GRANULOCYTES # BLD AUTO: 0.1 10*3/MM3 (ref 0–0.05)
IMM GRANULOCYTES NFR BLD AUTO: 1.1 % (ref 0–0.5)
LEFT ATRIUM VOLUME INDEX: 23.9 ML/M2
LIPASE SERPL-CCNC: 9 U/L (ref 13–60)
LYMPHOCYTES # BLD AUTO: 0.47 10*3/MM3 (ref 0.7–3.1)
LYMPHOCYTES NFR BLD AUTO: 5.2 % (ref 19.6–45.3)
MCH RBC QN AUTO: 29.5 PG (ref 26.6–33)
MCH RBC QN AUTO: 29.6 PG (ref 26.6–33)
MCH RBC QN AUTO: 29.8 PG (ref 26.6–33)
MCH RBC QN AUTO: 30.3 PG (ref 26.6–33)
MCHC RBC AUTO-ENTMCNC: 31.4 G/DL (ref 31.5–35.7)
MCHC RBC AUTO-ENTMCNC: 31.7 G/DL (ref 31.5–35.7)
MCHC RBC AUTO-ENTMCNC: 31.8 G/DL (ref 31.5–35.7)
MCHC RBC AUTO-ENTMCNC: 32.2 G/DL (ref 31.5–35.7)
MCV RBC AUTO: 93.3 FL (ref 79–97)
MCV RBC AUTO: 93.6 FL (ref 79–97)
MCV RBC AUTO: 93.8 FL (ref 79–97)
MCV RBC AUTO: 94.2 FL (ref 79–97)
MONOCYTES # BLD AUTO: 0.57 10*3/MM3 (ref 0.1–0.9)
MONOCYTES NFR BLD AUTO: 6.3 % (ref 5–12)
NEUTROPHILS NFR BLD AUTO: 7.55 10*3/MM3 (ref 1.7–7)
NEUTROPHILS NFR BLD AUTO: 83.5 % (ref 42.7–76)
NRBC BLD AUTO-RTO: 0 /100 WBC (ref 0–0.2)
PHOSPHATE SERPL-MCNC: 3.6 MG/DL (ref 2.5–4.5)
PHOSPHATE SERPL-MCNC: 3.8 MG/DL (ref 2.5–4.5)
PLATELET # BLD AUTO: 262 10*3/MM3 (ref 140–450)
PLATELET # BLD AUTO: 273 10*3/MM3 (ref 140–450)
PLATELET # BLD AUTO: 302 10*3/MM3 (ref 140–450)
PLATELET # BLD AUTO: 330 10*3/MM3 (ref 140–450)
PMV BLD AUTO: 9 FL (ref 6–12)
PMV BLD AUTO: 9.2 FL (ref 6–12)
PMV BLD AUTO: 9.2 FL (ref 6–12)
PMV BLD AUTO: 9.3 FL (ref 6–12)
POTASSIUM SERPL-SCNC: 3.3 MMOL/L (ref 3.5–5.2)
POTASSIUM SERPL-SCNC: 3.8 MMOL/L (ref 3.5–5.2)
POTASSIUM SERPL-SCNC: 3.9 MMOL/L (ref 3.5–5.2)
POTASSIUM SERPL-SCNC: 4.2 MMOL/L (ref 3.5–5.2)
PROT SERPL-MCNC: 7.1 G/DL (ref 6–8.5)
RBC # BLD AUTO: 2.24 10*6/MM3 (ref 3.77–5.28)
RBC # BLD AUTO: 2.35 10*6/MM3 (ref 3.77–5.28)
RBC # BLD AUTO: 2.4 10*6/MM3 (ref 3.77–5.28)
RBC # BLD AUTO: 2.77 10*6/MM3 (ref 3.77–5.28)
SODIUM SERPL-SCNC: 132 MMOL/L (ref 136–145)
SODIUM SERPL-SCNC: 133 MMOL/L (ref 136–145)
SODIUM SERPL-SCNC: 133 MMOL/L (ref 136–145)
SODIUM SERPL-SCNC: 135 MMOL/L (ref 136–145)
WBC NRBC COR # BLD: 7.6 10*3/MM3 (ref 3.4–10.8)
WBC NRBC COR # BLD: 8.39 10*3/MM3 (ref 3.4–10.8)
WBC NRBC COR # BLD: 9.04 10*3/MM3 (ref 3.4–10.8)
WBC NRBC COR # BLD: 9.88 10*3/MM3 (ref 3.4–10.8)

## 2023-01-01 PROCEDURE — 80069 RENAL FUNCTION PANEL: CPT | Performed by: INTERNAL MEDICINE

## 2023-01-01 PROCEDURE — 63710000001 ONDANSETRON PER 8 MG: Performed by: INTERNAL MEDICINE

## 2023-01-01 PROCEDURE — G0257 UNSCHED DIALYSIS ESRD PT HOS: HCPCS

## 2023-01-01 PROCEDURE — 25010000002 SODIUM CHLORIDE 0.9 % WITH KCL 20 MEQ 20-0.9 MEQ/L-% SOLUTION: Performed by: INTERNAL MEDICINE

## 2023-01-01 PROCEDURE — 99221 1ST HOSP IP/OBS SF/LOW 40: CPT | Performed by: SURGERY

## 2023-01-01 PROCEDURE — 82948 REAGENT STRIP/BLOOD GLUCOSE: CPT

## 2023-01-01 PROCEDURE — 96375 TX/PRO/DX INJ NEW DRUG ADDON: CPT

## 2023-01-01 PROCEDURE — 96376 TX/PRO/DX INJ SAME DRUG ADON: CPT

## 2023-01-01 PROCEDURE — 97530 THERAPEUTIC ACTIVITIES: CPT

## 2023-01-01 PROCEDURE — 36415 COLL VENOUS BLD VENIPUNCTURE: CPT | Performed by: INTERNAL MEDICINE

## 2023-01-01 PROCEDURE — 25010000002 LORAZEPAM PER 2 MG: Performed by: STUDENT IN AN ORGANIZED HEALTH CARE EDUCATION/TRAINING PROGRAM

## 2023-01-01 PROCEDURE — G0378 HOSPITAL OBSERVATION PER HR: HCPCS

## 2023-01-01 PROCEDURE — 85025 COMPLETE CBC W/AUTO DIFF WBC: CPT | Performed by: EMERGENCY MEDICINE

## 2023-01-01 PROCEDURE — 85027 COMPLETE CBC AUTOMATED: CPT | Performed by: INTERNAL MEDICINE

## 2023-01-01 PROCEDURE — 93306 TTE W/DOPPLER COMPLETE: CPT

## 2023-01-01 PROCEDURE — 25010000002 HEPARIN (PORCINE) PER 1000 UNITS: Performed by: INTERNAL MEDICINE

## 2023-01-01 PROCEDURE — 74176 CT ABD & PELVIS W/O CONTRAST: CPT

## 2023-01-01 PROCEDURE — 80048 BASIC METABOLIC PNL TOTAL CA: CPT | Performed by: INTERNAL MEDICINE

## 2023-01-01 PROCEDURE — 71045 X-RAY EXAM CHEST 1 VIEW: CPT

## 2023-01-01 PROCEDURE — 96365 THER/PROPH/DIAG IV INF INIT: CPT

## 2023-01-01 PROCEDURE — 25010000002 HYDROMORPHONE PER 4 MG: Performed by: STUDENT IN AN ORGANIZED HEALTH CARE EDUCATION/TRAINING PROGRAM

## 2023-01-01 PROCEDURE — 87040 BLOOD CULTURE FOR BACTERIA: CPT | Performed by: INTERNAL MEDICINE

## 2023-01-01 PROCEDURE — 25010000002 HYDROMORPHONE 1 MG/ML SOLUTION: Performed by: STUDENT IN AN ORGANIZED HEALTH CARE EDUCATION/TRAINING PROGRAM

## 2023-01-01 PROCEDURE — 99284 EMERGENCY DEPT VISIT MOD MDM: CPT

## 2023-01-01 PROCEDURE — 25010000002 MORPHINE PER 10 MG: Performed by: NURSE PRACTITIONER

## 2023-01-01 PROCEDURE — 96372 THER/PROPH/DIAG INJ SC/IM: CPT

## 2023-01-01 PROCEDURE — 97110 THERAPEUTIC EXERCISES: CPT

## 2023-01-01 PROCEDURE — 93306 TTE W/DOPPLER COMPLETE: CPT | Performed by: INTERNAL MEDICINE

## 2023-01-01 PROCEDURE — 97162 PT EVAL MOD COMPLEX 30 MIN: CPT

## 2023-01-01 PROCEDURE — 96366 THER/PROPH/DIAG IV INF ADDON: CPT

## 2023-01-01 PROCEDURE — 25010000002 HYDROMORPHONE PER 4 MG: Performed by: EMERGENCY MEDICINE

## 2023-01-01 PROCEDURE — 83690 ASSAY OF LIPASE: CPT | Performed by: EMERGENCY MEDICINE

## 2023-01-01 PROCEDURE — 25010000002 ONDANSETRON PER 1 MG: Performed by: EMERGENCY MEDICINE

## 2023-01-01 PROCEDURE — 25010000002 HYDROMORPHONE 1 MG/ML SOLUTION: Performed by: EMERGENCY MEDICINE

## 2023-01-01 PROCEDURE — 80053 COMPREHEN METABOLIC PANEL: CPT | Performed by: EMERGENCY MEDICINE

## 2023-01-01 RX ORDER — ACETAMINOPHEN 650 MG/1
650 SUPPOSITORY RECTAL EVERY 4 HOURS PRN
Status: DISCONTINUED | OUTPATIENT
Start: 2023-01-01 | End: 2023-01-01 | Stop reason: HOSPADM

## 2023-01-01 RX ORDER — LORAZEPAM 2 MG/ML
0.5 INJECTION INTRAMUSCULAR
Status: DISCONTINUED | OUTPATIENT
Start: 2023-01-01 | End: 2023-01-01 | Stop reason: HOSPADM

## 2023-01-01 RX ORDER — HEPARIN SODIUM 5000 [USP'U]/ML
5000 INJECTION, SOLUTION INTRAVENOUS; SUBCUTANEOUS EVERY 12 HOURS SCHEDULED
Status: DISCONTINUED | OUTPATIENT
Start: 2023-01-01 | End: 2023-01-01

## 2023-01-01 RX ORDER — GLYCOPYRROLATE 0.2 MG/ML
0.2 INJECTION INTRAMUSCULAR; INTRAVENOUS
Status: DISCONTINUED | OUTPATIENT
Start: 2023-01-01 | End: 2023-01-01 | Stop reason: HOSPADM

## 2023-01-01 RX ORDER — HYDROMORPHONE HYDROCHLORIDE 1 MG/ML
0.5 INJECTION, SOLUTION INTRAMUSCULAR; INTRAVENOUS; SUBCUTANEOUS
Status: DISCONTINUED | OUTPATIENT
Start: 2023-01-01 | End: 2023-01-01 | Stop reason: HOSPADM

## 2023-01-01 RX ORDER — MORPHINE SULFATE 2 MG/ML
2 INJECTION, SOLUTION INTRAMUSCULAR; INTRAVENOUS
Status: DISCONTINUED | OUTPATIENT
Start: 2023-01-01 | End: 2023-01-01 | Stop reason: HOSPADM

## 2023-01-01 RX ORDER — POLYETHYLENE GLYCOL 3350 17 G/17G
17 POWDER, FOR SOLUTION ORAL DAILY PRN
Status: DISCONTINUED | OUTPATIENT
Start: 2023-01-01 | End: 2023-01-01

## 2023-01-01 RX ORDER — MORPHINE SULFATE 20 MG/ML
5 SOLUTION ORAL
Status: DISCONTINUED | OUTPATIENT
Start: 2023-01-01 | End: 2023-01-01 | Stop reason: HOSPADM

## 2023-01-01 RX ORDER — HYDROCODONE BITARTRATE AND ACETAMINOPHEN 5; 325 MG/1; MG/1
1 TABLET ORAL EVERY 8 HOURS PRN
Status: DISCONTINUED | OUTPATIENT
Start: 2023-01-01 | End: 2023-01-01

## 2023-01-01 RX ORDER — GLYCOPYRROLATE 0.2 MG/ML
0.4 INJECTION INTRAMUSCULAR; INTRAVENOUS
Status: DISCONTINUED | OUTPATIENT
Start: 2023-01-01 | End: 2023-01-01 | Stop reason: HOSPADM

## 2023-01-01 RX ORDER — UREA 10 %
3 LOTION (ML) TOPICAL NIGHTLY PRN
Status: DISCONTINUED | OUTPATIENT
Start: 2023-01-01 | End: 2023-01-01

## 2023-01-01 RX ORDER — AMOXICILLIN 250 MG
2 CAPSULE ORAL 2 TIMES DAILY
Status: DISCONTINUED | OUTPATIENT
Start: 2023-01-01 | End: 2023-01-01

## 2023-01-01 RX ORDER — LORAZEPAM 2 MG/ML
2 INJECTION INTRAMUSCULAR
Status: DISCONTINUED | OUTPATIENT
Start: 2023-01-01 | End: 2023-01-01 | Stop reason: HOSPADM

## 2023-01-01 RX ORDER — DIPHENOXYLATE HYDROCHLORIDE AND ATROPINE SULFATE 2.5; .025 MG/1; MG/1
1 TABLET ORAL
Status: DISCONTINUED | OUTPATIENT
Start: 2023-01-01 | End: 2023-01-01 | Stop reason: HOSPADM

## 2023-01-01 RX ORDER — ACETAMINOPHEN 325 MG/1
650 TABLET ORAL EVERY 4 HOURS PRN
Status: DISCONTINUED | OUTPATIENT
Start: 2023-01-01 | End: 2023-01-01 | Stop reason: HOSPADM

## 2023-01-01 RX ORDER — LORAZEPAM 2 MG/ML
1 CONCENTRATE ORAL
Status: DISCONTINUED | OUTPATIENT
Start: 2023-01-01 | End: 2023-01-01 | Stop reason: HOSPADM

## 2023-01-01 RX ORDER — LORAZEPAM 2 MG/ML
2 CONCENTRATE ORAL
Status: DISCONTINUED | OUTPATIENT
Start: 2023-01-01 | End: 2023-01-01 | Stop reason: HOSPADM

## 2023-01-01 RX ORDER — KETOROLAC TROMETHAMINE 30 MG/ML
15 INJECTION, SOLUTION INTRAMUSCULAR; INTRAVENOUS EVERY 6 HOURS PRN
Status: DISCONTINUED | OUTPATIENT
Start: 2023-01-01 | End: 2023-01-01 | Stop reason: HOSPADM

## 2023-01-01 RX ORDER — SCOLOPAMINE TRANSDERMAL SYSTEM 1 MG/1
1 PATCH, EXTENDED RELEASE TRANSDERMAL
Status: DISCONTINUED | OUTPATIENT
Start: 2023-01-01 | End: 2023-01-01 | Stop reason: HOSPADM

## 2023-01-01 RX ORDER — HYDROCODONE BITARTRATE AND ACETAMINOPHEN 7.5; 325 MG/1; MG/1
1 TABLET ORAL EVERY 4 HOURS PRN
Status: DISCONTINUED | OUTPATIENT
Start: 2023-01-01 | End: 2023-01-01

## 2023-01-01 RX ORDER — MORPHINE SULFATE 20 MG/ML
20 SOLUTION ORAL
Status: DISCONTINUED | OUTPATIENT
Start: 2023-01-01 | End: 2023-01-01 | Stop reason: HOSPADM

## 2023-01-01 RX ORDER — LORAZEPAM 2 MG/ML
1 INJECTION INTRAMUSCULAR
Status: DISCONTINUED | OUTPATIENT
Start: 2023-01-01 | End: 2023-01-01 | Stop reason: HOSPADM

## 2023-01-01 RX ORDER — MORPHINE SULFATE 2 MG/ML
2 INJECTION, SOLUTION INTRAMUSCULAR; INTRAVENOUS ONCE
Status: COMPLETED | OUTPATIENT
Start: 2023-01-01 | End: 2023-01-01

## 2023-01-01 RX ORDER — BISACODYL 10 MG
10 SUPPOSITORY, RECTAL RECTAL DAILY PRN
Status: DISCONTINUED | OUTPATIENT
Start: 2023-01-01 | End: 2023-01-01

## 2023-01-01 RX ORDER — BISACODYL 5 MG/1
5 TABLET, DELAYED RELEASE ORAL DAILY PRN
Status: DISCONTINUED | OUTPATIENT
Start: 2023-01-01 | End: 2023-01-01

## 2023-01-01 RX ORDER — ALLOPURINOL 100 MG/1
100 TABLET ORAL DAILY
Status: DISCONTINUED | OUTPATIENT
Start: 2023-01-01 | End: 2023-01-01

## 2023-01-01 RX ORDER — ONDANSETRON 4 MG/1
4 TABLET, FILM COATED ORAL EVERY 6 HOURS PRN
Status: DISCONTINUED | OUTPATIENT
Start: 2023-01-01 | End: 2023-01-01 | Stop reason: HOSPADM

## 2023-01-01 RX ORDER — VANCOMYCIN 2 GRAM/500 ML IN 0.9 % SODIUM CHLORIDE INTRAVENOUS
2000 ONCE
Status: DISCONTINUED | OUTPATIENT
Start: 2023-01-01 | End: 2023-01-01

## 2023-01-01 RX ORDER — ONDANSETRON 2 MG/ML
4 INJECTION INTRAMUSCULAR; INTRAVENOUS ONCE
Status: COMPLETED | OUTPATIENT
Start: 2023-01-01 | End: 2023-01-01

## 2023-01-01 RX ORDER — MORPHINE SULFATE 2 MG/ML
4 INJECTION, SOLUTION INTRAMUSCULAR; INTRAVENOUS
Status: DISCONTINUED | OUTPATIENT
Start: 2023-01-01 | End: 2023-01-01 | Stop reason: HOSPADM

## 2023-01-01 RX ORDER — HYDROMORPHONE HYDROCHLORIDE 1 MG/ML
0.5 INJECTION, SOLUTION INTRAMUSCULAR; INTRAVENOUS; SUBCUTANEOUS ONCE
Status: COMPLETED | OUTPATIENT
Start: 2023-01-01 | End: 2023-01-01

## 2023-01-01 RX ORDER — MORPHINE SULFATE 20 MG/ML
10 SOLUTION ORAL
Status: DISCONTINUED | OUTPATIENT
Start: 2023-01-01 | End: 2023-01-01 | Stop reason: HOSPADM

## 2023-01-01 RX ORDER — LORAZEPAM 2 MG/ML
0.5 CONCENTRATE ORAL
Status: DISCONTINUED | OUTPATIENT
Start: 2023-01-01 | End: 2023-01-01 | Stop reason: HOSPADM

## 2023-01-01 RX ORDER — MORPHINE SULFATE 2 MG/ML
6 INJECTION, SOLUTION INTRAMUSCULAR; INTRAVENOUS
Status: DISCONTINUED | OUTPATIENT
Start: 2023-01-01 | End: 2023-01-01 | Stop reason: HOSPADM

## 2023-01-01 RX ORDER — ACETAMINOPHEN 160 MG/5ML
650 SOLUTION ORAL EVERY 4 HOURS PRN
Status: DISCONTINUED | OUTPATIENT
Start: 2023-01-01 | End: 2023-01-01 | Stop reason: HOSPADM

## 2023-01-01 RX ORDER — HYDROCODONE BITARTRATE AND ACETAMINOPHEN 5; 325 MG/1; MG/1
1 TABLET ORAL ONCE
Status: COMPLETED | OUTPATIENT
Start: 2023-01-01 | End: 2023-01-01

## 2023-01-01 RX ORDER — CARVEDILOL 6.25 MG/1
6.25 TABLET ORAL 2 TIMES DAILY WITH MEALS
Status: DISCONTINUED | OUTPATIENT
Start: 2023-01-01 | End: 2023-01-01

## 2023-01-01 RX ORDER — LEVOTHYROXINE SODIUM 137 UG/1
137 TABLET ORAL
Status: DISCONTINUED | OUTPATIENT
Start: 2023-01-01 | End: 2023-01-01

## 2023-01-01 RX ORDER — HYDROMORPHONE HYDROCHLORIDE 1 MG/ML
0.5 INJECTION, SOLUTION INTRAMUSCULAR; INTRAVENOUS; SUBCUTANEOUS
Status: DISCONTINUED | OUTPATIENT
Start: 2023-01-01 | End: 2023-01-01

## 2023-01-01 RX ORDER — BACITRACIN ZINC 500 [USP'U]/G
1 OINTMENT TOPICAL EVERY 12 HOURS SCHEDULED
Status: DISCONTINUED | OUTPATIENT
Start: 2023-01-01 | End: 2023-01-01 | Stop reason: HOSPADM

## 2023-01-01 RX ORDER — ONDANSETRON 2 MG/ML
4 INJECTION INTRAMUSCULAR; INTRAVENOUS EVERY 6 HOURS PRN
Status: DISCONTINUED | OUTPATIENT
Start: 2023-01-01 | End: 2023-01-01 | Stop reason: HOSPADM

## 2023-01-01 RX ORDER — SODIUM CHLORIDE AND POTASSIUM CHLORIDE 150; 900 MG/100ML; MG/100ML
100 INJECTION, SOLUTION INTRAVENOUS CONTINUOUS
Status: DISCONTINUED | OUTPATIENT
Start: 2023-01-01 | End: 2023-01-01

## 2023-01-01 RX ORDER — SODIUM CHLORIDE 0.9 % (FLUSH) 0.9 %
10 SYRINGE (ML) INJECTION AS NEEDED
Status: DISCONTINUED | OUTPATIENT
Start: 2023-01-01 | End: 2023-01-01 | Stop reason: HOSPADM

## 2023-01-01 RX ADMIN — HYDROMORPHONE HYDROCHLORIDE 0.5 MG: 1 INJECTION, SOLUTION INTRAMUSCULAR; INTRAVENOUS; SUBCUTANEOUS at 20:53

## 2023-01-01 RX ADMIN — ZINC OXIDE 1 APPLICATION: 200 OINTMENT TOPICAL at 09:56

## 2023-01-01 RX ADMIN — CARVEDILOL 6.25 MG: 6.25 TABLET, FILM COATED ORAL at 00:34

## 2023-01-01 RX ADMIN — HYDROMORPHONE HYDROCHLORIDE 1 MG: 1 INJECTION, SOLUTION INTRAMUSCULAR; INTRAVENOUS; SUBCUTANEOUS at 22:30

## 2023-01-01 RX ADMIN — ACETAMINOPHEN 650 MG: 325 TABLET ORAL at 01:10

## 2023-01-01 RX ADMIN — HYDROCODONE BITARTRATE AND ACETAMINOPHEN 1 TABLET: 7.5; 325 TABLET ORAL at 21:49

## 2023-01-01 RX ADMIN — ALLOPURINOL 100 MG: 100 TABLET ORAL at 09:55

## 2023-01-01 RX ADMIN — SACUBITRIL AND VALSARTAN 1 TABLET: 24; 26 TABLET, FILM COATED ORAL at 21:34

## 2023-01-01 RX ADMIN — MORPHINE SULFATE 2 MG: 2 INJECTION, SOLUTION INTRAMUSCULAR; INTRAVENOUS at 23:04

## 2023-01-01 RX ADMIN — HYDROCODONE BITARTRATE AND ACETAMINOPHEN 1 TABLET: 7.5; 325 TABLET ORAL at 10:00

## 2023-01-01 RX ADMIN — LEVOTHYROXINE SODIUM 137 MCG: 137 TABLET ORAL at 09:55

## 2023-01-01 RX ADMIN — HYDROCODONE BITARTRATE AND ACETAMINOPHEN 1 TABLET: 7.5; 325 TABLET ORAL at 07:00

## 2023-01-01 RX ADMIN — ALLOPURINOL 100 MG: 100 TABLET ORAL at 09:12

## 2023-01-01 RX ADMIN — LORAZEPAM 0.5 MG: 2 INJECTION INTRAMUSCULAR; INTRAVENOUS at 00:51

## 2023-01-01 RX ADMIN — HYDROMORPHONE HYDROCHLORIDE 1 MG: 1 INJECTION, SOLUTION INTRAMUSCULAR; INTRAVENOUS; SUBCUTANEOUS at 15:41

## 2023-01-01 RX ADMIN — HYDROCODONE BITARTRATE AND ACETAMINOPHEN 1 TABLET: 7.5; 325 TABLET ORAL at 06:28

## 2023-01-01 RX ADMIN — COLLAGENASE SANTYL 1 APPLICATION: 250 OINTMENT TOPICAL at 13:12

## 2023-01-01 RX ADMIN — SACUBITRIL AND VALSARTAN 1 TABLET: 24; 26 TABLET, FILM COATED ORAL at 09:12

## 2023-01-01 RX ADMIN — HEPARIN SODIUM 5000 UNITS: 5000 INJECTION, SOLUTION INTRAVENOUS; SUBCUTANEOUS at 09:55

## 2023-01-01 RX ADMIN — COLLAGENASE SANTYL 1 APPLICATION: 250 OINTMENT TOPICAL at 21:34

## 2023-01-01 RX ADMIN — HYDROMORPHONE HYDROCHLORIDE 1 MG: 1 INJECTION, SOLUTION INTRAMUSCULAR; INTRAVENOUS; SUBCUTANEOUS at 04:29

## 2023-01-01 RX ADMIN — CARVEDILOL 6.25 MG: 6.25 TABLET, FILM COATED ORAL at 09:11

## 2023-01-01 RX ADMIN — LORAZEPAM 0.5 MG: 2 INJECTION INTRAMUSCULAR; INTRAVENOUS at 20:53

## 2023-01-01 RX ADMIN — HYDROMORPHONE HYDROCHLORIDE 0.5 MG: 1 INJECTION, SOLUTION INTRAMUSCULAR; INTRAVENOUS; SUBCUTANEOUS at 12:22

## 2023-01-01 RX ADMIN — HYDROMORPHONE HYDROCHLORIDE 1 MG: 1 INJECTION, SOLUTION INTRAMUSCULAR; INTRAVENOUS; SUBCUTANEOUS at 00:51

## 2023-01-01 RX ADMIN — HYDROMORPHONE HYDROCHLORIDE 1 MG: 1 INJECTION, SOLUTION INTRAMUSCULAR; INTRAVENOUS; SUBCUTANEOUS at 06:27

## 2023-01-01 RX ADMIN — DOCUSATE SODIUM 50MG AND SENNOSIDES 8.6MG 2 TABLET: 8.6; 5 TABLET, FILM COATED ORAL at 09:55

## 2023-01-01 RX ADMIN — SACUBITRIL AND VALSARTAN 1 TABLET: 24; 26 TABLET, FILM COATED ORAL at 00:34

## 2023-01-01 RX ADMIN — HYDROMORPHONE HYDROCHLORIDE 0.5 MG: 1 INJECTION, SOLUTION INTRAMUSCULAR; INTRAVENOUS; SUBCUTANEOUS at 17:17

## 2023-01-01 RX ADMIN — LORAZEPAM 0.5 MG: 2 INJECTION INTRAMUSCULAR; INTRAVENOUS at 04:29

## 2023-01-01 RX ADMIN — LEVOTHYROXINE SODIUM 137 MCG: 137 TABLET ORAL at 06:36

## 2023-01-01 RX ADMIN — HYDROCODONE BITARTRATE AND ACETAMINOPHEN 1 TABLET: 7.5; 325 TABLET ORAL at 01:00

## 2023-01-01 RX ADMIN — BACITRACIN 1 APPLICATION: 500 OINTMENT TOPICAL at 13:11

## 2023-01-01 RX ADMIN — HYDROCODONE BITARTRATE AND ACETAMINOPHEN 1 TABLET: 7.5; 325 TABLET ORAL at 17:48

## 2023-01-01 RX ADMIN — CARVEDILOL 6.25 MG: 6.25 TABLET, FILM COATED ORAL at 17:22

## 2023-01-01 RX ADMIN — ACETAMINOPHEN 650 MG: 325 TABLET ORAL at 13:09

## 2023-01-01 RX ADMIN — HEPARIN SODIUM 5000 UNITS: 5000 INJECTION, SOLUTION INTRAVENOUS; SUBCUTANEOUS at 21:04

## 2023-01-01 RX ADMIN — HYDROCODONE BITARTRATE AND ACETAMINOPHEN 1 TABLET: 5; 325 TABLET ORAL at 21:19

## 2023-01-01 RX ADMIN — ONDANSETRON 4 MG: 2 INJECTION INTRAMUSCULAR; INTRAVENOUS at 15:40

## 2023-01-01 RX ADMIN — HYDROMORPHONE HYDROCHLORIDE 0.5 MG: 1 INJECTION, SOLUTION INTRAMUSCULAR; INTRAVENOUS; SUBCUTANEOUS at 19:45

## 2023-01-01 RX ADMIN — ALLOPURINOL 100 MG: 100 TABLET ORAL at 13:09

## 2023-01-01 RX ADMIN — GLYCOPYRROLATE 0.4 MG: 0.2 INJECTION INTRAMUSCULAR; INTRAVENOUS at 04:43

## 2023-01-01 RX ADMIN — ONDANSETRON HYDROCHLORIDE 4 MG: 4 TABLET, FILM COATED ORAL at 01:00

## 2023-01-01 RX ADMIN — HYDROCODONE BITARTRATE AND ACETAMINOPHEN 1 TABLET: 7.5; 325 TABLET ORAL at 14:16

## 2023-01-01 RX ADMIN — LORAZEPAM 2 MG: 2 INJECTION INTRAMUSCULAR; INTRAVENOUS at 06:27

## 2023-01-01 RX ADMIN — POTASSIUM CHLORIDE AND SODIUM CHLORIDE 100 ML/HR: 900; 150 INJECTION, SOLUTION INTRAVENOUS at 20:59

## 2023-01-01 RX ADMIN — COLLAGENASE SANTYL 1 APPLICATION: 250 OINTMENT TOPICAL at 09:56

## 2023-01-01 RX ADMIN — LEVOTHYROXINE SODIUM 137 MCG: 137 TABLET ORAL at 13:09

## 2023-01-01 RX ADMIN — BACITRACIN 1 APPLICATION: 500 OINTMENT TOPICAL at 09:56

## 2023-01-01 RX ADMIN — CARVEDILOL 6.25 MG: 6.25 TABLET, FILM COATED ORAL at 09:55

## 2023-10-16 ENCOUNTER — HOSPITAL ENCOUNTER (INPATIENT)
Facility: HOSPITAL | Age: 38
LOS: 5 days | Discharge: HOME | DRG: 640 | End: 2023-10-21
Attending: EMERGENCY MEDICINE | Admitting: STUDENT IN AN ORGANIZED HEALTH CARE EDUCATION/TRAINING PROGRAM
Payer: COMMERCIAL

## 2023-10-16 ENCOUNTER — APPOINTMENT (OUTPATIENT)
Dept: CT IMAGING | Facility: HOSPITAL | Age: 38
DRG: 640 | End: 2023-10-16
Payer: MEDICARE

## 2023-10-16 DIAGNOSIS — N18.6 CKD (CHRONIC KIDNEY DISEASE) REQUIRING CHRONIC DIALYSIS: ICD-10-CM

## 2023-10-16 DIAGNOSIS — R60.1 ANASARCA: Primary | ICD-10-CM

## 2023-10-16 DIAGNOSIS — Z91.199 NONCOMPLIANCE: ICD-10-CM

## 2023-10-16 DIAGNOSIS — Z99.2 CKD (CHRONIC KIDNEY DISEASE) REQUIRING CHRONIC DIALYSIS: ICD-10-CM

## 2023-10-16 PROBLEM — E87.70 FLUID OVERLOAD: Status: ACTIVE | Noted: 2023-01-01

## 2023-10-16 LAB
ALBUMIN SERPL-MCNC: 3.3 G/DL (ref 3.5–5.2)
ALBUMIN/GLOB SERPL: 0.8 G/DL
ALP SERPL-CCNC: 119 U/L (ref 39–117)
ALT SERPL W P-5'-P-CCNC: 9 U/L (ref 1–33)
ANION GAP SERPL CALCULATED.3IONS-SCNC: 16 MMOL/L (ref 5–15)
AST SERPL-CCNC: 14 U/L (ref 1–32)
BASOPHILS # BLD AUTO: 0.04 10*3/MM3 (ref 0–0.2)
BASOPHILS NFR BLD AUTO: 0.6 % (ref 0–1.5)
BILIRUB SERPL-MCNC: 0.9 MG/DL (ref 0–1.2)
BUN SERPL-MCNC: 38 MG/DL (ref 6–20)
BUN/CREAT SERPL: 4.9 (ref 7–25)
CALCIUM SPEC-SCNC: 7.9 MG/DL (ref 8.6–10.5)
CHLORIDE SERPL-SCNC: 91 MMOL/L (ref 98–107)
CO2 SERPL-SCNC: 26 MMOL/L (ref 22–29)
CREAT SERPL-MCNC: 7.78 MG/DL (ref 0.57–1)
DEPRECATED RDW RBC AUTO: 43.7 FL (ref 37–54)
EGFRCR SERPLBLD CKD-EPI 2021: 6.4 ML/MIN/1.73
EOSINOPHIL # BLD AUTO: 0.22 10*3/MM3 (ref 0–0.4)
EOSINOPHIL NFR BLD AUTO: 3.2 % (ref 0.3–6.2)
ERYTHROCYTE [DISTWIDTH] IN BLOOD BY AUTOMATED COUNT: 13.2 % (ref 12.3–15.4)
GLOBULIN UR ELPH-MCNC: 4 GM/DL
GLUCOSE SERPL-MCNC: 90 MG/DL (ref 65–99)
HCG SERPL QL: NEGATIVE
HCT VFR BLD AUTO: 32.1 % (ref 34–46.6)
HGB BLD-MCNC: 10.6 G/DL (ref 12–15.9)
IMM GRANULOCYTES # BLD AUTO: 0.03 10*3/MM3 (ref 0–0.05)
IMM GRANULOCYTES NFR BLD AUTO: 0.4 % (ref 0–0.5)
LIPASE SERPL-CCNC: 50 U/L (ref 13–60)
LYMPHOCYTES # BLD AUTO: 0.53 10*3/MM3 (ref 0.7–3.1)
LYMPHOCYTES NFR BLD AUTO: 7.7 % (ref 19.6–45.3)
MCH RBC QN AUTO: 30.5 PG (ref 26.6–33)
MCHC RBC AUTO-ENTMCNC: 33 G/DL (ref 31.5–35.7)
MCV RBC AUTO: 92.2 FL (ref 79–97)
MONOCYTES # BLD AUTO: 0.49 10*3/MM3 (ref 0.1–0.9)
MONOCYTES NFR BLD AUTO: 7.1 % (ref 5–12)
NEUTROPHILS NFR BLD AUTO: 5.58 10*3/MM3 (ref 1.7–7)
NEUTROPHILS NFR BLD AUTO: 81 % (ref 42.7–76)
NRBC BLD AUTO-RTO: 0 /100 WBC (ref 0–0.2)
PLATELET # BLD AUTO: 243 10*3/MM3 (ref 140–450)
PMV BLD AUTO: 8.9 FL (ref 6–12)
POTASSIUM SERPL-SCNC: 4.9 MMOL/L (ref 3.5–5.2)
PROT SERPL-MCNC: 7.3 G/DL (ref 6–8.5)
RBC # BLD AUTO: 3.48 10*6/MM3 (ref 3.77–5.28)
SODIUM SERPL-SCNC: 133 MMOL/L (ref 136–145)
WBC NRBC COR # BLD: 6.89 10*3/MM3 (ref 3.4–10.8)

## 2023-10-16 PROCEDURE — 25010000002 ONDANSETRON PER 1 MG: Performed by: EMERGENCY MEDICINE

## 2023-10-16 PROCEDURE — 84703 CHORIONIC GONADOTROPIN ASSAY: CPT | Performed by: EMERGENCY MEDICINE

## 2023-10-16 PROCEDURE — 80053 COMPREHEN METABOLIC PANEL: CPT | Performed by: EMERGENCY MEDICINE

## 2023-10-16 PROCEDURE — 25010000002 MORPHINE PER 10 MG: Performed by: EMERGENCY MEDICINE

## 2023-10-16 PROCEDURE — G0378 HOSPITAL OBSERVATION PER HR: HCPCS

## 2023-10-16 PROCEDURE — 83690 ASSAY OF LIPASE: CPT | Performed by: EMERGENCY MEDICINE

## 2023-10-16 PROCEDURE — 74176 CT ABD & PELVIS W/O CONTRAST: CPT

## 2023-10-16 PROCEDURE — 99285 EMERGENCY DEPT VISIT HI MDM: CPT

## 2023-10-16 PROCEDURE — 25010000002 HYDROMORPHONE PER 4 MG: Performed by: EMERGENCY MEDICINE

## 2023-10-16 PROCEDURE — 85025 COMPLETE CBC W/AUTO DIFF WBC: CPT | Performed by: EMERGENCY MEDICINE

## 2023-10-16 RX ORDER — ONDANSETRON 2 MG/ML
4 INJECTION INTRAMUSCULAR; INTRAVENOUS EVERY 6 HOURS PRN
Status: DISCONTINUED | OUTPATIENT
Start: 2023-10-16 | End: 2023-10-21 | Stop reason: HOSPADM

## 2023-10-16 RX ORDER — HYDROMORPHONE HYDROCHLORIDE 1 MG/ML
0.5 INJECTION, SOLUTION INTRAMUSCULAR; INTRAVENOUS; SUBCUTANEOUS ONCE
Status: COMPLETED | OUTPATIENT
Start: 2023-10-16 | End: 2023-10-16

## 2023-10-16 RX ORDER — HEPARIN SODIUM 5000 [USP'U]/ML
5000 INJECTION, SOLUTION INTRAVENOUS; SUBCUTANEOUS EVERY 8 HOURS SCHEDULED
Status: DISCONTINUED | OUTPATIENT
Start: 2023-10-16 | End: 2023-10-21 | Stop reason: HOSPADM

## 2023-10-16 RX ORDER — NITROGLYCERIN 0.4 MG/1
0.4 TABLET SUBLINGUAL
Status: DISCONTINUED | OUTPATIENT
Start: 2023-10-16 | End: 2023-10-21 | Stop reason: HOSPADM

## 2023-10-16 RX ORDER — HYDRALAZINE HYDROCHLORIDE 50 MG/1
100 TABLET, FILM COATED ORAL 2 TIMES DAILY
Status: DISCONTINUED | OUTPATIENT
Start: 2023-10-16 | End: 2023-10-18

## 2023-10-16 RX ORDER — LEVOTHYROXINE SODIUM 0.12 MG/1
125 TABLET ORAL NIGHTLY
Status: DISCONTINUED | OUTPATIENT
Start: 2023-10-16 | End: 2023-10-16

## 2023-10-16 RX ORDER — BISACODYL 10 MG
10 SUPPOSITORY, RECTAL RECTAL DAILY PRN
Status: DISCONTINUED | OUTPATIENT
Start: 2023-10-16 | End: 2023-10-21 | Stop reason: HOSPADM

## 2023-10-16 RX ORDER — ONDANSETRON 2 MG/ML
8 INJECTION INTRAMUSCULAR; INTRAVENOUS ONCE AS NEEDED
Status: COMPLETED | OUTPATIENT
Start: 2023-10-16 | End: 2023-10-16

## 2023-10-16 RX ORDER — SODIUM CHLORIDE 0.9 % (FLUSH) 0.9 %
10 SYRINGE (ML) INJECTION AS NEEDED
Status: DISCONTINUED | OUTPATIENT
Start: 2023-10-16 | End: 2023-10-21 | Stop reason: HOSPADM

## 2023-10-16 RX ORDER — CARVEDILOL 25 MG/1
25 TABLET ORAL 2 TIMES DAILY WITH MEALS
Status: DISCONTINUED | OUTPATIENT
Start: 2023-10-16 | End: 2023-10-20

## 2023-10-16 RX ORDER — POLYETHYLENE GLYCOL 3350 17 G/17G
17 POWDER, FOR SOLUTION ORAL DAILY PRN
Status: DISCONTINUED | OUTPATIENT
Start: 2023-10-16 | End: 2023-10-21 | Stop reason: HOSPADM

## 2023-10-16 RX ORDER — UREA 10 %
3 LOTION (ML) TOPICAL NIGHTLY PRN
Status: DISCONTINUED | OUTPATIENT
Start: 2023-10-16 | End: 2023-10-21 | Stop reason: HOSPADM

## 2023-10-16 RX ORDER — LEVOTHYROXINE SODIUM 0.12 MG/1
125 TABLET ORAL
Status: DISCONTINUED | OUTPATIENT
Start: 2023-10-17 | End: 2023-10-18

## 2023-10-16 RX ORDER — ONDANSETRON 4 MG/1
4 TABLET, FILM COATED ORAL EVERY 6 HOURS PRN
Status: DISCONTINUED | OUTPATIENT
Start: 2023-10-16 | End: 2023-10-21 | Stop reason: HOSPADM

## 2023-10-16 RX ORDER — AMOXICILLIN 250 MG
2 CAPSULE ORAL 2 TIMES DAILY
Status: DISCONTINUED | OUTPATIENT
Start: 2023-10-16 | End: 2023-10-21 | Stop reason: HOSPADM

## 2023-10-16 RX ORDER — AMLODIPINE BESYLATE 10 MG/1
10 TABLET ORAL NIGHTLY
Status: DISCONTINUED | OUTPATIENT
Start: 2023-10-16 | End: 2023-10-17

## 2023-10-16 RX ORDER — ALLOPURINOL 300 MG/1
300 TABLET ORAL DAILY
Status: DISCONTINUED | OUTPATIENT
Start: 2023-10-17 | End: 2023-10-17

## 2023-10-16 RX ORDER — ALUMINA, MAGNESIA, AND SIMETHICONE 2400; 2400; 240 MG/30ML; MG/30ML; MG/30ML
15 SUSPENSION ORAL EVERY 6 HOURS PRN
Status: DISCONTINUED | OUTPATIENT
Start: 2023-10-16 | End: 2023-10-21 | Stop reason: HOSPADM

## 2023-10-16 RX ORDER — NICOTINE 21 MG/24HR
1 PATCH, TRANSDERMAL 24 HOURS TRANSDERMAL EVERY 24 HOURS
Status: DISCONTINUED | OUTPATIENT
Start: 2023-10-16 | End: 2023-10-21 | Stop reason: HOSPADM

## 2023-10-16 RX ORDER — MORPHINE SULFATE 2 MG/ML
4 INJECTION, SOLUTION INTRAMUSCULAR; INTRAVENOUS ONCE
Status: COMPLETED | OUTPATIENT
Start: 2023-10-16 | End: 2023-10-16

## 2023-10-16 RX ORDER — ACETAMINOPHEN 325 MG/1
650 TABLET ORAL EVERY 4 HOURS PRN
Status: DISCONTINUED | OUTPATIENT
Start: 2023-10-16 | End: 2023-10-21 | Stop reason: HOSPADM

## 2023-10-16 RX ORDER — BISACODYL 5 MG/1
5 TABLET, DELAYED RELEASE ORAL DAILY PRN
Status: DISCONTINUED | OUTPATIENT
Start: 2023-10-16 | End: 2023-10-21 | Stop reason: HOSPADM

## 2023-10-16 RX ADMIN — MORPHINE SULFATE 4 MG: 2 INJECTION, SOLUTION INTRAMUSCULAR; INTRAVENOUS at 19:16

## 2023-10-16 RX ADMIN — ONDANSETRON 8 MG: 2 INJECTION INTRAMUSCULAR; INTRAVENOUS at 19:16

## 2023-10-16 RX ADMIN — HYDROMORPHONE HYDROCHLORIDE 0.5 MG: 1 INJECTION, SOLUTION INTRAMUSCULAR; INTRAVENOUS; SUBCUTANEOUS at 21:52

## 2023-10-16 RX ADMIN — CARVEDILOL 25 MG: 25 TABLET, FILM COATED ORAL at 22:49

## 2023-10-16 RX ADMIN — AMLODIPINE BESYLATE 10 MG: 5 TABLET ORAL at 22:49

## 2023-10-16 RX ADMIN — HYDRALAZINE HYDROCHLORIDE 100 MG: 50 TABLET, FILM COATED ORAL at 22:49

## 2023-10-16 NOTE — ED PROVIDER NOTES
EMERGENCY DEPARTMENT ENCOUNTER  Room Number:  29/29  Date of encounter:  10/16/2023  PCP: Emery Silveira MD  Patient Care Team:  Emery Silveira MD as PCP - General (Internal Medicine)  Larry Manning MD as Consulting Physician (Nephrology)     HPI:  Context: Gisela Dyson is a 38 y.o. female who presents to the ED c/o chief complaint of abdominal pain.  Patient complains of intermittent abdominal pain for last 3 days, reports pain radiates across middle of her abdomen, sharp in nature, denies any inciting event, no aggravating or ameliorating factors.  Patient denies any nausea or vomiting, no diarrhea, last bowel movement earlier today, normal in character, no blood in her stool, no dark tarry stool.  Patient is end-stage renal disease on dialysis, does still make urine, denies any urinary symptoms.  Last dialysis today, patient reports Monday Wednesday Friday dialysis schedule, missed dialysis on Friday, last dialysis before today was Wednesday.  Patient denies any vaginal bleeding discharge or irritation, no fever shakes chills or night sweats.    MEDICAL HISTORY REVIEW  Reviewed in EPIC    PAST MEDICAL HISTORY  Active Ambulatory Problems     Diagnosis Date Noted    Chronic diastolic congestive heart failure 11/07/2019    JULISSA (acute kidney injury) 11/07/2019    Obesity, morbid, BMI 50 or higher 11/07/2019    Type 2 diabetes mellitus with renal complication 11/07/2019    Essential hypertension 11/15/2019    Nonrheumatic mitral valve regurgitation 11/15/2019    Primary hypothyroidism 01/10/2020    Hirsutism 01/10/2020    Alopecia 01/10/2020    Acute renal failure 06/17/2021    ESRD on hemodialysis 06/18/2021     Resolved Ambulatory Problems     Diagnosis Date Noted    CKD (chronic kidney disease) stage 3, GFR 30-59 ml/min 12/13/2019     Past Medical History:   Diagnosis Date    Diastolic CHF     Disease of thyroid gland     ESRD (end stage renal disease) on dialysis     Gout     History of  COVID-19 01/09/2022    Neuropathy     Scratch marcel     Tachycardia     Type 2 diabetes mellitus        PAST SURGICAL HISTORY  Past Surgical History:   Procedure Laterality Date    ARTERIOVENOUS FISTULA/SHUNT SURGERY Left 3/14/2022    Procedure: LEFT ARM BRACHIOCEPHALIC FISTULA CREATION;  Surgeon: Amador Schilling MD;  Location: [facility];  Service: Vascular;  Laterality: Left;    INSERTION HEMODIALYSIS CATHETER Right 6/18/2021    Procedure: TUNNEL DIALYSIS, PALINDROME CATH;  Surgeon: Ancelmo Menendez MD;  Location: [facility];  Service: Vascular;  Laterality: Right;       FAMILY HISTORY  Family History   Adopted: Yes   Problem Relation Age of Onset    Malig Hyperthermia Neg Hx        SOCIAL HISTORY  Social History     Socioeconomic History    Marital status: Single   Tobacco Use    Smoking status: Former     Years: 1     Types: Cigarettes    Smokeless tobacco: Never    Tobacco comments:     QUIT AGE 32   Vaping Use    Vaping Use: Never used   Substance and Sexual Activity    Alcohol use: Yes     Alcohol/week: 1.0 - 2.0 standard drink of alcohol     Types: 1 - 2 Glasses of wine per week     Comment: socially    Drug use: No    Sexual activity: Defer       ALLERGIES  Patient has no known allergies.    The patient's allergies have been reviewed    REVIEW OF SYSTEMS  All systems reviewed and negative except for those discussed in HPI.     PHYSICAL EXAM  I have reviewed the triage vital signs and nursing notes.  ED Triage Vitals   Temp Heart Rate Resp BP SpO2   10/16/23 1848 10/16/23 1848 10/16/23 1851 -- 10/16/23 1848   98.1 °F (36.7 °C) 103 18  100 %      Temp src Heart Rate Source Patient Position BP Location FiO2 (%)   -- -- -- -- --              General: No acute distress.  HENT: NCAT, PERRL, Nares patent.  Eyes: no scleral icterus.  Neck: trachea midline, no ROM limitations.  CV: regular rhythm, regular rate.  Respiratory: normal effort, CTAB.  Abdomen: soft, nondistended, mild generalized tenderness  to palpation greatest in the upper abdomen, no rebound tenderness, no guarding or rigidity.  Musculoskeletal: no deformity.  Neuro: alert, moves all extremities, follows commands.  Skin: warm, dry.  AV fistula left upper extremity, palpable thrill, no signs of infection.    LAB RESULTS  No results found for this or any previous visit (from the past 24 hour(s)).    I ordered the above labs and reviewed the results.    RADIOLOGY  No Radiology Exams Resulted Within Past 24 Hours    I ordered the above noted radiological studies. I reviewed the images and results. I agree with the radiologist interpretation.    PROCEDURES  Procedures    MEDICATIONS GIVEN IN ER  Medications - No data to display    PROGRESS, DATA ANALYSIS, CONSULTS, AND MEDICAL DECISION MAKING  A complete history and physical exam have been performed.  All available laboratory and imaging results have been reviewed by myself prior to disposition.    MDM    After the initial H&P, I discussed pertinent information from history and physical exam with patient/family.  Discussed differential diagnosis.  Discussed plan for ED evaluation/workup/treatment.  All questions answered.  Patient/family is agreeable with plan.  ED Course as of 10/18/23 1922   Mon Oct 16, 2023   1900 My differential diagnosis for abdominal pain includes but is not limited to:  Gastritis, gastroenteritis, peptic ulcer disease, GERD, esophageal perforation, acute appendicitis, mesenteric adenitis, Meckel's diverticulum, epiploic appendagitis, diverticulitis, colon cancer, ulcerative colitis, Crohn's disease, intussusception, small bowel obstruction, adhesions, ischemic bowel, perforated viscus, ileus, obstipation, biliary colic, cholecystitis, cholelithiasis, Hadier-Yo Bernard, hepatitis, pancreatitis, common bile duct obstruction, cholangitis, bile leak, splenic trauma, splenic rupture, splenic infarction, splenic abscess, abdominal abscess, ascites, spontaneous bacterial peritonitis,  hernia, UTI, cystitis, prostatitis, ureterolithiasis, urinary obstruction, ovarian cyst, torsion, pregnancy, ectopic pregnancy, PID, pelvic abscess, mittelschmerz, endometriosis, AAA, myocardial infarction, pneumonia, cancer, porphyria, DKA, medications, sickle cell, viral syndrome, zoster   [JG]      ED Course User Index  [JG] Devonte Ramirez MD       AS OF 18:51 EDT VITALS:    BP -    HR - 103  TEMP - 98.1 °F (36.7 °C)  O2 SATS - 100%    DIAGNOSIS  Final diagnoses:   Anasarca   CKD (chronic kidney disease) requiring chronic dialysis   Noncompliance         DISPOSITION  ADMISSION    Discussed treatment plan and reason for admission with pt/family and admitting physician.  Pt/family voiced understanding of the plan for admission for further testing/treatment as needed.        Devonte Ramirez MD  10/18/23 1923

## 2023-10-16 NOTE — Clinical Note
Level of Care: Telemetry [5]   Diagnosis: Fluid overload [2473458]   Certification: I Certify That Inpatient Hospital Services Are Medically Necessary For Greater Than 2 Midnights

## 2023-10-17 ENCOUNTER — APPOINTMENT (OUTPATIENT)
Dept: ULTRASOUND IMAGING | Facility: HOSPITAL | Age: 38
DRG: 640 | End: 2023-10-17
Payer: COMMERCIAL

## 2023-10-17 PROBLEM — E87.70 VOLUME OVERLOAD: Status: ACTIVE | Noted: 2023-01-01

## 2023-10-17 PROBLEM — I50.33 ACUTE ON CHRONIC DIASTOLIC CHF (CONGESTIVE HEART FAILURE): Status: ACTIVE | Noted: 2019-11-07

## 2023-10-17 LAB
ALBUMIN SERPL-MCNC: 2.9 G/DL (ref 3.5–5.2)
ANION GAP SERPL CALCULATED.3IONS-SCNC: 15 MMOL/L (ref 5–15)
BACTERIA UR QL AUTO: ABNORMAL /HPF
BASOPHILS # BLD AUTO: 0.04 10*3/MM3 (ref 0–0.2)
BASOPHILS NFR BLD AUTO: 0.7 % (ref 0–1.5)
BILIRUB UR QL STRIP: NEGATIVE
BUN SERPL-MCNC: 39 MG/DL (ref 6–20)
BUN/CREAT SERPL: 4.8 (ref 7–25)
CALCIUM SPEC-SCNC: 7.5 MG/DL (ref 8.6–10.5)
CHLORIDE SERPL-SCNC: 93 MMOL/L (ref 98–107)
CLARITY UR: ABNORMAL
CO2 SERPL-SCNC: 22 MMOL/L (ref 22–29)
COLOR UR: YELLOW
CREAT SERPL-MCNC: 8.08 MG/DL (ref 0.57–1)
DEPRECATED RDW RBC AUTO: 44.4 FL (ref 37–54)
EGFRCR SERPLBLD CKD-EPI 2021: 6.1 ML/MIN/1.73
EOSINOPHIL # BLD AUTO: 0.22 10*3/MM3 (ref 0–0.4)
EOSINOPHIL NFR BLD AUTO: 3.8 % (ref 0.3–6.2)
ERYTHROCYTE [DISTWIDTH] IN BLOOD BY AUTOMATED COUNT: 13.3 % (ref 12.3–15.4)
GLUCOSE SERPL-MCNC: 98 MG/DL (ref 65–99)
GLUCOSE UR STRIP-MCNC: ABNORMAL MG/DL
HBA1C MFR BLD: 5.1 % (ref 4.8–5.6)
HCT VFR BLD AUTO: 25.6 % (ref 34–46.6)
HGB BLD-MCNC: 8.6 G/DL (ref 12–15.9)
HGB UR QL STRIP.AUTO: ABNORMAL
HYALINE CASTS UR QL AUTO: ABNORMAL /LPF
IMM GRANULOCYTES # BLD AUTO: 0.02 10*3/MM3 (ref 0–0.05)
IMM GRANULOCYTES NFR BLD AUTO: 0.3 % (ref 0–0.5)
KETONES UR QL STRIP: NEGATIVE
LEUKOCYTE ESTERASE UR QL STRIP.AUTO: ABNORMAL
LYMPHOCYTES # BLD AUTO: 0.59 10*3/MM3 (ref 0.7–3.1)
LYMPHOCYTES NFR BLD AUTO: 10.2 % (ref 19.6–45.3)
MCH RBC QN AUTO: 30.9 PG (ref 26.6–33)
MCHC RBC AUTO-ENTMCNC: 33.6 G/DL (ref 31.5–35.7)
MCV RBC AUTO: 92.1 FL (ref 79–97)
MONOCYTES # BLD AUTO: 0.48 10*3/MM3 (ref 0.1–0.9)
MONOCYTES NFR BLD AUTO: 8.3 % (ref 5–12)
NEUTROPHILS NFR BLD AUTO: 4.43 10*3/MM3 (ref 1.7–7)
NEUTROPHILS NFR BLD AUTO: 76.7 % (ref 42.7–76)
NITRITE UR QL STRIP: NEGATIVE
NRBC BLD AUTO-RTO: 0 /100 WBC (ref 0–0.2)
PH UR STRIP.AUTO: 8.5 [PH] (ref 5–8)
PHOSPHATE SERPL-MCNC: 7.2 MG/DL (ref 2.5–4.5)
PLATELET # BLD AUTO: 187 10*3/MM3 (ref 140–450)
PMV BLD AUTO: 8.9 FL (ref 6–12)
POTASSIUM SERPL-SCNC: 4.5 MMOL/L (ref 3.5–5.2)
PROT UR QL STRIP: ABNORMAL
RBC # BLD AUTO: 2.78 10*6/MM3 (ref 3.77–5.28)
RBC # UR STRIP: ABNORMAL /HPF
REF LAB TEST METHOD: ABNORMAL
SODIUM SERPL-SCNC: 130 MMOL/L (ref 136–145)
SP GR UR STRIP: 1.01 (ref 1–1.03)
SQUAMOUS #/AREA URNS HPF: ABNORMAL /HPF
UROBILINOGEN UR QL STRIP: ABNORMAL
WBC # UR STRIP: ABNORMAL /HPF
WBC NRBC COR # BLD: 5.78 10*3/MM3 (ref 3.4–10.8)

## 2023-10-17 PROCEDURE — 83036 HEMOGLOBIN GLYCOSYLATED A1C: CPT | Performed by: NURSE PRACTITIONER

## 2023-10-17 PROCEDURE — 5A1D70Z PERFORMANCE OF URINARY FILTRATION, INTERMITTENT, LESS THAN 6 HOURS PER DAY: ICD-10-PCS | Performed by: INTERNAL MEDICINE

## 2023-10-17 PROCEDURE — 81001 URINALYSIS AUTO W/SCOPE: CPT | Performed by: EMERGENCY MEDICINE

## 2023-10-17 PROCEDURE — 80069 RENAL FUNCTION PANEL: CPT | Performed by: NURSE PRACTITIONER

## 2023-10-17 PROCEDURE — 36415 COLL VENOUS BLD VENIPUNCTURE: CPT | Performed by: NURSE PRACTITIONER

## 2023-10-17 PROCEDURE — 85025 COMPLETE CBC W/AUTO DIFF WBC: CPT | Performed by: NURSE PRACTITIONER

## 2023-10-17 RX ORDER — MANNITOL 250 MG/ML
25 INJECTION, SOLUTION INTRAVENOUS AS NEEDED
Status: CANCELLED | OUTPATIENT
Start: 2023-10-18 | End: 2023-10-18

## 2023-10-17 RX ORDER — HYDROCODONE BITARTRATE AND ACETAMINOPHEN 5; 325 MG/1; MG/1
1 TABLET ORAL ONCE
Status: COMPLETED | OUTPATIENT
Start: 2023-10-17 | End: 2023-10-17

## 2023-10-17 RX ORDER — ALLOPURINOL 100 MG/1
100 TABLET ORAL DAILY
Status: DISCONTINUED | OUTPATIENT
Start: 2023-10-17 | End: 2023-10-21 | Stop reason: HOSPADM

## 2023-10-17 RX ORDER — HYDROCODONE BITARTRATE AND ACETAMINOPHEN 5; 325 MG/1; MG/1
1 TABLET ORAL EVERY 6 HOURS PRN
Status: DISCONTINUED | OUTPATIENT
Start: 2023-10-17 | End: 2023-10-21 | Stop reason: HOSPADM

## 2023-10-17 RX ADMIN — CARVEDILOL 25 MG: 25 TABLET, FILM COATED ORAL at 18:52

## 2023-10-17 RX ADMIN — HYDRALAZINE HYDROCHLORIDE 100 MG: 50 TABLET, FILM COATED ORAL at 20:24

## 2023-10-17 RX ADMIN — LEVOTHYROXINE SODIUM 125 MCG: 125 TABLET ORAL at 05:24

## 2023-10-17 RX ADMIN — HYDROCODONE BITARTRATE AND ACETAMINOPHEN 1 TABLET: 5; 325 TABLET ORAL at 03:11

## 2023-10-17 RX ADMIN — HYDROCODONE BITARTRATE AND ACETAMINOPHEN 1 TABLET: 5; 325 TABLET ORAL at 21:54

## 2023-10-17 RX ADMIN — HYDROCODONE BITARTRATE AND ACETAMINOPHEN 1 TABLET: 5; 325 TABLET ORAL at 16:13

## 2023-10-17 NOTE — ED NOTES
..Nursing report ED to floor  Gisela Dyson  38 y.o.  female    HPI :   Chief Complaint   Patient presents with    Abdominal Pain       Admitting doctor:   Kameron Brown MD    Admitting diagnosis:   The primary encounter diagnosis was Anasarca. Diagnoses of CKD (chronic kidney disease) requiring chronic dialysis and Noncompliance were also pertinent to this visit.    Code status:   Current Code Status       Date Active Code Status Order ID Comments User Context       10/16/2023 2230 CPR (Attempt to Resuscitate) [id number]  Rosalva Vizcarra APRN ED        Question Answer    Code Status (Patient has no pulse and is not breathing) CPR (Attempt to Resuscitate)    Medical Interventions (Patient has pulse or is breathing) Full Support                    Allergies:   Patient has no known allergies.    Isolation:   No active isolations    Intake and Output  No intake or output data in the 24 hours ending 10/16/23 2233    Weight:   There were no vitals filed for this visit.    Most recent vitals:   Vitals:    10/16/23 1854 10/16/23 2004 10/16/23 2105 10/16/23 2227   BP: 177/100 165/99 149/95 140/91   Pulse:  98 98 99   Resp:       Temp:       SpO2:  100% 100% 98%       Active LDAs/IV Access:   Lines, Drains & Airways       Active LDAs       Name Placement date Placement time Site Days    Peripheral IV 10/16/23 1909 Right Antecubital 10/16/23  1909  Antecubital  less than 1    Hemodialysis Cath Double 06/18/21  --  Internal Jugular  850                    Labs (abnormal labs have a star):   Labs Reviewed   COMPREHENSIVE METABOLIC PANEL - Abnormal; Notable for the following components:       Result Value    BUN 38 (*)     Creatinine 7.78 (*)     Sodium 133 (*)     Chloride 91 (*)     Calcium 7.9 (*)     Albumin 3.3 (*)     Alkaline Phosphatase 119 (*)     BUN/Creatinine Ratio 4.9 (*)     Anion Gap 16.0 (*)     eGFR 6.4 (*)     All other components within normal limits    Narrative:     GFR Normal >60  Chronic Kidney Disease  <60  Kidney Failure <15     CBC WITH AUTO DIFFERENTIAL - Abnormal; Notable for the following components:    RBC 3.48 (*)     Hemoglobin 10.6 (*)     Hematocrit 32.1 (*)     Neutrophil % 81.0 (*)     Lymphocyte % 7.7 (*)     Lymphocytes, Absolute 0.53 (*)     All other components within normal limits   LIPASE - Normal   HCG, SERUM, QUALITATIVE - Normal   URINALYSIS W/ MICROSCOPIC IF INDICATED (NO CULTURE)   RENAL FUNCTION PANEL   CBC WITH AUTO DIFFERENTIAL   CBC AND DIFFERENTIAL    Narrative:     The following orders were created for panel order CBC & Differential.  Procedure                               Abnormality         Status                     ---------                               -----------         ------                     CBC Auto Differential[[id number]]        Abnormal            Final result                 Please view results for these tests on the individual orders.   CBC AND DIFFERENTIAL    Narrative:     The following orders were created for panel order CBC & Differential.  Procedure                               Abnormality         Status                     ---------                               -----------         ------                     CBC Auto Differential[[id number]]                                                         Please view results for these tests on the individual orders.       EKG:   No orders to display       Meds given in ED:   Medications   allopurinol (ZYLOPRIM) tablet 300 mg (has no administration in time range)   amLODIPine (NORVASC) tablet 10 mg (has no administration in time range)   carvedilol (COREG) tablet 25 mg (has no administration in time range)   hydrALAZINE (APRESOLINE) tablet 100 mg (has no administration in time range)   sodium chloride 0.9 % flush 10 mL (has no administration in time range)   sennosides-docusate (PERICOLACE) 8.6-50 MG per tablet 2 tablet (has no administration in time range)     And   polyethylene glycol (MIRALAX) packet 17 g (has no  administration in time range)     And   bisacodyl (DULCOLAX) EC tablet 5 mg (has no administration in time range)     And   bisacodyl (DULCOLAX) suppository 10 mg (has no administration in time range)   heparin (porcine) 5000 UNIT/ML injection 5,000 Units (has no administration in time range)   nitroglycerin (NITROSTAT) SL tablet 0.4 mg (has no administration in time range)   acetaminophen (TYLENOL) tablet 650 mg (has no administration in time range)   melatonin tablet 3 mg (has no administration in time range)   ondansetron (ZOFRAN) tablet 4 mg (has no administration in time range)     Or   ondansetron (ZOFRAN) injection 4 mg (has no administration in time range)   aluminum-magnesium hydroxide-simethicone (MAALOX MAX) 400-400-40 MG/5ML suspension 15 mL (has no administration in time range)   nicotine (NICODERM CQ) 21 MG/24HR patch 1 patch (has no administration in time range)   levothyroxine (SYNTHROID, LEVOTHROID) tablet 125 mcg (has no administration in time range)   morphine injection 4 mg (4 mg Intravenous Given 10/16/23 1916)   ondansetron (ZOFRAN) injection 8 mg (8 mg Intravenous Given 10/16/23 1916)   HYDROmorphone (DILAUDID) injection 0.5 mg (0.5 mg Intravenous Given 10/16/23 2152)       Imaging results:  CT Abdomen Pelvis Without Contrast    Result Date: 10/16/2023   Findings of fluid overload are present, including moderate to large abdominal and pelvic ascites, diffuse mesenteric edema, anasarca, diffuse skin thickening, trace left pleural effusion. Abdominal and pelvic ascites is greater density than expected for simple fluid, could represent hemorrhagic ascites or could be evidence of spontaneous peritonitis.     This report was finalized on 10/16/2023 9:02 PM by Dr. Emery Cook M.D on Workstation: [email or web address]       Ambulatory status:   - one person assist    Social issues:   Social History     Socioeconomic History    Marital status: Single   Tobacco Use    Smoking status: Former     Years: 1      Types: Cigarettes    Smokeless tobacco: Never    Tobacco comments:     QUIT AGE 32   Vaping Use    Vaping Use: Never used   Substance and Sexual Activity    Alcohol use: Yes     Alcohol/week: 1.0 - 2.0 standard drink of alcohol     Types: 1 - 2 Glasses of wine per week     Comment: socially    Drug use: No    Sexual activity: Defer       NIH Stroke Scale:       Zandra Cervantes RN  10/16/23 22:33 EDT         Dutasteride Pregnancy And Lactation Text: This medication is absolutely contraindicated in women, especially during pregnancy and breast feeding. Feminization of male fetuses is possible if taking while pregnant.

## 2023-10-17 NOTE — PROGRESS NOTES
Discharge Planning Assessment  [facility]     Patient Name: Gisela Dyson  MRN: [id number]  Today's Date: 10/17/2023    Admit Date: 10/16/2023    Plan: Home to father's house   Discharge Needs Assessment       Row Name 10/17/23 1548       Living Environment    People in Home alone    Current Living Arrangements apartment    Primary Care Provided by self    Provides Primary Care For no one    Family Caregiver if Needed parent(s)    Quality of Family Relationships helpful;involved;supportive       Transition Planning    Patient/Family Anticipates Transition to home with family    Patient/Family Anticipated Services at Transition none    Transportation Anticipated family or friend will provide       Discharge Needs Assessment    Equipment Currently Used at Home none    Equipment Needed After Discharge none    Provided Post Acute Provider List? N/A    N/A Provider List Comment Denies needs. Plan is home                   Discharge Plan       Row Name 10/17/23 4730       Plan    Plan Home to father's house    Patient/Family in Agreement with Plan yes    Plan Comments Introduced self and role of CCP. Patient's father Tony Dyson [phone]) at bedside. Patient agreeable to discuss with all present. Facesheet verified. Patient lives alone in a second floor apartment. Patient is independent with ADL's and uses no DMEs. Stated she works Full Time and continues to drive. Stated she is scheduled for HD at [facility] on [place] with chair time of 11:30ish. Stated she has been going to HD for 2 years. Has never used HH and has never been to a rehab facility. DC plan is to go to her father's house at [place] who will assist as needed. CCP will continue to follow for poss needs/equipment.                  Continued Care and Services - Admitted Since 10/16/2023    Coordination has not been started for this encounter.          Demographic Summary       Row Name 10/17/23 1546       General Information    Admission Type  observation    Arrived From home    Reason for Consult discharge planning    Preferred Language English                   Functional Status       Row Name 10/17/23 1547       Functional Status    Usual Activity Tolerance good    Current Activity Tolerance good       Functional Status, IADL    Medications independent    Meal Preparation independent    Housekeeping independent    Laundry independent    Shopping independent       Mental Status    General Appearance WDL WDL                   Psychosocial       Row Name 10/17/23 1547       Values/Beliefs    Spiritual, Cultural Beliefs, Cheondoism Practices, Values that Affect Care no       Behavior WDL    Behavior WDL WDL       Emotion Mood WDL    Emotion/Mood/Affect WDL WDL       Speech WDL    Speech WDL WDL       Coping/Stress    Sources of Support parent(s)    Reaction to Health Status adjusting    Understanding of Condition and Treatment partial understanding of treatment       Developmental Stage (Eriksson's)    Developmental Stage Stage 7 (35-65 years/Middle Adulthood) Generativity vs. Stagnation                   Abuse/Neglect       Row Name 10/17/23 1548       Personal Safety    Feels Unsafe at Home or Work/School no    Feels Threatened by Someone no    Does Anyone Try to Keep You From Having Contact with Others or Doing Things Outside Your Home? no    Physical Signs of Abuse Present no                     Dianne Gilbert, RN

## 2023-10-17 NOTE — PLAN OF CARE
Goal Outcome Evaluation:  Plan of Care Reviewed With: patient           Outcome Evaluation: VSS, c/o abd pain, norco x1, no nausea, collected urine sample and sent to lab, pt refused: SCD's, heparin injection, stool softners, nicotine patch, hosp non-slip socks. Pt has numerous scabs and open areas (band-aides on abd) d/t her scratching her skin, andressa area red - cleaned and barrier cream applied, some scattered bruising, dialysis access on left upper arm has dressing covering it, had dialysis done at [facility] on Monday (is a M,W,F dialysis), is on renal diet, consult will be placed  for Nephrology, plan of care continuing.

## 2023-10-17 NOTE — SIGNIFICANT NOTE
10/17/23 0849   OTHER   Discipline physical therapist   Rehab Time/Intention   Session Not Performed other (see comments)  (Pt is up ad edwin. No acute PT needs.)   Therapy Assessment/Plan (PT)   Criteria for Skilled Interventions Met (PT) no problems identified which require skilled intervention

## 2023-10-17 NOTE — PLAN OF CARE
Goal Outcome Evaluation:  Plan of Care Reviewed With: patient        Progress: no change  Outcome Evaluation: Dialysis complete this afternoon. Abdomen continues to be distended. Order for US Paracentesis but patient is not sure if she wants to get this done yet. Family member at bedside this afternoon. Tolerating diet. Plan of care ongoing.

## 2023-10-17 NOTE — PAYOR COMM NOTE
"Gisela Ulrich (38 y.o. Female)          INPATIENT REQUEST FOR [id number]    CONTACT AMILEMUEL  P# [phone]  F# [phone]         Date of Birth   1985    Social Security Number       Address   [address] [place] [address] [address] [place] [address]    Home Phone   [phone]    MRN   [id number]       Jainism   None    Marital Status   Single                            Admission Date   10/16/23    Admission Type   Emergency    Admitting Provider   Kameron Brown MD    Attending Provider   Kameron De Jesus MD    Department, Room/Bed   [address], 81/1       Discharge Date       Discharge Disposition       Discharge Destination                                 Attending Provider: Kameron De Jesus MD    Allergies: No Known Allergies    Isolation: None   Infection: None   Code Status: CPR    Ht: 157.5 cm (62\")   Wt: 115 kg (253 lb 4.9 oz)    Admission Cmt: None   Principal Problem: Fluid overload [E87.70]                   Active Insurance as of 10/16/2023       Primary Coverage       Payor Plan Insurance Group Employer/Plan Group    ANTHEM BLUE CROSS ANTHEM BLUE CROSS BLUE SHIELD PPO [id number]       Payor Plan Address Payor Plan Phone Number Payor Plan Fax Number Effective Dates    [address] [phone]  1/1/2022 - None Entered    [facility] [id number]         Subscriber Name Subscriber Birth Date Member ID       GISELA ULRICH R 1985 [id number]               Secondary Coverage       Payor Plan Insurance Group Employer/Plan Group    MEDICARE MEDICARE A & B        Payor Plan Address Payor Plan Phone Number Payor Plan Fax Number Effective Dates    [address] [phone]  9/1/2021 - None Entered    [place] [id number]         Subscriber Name Subscriber Birth Date Member ID       GISELA ULRICH R 1985 [id number]                     Emergency Contacts        (Rel.) Home Phone Work Phone Mobile Phone    MOJGANFILOMENA (BILL) (Father) [phone] -- [phone] "                 History & Physical        Rosalva Vizcarra APRN at 10/16/23 1470       Attestation signed by Kameron Brown MD at 10/17/23 5842 (Updated)    I have reviewed this documentation and agree.        Addendum: I have reviewed the history and plan as obtained by DANIKA Juarez and agree with below documentation with exception of the following:    Physical Exam  Constitutional:       General: Patient is not in acute distress.     Appearance: Normal appearance.  Chronically ill-appearing  HENT:      Head: Normocephalic and atraumatic.   Cardiovascular:      Rate and Rhythm: Normal rate and regular rhythm.   Pulmonary:      Effort: Pulmonary effort is normal. No respiratory distress.      Breath sounds: Normal breath sounds. No wheezing or rhonchi.   Abdominal:      General: Distended, indurated skin in the lower quadrants.  3+ pitting edema distension.      Palpations: Abdomen is soft.      Tenderness: There is no abdominal tenderness. There is no guarding or rebound.   Musculoskeletal:         General: No swelling.      Bilateral 3+ bilateral pitting edema up to abdomen  Skin:     General: Skin is warm and dry.   Neurological:      General: No focal deficit present.      Mental Status: Alert and oriented to person, place, and time.   Psychiatric:         Mood and Affect: Mood normal.         Behavior: Behavior normal.     I have personally reviewed:  [x]  Laboratory   []  Microbiology   [x]  Radiology   [x]  EKG/Telemetry   []  Cardiology/Vascular   []  Pathology   [x]  Some old records     38-year-old with ESRD from uncontrolled hypertension presenting with anasarca.  Per father at bedside patient only goes to hemodialysis twice a week for 2 hours.  This has been going on for the last few weeks.  Patient states that she cannot stay for long because she has to go to work at [employer].  Besides abdominal distention and swelling patient denies any other symptoms.    ESRD on HD with  noncompliance  Acute on chronic diastolic heart failure  Anasarca  Ascites  -CT abdomen with diffuse skin thickening, anasarca, greater than 3 expected for simple fluid possible hemorrhagic ascites or SBP.  Hemoglobin stable, minimal tenderness to palpation, suspicion for there is low at this time  -No history of liver disease  -Renal consulted, appreciate recommendations  -Consider paracentesis if dialysis does not help ascites    Hypertension-continue home Norvasc, Coreg, hydralazine    Hypothyroidism-continue home Synthroid    Gout-continue allopurinol    Diabetes type 2  -A1c pending  -Low-dose sliding scale insulin for now    The majority of medical decision making (>50%) for this encounter was completed by myself and discussed with this APRN    Kameron Brown MD  01:12 EDT                      Patient Name:  Gisela Dyson  YOB: 1985  MRN:  [id number]  Admit Date:  10/16/2023  Patient Care Team:  Provider, No Known as PCP - Larry Almeida MD as Consulting Physician (Nephrology)      Subjective  History Present Illness     Chief Complaint   Patient presents with    Abdominal Pain     History of Present Illness  Ms. Dyson is a 38 y.o. former smoker with a history of ESRD on HD, chronic diastolic CHF, HTN, morbid obesity, hypothyroidism, and type 2 diabetes that presents to [facility] complaining of abdominal pain.  She reports her pain has been present for the past 3 days but has been intermittent in nature. She receives hemodialysis on Monday, Wednesday, and Friday but she only goes on Mondays and Fridays.  She received dialysis today and afterwards went to work.  While she was working, her abdominal pain became severe in nature to the point where she was unable to work.  She describes the pain as sharp in nature radiating across the middle of her abdomen.  She denies any nausea or vomiting.  She also denies any constipation or diarrhea.  Work-up in emergency  department reveals findings of fluid overload including moderate to large abdominal and pelvic ascites, diffuse mesenteric edema, anasarca, and diffuse skin thickening with trace left pleural effusion.    Review of Systems   Constitutional:  Negative for chills and fever.   HENT:  Negative for congestion and rhinorrhea.    Eyes:  Negative for photophobia and visual disturbance.   Respiratory:  Negative for cough and shortness of breath.    Cardiovascular:  Negative for chest pain and palpitations.   Gastrointestinal:  Positive for abdominal pain. Negative for constipation, diarrhea, nausea and vomiting.   Endocrine: Negative for cold intolerance and heat intolerance.   Genitourinary:  Negative for difficulty urinating and dysuria.   Musculoskeletal:  Negative for gait problem and joint swelling.   Skin:  Negative for rash and wound.   Neurological:  Negative for dizziness, light-headedness and headaches.   Psychiatric/Behavioral:  Negative for sleep disturbance and suicidal ideas.         Personal History     Past Medical History:   Diagnosis Date    JULISSA (acute kidney injury)     Diastolic CHF     Disease of thyroid gland     ESRD (end stage renal disease) on dialysis     Essential hypertension     Gout     History of COVID-19 01/09/2022    Neuropathy     Nonrheumatic mitral valve regurgitation     Obesity, morbid, BMI 50 or higher     Scratch marcel     ON LEFT ARM WITH SMALL OPEN AREA INSTRUCTED PT TO CALL DR FLEMING IF AREA IS STILL OPEN ON 3/14/2022.    Tachycardia     Type 2 diabetes mellitus      Past Surgical History:   Procedure Laterality Date    ARTERIOVENOUS FISTULA/SHUNT SURGERY Left 3/14/2022    Procedure: LEFT ARM BRACHIOCEPHALIC FISTULA CREATION;  Surgeon: Amador Schilling MD;  Location: [facility];  Service: Vascular;  Laterality: Left;    INSERTION HEMODIALYSIS CATHETER Right 6/18/2021    Procedure: TUNNEL DIALYSIS, PALINDROME CATH;  Surgeon: Ancelmo Menendez MD;  Location: [facility];   "Service: Vascular;  Laterality: Right;     Family History   Adopted: Yes   Problem Relation Age of Onset    Malig Hyperthermia Neg Hx      Social History     Tobacco Use    Smoking status: Former     Years: 1     Types: Cigarettes    Smokeless tobacco: Never    Tobacco comments:     QUIT AGE 32   Vaping Use    Vaping Use: Never used   Substance Use Topics    Alcohol use: Yes     Alcohol/week: 1.0 - 2.0 standard drink of alcohol     Types: 1 - 2 Glasses of wine per week     Comment: socially    Drug use: No     No current facility-administered medications on file prior to encounter.     Current Outpatient Medications on File Prior to Encounter   Medication Sig Dispense Refill    allopurinol (ZYLOPRIM) 300 MG tablet Take 300 mg by mouth Daily.      amLODIPine (NORVASC) 10 MG tablet Take 10 mg by mouth Every Night.      carvedilol (COREG) 25 MG tablet Take 1 tablet by mouth 2 (Two) Times a Day With Meals. 60 tablet 0    CHLORHEXIDINE GLUCONATE CLOTH EX Apply  topically. AS DIRECTED PREOP      Ergocalciferol (ERGOCAL PO) Take 1.25 mg by mouth 3 (Three) Times a Week.      Ferric Citrate (Auryxia) 1  MG(Fe) tablet Take 420 mg by mouth 3 (Three) Times a Day. PT STATES HAS NOT BEEN TAKING      hydrALAZINE (APRESOLINE) 100 MG tablet Take 100 mg by mouth 2 (Two) Times a Day.      HYDROcodone-acetaminophen (Norco) 5-325 MG per tablet Take 1 tablet by mouth Every 8 (Eight) Hours As Needed for Severe Pain . 8 tablet 0    HYDROcodone-acetaminophen (Norco) 5-325 MG per tablet Take 1 tablet by mouth Every 8 (Eight) Hours As Needed for Severe Pain . 8 tablet 0    levothyroxine (SYNTHROID, LEVOTHROID) 125 MCG tablet Take 125 mcg by mouth Every Night.      lisinopril (PRINIVIL,ZESTRIL) 10 MG tablet Take 1 tablet by mouth Every Night for 30 days. 30 tablet 0    montelukast (SINGULAIR) 10 MG tablet Take 1 tablet by mouth Every Night for 30 days. 30 tablet 0    Needle, Disp, (BD DISP NEEDLES) 30G X 1/2\" misc Use daily for injection " of Tresiba. 100 each 3     No Known Allergies    Objective   Objective     Vital Signs  Temp:  [98.1 °F (36.7 °C)] 98.1 °F (36.7 °C)  Heart Rate:  [] 100  Resp:  [18] 18  BP: (140-177)/() 147/96  SpO2:  [98 %-100 %] 98 %  on   ;   Device (Oxygen Therapy): room air  There is no height or weight on file to calculate BMI.    Physical Exam  Vitals and nursing note reviewed.   Constitutional:       General: She is not in acute distress.     Appearance: She is well-developed. She is obese. She is not toxic-appearing.   HENT:      Head: Normocephalic and atraumatic.   Eyes:      General: No scleral icterus.        Right eye: No discharge.         Left eye: No discharge.      Conjunctiva/sclera: Conjunctivae normal.   Neck:      Vascular: No JVD.   Cardiovascular:      Rate and Rhythm: Normal rate and regular rhythm.      Heart sounds: Normal heart sounds. No murmur heard.     No friction rub. No gallop.   Pulmonary:      Effort: Pulmonary effort is normal. No respiratory distress.      Breath sounds: Normal breath sounds. No wheezing or rales.   Abdominal:      General: Bowel sounds are normal. There is distension.      Palpations: Abdomen is rigid.      Tenderness: There is abdominal tenderness. There is no guarding.   Musculoskeletal:         General: No tenderness or deformity. Normal range of motion.      Cervical back: Normal range of motion and neck supple.   Skin:     General: Skin is warm and dry.      Capillary Refill: Capillary refill takes less than 2 seconds.   Neurological:      Mental Status: She is alert and oriented to person, place, and time.   Psychiatric:         Behavior: Behavior normal.       Results Review:  I reviewed the patient's new clinical results.    Lab Results (last 24 hours)       Procedure Component Value Units Date/Time    CBC & Differential [[id number]]  (Abnormal) Collected: 10/16/23 1908    Specimen: Blood Updated: 10/16/23 1927    Narrative:      The following orders were  created for panel order CBC & Differential.  Procedure                               Abnormality         Status                     ---------                               -----------         ------                     CBC Auto Differential[[id number]]        Abnormal            Final result                 Please view results for these tests on the individual orders.    Comprehensive Metabolic Panel [[id number]]  (Abnormal) Collected: 10/16/23 1908    Specimen: Blood Updated: 10/16/23 1945     Glucose 90 mg/dL      BUN 38 mg/dL      Creatinine 7.78 mg/dL      Sodium 133 mmol/L      Potassium 4.9 mmol/L      Chloride 91 mmol/L      CO2 26.0 mmol/L      Calcium 7.9 mg/dL      Total Protein 7.3 g/dL      Albumin 3.3 g/dL      ALT (SGPT) 9 U/L      AST (SGOT) 14 U/L      Alkaline Phosphatase 119 U/L      Total Bilirubin 0.9 mg/dL      Globulin 4.0 gm/dL      A/G Ratio 0.8 g/dL      BUN/Creatinine Ratio 4.9     Anion Gap 16.0 mmol/L      eGFR 6.4 mL/min/1.73      Comment: <15 Indicative of kidney failure       Narrative:      GFR Normal >60  Chronic Kidney Disease <60  Kidney Failure <15      Lipase [[id number]]  (Normal) Collected: 10/16/23 1908    Specimen: Blood Updated: 10/16/23 1945     Lipase 50 U/L     hCG, Serum, Qualitative [[id number]]  (Normal) Collected: 10/16/23 1908    Specimen: Blood Updated: 10/16/23 1947     HCG Qualitative Negative    CBC Auto Differential [[id number]]  (Abnormal) Collected: 10/16/23 1908    Specimen: Blood Updated: 10/16/23 1927     WBC 6.89 10*3/mm3      RBC 3.48 10*6/mm3      Hemoglobin 10.6 g/dL      Hematocrit 32.1 %      MCV 92.2 fL      MCH 30.5 pg      MCHC 33.0 g/dL      RDW 13.2 %      RDW-SD 43.7 fl      MPV 8.9 fL      Platelets 243 10*3/mm3      Neutrophil % 81.0 %      Lymphocyte % 7.7 %      Monocyte % 7.1 %      Eosinophil % 3.2 %      Basophil % 0.6 %      Immature Grans % 0.4 %      Neutrophils, Absolute 5.58 10*3/mm3      Lymphocytes, Absolute 0.53 10*3/mm3       Monocytes, Absolute 0.49 10*3/mm3      Eosinophils, Absolute 0.22 10*3/mm3      Basophils, Absolute 0.04 10*3/mm3      Immature Grans, Absolute 0.03 10*3/mm3      nRBC 0.0 /100 WBC             Imaging Results (Last 24 Hours)       Procedure Component Value Units Date/Time    CT Abdomen Pelvis Without Contrast [[id number]] Collected: 10/16/23 2046     Updated: 10/16/23 2105    Narrative:      CT ABDOMEN PELVIS WO CONTRAST-     INDICATIONS: Abdominal pain and distention, renal disease     TECHNIQUE: Radiation dose reduction techniques were utilized, including  automated exposure control and exposure modulation based on body size.  Unenhanced ABDOMEN AND PELVIS CT     COMPARISON: None available     FINDINGS:     Findings of fluid overload are present, including moderate to large  abdominal and pelvic ascites, diffuse mesenteric edema, anasarca,  diffuse skin thickening, trace left pleural effusion. Abdominal and  pelvic ascites is greater density than expected for simple fluid, could  represent hemorrhagic ascites or could be evidence of spontaneous  peritonitis.     The liver is enlarged. Cholelithiasis is apparent. The spleen is  enlarged, 15.2 cm CC.     Adrenal calcifications are seen on the left.     Calcifications at the bilateral renal millicent are probably arterial in  location, or could be evidence of nonobstructive nephrolithiasis. No  hydronephrosis.        Otherwise unremarkable unenhanced appearance of the liver, spleen,  adrenal glands, pancreas, kidneys, bladder.     No bowel obstruction. Assessment for inflammatory changes of bowel is  limited by absence of contrast material, and presence of diffuse  mesenteric edema and abdominal ascites, follow-up as indications  persist.        No free intraperitoneal gas .        Scattered small mesenteric and para-aortic lymph nodes are seen that are  not significant by size criteria.     Abdominal aorta is not aneurysmal. Aortic and other arterial  calcifications are  present.     The lung bases show minimal atelectasis. The heart is enlarged. Coronary  arterial calcifications are conspicuous.        Degenerative changes are seen in the spine. No acute fracture is  identified.             Impression:         Findings of fluid overload are present, including moderate to large  abdominal and pelvic ascites, diffuse mesenteric edema, anasarca,  diffuse skin thickening, trace left pleural effusion. Abdominal and  pelvic ascites is greater density than expected for simple fluid, could  represent hemorrhagic ascites or could be evidence of spontaneous  peritonitis.              This report was finalized on 10/16/2023 9:02 PM by Dr. Emery Cook M.D on Workstation: [id number]               Results for orders placed during the hospital encounter of 06/17/21    Adult Transthoracic Echo Complete W/ Cont if Necessary Per Protocol    Interpretation Summary  · Calculated left ventricular EF = 48.8% Estimated left ventricular EF = 49% Estimated left ventricular EF was in agreement with the calculated left ventricular EF. Left ventricular systolic function is mildly decreased. The left ventricular cavity is moderately dilated. Left ventricular wall thickness is consistent with moderate concentric hypertrophy. All left ventricular wall segments contract normally. Left ventricular diastolic function is consistent with (grade II w/high LAP) pseudonormalization.  · The mitral valve is grossly normal in structure. Moderate to severe mitral valve regurgitation is present  · Moderate tricuspid valve regurgitation is present. Calculated right ventricular systolic pressure from tricuspid regurgitation is 50.0 mmHg.  · There is mild pulmonic valve regurgitation present.      No orders to display        Assessment/Plan     Active Hospital Problems    Diagnosis  POA    **Fluid overload [E87.70]  Yes    ESRD on hemodialysis [N18.6, Z99.2]  Not Applicable    Primary hypothyroidism [E03.9]  Yes     Essential hypertension [I10]  Yes    Chronic diastolic congestive heart failure [I50.32]  Yes    Type 2 diabetes mellitus with renal complication [E11.29]  Yes    Obesity, morbid, BMI 50 or higher [E66.01]  Yes      Resolved Hospital Problems   No resolved problems to display.       Ms. Dyson is a 38 y.o. former smoker with a history of ESRD on HD who presents with abdominal pain.    ESRD on HD  -Fluid overloaded on exam.  Received dialysis today.  Her regular regimen is Monday, Wednesday, and Friday.  She does report she only goes on Mondays and Fridays.  We will consult nephrology to see  -Renal function panel in a.m.    Chronic diastolic congestive heart failure  -Overloaded on exam  -Daily weights  -Strict I's and O    Essential hypertension  -Stable, hold lisinopril.  Continue carvedilol, hydralazine, and Norvasc    Type 2 diabetes mellitus  -Accu-Cheks 4 times a day with meals and at bedtime  -Low dose correctional factor with hypoglycemic protocol  -Check hemoglobin A1c  -Hold oral diabetic medication    HLD  -Resume statin therapy    Anemia  -Hemoglobin 10.6 today appears around her baseline  -Repeat CBC in a.m.      I discussed the patient's findings and my recommendations with patient and Dr. Brown .    VTE Prophylaxis - Subq hep  Code Status - Full code.       DANIKA Main  [facility]  10/16/23  22:00 EDT    Electronically signed by Kameron Brown MD at 10/17/23 0119          Emergency Department Notes        Zandra Cervantes, RN at 10/16/23 6122          ..Nursing report ED to floor  Gisela Dyson  38 y.o.  female    HPI :   Chief Complaint   Patient presents with    Abdominal Pain       Admitting doctor:   Kameron Brown MD    Admitting diagnosis:   The primary encounter diagnosis was Anasarca. Diagnoses of CKD (chronic kidney disease) requiring chronic dialysis and Noncompliance were also pertinent to this visit.    Code status:   Current Code Status       Date Active Code  Status Order ID Comments User Context       10/16/2023 2230 CPR (Attempt to Resuscitate) [id number]  Rosalva Vizcarra, APRN ED        Question Answer    Code Status (Patient has no pulse and is not breathing) CPR (Attempt to Resuscitate)    Medical Interventions (Patient has pulse or is breathing) Full Support                    Allergies:   Patient has no known allergies.    Isolation:   No active isolations    Intake and Output  No intake or output data in the 24 hours ending 10/16/23 2233    Weight:   There were no vitals filed for this visit.    Most recent vitals:   Vitals:    10/16/23 1854 10/16/23 2004 10/16/23 2105 10/16/23 2227   BP: 177/100 165/99 149/95 140/91   Pulse:  98 98 99   Resp:       Temp:       SpO2:  100% 100% 98%       Active LDAs/IV Access:   Lines, Drains & Airways       Active LDAs       Name Placement date Placement time Site Days    Peripheral IV 10/16/23 1909 Right Antecubital 10/16/23  1909  Antecubital  less than 1    Hemodialysis Cath Double 06/18/21  --  Internal Jugular  850                    Labs (abnormal labs have a star):   Labs Reviewed   COMPREHENSIVE METABOLIC PANEL - Abnormal; Notable for the following components:       Result Value    BUN 38 (*)     Creatinine 7.78 (*)     Sodium 133 (*)     Chloride 91 (*)     Calcium 7.9 (*)     Albumin 3.3 (*)     Alkaline Phosphatase 119 (*)     BUN/Creatinine Ratio 4.9 (*)     Anion Gap 16.0 (*)     eGFR 6.4 (*)     All other components within normal limits    Narrative:     GFR Normal >60  Chronic Kidney Disease <60  Kidney Failure <15     CBC WITH AUTO DIFFERENTIAL - Abnormal; Notable for the following components:    RBC 3.48 (*)     Hemoglobin 10.6 (*)     Hematocrit 32.1 (*)     Neutrophil % 81.0 (*)     Lymphocyte % 7.7 (*)     Lymphocytes, Absolute 0.53 (*)     All other components within normal limits   LIPASE - Normal   HCG, SERUM, QUALITATIVE - Normal   URINALYSIS W/ MICROSCOPIC IF INDICATED (NO CULTURE)   RENAL FUNCTION  PANEL   CBC WITH AUTO DIFFERENTIAL   CBC AND DIFFERENTIAL    Narrative:     The following orders were created for panel order CBC & Differential.  Procedure                               Abnormality         Status                     ---------                               -----------         ------                     CBC Auto Differential[[id number]]        Abnormal            Final result                 Please view results for these tests on the individual orders.   CBC AND DIFFERENTIAL    Narrative:     The following orders were created for panel order CBC & Differential.  Procedure                               Abnormality         Status                     ---------                               -----------         ------                     CBC Auto Differential[[id number]]                                                         Please view results for these tests on the individual orders.       EKG:   No orders to display       Meds given in ED:   Medications   allopurinol (ZYLOPRIM) tablet 300 mg (has no administration in time range)   amLODIPine (NORVASC) tablet 10 mg (has no administration in time range)   carvedilol (COREG) tablet 25 mg (has no administration in time range)   hydrALAZINE (APRESOLINE) tablet 100 mg (has no administration in time range)   sodium chloride 0.9 % flush 10 mL (has no administration in time range)   sennosides-docusate (PERICOLACE) 8.6-50 MG per tablet 2 tablet (has no administration in time range)     And   polyethylene glycol (MIRALAX) packet 17 g (has no administration in time range)     And   bisacodyl (DULCOLAX) EC tablet 5 mg (has no administration in time range)     And   bisacodyl (DULCOLAX) suppository 10 mg (has no administration in time range)   heparin (porcine) 5000 UNIT/ML injection 5,000 Units (has no administration in time range)   nitroglycerin (NITROSTAT) SL tablet 0.4 mg (has no administration in time range)   acetaminophen (TYLENOL) tablet 650 mg (has no  administration in time range)   melatonin tablet 3 mg (has no administration in time range)   ondansetron (ZOFRAN) tablet 4 mg (has no administration in time range)     Or   ondansetron (ZOFRAN) injection 4 mg (has no administration in time range)   aluminum-magnesium hydroxide-simethicone (MAALOX MAX) 400-400-40 MG/5ML suspension 15 mL (has no administration in time range)   nicotine (NICODERM CQ) 21 MG/24HR patch 1 patch (has no administration in time range)   levothyroxine (SYNTHROID, LEVOTHROID) tablet 125 mcg (has no administration in time range)   morphine injection 4 mg (4 mg Intravenous Given 10/16/23 1916)   ondansetron (ZOFRAN) injection 8 mg (8 mg Intravenous Given 10/16/23 1916)   HYDROmorphone (DILAUDID) injection 0.5 mg (0.5 mg Intravenous Given 10/16/23 2152)       Imaging results:  CT Abdomen Pelvis Without Contrast    Result Date: 10/16/2023   Findings of fluid overload are present, including moderate to large abdominal and pelvic ascites, diffuse mesenteric edema, anasarca, diffuse skin thickening, trace left pleural effusion. Abdominal and pelvic ascites is greater density than expected for simple fluid, could represent hemorrhagic ascites or could be evidence of spontaneous peritonitis.     This report was finalized on 10/16/2023 9:02 PM by Dr. Emery Cook M.D on Workstation: [id number]       Ambulatory status:   - one person assist    Social issues:   Social History     Socioeconomic History    Marital status: Single   Tobacco Use    Smoking status: Former     Years: 1     Types: Cigarettes    Smokeless tobacco: Never    Tobacco comments:     QUIT AGE 32   Vaping Use    Vaping Use: Never used   Substance and Sexual Activity    Alcohol use: Yes     Alcohol/week: 1.0 - 2.0 standard drink of alcohol     Types: 1 - 2 Glasses of wine per week     Comment: socially    Drug use: No    Sexual activity: Defer       NIH Stroke Scale:       Zandra Cervantes RN  10/16/23 22:33 EDT          Electronically  signed by Zandra Cervantes, RN at 10/16/23 2233       Damon Moody PA at 10/16/23 2145          2005: Care assumed from Dr Ramirez pending CT and full work-up.    :  Updated patient on CT results.  Patient appears fluid overloaded.  She has been skipping dialysis treatments and shortening her treatments.  I offered her admission for dialysis versus following up.  She would rather be admitted.  I believe this is reasonable.    : I discussed the case with Dr. Brown ARIADNE.  He agrees to admit.    : I discussed the case with Dr. Salazar, nephrology.  He is familiar with the patient.  He will ensure the patient gets dialysis tomorrow.       Damon Moody PA  10/17/23 0510      Electronically signed by Damon Moody PA at 10/17/23 0510       Physician Progress Notes (last 24 hours)  Notes from 10/16/23 1345 through 10/17/23 134   No notes of this type exist for this encounter.          Consult Notes (last 24 hours)        Omid Newell MD at 10/17/23 0731        Consult Orders    1. Nephrology (on -call MD unless specified) [[id number]] ordered by Damon Moody PA at 10/16/23 2145                   [facility] Consult Note      Patient Name: Gisela Dyson  : 1985  MRN: [id number]  Primary Care Physician:  Provider, No Known  Referring Physician: Kameron Brown MD  Date of admission: 10/16/2023    Subjective     Reason for Consult:  ESRD     HPI:   Gisela Dyson is a 38 y.o. female with ESRD on HD MWF ([facility]) via LUE AVF, HTN, hypothyroidism who presented last night with LLQ abd pain.  No n/v or diarrhea.  Had recent fall.  CT abd/pelvis shows no acute abnormality but does reveal large ascites, diffuse mesenteric edema, anasarca.  Density of ascites greater than expected for simple fluid, raising question of hemorrhagic ascites or SBP.  She is requesting pain meds currently.  Mild dyspnea present.  She has been missing some treatments and coming off treatment  early.  Dialysis dependent approx 2 years, attributes ESRD to CHF.  Echo June 2021 showed EF 49% and RVSP 50 mm Hg with mod TR.    Review of Systems:   14 point review of systems is otherwise negative except for mentioned above on HPI    Personal History     Past Medical History:   Diagnosis Date    JULISSA (acute kidney injury)     Diastolic CHF     Disease of thyroid gland     ESRD (end stage renal disease) on dialysis     Essential hypertension     Gout     History of COVID-19 01/09/2022    Neuropathy     Nonrheumatic mitral valve regurgitation     Obesity, morbid, BMI 50 or higher     Scratch marcel     ON LEFT ARM WITH SMALL OPEN AREA INSTRUCTED PT TO CALL DR FLEMING IF AREA IS STILL OPEN ON 3/14/2022.    Tachycardia     Type 2 diabetes mellitus        Past Surgical History:   Procedure Laterality Date    ARTERIOVENOUS FISTULA/SHUNT SURGERY Left 3/14/2022    Procedure: LEFT ARM BRACHIOCEPHALIC FISTULA CREATION;  Surgeon: Amador Schilling MD;  Location: [facility];  Service: Vascular;  Laterality: Left;    INSERTION HEMODIALYSIS CATHETER Right 6/18/2021    Procedure: TUNNEL DIALYSIS, PALINDROME CATH;  Surgeon: Ancelmo Menendez MD;  Location: [facility];  Service: Vascular;  Laterality: Right;       Family History: family history is not on file. She was adopted.    Social History:  reports that she has quit smoking. She has never used smokeless tobacco. She reports current alcohol use of about 1.0 - 2.0 standard drink of alcohol per week. She reports that she does not use drugs.    Home Medications:  Prior to Admission medications    Medication Sig Start Date End Date Taking? Authorizing Provider   allopurinol (ZYLOPRIM) 300 MG tablet Take 1 tablet by mouth Daily.   Yes Provider, MD Zen   amLODIPine (NORVASC) 10 MG tablet Take 1 tablet by mouth Every Night.   Yes ProviderZen MD   carvedilol (COREG) 25 MG tablet Take 1 tablet by mouth 2 (Two) Times a Day With Meals. 11/10/19  Yes Tana  "Nico Felton MD   CHLORHEXIDINE GLUCONATE CLOTH EX Apply  topically. AS DIRECTED PREOP   Yes Zen Baeza MD   Ergocalciferol (ERGOCAL PO) Take 1.25 mg by mouth 3 (Three) Times a Week.   Yes Zen Baeza MD   hydrALAZINE (APRESOLINE) 100 MG tablet Take 1 tablet by mouth 2 (Two) Times a Day.   Yes Zen Baeza MD   HYDROcodone-acetaminophen (Norco) 5-325 MG per tablet Take 1 tablet by mouth Every 8 (Eight) Hours As Needed for Severe Pain . 3/14/22  Yes Amador Schilling MD   levothyroxine (SYNTHROID, LEVOTHROID) 125 MCG tablet Take 1 tablet by mouth Every Night.   Yes Zen Baeza MD   HYDROcodone-acetaminophen (Norco) 5-325 MG per tablet Take 1 tablet by mouth Every 8 (Eight) Hours As Needed for Severe Pain . 3/14/22   Amador Schilling MD   lisinopril (PRINIVIL,ZESTRIL) 10 MG tablet Take 1 tablet by mouth Every Night for 30 days. 6/22/21 7/22/21  Ehsan Toledo MD   montelukast (SINGULAIR) 10 MG tablet Take 1 tablet by mouth Every Night for 30 days. 5/11/21 6/10/21  Mike Calvert MD   Needle, Disp, (BD DISP NEEDLES) 30G X 1/2\" misc Use daily for injection of Tresiba. 1/10/20   Pat Coon MD   Ferric Citrate (Auryxia) 1  MG(Fe) tablet Take 420 mg by mouth 3 (Three) Times a Day. PT STATES HAS NOT BEEN TAKING  10/17/23  Zen Baeza MD       Allergies:  No Known Allergies    Objective     Vitals:   Temp:  [96.9 °F (36.1 °C)-98.1 °F (36.7 °C)] 96.9 °F (36.1 °C)  Heart Rate:  [] 70  Resp:  [18] 18  BP: (113-177)/() 113/73    Intake/Output Summary (Last 24 hours) at 10/17/2023 0721  Last data filed at 10/17/2023 0329  Gross per 24 hour   Intake 500 ml   Output --   Net 500 ml       Physical Exam:    General Appearance: obese uncomfortable F who is alert and conversant  Skin: warm and dry  HEENT: oral mucosa normal, nonicteric sclera  Neck: supple, no JVD  Lungs: CTA bilat  Heart: RRR, normal S1 and S2  Abdomen: distended and diffusely TTP  : no " palpable bladder  Extremities: 3+ BLE edema, no cyanosis or clubbing  Neuro: normal speech and mental status     Scheduled Meds:     allopurinol, 300 mg, Oral, Daily  amLODIPine, 10 mg, Oral, Nightly  carvedilol, 25 mg, Oral, BID With Meals  heparin (porcine), 5,000 Units, Subcutaneous, Q8H  hydrALAZINE, 100 mg, Oral, BID  levothyroxine, 125 mcg, Oral, Q AM  nicotine, 1 patch, Transdermal, Q24H  senna-docusate sodium, 2 tablet, Oral, BID      IV Meds:        Results Reviewed:   I have personally reviewed the results from the time of this admission to 10/17/2023 07:31 EDT     Lab Results   Component Value Date    GLUCOSE 98 10/17/2023    CALCIUM 7.5 (L) 10/17/2023     (L) 10/17/2023    K 4.5 10/17/2023    CO2 22.0 10/17/2023    CL 93 (L) 10/17/2023    BUN 39 (H) 10/17/2023    CREATININE 8.08 (H) 10/17/2023    EGFRIFAFRI 5 (L) 01/09/2022    EGFRIFNONA 4 (L) 01/09/2022    BCR 4.8 (L) 10/17/2023    ANIONGAP 15.0 10/17/2023      Lab Results   Component Value Date    MG 1.9 01/09/2022    PHOS 7.2 (H) 10/17/2023    ALBUMIN 2.9 (L) 10/17/2023           Assessment / Plan     ASSESSMENT:  ESRD - HD MWF.  Non compliant, missing treatments and coming off early.  Last dialysis yesterday.  Needs again today for vol overload.  K/HCo3 normal.  Waste products ok.  LUE AVF  Vol overload - marked periph edema, abd distention, and large ascites with concerning fluid characteristics.  Albumin low 2.9.  Trace pleural effusion  HTN - BP initially elevated but down to 113/73 this AM which may be barrier to aggressive UF.  Hold norvasc given degree of edema.    Abd pain - given CT findings may warrant paracentesis to exclude SBP.  WBC is normal.    Anemia of CKD, hgb 8.6, on long acting ELSA at dialysis but may be missing doses  Hypothyroidism, treated  Hx CHF - echo noted from 2021 with EF 49%.  Consider repeating this, though degree of anasarca more likely explained by dialysis non compliance  MBD - phos high 7.2, neglected to ask if  on binder (none listed), can address this tomorrow     PLAN:  HD today and again tomorrow, remove 4L both treatments as tolerated  Hold coreg/hydralazine predialysis to aid UF  DC norvasc for now   Consider diagnostic and therapeutic paracentesis and repeat echocardiogram  Dec allopurinol 100 mg based on GFR    Thank you for involving us in the care of Gisela Dyson.  Please feel free to call with any questions.    Omid Newell MD  10/17/23  07:31 EDT    [facility]  [phone]    Electronically signed by Omid Newell MD at 10/17/23 2387

## 2023-10-17 NOTE — H&P
Patient Name:  Gisela Dyson  YOB: 1985  MRN:  [id number]  Admit Date:  10/16/2023  Patient Care Team:  Provider, No Known as PCP - Larry Almeida MD as Consulting Physician (Nephrology)      Subjective   History Present Illness     Chief Complaint   Patient presents with    Abdominal Pain     History of Present Illness  Ms. Dyson is a 38 y.o. former smoker with a history of ESRD on HD, chronic diastolic CHF, HTN, morbid obesity, hypothyroidism, and type 2 diabetes that presents to [facility] complaining of abdominal pain.  She reports her pain has been present for the past 3 days but has been intermittent in nature. She receives hemodialysis on Monday, Wednesday, and Friday but she only goes on Mondays and Fridays.  She received dialysis today and afterwards went to work.  While she was working, her abdominal pain became severe in nature to the point where she was unable to work.  She describes the pain as sharp in nature radiating across the middle of her abdomen.  She denies any nausea or vomiting.  She also denies any constipation or diarrhea.  Work-up in emergency department reveals findings of fluid overload including moderate to large abdominal and pelvic ascites, diffuse mesenteric edema, anasarca, and diffuse skin thickening with trace left pleural effusion.    Review of Systems   Constitutional:  Negative for chills and fever.   HENT:  Negative for congestion and rhinorrhea.    Eyes:  Negative for photophobia and visual disturbance.   Respiratory:  Negative for cough and shortness of breath.    Cardiovascular:  Negative for chest pain and palpitations.   Gastrointestinal:  Positive for abdominal pain. Negative for constipation, diarrhea, nausea and vomiting.   Endocrine: Negative for cold intolerance and heat intolerance.   Genitourinary:  Negative for difficulty urinating and dysuria.   Musculoskeletal:  Negative for gait problem and joint  swelling.   Skin:  Negative for rash and wound.   Neurological:  Negative for dizziness, light-headedness and headaches.   Psychiatric/Behavioral:  Negative for sleep disturbance and suicidal ideas.         Personal History     Past Medical History:   Diagnosis Date    JULISSA (acute kidney injury)     Diastolic CHF     Disease of thyroid gland     ESRD (end stage renal disease) on dialysis     Essential hypertension     Gout     History of COVID-19 01/09/2022    Neuropathy     Nonrheumatic mitral valve regurgitation     Obesity, morbid, BMI 50 or higher     Scratch marcel     ON LEFT ARM WITH SMALL OPEN AREA INSTRUCTED PT TO CALL DR FLEMING IF AREA IS STILL OPEN ON 3/14/2022.    Tachycardia     Type 2 diabetes mellitus      Past Surgical History:   Procedure Laterality Date    ARTERIOVENOUS FISTULA/SHUNT SURGERY Left 3/14/2022    Procedure: LEFT ARM BRACHIOCEPHALIC FISTULA CREATION;  Surgeon: Amador Schilling MD;  Location: [facility];  Service: Vascular;  Laterality: Left;    INSERTION HEMODIALYSIS CATHETER Right 6/18/2021    Procedure: TUNNEL DIALYSIS, PALINDROME CATH;  Surgeon: Ancelmo Menendez MD;  Location: [facility];  Service: Vascular;  Laterality: Right;     Family History   Adopted: Yes   Problem Relation Age of Onset    Malig Hyperthermia Neg Hx      Social History     Tobacco Use    Smoking status: Former     Years: 1     Types: Cigarettes    Smokeless tobacco: Never    Tobacco comments:     QUIT AGE 32   Vaping Use    Vaping Use: Never used   Substance Use Topics    Alcohol use: Yes     Alcohol/week: 1.0 - 2.0 standard drink of alcohol     Types: 1 - 2 Glasses of wine per week     Comment: socially    Drug use: No     No current facility-administered medications on file prior to encounter.     Current Outpatient Medications on File Prior to Encounter   Medication Sig Dispense Refill    allopurinol (ZYLOPRIM) 300 MG tablet Take 300 mg by mouth Daily.      amLODIPine (NORVASC) 10 MG tablet Take 10  "mg by mouth Every Night.      carvedilol (COREG) 25 MG tablet Take 1 tablet by mouth 2 (Two) Times a Day With Meals. 60 tablet 0    CHLORHEXIDINE GLUCONATE CLOTH EX Apply  topically. AS DIRECTED PREOP      Ergocalciferol (ERGOCAL PO) Take 1.25 mg by mouth 3 (Three) Times a Week.      Ferric Citrate (Auryxia) 1  MG(Fe) tablet Take 420 mg by mouth 3 (Three) Times a Day. PT STATES HAS NOT BEEN TAKING      hydrALAZINE (APRESOLINE) 100 MG tablet Take 100 mg by mouth 2 (Two) Times a Day.      HYDROcodone-acetaminophen (Norco) 5-325 MG per tablet Take 1 tablet by mouth Every 8 (Eight) Hours As Needed for Severe Pain . 8 tablet 0    HYDROcodone-acetaminophen (Norco) 5-325 MG per tablet Take 1 tablet by mouth Every 8 (Eight) Hours As Needed for Severe Pain . 8 tablet 0    levothyroxine (SYNTHROID, LEVOTHROID) 125 MCG tablet Take 125 mcg by mouth Every Night.      lisinopril (PRINIVIL,ZESTRIL) 10 MG tablet Take 1 tablet by mouth Every Night for 30 days. 30 tablet 0    montelukast (SINGULAIR) 10 MG tablet Take 1 tablet by mouth Every Night for 30 days. 30 tablet 0    Needle, Disp, (BD DISP NEEDLES) 30G X 1/2\" misc Use daily for injection of Tresiba. 100 each 3     No Known Allergies    Objective    Objective     Vital Signs  Temp:  [98.1 °F (36.7 °C)] 98.1 °F (36.7 °C)  Heart Rate:  [] 100  Resp:  [18] 18  BP: (140-177)/() 147/96  SpO2:  [98 %-100 %] 98 %  on   ;   Device (Oxygen Therapy): room air  There is no height or weight on file to calculate BMI.    Physical Exam  Vitals and nursing note reviewed.   Constitutional:       General: She is not in acute distress.     Appearance: She is well-developed. She is obese. She is not toxic-appearing.   HENT:      Head: Normocephalic and atraumatic.   Eyes:      General: No scleral icterus.        Right eye: No discharge.         Left eye: No discharge.      Conjunctiva/sclera: Conjunctivae normal.   Neck:      Vascular: No JVD.   Cardiovascular:      Rate and " Rhythm: Normal rate and regular rhythm.      Heart sounds: Normal heart sounds. No murmur heard.     No friction rub. No gallop.   Pulmonary:      Effort: Pulmonary effort is normal. No respiratory distress.      Breath sounds: Normal breath sounds. No wheezing or rales.   Abdominal:      General: Bowel sounds are normal. There is distension.      Palpations: Abdomen is rigid.      Tenderness: There is abdominal tenderness. There is no guarding.   Musculoskeletal:         General: No tenderness or deformity. Normal range of motion.      Cervical back: Normal range of motion and neck supple.   Skin:     General: Skin is warm and dry.      Capillary Refill: Capillary refill takes less than 2 seconds.   Neurological:      Mental Status: She is alert and oriented to person, place, and time.   Psychiatric:         Behavior: Behavior normal.       Results Review:  I reviewed the patient's new clinical results.    Lab Results (last 24 hours)       Procedure Component Value Units Date/Time    CBC & Differential [[id number]]  (Abnormal) Collected: 10/16/23 1908    Specimen: Blood Updated: 10/16/23 1927    Narrative:      The following orders were created for panel order CBC & Differential.  Procedure                               Abnormality         Status                     ---------                               -----------         ------                     CBC Auto Differential[[id number]]        Abnormal            Final result                 Please view results for these tests on the individual orders.    Comprehensive Metabolic Panel [[id number]]  (Abnormal) Collected: 10/16/23 1908    Specimen: Blood Updated: 10/16/23 1945     Glucose 90 mg/dL      BUN 38 mg/dL      Creatinine 7.78 mg/dL      Sodium 133 mmol/L      Potassium 4.9 mmol/L      Chloride 91 mmol/L      CO2 26.0 mmol/L      Calcium 7.9 mg/dL      Total Protein 7.3 g/dL      Albumin 3.3 g/dL      ALT (SGPT) 9 U/L      AST (SGOT) 14 U/L      Alkaline  Phosphatase 119 U/L      Total Bilirubin 0.9 mg/dL      Globulin 4.0 gm/dL      A/G Ratio 0.8 g/dL      BUN/Creatinine Ratio 4.9     Anion Gap 16.0 mmol/L      eGFR 6.4 mL/min/1.73      Comment: <15 Indicative of kidney failure       Narrative:      GFR Normal >60  Chronic Kidney Disease <60  Kidney Failure <15      Lipase [[id number]]  (Normal) Collected: 10/16/23 1908    Specimen: Blood Updated: 10/16/23 1945     Lipase 50 U/L     hCG, Serum, Qualitative [[id number]]  (Normal) Collected: 10/16/23 1908    Specimen: Blood Updated: 10/16/23 1947     HCG Qualitative Negative    CBC Auto Differential [[id number]]  (Abnormal) Collected: 10/16/23 1908    Specimen: Blood Updated: 10/16/23 1927     WBC 6.89 10*3/mm3      RBC 3.48 10*6/mm3      Hemoglobin 10.6 g/dL      Hematocrit 32.1 %      MCV 92.2 fL      MCH 30.5 pg      MCHC 33.0 g/dL      RDW 13.2 %      RDW-SD 43.7 fl      MPV 8.9 fL      Platelets 243 10*3/mm3      Neutrophil % 81.0 %      Lymphocyte % 7.7 %      Monocyte % 7.1 %      Eosinophil % 3.2 %      Basophil % 0.6 %      Immature Grans % 0.4 %      Neutrophils, Absolute 5.58 10*3/mm3      Lymphocytes, Absolute 0.53 10*3/mm3      Monocytes, Absolute 0.49 10*3/mm3      Eosinophils, Absolute 0.22 10*3/mm3      Basophils, Absolute 0.04 10*3/mm3      Immature Grans, Absolute 0.03 10*3/mm3      nRBC 0.0 /100 WBC             Imaging Results (Last 24 Hours)       Procedure Component Value Units Date/Time    CT Abdomen Pelvis Without Contrast [[id number]] Collected: 10/16/23 2046     Updated: 10/16/23 2105    Narrative:      CT ABDOMEN PELVIS WO CONTRAST-     INDICATIONS: Abdominal pain and distention, renal disease     TECHNIQUE: Radiation dose reduction techniques were utilized, including  automated exposure control and exposure modulation based on body size.  Unenhanced ABDOMEN AND PELVIS CT     COMPARISON: None available     FINDINGS:     Findings of fluid overload are present, including moderate to  large  abdominal and pelvic ascites, diffuse mesenteric edema, anasarca,  diffuse skin thickening, trace left pleural effusion. Abdominal and  pelvic ascites is greater density than expected for simple fluid, could  represent hemorrhagic ascites or could be evidence of spontaneous  peritonitis.     The liver is enlarged. Cholelithiasis is apparent. The spleen is  enlarged, 15.2 cm CC.     Adrenal calcifications are seen on the left.     Calcifications at the bilateral renal millicent are probably arterial in  location, or could be evidence of nonobstructive nephrolithiasis. No  hydronephrosis.        Otherwise unremarkable unenhanced appearance of the liver, spleen,  adrenal glands, pancreas, kidneys, bladder.     No bowel obstruction. Assessment for inflammatory changes of bowel is  limited by absence of contrast material, and presence of diffuse  mesenteric edema and abdominal ascites, follow-up as indications  persist.        No free intraperitoneal gas .        Scattered small mesenteric and para-aortic lymph nodes are seen that are  not significant by size criteria.     Abdominal aorta is not aneurysmal. Aortic and other arterial  calcifications are present.     The lung bases show minimal atelectasis. The heart is enlarged. Coronary  arterial calcifications are conspicuous.        Degenerative changes are seen in the spine. No acute fracture is  identified.             Impression:         Findings of fluid overload are present, including moderate to large  abdominal and pelvic ascites, diffuse mesenteric edema, anasarca,  diffuse skin thickening, trace left pleural effusion. Abdominal and  pelvic ascites is greater density than expected for simple fluid, could  represent hemorrhagic ascites or could be evidence of spontaneous  peritonitis.              This report was finalized on 10/16/2023 9:02 PM by Dr. Emery Cook M.D on Workstation: [id number]               Results for orders placed during the hospital  encounter of 06/17/21    Adult Transthoracic Echo Complete W/ Cont if Necessary Per Protocol    Interpretation Summary  · Calculated left ventricular EF = 48.8% Estimated left ventricular EF = 49% Estimated left ventricular EF was in agreement with the calculated left ventricular EF. Left ventricular systolic function is mildly decreased. The left ventricular cavity is moderately dilated. Left ventricular wall thickness is consistent with moderate concentric hypertrophy. All left ventricular wall segments contract normally. Left ventricular diastolic function is consistent with (grade II w/high LAP) pseudonormalization.  · The mitral valve is grossly normal in structure. Moderate to severe mitral valve regurgitation is present  · Moderate tricuspid valve regurgitation is present. Calculated right ventricular systolic pressure from tricuspid regurgitation is 50.0 mmHg.  · There is mild pulmonic valve regurgitation present.      No orders to display        Assessment/Plan     Active Hospital Problems    Diagnosis  POA    **Fluid overload [E87.70]  Yes    ESRD on hemodialysis [N18.6, Z99.2]  Not Applicable    Primary hypothyroidism [E03.9]  Yes    Essential hypertension [I10]  Yes    Chronic diastolic congestive heart failure [I50.32]  Yes    Type 2 diabetes mellitus with renal complication [E11.29]  Yes    Obesity, morbid, BMI 50 or higher [E66.01]  Yes      Resolved Hospital Problems   No resolved problems to display.       Ms. Dyson is a 38 y.o. former smoker with a history of ESRD on HD who presents with abdominal pain.    ESRD on HD  -Fluid overloaded on exam.  Received dialysis today.  Her regular regimen is Monday, Wednesday, and Friday.  She does report she only goes on Mondays and Fridays.  We will consult nephrology to see  -Renal function panel in a.m.    Chronic diastolic congestive heart failure  -Overloaded on exam  -Daily weights  -Strict I's and O    Essential hypertension  -Stable, hold lisinopril.   Continue carvedilol, hydralazine, and Norvasc    Type 2 diabetes mellitus  -Accu-Cheks 4 times a day with meals and at bedtime  -Low dose correctional factor with hypoglycemic protocol  -Check hemoglobin A1c  -Hold oral diabetic medication    HLD  -Resume statin therapy    Anemia  -Hemoglobin 10.6 today appears around her baseline  -Repeat CBC in a.m.      I discussed the patient's findings and my recommendations with patient and Dr. Brown .    VTE Prophylaxis - Subq hep  Code Status - Full code.       DANIKA Main  [facility]  10/16/23  22:00 EDT

## 2023-10-17 NOTE — PROGRESS NOTES
"    Name: Gisela Dyson ADMIT: 10/16/2023   : 1985  PCP: Provider, No Known    MRN: [id number] LOS: 1 days   AGE/SEX: 38 y.o. female  ROOM: [facility]     Subjective   Subjective   Patient seen following dialysis today.    BLE edema persists.  Significant abdominal ascites.  No CP or SOB.  She is not inclined to have a paracentesis \" I do want a needle in my belly\"     Objective   Objective   Vital Signs  Temp:  [96.9 °F (36.1 °C)-98.1 °F (36.7 °C)] 97.7 °F (36.5 °C)  Heart Rate:  [] 69  Resp:  [16-18] 16  BP: (113-177)/() 123/70  SpO2:  [97 %-100 %] 97 %  on   ;   Device (Oxygen Therapy): room air  Body mass index is 46.33 kg/m².  Physical Exam  Vitals reviewed.   Constitutional:       Appearance: She is well-developed. She is obese. She is ill-appearing.   HENT:      Head: Normocephalic and atraumatic.      Mouth/Throat:      Mouth: Mucous membranes are moist.   Cardiovascular:      Rate and Rhythm: Normal rate and regular rhythm.   Pulmonary:      Effort: Pulmonary effort is normal. No respiratory distress.      Breath sounds: Normal breath sounds.   Abdominal:      General: Bowel sounds are normal. There is no distension.      Palpations: Abdomen is soft.      Tenderness: There is no abdominal tenderness.      Comments: Significant abdominal wall edema/ascites   Musculoskeletal:      Right lower leg: Edema present.      Left lower leg: Edema present.   Skin:     General: Skin is warm and dry.   Neurological:      General: No focal deficit present.      Mental Status: She is alert and oriented to person, place, and time.   Psychiatric:         Mood and Affect: Mood normal.         Behavior: Behavior normal.         Thought Content: Thought content normal.       Results Review     I reviewed the patient's new clinical results.  Results from last 7 days   Lab Units 10/17/23  0545 10/16/23  1908   WBC 10*3/mm3 5.78 6.89   HEMOGLOBIN g/dL 8.6* 10.6*   PLATELETS 10*3/mm3 187 243     Results from last 7 " "days   Lab Units 10/17/23  0545 10/16/23  1908   SODIUM mmol/L 130* 133*   POTASSIUM mmol/L 4.5 4.9   CHLORIDE mmol/L 93* 91*   CO2 mmol/L 22.0 26.0   BUN mg/dL 39* 38*   CREATININE mg/dL 8.08* 7.78*   GLUCOSE mg/dL 98 90   EGFR mL/min/1.73 6.1* 6.4*     Results from last 7 days   Lab Units 10/17/23  0545 10/16/23  1908   ALBUMIN g/dL 2.9* 3.3*   BILIRUBIN mg/dL  --  0.9   ALK PHOS U/L  --  119*   AST (SGOT) U/L  --  14   ALT (SGPT) U/L  --  9     Results from last 7 days   Lab Units 10/17/23  0545 10/16/23  1908   CALCIUM mg/dL 7.5* 7.9*   ALBUMIN g/dL 2.9* 3.3*   PHOSPHORUS mg/dL 7.2*  --        No results found for: \"HGBA1C\", \"POCGLU\"    CT Abdomen Pelvis Without Contrast    Result Date: 10/16/2023   Findings of fluid overload are present, including moderate to large abdominal and pelvic ascites, diffuse mesenteric edema, anasarca, diffuse skin thickening, trace left pleural effusion. Abdominal and pelvic ascites is greater density than expected for simple fluid, could represent hemorrhagic ascites or could be evidence of spontaneous peritonitis.     This report was finalized on 10/16/2023 9:02 PM by Dr. Emery Cook M.D on Workstation: [id number]       I have personally reviewed all medications:  Scheduled Medications  allopurinol, 100 mg, Oral, Daily  carvedilol, 25 mg, Oral, BID With Meals  heparin (porcine), 5,000 Units, Subcutaneous, Q8H  hydrALAZINE, 100 mg, Oral, BID  levothyroxine, 125 mcg, Oral, Q AM  nicotine, 1 patch, Transdermal, Q24H  senna-docusate sodium, 2 tablet, Oral, BID    Infusions   Diet  Diet: Renal Diets; Low Sodium (2-3g), Low Potassium, Low Phosphorus; Texture: Regular Texture (IDDSI 7); Fluid Consistency: Thin (IDDSI 0)    I have personally reviewed:  [x]  Laboratory   [x]  Microbiology   [x]  Radiology   [x]  EKG/Telemetry  [x]  Cardiology/Vascular   []  Pathology    []  Records       Assessment/Plan     Active Hospital Problems    Diagnosis  POA    **Fluid overload [E87.70]  Yes    " "ESRD on hemodialysis [N18.6, Z99.2]  Not Applicable    Primary hypothyroidism [E03.9]  Yes    Essential hypertension [I10]  Yes    Acute on chronic diastolic CHF (congestive heart failure) [I50.33]  Yes    Type 2 diabetes mellitus with renal complication [E11.29]  Yes    Obesity, morbid, BMI 50 or higher [E66.01]  Yes      Resolved Hospital Problems   No resolved problems to display.       38 y.o. female admitted with Fluid overload.    ESRD on HD MWF/ Volume overload/electrolyte disturbance  Appreciate nephrology. Presents with volume overload. She has been missing HD. HD today and again tmrw    Ascites/ anasarca   LVP with fluid studies recommended given amount concerning for SBP or hemorrhagic ascites per renal.  Patient at this point is declining paracentesis but will \"think about it\"     HTN  Holding coreg and hydralazine per renal. DC norvasc    Hypothyroidism- continue synthroid     DM2  Correcitonal insulin, A1c is 5.1    Anemia-monitor hemoglobin.  Anemia studies pending  SCDs for DVT prophylaxis.  Full code.  Discussed with patient, nurse and family at bedside      DANIKA Verdugo  [facility]  10/17/23  11:03 EDT    "

## 2023-10-17 NOTE — ED PROVIDER NOTES
2005: Care assumed from Dr Ramirez pending CT and full work-up.    2135:  Updated patient on CT results.  Patient appears fluid overloaded.  She has been skipping dialysis treatments and shortening her treatments.  I offered her admission for dialysis versus following up.  She would rather be admitted.  I believe this is reasonable.    2155: I discussed the case with Dr. Brown [facility].  He agrees to admit.    2159: I discussed the case with Dr. Salazar, nephrology.  He is familiar with the patient.  He will ensure the patient gets dialysis tomorrow.       Damon Moody PA  10/17/23 0510

## 2023-10-17 NOTE — CONSULTS
[facility] Consult Note      Patient Name: Gisela Dyson  : 1985  MRN: [id number]  Primary Care Physician:  Provider, No Known  Referring Physician: Kameron Brown MD  Date of admission: 10/16/2023    Subjective     Reason for Consult:  ESRD     HPI:   Gisela Dyson is a 38 y.o. female with ESRD on HD MWF ([facility]) via LUE AVF, HTN, hypothyroidism who presented last night with LLQ abd pain.  No n/v or diarrhea.  Had recent fall.  CT abd/pelvis shows no acute abnormality but does reveal large ascites, diffuse mesenteric edema, anasarca.  Density of ascites greater than expected for simple fluid, raising question of hemorrhagic ascites or SBP.  She is requesting pain meds currently.  Mild dyspnea present.  She has been missing some treatments and coming off treatment early.  Dialysis dependent approx 2 years, attributes ESRD to CHF.  Echo 2021 showed EF 49% and RVSP 50 mm Hg with mod TR.    Review of Systems:   14 point review of systems is otherwise negative except for mentioned above on HPI    Personal History     Past Medical History:   Diagnosis Date    JULISSA (acute kidney injury)     Diastolic CHF     Disease of thyroid gland     ESRD (end stage renal disease) on dialysis     Essential hypertension     Gout     History of COVID-19 2022    Neuropathy     Nonrheumatic mitral valve regurgitation     Obesity, morbid, BMI 50 or higher     Scratch marcel     ON LEFT ARM WITH SMALL OPEN AREA INSTRUCTED PT TO CALL DR FLEMING IF AREA IS STILL OPEN ON 3/14/2022.    Tachycardia     Type 2 diabetes mellitus        Past Surgical History:   Procedure Laterality Date    ARTERIOVENOUS FISTULA/SHUNT SURGERY Left 3/14/2022    Procedure: LEFT ARM BRACHIOCEPHALIC FISTULA CREATION;  Surgeon: Amador Schilling MD;  Location: [facility];  Service: Vascular;  Laterality: Left;    INSERTION HEMODIALYSIS CATHETER Right 2021    Procedure: TUNNEL DIALYSIS, PALINDROME CATH;  Surgeon:  "Ancelmo Menendez MD;  Location: [facility] OR;  Service: Vascular;  Laterality: Right;       Family History: family history is not on file. She was adopted.    Social History:  reports that she has quit smoking. She has never used smokeless tobacco. She reports current alcohol use of about 1.0 - 2.0 standard drink of alcohol per week. She reports that she does not use drugs.    Home Medications:  Prior to Admission medications    Medication Sig Start Date End Date Taking? Authorizing Provider   allopurinol (ZYLOPRIM) 300 MG tablet Take 1 tablet by mouth Daily.   Yes Zen Baeza MD   amLODIPine (NORVASC) 10 MG tablet Take 1 tablet by mouth Every Night.   Yes Zen Baeza MD   carvedilol (COREG) 25 MG tablet Take 1 tablet by mouth 2 (Two) Times a Day With Meals. 11/10/19  Yes Nico Fajardo MD   CHLORHEXIDINE GLUCONATE CLOTH EX Apply  topically. AS DIRECTED PREOP   Yes Zen Baeza MD   Ergocalciferol (ERGOCAL PO) Take 1.25 mg by mouth 3 (Three) Times a Week.   Yes eZn Baeza MD   hydrALAZINE (APRESOLINE) 100 MG tablet Take 1 tablet by mouth 2 (Two) Times a Day.   Yes Zen Baeza MD   HYDROcodone-acetaminophen (Norco) 5-325 MG per tablet Take 1 tablet by mouth Every 8 (Eight) Hours As Needed for Severe Pain . 3/14/22  Yes Amador Schilling MD   levothyroxine (SYNTHROID, LEVOTHROID) 125 MCG tablet Take 1 tablet by mouth Every Night.   Yes Zen Baeza MD   HYDROcodone-acetaminophen (Norco) 5-325 MG per tablet Take 1 tablet by mouth Every 8 (Eight) Hours As Needed for Severe Pain . 3/14/22   Amador Schilling MD   lisinopril (PRINIVIL,ZESTRIL) 10 MG tablet Take 1 tablet by mouth Every Night for 30 days. 6/22/21 7/22/21  Ehsan Toledo MD   montelukast (SINGULAIR) 10 MG tablet Take 1 tablet by mouth Every Night for 30 days. 5/11/21 6/10/21  Mike Calvert MD   Needle, Disp, (BD DISP NEEDLES) 30G X 1/2\" misc Use daily for injection of Tresiba. " 1/10/20   Pat Coon MD   Ferric Citrate (Auryxia) 1  MG(Fe) tablet Take 420 mg by mouth 3 (Three) Times a Day. PT STATES HAS NOT BEEN TAKING  10/17/23  Provider, MD Zen       Allergies:  No Known Allergies    Objective     Vitals:   Temp:  [96.9 °F (36.1 °C)-98.1 °F (36.7 °C)] 96.9 °F (36.1 °C)  Heart Rate:  [] 70  Resp:  [18] 18  BP: (113-177)/() 113/73    Intake/Output Summary (Last 24 hours) at 10/17/2023 0731  Last data filed at 10/17/2023 0329  Gross per 24 hour   Intake 500 ml   Output --   Net 500 ml       Physical Exam:    General Appearance: obese uncomfortable F who is alert and conversant  Skin: warm and dry  HEENT: oral mucosa normal, nonicteric sclera  Neck: supple, no JVD  Lungs: CTA bilat  Heart: RRR, normal S1 and S2  Abdomen: distended and diffusely TTP  : no palpable bladder  Extremities: 3+ BLE edema, no cyanosis or clubbing  Neuro: normal speech and mental status     Scheduled Meds:     allopurinol, 300 mg, Oral, Daily  amLODIPine, 10 mg, Oral, Nightly  carvedilol, 25 mg, Oral, BID With Meals  heparin (porcine), 5,000 Units, Subcutaneous, Q8H  hydrALAZINE, 100 mg, Oral, BID  levothyroxine, 125 mcg, Oral, Q AM  nicotine, 1 patch, Transdermal, Q24H  senna-docusate sodium, 2 tablet, Oral, BID      IV Meds:        Results Reviewed:   I have personally reviewed the results from the time of this admission to 10/17/2023 07:31 EDT     Lab Results   Component Value Date    GLUCOSE 98 10/17/2023    CALCIUM 7.5 (L) 10/17/2023     (L) 10/17/2023    K 4.5 10/17/2023    CO2 22.0 10/17/2023    CL 93 (L) 10/17/2023    BUN 39 (H) 10/17/2023    CREATININE 8.08 (H) 10/17/2023    EGFRIFAFRI 5 (L) 01/09/2022    EGFRIFNONA 4 (L) 01/09/2022    BCR 4.8 (L) 10/17/2023    ANIONGAP 15.0 10/17/2023      Lab Results   Component Value Date    MG 1.9 01/09/2022    PHOS 7.2 (H) 10/17/2023    ALBUMIN 2.9 (L) 10/17/2023           Assessment / Plan     ASSESSMENT:  ESRD - HD MWF.  Non  compliant, missing treatments and coming off early.  Last dialysis yesterday.  Needs again today for vol overload.  K/HCo3 normal.  Waste products ok.  LUE AVF  Vol overload - marked periph edema, abd distention, and large ascites with concerning fluid characteristics.  Albumin low 2.9.  Trace pleural effusion  HTN - BP initially elevated but down to 113/73 this AM which may be barrier to aggressive UF.  Hold norvasc given degree of edema.    Abd pain - given CT findings may warrant paracentesis to exclude SBP.  WBC is normal.    Anemia of CKD, hgb 8.6, on long acting ELSA at dialysis but may be missing doses  Hypothyroidism, treated  Hx CHF - echo noted from 2021 with EF 49%.  Consider repeating this, though degree of anasarca more likely explained by dialysis non compliance  MBD - phos high 7.2, neglected to ask if on binder (none listed), can address this tomorrow     PLAN:  HD today and again tomorrow, remove 4L both treatments as tolerated  Hold coreg/hydralazine predialysis to aid UF  DC norvasc for now   Consider diagnostic and therapeutic paracentesis and repeat echocardiogram  Dec allopurinol 100 mg based on GFR    Thank you for involving us in the care of Gisela Dyson.  Please feel free to call with any questions.    Omid Newell MD  10/17/23  07:31 EDT    [facility]  [phone]

## 2023-10-18 ENCOUNTER — APPOINTMENT (OUTPATIENT)
Dept: ULTRASOUND IMAGING | Facility: HOSPITAL | Age: 38
DRG: 640 | End: 2023-10-18
Payer: COMMERCIAL

## 2023-10-18 LAB
ALBUMIN FLD-MCNC: 2.3 G/DL
ALBUMIN SERPL-MCNC: 2.9 G/DL (ref 3.5–5.2)
ANION GAP SERPL CALCULATED.3IONS-SCNC: 13.8 MMOL/L (ref 5–15)
APPEARANCE FLD: CLEAR
BASOPHILS # BLD AUTO: 0.03 10*3/MM3 (ref 0–0.2)
BASOPHILS NFR BLD AUTO: 0.5 % (ref 0–1.5)
BUN SERPL-MCNC: 25 MG/DL (ref 6–20)
BUN/CREAT SERPL: 4.3 (ref 7–25)
CALCIUM SPEC-SCNC: 8.1 MG/DL (ref 8.6–10.5)
CHLORIDE SERPL-SCNC: 92 MMOL/L (ref 98–107)
CO2 SERPL-SCNC: 21.2 MMOL/L (ref 22–29)
COLOR FLD: YELLOW
CREAT SERPL-MCNC: 5.82 MG/DL (ref 0.57–1)
DEPRECATED RDW RBC AUTO: 45 FL (ref 37–54)
EGFRCR SERPLBLD CKD-EPI 2021: 9.1 ML/MIN/1.73
EOSINOPHIL # BLD AUTO: 0.23 10*3/MM3 (ref 0–0.4)
EOSINOPHIL NFR BLD AUTO: 3.6 % (ref 0.3–6.2)
ERYTHROCYTE [DISTWIDTH] IN BLOOD BY AUTOMATED COUNT: 13.3 % (ref 12.3–15.4)
FERRITIN SERPL-MCNC: 1080 NG/ML (ref 13–150)
GLUCOSE SERPL-MCNC: 92 MG/DL (ref 65–99)
HCT VFR BLD AUTO: 25.8 % (ref 34–46.6)
HGB BLD-MCNC: 8.6 G/DL (ref 12–15.9)
IMM GRANULOCYTES # BLD AUTO: 0.04 10*3/MM3 (ref 0–0.05)
IMM GRANULOCYTES NFR BLD AUTO: 0.6 % (ref 0–0.5)
INR PPP: 1.45 (ref 0.9–1.1)
IRON 24H UR-MRATE: 28 MCG/DL (ref 37–145)
IRON SATN MFR SERPL: 14 % (ref 20–50)
LYMPHOCYTES # BLD AUTO: 0.59 10*3/MM3 (ref 0.7–3.1)
LYMPHOCYTES NFR BLD AUTO: 9.2 % (ref 19.6–45.3)
LYMPHOCYTES NFR FLD MANUAL: 28 %
MCH RBC QN AUTO: 31.2 PG (ref 26.6–33)
MCHC RBC AUTO-ENTMCNC: 33.3 G/DL (ref 31.5–35.7)
MCV RBC AUTO: 93.5 FL (ref 79–97)
METHOD: NORMAL
MONOCYTES # BLD AUTO: 0.55 10*3/MM3 (ref 0.1–0.9)
MONOCYTES NFR BLD AUTO: 8.6 % (ref 5–12)
MONOS+MACROS NFR FLD: 63 %
NEUTROPHILS NFR BLD AUTO: 4.95 10*3/MM3 (ref 1.7–7)
NEUTROPHILS NFR BLD AUTO: 77.5 % (ref 42.7–76)
NEUTROPHILS NFR FLD MANUAL: 9 %
NRBC BLD AUTO-RTO: 0 /100 WBC (ref 0–0.2)
NUC CELL # FLD: 218 /MM3
PHOSPHATE SERPL-MCNC: 5.5 MG/DL (ref 2.5–4.5)
PLATELET # BLD AUTO: 202 10*3/MM3 (ref 140–450)
PMV BLD AUTO: 9.9 FL (ref 6–12)
POTASSIUM SERPL-SCNC: 4.5 MMOL/L (ref 3.5–5.2)
PROT FLD-MCNC: 4.1 G/DL
PROTHROMBIN TIME: 17.9 SECONDS (ref 11.7–14.2)
RBC # BLD AUTO: 2.76 10*6/MM3 (ref 3.77–5.28)
RBC # FLD AUTO: 672 /MM3
SODIUM SERPL-SCNC: 127 MMOL/L (ref 136–145)
T4 FREE SERPL-MCNC: 1.39 NG/DL (ref 0.93–1.7)
TIBC SERPL-MCNC: 207 MCG/DL (ref 298–536)
TRANSFERRIN SERPL-MCNC: 139 MG/DL (ref 200–360)
TSH SERPL DL<=0.05 MIU/L-ACNC: 6.87 UIU/ML (ref 0.27–4.2)
WBC NRBC COR # BLD: 6.39 10*3/MM3 (ref 3.4–10.8)

## 2023-10-18 PROCEDURE — 80069 RENAL FUNCTION PANEL: CPT | Performed by: INTERNAL MEDICINE

## 2023-10-18 PROCEDURE — 25010000002 HEPARIN (PORCINE) PER 1000 UNITS: Performed by: NURSE PRACTITIONER

## 2023-10-18 PROCEDURE — 84157 ASSAY OF PROTEIN OTHER: CPT | Performed by: NURSE PRACTITIONER

## 2023-10-18 PROCEDURE — 85610 PROTHROMBIN TIME: CPT | Performed by: NURSE PRACTITIONER

## 2023-10-18 PROCEDURE — 88305 TISSUE EXAM BY PATHOLOGIST: CPT | Performed by: NURSE PRACTITIONER

## 2023-10-18 PROCEDURE — 87205 SMEAR GRAM STAIN: CPT | Performed by: NURSE PRACTITIONER

## 2023-10-18 PROCEDURE — 85025 COMPLETE CBC W/AUTO DIFF WBC: CPT | Performed by: INTERNAL MEDICINE

## 2023-10-18 PROCEDURE — 0W9G3ZX DRAINAGE OF PERITONEAL CAVITY, PERCUTANEOUS APPROACH, DIAGNOSTIC: ICD-10-PCS | Performed by: RADIOLOGY

## 2023-10-18 PROCEDURE — 25010000002 MORPHINE PER 10 MG: Performed by: NURSE PRACTITIONER

## 2023-10-18 PROCEDURE — 87015 SPECIMEN INFECT AGNT CONCNTJ: CPT | Performed by: NURSE PRACTITIONER

## 2023-10-18 PROCEDURE — 87070 CULTURE OTHR SPECIMN AEROBIC: CPT | Performed by: NURSE PRACTITIONER

## 2023-10-18 PROCEDURE — 25010000002 LIDOCAINE 1 % SOLUTION: Performed by: RADIOLOGY

## 2023-10-18 PROCEDURE — 83540 ASSAY OF IRON: CPT | Performed by: INTERNAL MEDICINE

## 2023-10-18 PROCEDURE — 84466 ASSAY OF TRANSFERRIN: CPT | Performed by: INTERNAL MEDICINE

## 2023-10-18 PROCEDURE — 88112 CYTOPATH CELL ENHANCE TECH: CPT | Performed by: NURSE PRACTITIONER

## 2023-10-18 PROCEDURE — 84439 ASSAY OF FREE THYROXINE: CPT | Performed by: INTERNAL MEDICINE

## 2023-10-18 PROCEDURE — 82728 ASSAY OF FERRITIN: CPT | Performed by: INTERNAL MEDICINE

## 2023-10-18 PROCEDURE — 82042 OTHER SOURCE ALBUMIN QUAN EA: CPT | Performed by: NURSE PRACTITIONER

## 2023-10-18 PROCEDURE — 84443 ASSAY THYROID STIM HORMONE: CPT | Performed by: INTERNAL MEDICINE

## 2023-10-18 PROCEDURE — 89051 BODY FLUID CELL COUNT: CPT | Performed by: NURSE PRACTITIONER

## 2023-10-18 PROCEDURE — 76942 ECHO GUIDE FOR BIOPSY: CPT

## 2023-10-18 RX ORDER — ALBUMIN (HUMAN) 12.5 G/50ML
12.5 SOLUTION INTRAVENOUS ONCE
Status: DISCONTINUED | OUTPATIENT
Start: 2023-10-18 | End: 2023-10-21 | Stop reason: HOSPADM

## 2023-10-18 RX ORDER — LEVOTHYROXINE SODIUM 137 UG/1
137 TABLET ORAL
Status: DISCONTINUED | OUTPATIENT
Start: 2023-10-19 | End: 2023-10-21 | Stop reason: HOSPADM

## 2023-10-18 RX ORDER — MORPHINE SULFATE 2 MG/ML
2 INJECTION, SOLUTION INTRAMUSCULAR; INTRAVENOUS ONCE
Status: COMPLETED | OUTPATIENT
Start: 2023-10-18 | End: 2023-10-18

## 2023-10-18 RX ORDER — LIDOCAINE HYDROCHLORIDE 10 MG/ML
10 INJECTION, SOLUTION INFILTRATION; PERINEURAL ONCE
Status: COMPLETED | OUTPATIENT
Start: 2023-10-18 | End: 2023-10-18

## 2023-10-18 RX ADMIN — MORPHINE SULFATE 2 MG: 2 INJECTION, SOLUTION INTRAMUSCULAR; INTRAVENOUS at 00:26

## 2023-10-18 RX ADMIN — HYDROCODONE BITARTRATE AND ACETAMINOPHEN 1 TABLET: 5; 325 TABLET ORAL at 03:49

## 2023-10-18 RX ADMIN — ACETAMINOPHEN 650 MG: 325 TABLET, FILM COATED ORAL at 00:08

## 2023-10-18 RX ADMIN — LEVOTHYROXINE SODIUM 125 MCG: 125 TABLET ORAL at 07:32

## 2023-10-18 RX ADMIN — LIDOCAINE HYDROCHLORIDE 10 ML: 10 INJECTION, SOLUTION INFILTRATION; PERINEURAL at 16:14

## 2023-10-18 RX ADMIN — HYDROCODONE BITARTRATE AND ACETAMINOPHEN 1 TABLET: 5; 325 TABLET ORAL at 15:53

## 2023-10-18 RX ADMIN — CARVEDILOL 25 MG: 25 TABLET, FILM COATED ORAL at 17:32

## 2023-10-18 RX ADMIN — HYDROCODONE BITARTRATE AND ACETAMINOPHEN 1 TABLET: 5; 325 TABLET ORAL at 09:47

## 2023-10-18 RX ADMIN — ALLOPURINOL 100 MG: 100 TABLET ORAL at 17:32

## 2023-10-18 NOTE — PROGRESS NOTES
Name: Gisela Dyson ADMIT: 10/16/2023   : 1985  PCP: Provider, No Known    MRN: [id number] LOS: 2 days   AGE/SEX: 38 y.o. female  ROOM: [facility]     Subjective   Subjective   Patient seen following dialysis today.     She is sleepy and not inclined to wake up to answer questions.  Significant abdominal ascites/ anasarca.  No CP or SOB.   She is refusing paracentesis      Objective   Objective   Vital Signs  Temp:  [97 °F (36.1 °C)-99 °F (37.2 °C)] 97 °F (36.1 °C)  Heart Rate:  [73-82] 79  Resp:  [16] 16  BP: (102-154)/(55-84) 116/69  SpO2:  [93 %-99 %] 95 %  on   ;   Device (Oxygen Therapy): room air  Body mass index is 48.83 kg/m².  Physical Exam  Vitals reviewed.   Constitutional:       Appearance: She is well-developed. She is obese. She is ill-appearing.   HENT:      Head: Normocephalic and atraumatic.      Mouth/Throat:      Mouth: Mucous membranes are moist.   Cardiovascular:      Rate and Rhythm: Normal rate and regular rhythm.   Pulmonary:      Effort: Pulmonary effort is normal. No respiratory distress.      Breath sounds: Normal breath sounds.   Abdominal:      General: Bowel sounds are normal. There is no distension.      Palpations: Abdomen is soft.      Tenderness: There is no abdominal tenderness.      Comments: Significant abdominal wall edema/ascites   Musculoskeletal:      Right lower leg: Edema present.      Left lower leg: Edema present.   Skin:     General: Skin is warm and dry.   Neurological:      General: No focal deficit present.      Mental Status: She is alert and oriented to person, place, and time.   Psychiatric:         Mood and Affect: Mood normal.         Behavior: Behavior normal.         Thought Content: Thought content normal.       Results Review     I reviewed the patient's new clinical results.  Results from last 7 days   Lab Units 10/18/23  0524 10/17/23  0545 10/16/23  1908   WBC 10*3/mm3 6.39 5.78 6.89   HEMOGLOBIN g/dL 8.6* 8.6* 10.6*   PLATELETS 10*3/mm3 202 187  243     Results from last 7 days   Lab Units 10/18/23  0524 10/17/23  0545 10/16/23  1908   SODIUM mmol/L 127* 130* 133*   POTASSIUM mmol/L 4.5 4.5 4.9   CHLORIDE mmol/L 92* 93* 91*   CO2 mmol/L 21.2* 22.0 26.0   BUN mg/dL 25* 39* 38*   CREATININE mg/dL 5.82* 8.08* 7.78*   GLUCOSE mg/dL 92 98 90   EGFR mL/min/1.73 9.1* 6.1* 6.4*     Results from last 7 days   Lab Units 10/18/23  0524 10/17/23  0545 10/16/23  1908   ALBUMIN g/dL 2.9* 2.9* 3.3*   BILIRUBIN mg/dL  --   --  0.9   ALK PHOS U/L  --   --  119*   AST (SGOT) U/L  --   --  14   ALT (SGPT) U/L  --   --  9     Results from last 7 days   Lab Units 10/18/23  0524 10/17/23  0545 10/16/23  1908   CALCIUM mg/dL 8.1* 7.5* 7.9*   ALBUMIN g/dL 2.9* 2.9* 3.3*   PHOSPHORUS mg/dL 5.5* 7.2*  --        Hemoglobin A1C   Date/Time Value Ref Range Status   10/17/2023 0545 5.10 4.80 - 5.60 % Final       CT Abdomen Pelvis Without Contrast    Result Date: 10/16/2023   Findings of fluid overload are present, including moderate to large abdominal and pelvic ascites, diffuse mesenteric edema, anasarca, diffuse skin thickening, trace left pleural effusion. Abdominal and pelvic ascites is greater density than expected for simple fluid, could represent hemorrhagic ascites or could be evidence of spontaneous peritonitis.     This report was finalized on 10/16/2023 9:02 PM by Dr. Emery Cook M.D on Workstation: [id number]       I have personally reviewed all medications:  Scheduled Medications  allopurinol, 100 mg, Oral, Daily  carvedilol, 25 mg, Oral, BID With Meals  heparin (porcine), 5,000 Units, Subcutaneous, Q8H  levothyroxine, 125 mcg, Oral, Q AM  nicotine, 1 patch, Transdermal, Q24H  senna-docusate sodium, 2 tablet, Oral, BID    Infusions   Diet  Diet: Renal Diets, Fluid Restriction (240 mL/tray) Diets; Low Sodium (2-3g), Low Potassium, Low Phosphorus; Other (Specify mL/day) (1200); Texture: Regular Texture (IDDSI 7); Fluid Consistency: Thin (IDDSI 0)    I have personally  reviewed:  [x]  Laboratory   [x]  Microbiology   [x]  Radiology   [x]  EKG/Telemetry  [x]  Cardiology/Vascular   []  Pathology    []  Records       Assessment/Plan     Active Hospital Problems    Diagnosis  POA    **Fluid overload [E87.70]  Yes    Volume overload [E87.70]  Yes    ESRD on hemodialysis [N18.6, Z99.2]  Not Applicable    Primary hypothyroidism [E03.9]  Yes    Essential hypertension [I10]  Yes    Acute on chronic diastolic CHF (congestive heart failure) [I50.33]  Yes    Type 2 diabetes mellitus with renal complication [E11.29]  Yes    Obesity, morbid, BMI 50 or higher [E66.01]  Yes      Resolved Hospital Problems   No resolved problems to display.       38 y.o. female admitted with Fluid overload.    ESRD on HD MWF/ Volume overload/electrolyte disturbance  Appreciate nephrology. Presents with volume overload. She has been missing HD. HD today and again tmrw    Ascites/ anasarca   Today LVP with fluid studies recommended given amount concerning for SBP or hemorrhagic ascites per renal.     Fluid restriction   HTN  Holding coreg, DC hydralazine per renal. DC norvasc. Volume control will aid BP control     Hypothyroidism- continue synthroid. TSH 6.8 but T4 wnl.      DM2  Correcitonal insulin, A1c is 5.1    Anemia CKD-monitor hemoglobin.       SCDs for DVT prophylaxis.  Full code.  Discussed with patient, nurse        DANIKA Verdugo  [facility]  10/18/23  13:56 EDT

## 2023-10-18 NOTE — PLAN OF CARE
Goal Outcome Evaluation:  Plan of Care Reviewed With: patient        Progress: no change  Outcome Evaluation: Dialysis completed this AM. Norco given for pain. US paracentesis this afternoon. Still needing to complete ECHO. Patient placed on fluid restriction per nephro. Family at bedside. VSS.       5.3 L removed during paracentesis

## 2023-10-18 NOTE — NURSING NOTE
Hd completed as ordered. 4 liters net uf. No issues except pain meds given during tx. Post vss [id number]

## 2023-10-18 NOTE — PLAN OF CARE
Goal Outcome Evaluation:  Plan of Care Reviewed With: patient           Outcome Evaluation: VSS, pain managed with Norco 5 x2  with out good results per pt, and a 1 time dose of morphine, c/o pain across abd, throbbibg stabbing, up ad edwin, tolerating diet, no nausea, not much sleep, has been watching tv and playing games on phone, dialysis planned for tomorrow, US paracentesis to follow. countinuing with plan of care.

## 2023-10-18 NOTE — PROGRESS NOTES
[facility] Progress Note      Patient Name: Gisela Dyson  : 1985  MRN: [id number]  Primary Care Physician:  Provider, No Known  Date of admission: 10/16/2023    Subjective     Interval History:   Follow-up ESRD.  Dialysis yesterday completed.  No documentation of volume removed.  Weighed on the bed this morning. On dialysis. Received pain med, so sleepy.  Goal 4 kg.      Review of Systems:   As noted above    Objective     Vitals:   Temp:  [97.2 °F (36.2 °C)-99 °F (37.2 °C)] 99 °F (37.2 °C)  Heart Rate:  [69-82] 73  Resp:  [16] 16  BP: (102-139)/(59-84) 104/65    Intake/Output Summary (Last 24 hours) at 10/18/2023 0828  Last data filed at 10/17/2023 1730  Gross per 24 hour   Intake 220 ml   Output --   Net 220 ml       Physical Exam:    General Appearance: obese, chronically ill. On dialysis. Sleepy, does awaken and nods to questions.    Goal 4 kg. Qb 400.   systolic .  Skin: warm and dry  HEENT: oral mucosa dry, nonicteric sclera  Neck: supple, no JVD  Lungs: decreased bs bases .  Heart: RRR, normal S1 and S2  Abdomen: soft, distended. Body wall edema. Ascites. + bs  Extremities:2+ upper and lower ext edema.  LUE AVF  Neuro: sleepy after pain med.     Scheduled Meds:     allopurinol, 100 mg, Oral, Daily  carvedilol, 25 mg, Oral, BID With Meals  heparin (porcine), 5,000 Units, Subcutaneous, Q8H  levothyroxine, 125 mcg, Oral, Q AM  nicotine, 1 patch, Transdermal, Q24H  senna-docusate sodium, 2 tablet, Oral, BID      IV Meds:        Results Reviewed:   I have personally reviewed the results from the time of this admission to 10/18/2023 08:28 EDT     Results from last 7 days   Lab Units 10/18/23  0524 10/17/23  0545 10/16/23  1908   SODIUM mmol/L 127* 130* 133*   POTASSIUM mmol/L 4.5 4.5 4.9   CHLORIDE mmol/L 92* 93* 91*   CO2 mmol/L 21.2* 22.0 26.0   BUN mg/dL 25* 39* 38*   CREATININE mg/dL 5.82* 8.08* 7.78*   CALCIUM mg/dL 8.1* 7.5* 7.9*   BILIRUBIN mg/dL  --   --  0.9    ALK PHOS U/L  --   --  119*   ALT (SGPT) U/L  --   --  9   AST (SGOT) U/L  --   --  14   GLUCOSE mg/dL 92 98 90       Estimated Creatinine Clearance: 16.6 mL/min (A) (by C-G formula based on SCr of 5.82 mg/dL (H)).    Results from last 7 days   Lab Units 10/18/23  0524 10/17/23  0545   PHOSPHORUS mg/dL 5.5* 7.2*             Results from last 7 days   Lab Units 10/18/23  0524 10/17/23  0545 10/16/23  1908   WBC 10*3/mm3 6.39 5.78 6.89   HEMOGLOBIN g/dL 8.6* 8.6* 10.6*   PLATELETS 10*3/mm3 202 187 243       Results from last 7 days   Lab Units 10/18/23  0524   INR  1.45*       Assessment / Plan     ASSESSMENT:  ESRD dialysis today to remove more volume.  Noncompliance is major issue.  Very frequently misses treatments and comes off early. [facility]   Ascites, mesenteric edema, anasarca.  Likely accumulating volume due to non-compliance with dialysis. Ascitic fluid appearance on CT more dense than would be expected with simple ascites.  Plan for paracentesis today if patient agrees.  Check echocardiogram.  Last one June 2021 demonstrated pulmonary hypertension and moderate tricuspid regurg with an EF of 49%.  Anemia of CKD.  Hypothyroidism on replacement,  check TSH.  Metabolic bone disease.  On ergocalciferol 3 times weekly.  History of hypertension on multiple meds.  Have discontinued her hydralazine and her vast.  Suspect that if fluid is removed her blood pressure will be better controlled overall and she will not need as many meds.    PLAN:  Dialysis today to remove more volume.  Echocardiogram today.  Dc  hydralazine.  Po fluid restriction .    Thank you for involving us in the care of Gisela Dyson.  Please feel free to call with any questions.    Sowmya Boles MD  10/18/23  08:28 EDT    [facility]  [phone]    Please note that portions of this note were completed with a voice recognition program.

## 2023-10-19 ENCOUNTER — APPOINTMENT (OUTPATIENT)
Dept: CARDIOLOGY | Facility: HOSPITAL | Age: 38
DRG: 640 | End: 2023-10-19
Payer: COMMERCIAL

## 2023-10-19 LAB
ALBUMIN SERPL-MCNC: 2.9 G/DL (ref 3.5–5.2)
ANION GAP SERPL CALCULATED.3IONS-SCNC: 9.4 MMOL/L (ref 5–15)
AORTIC ARCH: 2.8 CM
AORTIC DIMENSIONLESS INDEX: 0.7 (DI)
ASCENDING AORTA: 3.2 CM
BH CV ECHO MEAS - ACS: 1.61 CM
BH CV ECHO MEAS - AO MAX PG: 13.4 MMHG
BH CV ECHO MEAS - AO MEAN PG: 7.3 MMHG
BH CV ECHO MEAS - AO ROOT DIAM: 2.6 CM
BH CV ECHO MEAS - AO V2 MAX: 183.2 CM/SEC
BH CV ECHO MEAS - AO V2 VTI: 28.7 CM
BH CV ECHO MEAS - AVA(I,D): 2.28 CM2
BH CV ECHO MEAS - EDV(CUBED): 110.6 ML
BH CV ECHO MEAS - EDV(MOD-SP2): 164 ML
BH CV ECHO MEAS - EDV(MOD-SP4): 131 ML
BH CV ECHO MEAS - EF(MOD-BP): 39.7 %
BH CV ECHO MEAS - EF(MOD-SP2): 44.5 %
BH CV ECHO MEAS - EF(MOD-SP4): 36.6 %
BH CV ECHO MEAS - ESV(CUBED): 18.1 ML
BH CV ECHO MEAS - ESV(MOD-SP2): 91 ML
BH CV ECHO MEAS - ESV(MOD-SP4): 83 ML
BH CV ECHO MEAS - FS: 45.3 %
BH CV ECHO MEAS - IVS/LVPW: 1 CM
BH CV ECHO MEAS - IVSD: 1.1 CM
BH CV ECHO MEAS - LAT PEAK E' VEL: 9.4 CM/SEC
BH CV ECHO MEAS - LV DIASTOLIC VOL/BSA (35-75): 63.5 CM2
BH CV ECHO MEAS - LV MASS(C)D: 194 GRAMS
BH CV ECHO MEAS - LV MAX PG: 6 MMHG
BH CV ECHO MEAS - LV MEAN PG: 3.4 MMHG
BH CV ECHO MEAS - LV SYSTOLIC VOL/BSA (12-30): 40.3 CM2
BH CV ECHO MEAS - LV V1 MAX: 122.3 CM/SEC
BH CV ECHO MEAS - LV V1 VTI: 21.4 CM
BH CV ECHO MEAS - LVIDD: 4.8 CM
BH CV ECHO MEAS - LVIDS: 2.6 CM
BH CV ECHO MEAS - LVOT AREA: 3.1 CM2
BH CV ECHO MEAS - LVOT DIAM: 1.97 CM
BH CV ECHO MEAS - LVPWD: 1.1 CM
BH CV ECHO MEAS - MED PEAK E' VEL: 7.9 CM/SEC
BH CV ECHO MEAS - MR MAX PG: 82.2 MMHG
BH CV ECHO MEAS - MR MAX VEL: 453.4 CM/SEC
BH CV ECHO MEAS - MV A DUR: 0.13 SEC
BH CV ECHO MEAS - MV A MAX VEL: 49 CM/SEC
BH CV ECHO MEAS - MV DEC SLOPE: 656.8 CM/SEC2
BH CV ECHO MEAS - MV DEC TIME: 0.19 SEC
BH CV ECHO MEAS - MV E MAX VEL: 118.1 CM/SEC
BH CV ECHO MEAS - MV E/A: 2.41
BH CV ECHO MEAS - MV MAX PG: 6.4 MMHG
BH CV ECHO MEAS - MV MEAN PG: 2.6 MMHG
BH CV ECHO MEAS - MV P1/2T: 56.3 MSEC
BH CV ECHO MEAS - MV V2 VTI: 30.1 CM
BH CV ECHO MEAS - MVA(P1/2T): 3.9 CM2
BH CV ECHO MEAS - MVA(VTI): 2.18 CM2
BH CV ECHO MEAS - PA ACC TIME: 0.14 SEC
BH CV ECHO MEAS - PA V2 MAX: 109.9 CM/SEC
BH CV ECHO MEAS - QP/QS: 0.83
BH CV ECHO MEAS - RAP SYSTOLE: 15 MMHG
BH CV ECHO MEAS - RV MAX PG: 1.76 MMHG
BH CV ECHO MEAS - RV V1 MAX: 66.4 CM/SEC
BH CV ECHO MEAS - RV V1 VTI: 15.4 CM
BH CV ECHO MEAS - RVOT DIAM: 2.12 CM
BH CV ECHO MEAS - RVSP: 37.6 MMHG
BH CV ECHO MEAS - SI(MOD-SP2): 35.4 ML/M2
BH CV ECHO MEAS - SI(MOD-SP4): 23.3 ML/M2
BH CV ECHO MEAS - SUP REN AO DIAM: 1.6 CM
BH CV ECHO MEAS - SV(LVOT): 65.6 ML
BH CV ECHO MEAS - SV(MOD-SP2): 73 ML
BH CV ECHO MEAS - SV(MOD-SP4): 48 ML
BH CV ECHO MEAS - SV(RVOT): 54.3 ML
BH CV ECHO MEAS - TAPSE (>1.6): 1.37 CM
BH CV ECHO MEAS - TR MAX PG: 22.6 MMHG
BH CV ECHO MEAS - TR MAX VEL: 237.5 CM/SEC
BH CV ECHO MEASUREMENTS AVERAGE E/E' RATIO: 13.65
BH CV XLRA - RV BASE: 4.1 CM
BH CV XLRA - RV LENGTH: 8.6 CM
BH CV XLRA - RV MID: 3.7 CM
BH CV XLRA - TDI S': 8.2 CM/SEC
BUN SERPL-MCNC: 23 MG/DL (ref 6–20)
BUN/CREAT SERPL: 4.7 (ref 7–25)
CALCIUM SPEC-SCNC: 8.3 MG/DL (ref 8.6–10.5)
CHLORIDE SERPL-SCNC: 98 MMOL/L (ref 98–107)
CO2 SERPL-SCNC: 24.6 MMOL/L (ref 22–29)
CREAT SERPL-MCNC: 4.92 MG/DL (ref 0.57–1)
EGFRCR SERPLBLD CKD-EPI 2021: 11.1 ML/MIN/1.73
GLUCOSE SERPL-MCNC: 144 MG/DL (ref 65–99)
LEFT ATRIUM VOLUME INDEX: 33.3 ML/M2
PHOSPHATE SERPL-MCNC: 4.5 MG/DL (ref 2.5–4.5)
POTASSIUM SERPL-SCNC: 4.7 MMOL/L (ref 3.5–5.2)
SINUS: 2.17 CM
SODIUM SERPL-SCNC: 132 MMOL/L (ref 136–145)
STJ: 1.95 CM

## 2023-10-19 PROCEDURE — 93306 TTE W/DOPPLER COMPLETE: CPT

## 2023-10-19 PROCEDURE — 93306 TTE W/DOPPLER COMPLETE: CPT | Performed by: INTERNAL MEDICINE

## 2023-10-19 PROCEDURE — 80069 RENAL FUNCTION PANEL: CPT | Performed by: INTERNAL MEDICINE

## 2023-10-19 RX ADMIN — ALLOPURINOL 100 MG: 100 TABLET ORAL at 14:16

## 2023-10-19 RX ADMIN — HYDROCODONE BITARTRATE AND ACETAMINOPHEN 1 TABLET: 5; 325 TABLET ORAL at 17:37

## 2023-10-19 RX ADMIN — HYDROCODONE BITARTRATE AND ACETAMINOPHEN 1 TABLET: 5; 325 TABLET ORAL at 23:34

## 2023-10-19 RX ADMIN — CARVEDILOL 25 MG: 25 TABLET, FILM COATED ORAL at 18:44

## 2023-10-19 NOTE — PAYOR COMM NOTE
"Gisela Ulrich ANAHI (38 y.o. Female)          U/D FOR [id number]     CONTACT SHIRA MUELLERTAD  P# [phone]  F# [phone]         Date of Birth   1985    Social Security Number       Address   [address] [place] [address]    Home Phone   [phone]    MRN   [id number]       Gnosticism   None    Marital Status   Single                            Admission Date   10/16/23    Admission Type   Emergency    Admitting Provider   Kameron Brown MD    Attending Provider   Hayden Dent MD    Department, Room/Bed   [address], 81/1       Discharge Date       Discharge Disposition       Discharge Destination                                 Attending Provider: Hayden Dent MD    Allergies: No Known Allergies    Isolation: None   Infection: None   Code Status: CPR    Ht: 157.5 cm (62\")   Wt: 108 kg (239 lb)    Admission Cmt: None   Principal Problem: Fluid overload [E87.70]                   Active Insurance as of 10/16/2023       Primary Coverage       Payor Plan Insurance Group Employer/Plan Group    [employer]HEM BLUE CROSS ANTHEM BLUE CROSS BLUE SHIELD PPO [id number]       Payor Plan Address Payor Plan Phone Number Payor Plan Fax Number Effective Dates    [address] [phone]  1/1/2022 - None Entered    [facility] [id number]         Subscriber Name Subscriber Birth Date Member ID       GISELA ULRICH R 1985 [id number]               Secondary Coverage       Payor Plan Insurance Group Employer/Plan Group    MEDICARE MEDICARE A & B        Payor Plan Address Payor Plan Phone Number Payor Plan Fax Number Effective Dates    [address] [phone]  9/1/2021 - None Entered    [facility] [id number]         Subscriber Name Subscriber Birth Date Member ID       GISELA ULRICH R 1985 [id number]                     Emergency Contacts        (Rel.) Home Phone Work Phone Mobile Phone    MOJGANFILOMENA (KEVIN) (Father) [phone] -- [phone]               "   Physician Progress Notes (last 48 hours)        Noelle Alcantar, APRN at 10/19/23 1216              Name: Gisela Dyson ADMIT: 10/16/2023   : 1985  PCP: Provider, No Known    MRN: [id number] LOS: 3 days   AGE/SEX: 38 y.o. female  ROOM: [facility]     Subjective   Subjective   Patient seen following dialysis (day 3). She is sleeping and not inclined to wake up to talk   S/p LVP yesterday 5.3L removed       Objective   Objective   Vital Signs  Temp:  [97 °F (36.1 °C)-97.8 °F (36.6 °C)] 97.7 °F (36.5 °C)  Heart Rate:  [79-92] 82  Resp:  [16-18] 16  BP: (111-154)/(69-81) 117/77  SpO2:  [93 %-100 %] 94 %  on   ;   Device (Oxygen Therapy): room air  Body mass index is 43.71 kg/m².  Physical Exam  Vitals reviewed.   Constitutional:       Appearance: She is well-developed. She is obese. She is ill-appearing.   HENT:      Head: Normocephalic and atraumatic.      Mouth/Throat:      Mouth: Mucous membranes are moist.   Cardiovascular:      Rate and Rhythm: Normal rate and regular rhythm.   Pulmonary:      Effort: Pulmonary effort is normal. No respiratory distress.      Breath sounds: Normal breath sounds.   Abdominal:      General: Bowel sounds are normal. There is no distension.      Palpations: Abdomen is soft.      Tenderness: There is no abdominal tenderness.      Comments:   abdominal wall edema/ascites   Musculoskeletal:      Right lower leg: Edema present.      Left lower leg: Edema present.   Skin:     General: Skin is warm and dry.   Neurological:      General: No focal deficit present.      Mental Status: She is alert and oriented to person, place, and time.   Psychiatric:         Mood and Affect: Mood normal.         Behavior: Behavior normal.         Thought Content: Thought content normal.       Results Review     I reviewed the patient's new clinical results.  Results from last 7 days   Lab Units 10/18/23  0524 10/17/23  0545 10/16/23  1908   WBC 10*3/mm3 6.39 5.78 6.89   HEMOGLOBIN g/dL 8.6* 8.6* 10.6*    PLATELETS 10*3/mm3 202 187 243     Results from last 7 days   Lab Units 10/19/23  0545 10/18/23  0524 10/17/23  0545 10/16/23  1908   SODIUM mmol/L 132* 127* 130* 133*   POTASSIUM mmol/L 4.7 4.5 4.5 4.9   CHLORIDE mmol/L 98 92* 93* 91*   CO2 mmol/L 24.6 21.2* 22.0 26.0   BUN mg/dL 23* 25* 39* 38*   CREATININE mg/dL 4.92* 5.82* 8.08* 7.78*   GLUCOSE mg/dL 144* 92 98 90   EGFR mL/min/1.73 11.1* 9.1* 6.1* 6.4*     Results from last 7 days   Lab Units 10/19/23  0545 10/18/23  0524 10/17/23  0545 10/16/23  1908   ALBUMIN g/dL 2.9* 2.9* 2.9* 3.3*   BILIRUBIN mg/dL  --   --   --  0.9   ALK PHOS U/L  --   --   --  119*   AST (SGOT) U/L  --   --   --  14   ALT (SGPT) U/L  --   --   --  9     Results from last 7 days   Lab Units 10/19/23  0545 10/18/23  0524 10/17/23  0545 10/16/23  1908   CALCIUM mg/dL 8.3* 8.1* 7.5* 7.9*   ALBUMIN g/dL 2.9* 2.9* 2.9* 3.3*   PHOSPHORUS mg/dL 4.5 5.5* 7.2*  --        Hemoglobin A1C   Date/Time Value Ref Range Status   10/17/2023 0545 5.10 4.80 - 5.60 % Final       US Paracentesis    Result Date: 10/18/2023  Ultrasound-guided paracentesis as described  This report was finalized on 10/18/2023 5:10 PM by Dr. Carlos Cartagena M.D on Workstation: [id number]       I have personally reviewed all medications:  Scheduled Medications  albumin human, 12.5 g, Intravenous, Once  allopurinol, 100 mg, Oral, Daily  carvedilol, 25 mg, Oral, BID With Meals  heparin (porcine), 5,000 Units, Subcutaneous, Q8H  levothyroxine, 137 mcg, Oral, Q AM  nicotine, 1 patch, Transdermal, Q24H  senna-docusate sodium, 2 tablet, Oral, BID    Infusions   Diet  Diet: Renal Diets, Fluid Restriction (240 mL/tray) Diets; Low Sodium (2-3g), Low Potassium, Low Phosphorus; Other (Specify mL/day) (1200); Texture: Regular Texture (IDDSI 7); Fluid Consistency: Thin (IDDSI 0)    I have personally reviewed:  [x]  Laboratory   [x]  Microbiology   [x]  Radiology   [x]  EKG/Telemetry  [x]  Cardiology/Vascular   []  Pathology    []   Records  Results for orders placed during the hospital encounter of 10/16/23    Adult Transthoracic Echo Complete w/ Color, Spectral and Contrast if Necessary Per Protocol    Interpretation Summary    Left ventricular systolic function is moderately decreased. Calculated left ventricular EF = 39.7%    Left ventricular wall thickness is consistent with borderline concentric hypertrophy.    Left ventricular diastolic function was normal.    Moderately reduced right ventricular systolic function noted.    The right ventricular cavity is severely dilated.    The left atrial cavity is moderately dilated.    Left atrial volume is mildly increased.    The right atrial cavity is moderately  dilated.    Moderate to severe mitral valve regurgitation is present.    Severe tricuspid valve regurgitation is present.    Estimated right ventricular systolic pressure from tricuspid regurgitation is mildly elevated (35-45 mmHg). Calculated right ventricular systolic pressure from tricuspid regurgitation is 38 mmHg.        Assessment/Plan     Active Hospital Problems    Diagnosis  POA    **Fluid overload [E87.70]  Yes    Volume overload [E87.70]  Yes    ESRD on hemodialysis [N18.6, Z99.2]  Not Applicable    Primary hypothyroidism [E03.9]  Yes    Essential hypertension [I10]  Yes    Nonrheumatic mitral valve regurgitation [I34.0]  Yes    Acute on chronic diastolic CHF (congestive heart failure) [I50.33]  Yes    Type 2 diabetes mellitus with renal complication [E11.29]  Yes    Obesity, morbid, BMI 50 or higher [E66.01]  Yes      Resolved Hospital Problems   No resolved problems to display.       38 y.o. female admitted with Fluid overload.    ESRD on HD MWF/ Volume overload/electrolyte disturbance  Appreciate nephrology. Presents with volume overload. She has been missing HD. HD today day 3    Ascites/ anasarca   10/18 LVP with fluid studies demonstrates likely nephrotic related ascites with SAAG 0.6. no SBP, cytology is pending.       Acute on chronic CHF/ pulmonary HTN/ valvular disease  On carvedilol. Volume management with HD. Echocardiogram with EF 39.4%, moderate to severe MR, severe TVR,  pulm HTN. Seen by cardiology in  with plan for outpatient LUPE to eval severe MVR but it does not appear this occurred.     HTN  DC hydralazine per renal. DC norvasc. Volume control will aid BP control. On coreg    Hypothyroidism- continue synthroid. TSH 6.8 but T4 wnl.      DM2  Correcitonal insulin, A1c is 5.1    Anemia CKD-monitor hemoglobin.       SCDs for DVT prophylaxis.  Full code.  Discussed with patient,  DANIKA Marrero  [facility]  10/19/23  12:27 EDT      Electronically signed by Noelle Alcantar APRN at 10/19/23 1432       Sowmya Boles MD at 10/19/23 1200              [facility] Progress Note      Patient Name: Gisela Dyson  : 1985  MRN: [id number]  Primary Care Physician:  Provider, No Known  Date of admission: 10/16/2023    Subjective     Interval History:   Follow-up ESRD.  On dialysis.  Goal 4 kg.  4 kg removed yesterday with dialysis.  Paracentesis yesterday with 5.3 L removed.  Says she feels some relief in her abdomen.  Echocardiogram pending from this morning.  Refusing some meds, refusing SCDs.    Review of Systems:   As noted above    Objective     Vitals:   Temp:  [97 °F (36.1 °C)-97.8 °F (36.6 °C)] 97.7 °F (36.5 °C)  Heart Rate:  [79-92] 82  Resp:  [16-18] 16  BP: (111-154)/(69-81) 117/77    Intake/Output Summary (Last 24 hours) at 10/19/2023 1200  Last data filed at 10/19/2023 0554  Gross per 24 hour   Intake 1180 ml   Output 79118 ml   Net -17594 ml       Physical Exam:    General Appearance: obese, very chronically ill. On dialysis. Sleepy, does awaken and nods to questions.    Goal 4 kg. Qb 400.    Skin: excoriations over face.   HEENT: oral mucosa dry, nonicteric sclera  Neck: supple, no JVD  Lungs: decreased bs bases  .  Heart: RRR, normal S1 and S2  Abdomen: softer, less distended. Body wall woody edema. Ascites. + bs  Extremities:2+ upper and lower ext edema.  LUE AVF      Scheduled Meds:     albumin human, 12.5 g, Intravenous, Once  allopurinol, 100 mg, Oral, Daily  carvedilol, 25 mg, Oral, BID With Meals  heparin (porcine), 5,000 Units, Subcutaneous, Q8H  levothyroxine, 137 mcg, Oral, Q AM  nicotine, 1 patch, Transdermal, Q24H  senna-docusate sodium, 2 tablet, Oral, BID      IV Meds:        Results Reviewed:   I have personally reviewed the results from the time of this admission to 10/19/2023 12:00 EDT     Results from last 7 days   Lab Units 10/19/23  0545 10/18/23  0524 10/17/23  0545 10/16/23  1908   SODIUM mmol/L 132* 127* 130* 133*   POTASSIUM mmol/L 4.7 4.5 4.5 4.9   CHLORIDE mmol/L 98 92* 93* 91*   CO2 mmol/L 24.6 21.2* 22.0 26.0   BUN mg/dL 23* 25* 39* 38*   CREATININE mg/dL 4.92* 5.82* 8.08* 7.78*   CALCIUM mg/dL 8.3* 8.1* 7.5* 7.9*   BILIRUBIN mg/dL  --   --   --  0.9   ALK PHOS U/L  --   --   --  119*   ALT (SGPT) U/L  --   --   --  9   AST (SGOT) U/L  --   --   --  14   GLUCOSE mg/dL 144* 92 98 90       Estimated Creatinine Clearance: 18.3 mL/min (A) (by C-G formula based on SCr of 4.92 mg/dL (H)).    Results from last 7 days   Lab Units 10/19/23  0545 10/18/23  0524 10/17/23  0545   PHOSPHORUS mg/dL 4.5 5.5* 7.2*             Results from last 7 days   Lab Units 10/18/23  0524 10/17/23  0545 10/16/23  1908   WBC 10*3/mm3 6.39 5.78 6.89   HEMOGLOBIN g/dL 8.6* 8.6* 10.6*   PLATELETS 10*3/mm3 202 187 243       Results from last 7 days   Lab Units 10/18/23  0524   INR  1.45*       Assessment / Plan     ASSESSMENT:  ESRD again dialysis today to remove more volume.  Noncompliance is major issue.  Very frequently misses treatments and comes off early. [facility]    Ascites, mesenteric edema, anasarca.  Likely accumulating volume due to non-compliance with dialysis. Ascitic  fluid appearance on CT more dense than would be expected with simple ascites.  SP [id number]. Echocardiogram pending.  Last one 2021 demonstrated pulmonary hypertension and moderate tricuspid regurg with an EF of 49%.  Anemia of CKD.  Hypothyroidism on replacement.  TSH slightly elevated.  Free T4 normal.  Metabolic bone disease.  On ergocalciferol 3 times weekly.  History of hypertension on multiple meds.  Have discontinued her hydralazine and norvasc.  Blood pressure well controlled with volume removal.    PLAN:  Dialysis today to remove more volume.      Thank you for involving us in the care of Gisela Dyson.  Please feel free to call with any questions.    Sowmya Boles MD  10/19/23  12:00 EDT    [facility]  [phone]    Please note that portions of this note were completed with a voice recognition program.    Electronically signed by Sowmya Boles MD at 10/19/23 9953       Noelle Alcantar APRN at 10/18/23 2619              Name: Gisela Dyson ADMIT: 10/16/2023   : 1985  PCP: Provider, No Known    MRN: [id number] LOS: 2 days   AGE/SEX: 38 y.o. female  ROOM: [facility]     Subjective   Subjective   Patient seen following dialysis today.     She is sleepy and not inclined to wake up to answer questions.  Significant abdominal ascites/ anasarca.  No CP or SOB.   She is refusing paracentesis     Objective   Objective   Vital Signs  Temp:  [97 °F (36.1 °C)-99 °F (37.2 °C)] 97 °F (36.1 °C)  Heart Rate:  [73-82] 79  Resp:  [16] 16  BP: (102-154)/(55-84) 116/69  SpO2:  [93 %-99 %] 95 %  on   ;   Device (Oxygen Therapy): room air  Body mass index is 48.83 kg/m².  Physical Exam  Vitals reviewed.   Constitutional:       Appearance: She is well-developed. She is obese. She is ill-appearing.   HENT:      Head: Normocephalic and atraumatic.      Mouth/Throat:      Mouth: Mucous membranes are moist.   Cardiovascular:      Rate and Rhythm: Normal rate and regular  rhythm.   Pulmonary:      Effort: Pulmonary effort is normal. No respiratory distress.      Breath sounds: Normal breath sounds.   Abdominal:      General: Bowel sounds are normal. There is no distension.      Palpations: Abdomen is soft.      Tenderness: There is no abdominal tenderness.      Comments: Significant abdominal wall edema/ascites   Musculoskeletal:      Right lower leg: Edema present.      Left lower leg: Edema present.   Skin:     General: Skin is warm and dry.   Neurological:      General: No focal deficit present.      Mental Status: She is alert and oriented to person, place, and time.   Psychiatric:         Mood and Affect: Mood normal.         Behavior: Behavior normal.         Thought Content: Thought content normal.       Results Review     I reviewed the patient's new clinical results.  Results from last 7 days   Lab Units 10/18/23  0524 10/17/23  0545 10/16/23  1908   WBC 10*3/mm3 6.39 5.78 6.89   HEMOGLOBIN g/dL 8.6* 8.6* 10.6*   PLATELETS 10*3/mm3 202 187 243     Results from last 7 days   Lab Units 10/18/23  0524 10/17/23  0545 10/16/23  1908   SODIUM mmol/L 127* 130* 133*   POTASSIUM mmol/L 4.5 4.5 4.9   CHLORIDE mmol/L 92* 93* 91*   CO2 mmol/L 21.2* 22.0 26.0   BUN mg/dL 25* 39* 38*   CREATININE mg/dL 5.82* 8.08* 7.78*   GLUCOSE mg/dL 92 98 90   EGFR mL/min/1.73 9.1* 6.1* 6.4*     Results from last 7 days   Lab Units 10/18/23  0524 10/17/23  0545 10/16/23  1908   ALBUMIN g/dL 2.9* 2.9* 3.3*   BILIRUBIN mg/dL  --   --  0.9   ALK PHOS U/L  --   --  119*   AST (SGOT) U/L  --   --  14   ALT (SGPT) U/L  --   --  9     Results from last 7 days   Lab Units 10/18/23  0524 10/17/23  0545 10/16/23  1908   CALCIUM mg/dL 8.1* 7.5* 7.9*   ALBUMIN g/dL 2.9* 2.9* 3.3*   PHOSPHORUS mg/dL 5.5* 7.2*  --        Hemoglobin A1C   Date/Time Value Ref Range Status   10/17/2023 0545 5.10 4.80 - 5.60 % Final       CT Abdomen Pelvis Without Contrast    Result Date: 10/16/2023   Findings of fluid overload are  present, including moderate to large abdominal and pelvic ascites, diffuse mesenteric edema, anasarca, diffuse skin thickening, trace left pleural effusion. Abdominal and pelvic ascites is greater density than expected for simple fluid, could represent hemorrhagic ascites or could be evidence of spontaneous peritonitis.     This report was finalized on 10/16/2023 9:02 PM by Dr. Emery Cook M.D on Workstation: [id number]       I have personally reviewed all medications:  Scheduled Medications  allopurinol, 100 mg, Oral, Daily  carvedilol, 25 mg, Oral, BID With Meals  heparin (porcine), 5,000 Units, Subcutaneous, Q8H  levothyroxine, 125 mcg, Oral, Q AM  nicotine, 1 patch, Transdermal, Q24H  senna-docusate sodium, 2 tablet, Oral, BID    Infusions   Diet  Diet: Renal Diets, Fluid Restriction (240 mL/tray) Diets; Low Sodium (2-3g), Low Potassium, Low Phosphorus; Other (Specify mL/day) (1200); Texture: Regular Texture (IDDSI 7); Fluid Consistency: Thin (IDDSI 0)    I have personally reviewed:  [x]  Laboratory   [x]  Microbiology   [x]  Radiology   [x]  EKG/Telemetry  [x]  Cardiology/Vascular   []  Pathology    []  Records      Assessment/Plan     Active Hospital Problems    Diagnosis  POA    **Fluid overload [E87.70]  Yes    Volume overload [E87.70]  Yes    ESRD on hemodialysis [N18.6, Z99.2]  Not Applicable    Primary hypothyroidism [E03.9]  Yes    Essential hypertension [I10]  Yes    Acute on chronic diastolic CHF (congestive heart failure) [I50.33]  Yes    Type 2 diabetes mellitus with renal complication [E11.29]  Yes    Obesity, morbid, BMI 50 or higher [E66.01]  Yes      Resolved Hospital Problems   No resolved problems to display.       38 y.o. female admitted with Fluid overload.    ESRD on HD MWF/ Volume overload/electrolyte disturbance  Appreciate nephrology. Presents with volume overload. She has been missing HD. HD today and again tmrw    Ascites/ anasarca   Today LVP with fluid studies recommended given  amount concerning for SBP or hemorrhagic ascites per renal.     Fluid restriction   HTN  Holding coreg, DC hydralazine per renal. DC norvasc. Volume control will aid BP control     Hypothyroidism- continue synthroid. TSH 6.8 but T4 wnl.      DM2  Correcitonal insulin, A1c is 5.1    Anemia CKD-monitor hemoglobin.       SCDs for DVT prophylaxis.  Full code.  Discussed with patient, nurse        DANIKA Verdugo  [facility]  10/18/23  13:56 EDT      Electronically signed by Noelle Alcantar APRN at 10/18/23 1406       Sowmya Boles MD at 10/18/23 0828              [facility] Progress Note      Patient Name: Gisela Dyson  : 1985  MRN: [id number]  Primary Care Physician:  Provider, No Known  Date of admission: 10/16/2023    Subjective     Interval History:   Follow-up ESRD.  Dialysis yesterday completed.  No documentation of volume removed.  Weighed on the bed this morning. On dialysis. Received pain med, so sleepy.  Goal 4 kg.      Review of Systems:   As noted above    Objective     Vitals:   Temp:  [97.2 °F (36.2 °C)-99 °F (37.2 °C)] 99 °F (37.2 °C)  Heart Rate:  [69-82] 73  Resp:  [16] 16  BP: (102-139)/(59-84) 104/65    Intake/Output Summary (Last 24 hours) at 10/18/2023 0828  Last data filed at 10/17/2023 1730  Gross per 24 hour   Intake 220 ml   Output --   Net 220 ml       Physical Exam:    General Appearance: obese, chronically ill. On dialysis. Sleepy, does awaken and nods to questions.    Goal 4 kg. Qb 400.   systolic .  Skin: warm and dry  HEENT: oral mucosa dry, nonicteric sclera  Neck: supple, no JVD  Lungs: decreased bs bases .  Heart: RRR, normal S1 and S2  Abdomen: soft, distended. Body wall edema. Ascites. + bs  Extremities:2+ upper and lower ext edema.  LUE AVF  Neuro: sleepy after pain med.     Scheduled Meds:     allopurinol, 100 mg, Oral, Daily  carvedilol, 25 mg, Oral, BID With Meals  heparin (porcine), 5,000 Units,  Subcutaneous, Q8H  levothyroxine, 125 mcg, Oral, Q AM  nicotine, 1 patch, Transdermal, Q24H  senna-docusate sodium, 2 tablet, Oral, BID      IV Meds:        Results Reviewed:   I have personally reviewed the results from the time of this admission to 10/18/2023 08:28 EDT     Results from last 7 days   Lab Units 10/18/23  0524 10/17/23  0545 10/16/23  1908   SODIUM mmol/L 127* 130* 133*   POTASSIUM mmol/L 4.5 4.5 4.9   CHLORIDE mmol/L 92* 93* 91*   CO2 mmol/L 21.2* 22.0 26.0   BUN mg/dL 25* 39* 38*   CREATININE mg/dL 5.82* 8.08* 7.78*   CALCIUM mg/dL 8.1* 7.5* 7.9*   BILIRUBIN mg/dL  --   --  0.9   ALK PHOS U/L  --   --  119*   ALT (SGPT) U/L  --   --  9   AST (SGOT) U/L  --   --  14   GLUCOSE mg/dL 92 98 90       Estimated Creatinine Clearance: 16.6 mL/min (A) (by C-G formula based on SCr of 5.82 mg/dL (H)).    Results from last 7 days   Lab Units 10/18/23  0524 10/17/23  0545   PHOSPHORUS mg/dL 5.5* 7.2*             Results from last 7 days   Lab Units 10/18/23  0524 10/17/23  0545 10/16/23  1908   WBC 10*3/mm3 6.39 5.78 6.89   HEMOGLOBIN g/dL 8.6* 8.6* 10.6*   PLATELETS 10*3/mm3 202 187 243       Results from last 7 days   Lab Units 10/18/23  0524   INR  1.45*       Assessment / Plan     ASSESSMENT:  ESRD dialysis today to remove more volume.  Noncompliance is major issue.  Very frequently misses treatments and comes off early. [facility]   Ascites, mesenteric edema, anasarca.  Likely accumulating volume due to non-compliance with dialysis. Ascitic fluid appearance on CT more dense than would be expected with simple ascites.  Plan for paracentesis today if patient agrees.  Check echocardiogram.  Last one June 2021 demonstrated pulmonary hypertension and moderate tricuspid regurg with an EF of 49%.  Anemia of CKD.  Hypothyroidism on replacement,  check TSH.  Metabolic bone disease.  On ergocalciferol 3 times weekly.  History of hypertension on multiple meds.  Have discontinued her  hydralazine and her vast.  Suspect that if fluid is removed her blood pressure will be better controlled overall and she will not need as many meds.    PLAN:  Dialysis today to remove more volume.  Echocardiogram today.  Dc  hydralazine.  Po fluid restriction .    Thank you for involving us in the care of Gisela Dyson.  Please feel free to call with any questions.    Sowmya Boles MD  10/18/23  08:28 EDT    [facility]  [phone]    Please note that portions of this note were completed with a voice recognition program.    Electronically signed by Sowmya Boles MD at 10/18/23 1106       Consult Notes (last 48 hours)  Notes from 10/17/23 1433 through 10/19/23 1433   No notes of this type exist for this encounter.

## 2023-10-19 NOTE — PLAN OF CARE
Goal Outcome Evaluation:  Plan of Care Reviewed With: patient        Progress: no change  Outcome Evaluation: VSS, fluid restriction of 1200 mL/day, pt refused nicotine patch, stool softener, and heparin, education given, daily weight, limb precautions maintained, ECHO still to be completed  Pt refused SCD's  Pt refused heparin and synthroid this am

## 2023-10-19 NOTE — PROGRESS NOTES
Name: Gisela Dyson ADMIT: 10/16/2023   : 1985  PCP: Provider, No Known    MRN: [id number] LOS: 3 days   AGE/SEX: 38 y.o. female  ROOM: [facility]     Subjective   Subjective   Patient seen following dialysis (day 3). She is sleeping and not inclined to wake up to talk   S/p LVP yesterday 5.3L removed        Objective   Objective   Vital Signs  Temp:  [97 °F (36.1 °C)-97.8 °F (36.6 °C)] 97.7 °F (36.5 °C)  Heart Rate:  [79-92] 82  Resp:  [16-18] 16  BP: (111-154)/(69-81) 117/77  SpO2:  [93 %-100 %] 94 %  on   ;   Device (Oxygen Therapy): room air  Body mass index is 43.71 kg/m².  Physical Exam  Vitals reviewed.   Constitutional:       Appearance: She is well-developed. She is obese. She is ill-appearing.   HENT:      Head: Normocephalic and atraumatic.      Mouth/Throat:      Mouth: Mucous membranes are moist.   Cardiovascular:      Rate and Rhythm: Normal rate and regular rhythm.   Pulmonary:      Effort: Pulmonary effort is normal. No respiratory distress.      Breath sounds: Normal breath sounds.   Abdominal:      General: Bowel sounds are normal. There is no distension.      Palpations: Abdomen is soft.      Tenderness: There is no abdominal tenderness.      Comments:   abdominal wall edema/ascites   Musculoskeletal:      Right lower leg: Edema present.      Left lower leg: Edema present.   Skin:     General: Skin is warm and dry.   Neurological:      General: No focal deficit present.      Mental Status: She is alert and oriented to person, place, and time.   Psychiatric:         Mood and Affect: Mood normal.         Behavior: Behavior normal.         Thought Content: Thought content normal.       Results Review     I reviewed the patient's new clinical results.  Results from last 7 days   Lab Units 10/18/23  0524 10/17/23  0545 10/16/23  1908   WBC 10*3/mm3 6.39 5.78 6.89   HEMOGLOBIN g/dL 8.6* 8.6* 10.6*   PLATELETS 10*3/mm3 202 187 243     Results from last 7 days   Lab Units 10/19/23  0545  10/18/23  0524 10/17/23  0545 10/16/23  1908   SODIUM mmol/L 132* 127* 130* 133*   POTASSIUM mmol/L 4.7 4.5 4.5 4.9   CHLORIDE mmol/L 98 92* 93* 91*   CO2 mmol/L 24.6 21.2* 22.0 26.0   BUN mg/dL 23* 25* 39* 38*   CREATININE mg/dL 4.92* 5.82* 8.08* 7.78*   GLUCOSE mg/dL 144* 92 98 90   EGFR mL/min/1.73 11.1* 9.1* 6.1* 6.4*     Results from last 7 days   Lab Units 10/19/23  0545 10/18/23  0524 10/17/23  0545 10/16/23  1908   ALBUMIN g/dL 2.9* 2.9* 2.9* 3.3*   BILIRUBIN mg/dL  --   --   --  0.9   ALK PHOS U/L  --   --   --  119*   AST (SGOT) U/L  --   --   --  14   ALT (SGPT) U/L  --   --   --  9     Results from last 7 days   Lab Units 10/19/23  0545 10/18/23  0524 10/17/23  0545 10/16/23  1908   CALCIUM mg/dL 8.3* 8.1* 7.5* 7.9*   ALBUMIN g/dL 2.9* 2.9* 2.9* 3.3*   PHOSPHORUS mg/dL 4.5 5.5* 7.2*  --        Hemoglobin A1C   Date/Time Value Ref Range Status   10/17/2023 0545 5.10 4.80 - 5.60 % Final       US Paracentesis    Result Date: 10/18/2023  Ultrasound-guided paracentesis as described  This report was finalized on 10/18/2023 5:10 PM by Dr. Carlos Cartagena M.D on Workstation: [id number]       I have personally reviewed all medications:  Scheduled Medications  albumin human, 12.5 g, Intravenous, Once  allopurinol, 100 mg, Oral, Daily  carvedilol, 25 mg, Oral, BID With Meals  heparin (porcine), 5,000 Units, Subcutaneous, Q8H  levothyroxine, 137 mcg, Oral, Q AM  nicotine, 1 patch, Transdermal, Q24H  senna-docusate sodium, 2 tablet, Oral, BID    Infusions   Diet  Diet: Renal Diets, Fluid Restriction (240 mL/tray) Diets; Low Sodium (2-3g), Low Potassium, Low Phosphorus; Other (Specify mL/day) (1200); Texture: Regular Texture (IDDSI 7); Fluid Consistency: Thin (IDDSI 0)    I have personally reviewed:  [x]  Laboratory   [x]  Microbiology   [x]  Radiology   [x]  EKG/Telemetry  [x]  Cardiology/Vascular   []  Pathology    []  Records  Results for orders placed during the hospital encounter of 10/16/23    Adult Transthoracic  Echo Complete w/ Color, Spectral and Contrast if Necessary Per Protocol    Interpretation Summary    Left ventricular systolic function is moderately decreased. Calculated left ventricular EF = 39.7%    Left ventricular wall thickness is consistent with borderline concentric hypertrophy.    Left ventricular diastolic function was normal.    Moderately reduced right ventricular systolic function noted.    The right ventricular cavity is severely dilated.    The left atrial cavity is moderately dilated.    Left atrial volume is mildly increased.    The right atrial cavity is moderately  dilated.    Moderate to severe mitral valve regurgitation is present.    Severe tricuspid valve regurgitation is present.    Estimated right ventricular systolic pressure from tricuspid regurgitation is mildly elevated (35-45 mmHg). Calculated right ventricular systolic pressure from tricuspid regurgitation is 38 mmHg.         Assessment/Plan     Active Hospital Problems    Diagnosis  POA    **Fluid overload [E87.70]  Yes    Volume overload [E87.70]  Yes    ESRD on hemodialysis [N18.6, Z99.2]  Not Applicable    Primary hypothyroidism [E03.9]  Yes    Essential hypertension [I10]  Yes    Nonrheumatic mitral valve regurgitation [I34.0]  Yes    Acute on chronic diastolic CHF (congestive heart failure) [I50.33]  Yes    Type 2 diabetes mellitus with renal complication [E11.29]  Yes    Obesity, morbid, BMI 50 or higher [E66.01]  Yes      Resolved Hospital Problems   No resolved problems to display.       38 y.o. female admitted with Fluid overload.    ESRD on HD MWF/ Volume overload/electrolyte disturbance  Appreciate nephrology. Presents with volume overload. She has been missing HD. HD today day 3    Ascites/ anasarca   10/18 LVP with fluid studies demonstrates likely nephrotic related ascites with SAAG 0.6. no SBP, cytology is pending.      Acute on chronic CHF/ pulmonary HTN/ valvular disease  On carvedilol. Volume management with HD.  Echocardiogram with EF 39.4%, moderate to severe MR, severe TVR,  pulm HTN. Seen by cardiology in 2021 with plan for outpatient LUPE to eval severe MVR but it does not appear this occurred.     HTN  DC hydralazine per renal. DC norvasc. Volume control will aid BP control. On coreg    Hypothyroidism- continue synthroid. TSH 6.8 but T4 wnl.      DM2  Correcitonal insulin, A1c is 5.1    Anemia CKD-monitor hemoglobin.       SCDs for DVT prophylaxis.  Full code.  Discussed with patient,  DANIKA Marrero  [facility]  10/19/23  12:27 EDT

## 2023-10-19 NOTE — PROGRESS NOTES
[facility] Progress Note      Patient Name: Gisela Dyson  : 1985  MRN: [id number]  Primary Care Physician:  Provider, No Known  Date of admission: 10/16/2023    Subjective     Interval History:   Follow-up ESRD.  On dialysis.  Goal 4 kg.  4 kg removed yesterday with dialysis.  Paracentesis yesterday with 5.3 L removed.  Says she feels some relief in her abdomen.  Echocardiogram pending from this morning.  Refusing some meds, refusing SCDs.    Review of Systems:   As noted above    Objective     Vitals:   Temp:  [97 °F (36.1 °C)-97.8 °F (36.6 °C)] 97.7 °F (36.5 °C)  Heart Rate:  [79-92] 82  Resp:  [16-18] 16  BP: (111-154)/(69-81) 117/77    Intake/Output Summary (Last 24 hours) at 10/19/2023 1200  Last data filed at 10/19/2023 0554  Gross per 24 hour   Intake 1180 ml   Output 14095 ml   Net -79305 ml       Physical Exam:    General Appearance: obese, very chronically ill. On dialysis. Sleepy, does awaken and nods to questions.    Goal 4 kg. Qb 400.    Skin: excoriations over face.   HEENT: oral mucosa dry, nonicteric sclera  Neck: supple, no JVD  Lungs: decreased bs bases .  Heart: RRR, normal S1 and S2  Abdomen: softer, less distended. Body wall woody edema. Ascites. + bs  Extremities:2+ upper and lower ext edema.  LUE AVF      Scheduled Meds:     albumin human, 12.5 g, Intravenous, Once  allopurinol, 100 mg, Oral, Daily  carvedilol, 25 mg, Oral, BID With Meals  heparin (porcine), 5,000 Units, Subcutaneous, Q8H  levothyroxine, 137 mcg, Oral, Q AM  nicotine, 1 patch, Transdermal, Q24H  senna-docusate sodium, 2 tablet, Oral, BID      IV Meds:        Results Reviewed:   I have personally reviewed the results from the time of this admission to 10/19/2023 12:00 EDT     Results from last 7 days   Lab Units 10/19/23  0545 10/18/23  0524 10/17/23  0545 10/16/23  1908   SODIUM mmol/L 132* 127* 130* 133*   POTASSIUM mmol/L 4.7 4.5 4.5 4.9   CHLORIDE mmol/L 98 92* 93* 91*   CO2  mmol/L 24.6 21.2* 22.0 26.0   BUN mg/dL 23* 25* 39* 38*   CREATININE mg/dL 4.92* 5.82* 8.08* 7.78*   CALCIUM mg/dL 8.3* 8.1* 7.5* 7.9*   BILIRUBIN mg/dL  --   --   --  0.9   ALK PHOS U/L  --   --   --  119*   ALT (SGPT) U/L  --   --   --  9   AST (SGOT) U/L  --   --   --  14   GLUCOSE mg/dL 144* 92 98 90       Estimated Creatinine Clearance: 18.3 mL/min (A) (by C-G formula based on SCr of 4.92 mg/dL (H)).    Results from last 7 days   Lab Units 10/19/23  0545 10/18/23  0524 10/17/23  0545   PHOSPHORUS mg/dL 4.5 5.5* 7.2*             Results from last 7 days   Lab Units 10/18/23  0524 10/17/23  0545 10/16/23  1908   WBC 10*3/mm3 6.39 5.78 6.89   HEMOGLOBIN g/dL 8.6* 8.6* 10.6*   PLATELETS 10*3/mm3 202 187 243       Results from last 7 days   Lab Units 10/18/23  0524   INR  1.45*       Assessment / Plan     ASSESSMENT:  ESRD again dialysis today to remove more volume.  Noncompliance is major issue.  Very frequently misses treatments and comes off early. [facility]    Ascites, mesenteric edema, anasarca.  Likely accumulating volume due to non-compliance with dialysis. Ascitic fluid appearance on CT more dense than would be expected with simple ascites.  SP [id number]. Echocardiogram pending.  Last one June 2021 demonstrated pulmonary hypertension and moderate tricuspid regurg with an EF of 49%.  Anemia of CKD.  Hypothyroidism on replacement.  TSH slightly elevated.  Free T4 normal.  Metabolic bone disease.  On ergocalciferol 3 times weekly.  History of hypertension on multiple meds.  Have discontinued her hydralazine and norvasc.  Blood pressure well controlled with volume removal.    PLAN:  Dialysis today to remove more volume.      Thank you for involving us in the care of Gisela Dyson.  Please feel free to call with any questions.    Sowmya Boles MD  10/19/23  12:00 EDT    [facility]  [phone]    Please note that portions of this  note were completed with a voice recognition program.

## 2023-10-20 LAB
CYTO UR: NORMAL
LAB AP CASE REPORT: NORMAL
PATH REPORT.FINAL DX SPEC: NORMAL
PATH REPORT.GROSS SPEC: NORMAL

## 2023-10-20 RX ORDER — MANNITOL 250 MG/ML
25 INJECTION, SOLUTION INTRAVENOUS AS NEEDED
Status: CANCELLED | OUTPATIENT
Start: 2023-10-21 | End: 2023-10-21

## 2023-10-20 RX ORDER — CARVEDILOL 6.25 MG/1
6.25 TABLET ORAL 2 TIMES DAILY WITH MEALS
Status: DISCONTINUED | OUTPATIENT
Start: 2023-10-20 | End: 2023-10-21 | Stop reason: HOSPADM

## 2023-10-20 RX ADMIN — HYDROCODONE BITARTRATE AND ACETAMINOPHEN 1 TABLET: 5; 325 TABLET ORAL at 21:54

## 2023-10-20 RX ADMIN — HYDROCODONE BITARTRATE AND ACETAMINOPHEN 1 TABLET: 5; 325 TABLET ORAL at 10:25

## 2023-10-20 RX ADMIN — SACUBITRIL AND VALSARTAN 1 TABLET: 24; 26 TABLET, FILM COATED ORAL at 21:51

## 2023-10-20 NOTE — PROGRESS NOTES
Name: Gisela Dyson ADMIT: 10/16/2023   : 1985  PCP: Provider, No Known    MRN: [id number] LOS: 4 days   AGE/SEX: 38 y.o. female  ROOM: [facility]     Subjective   Subjective    Patient is asleep when enter the room. She wakes up after repeated requests but responses are limited. She is still edematous. She did not follow up with cardiology as recommended last admission in  for LUPE.            Objective   Objective   Vital Signs  Temp:  [96.5 °F (35.8 °C)-98.3 °F (36.8 °C)] 96.5 °F (35.8 °C)  Heart Rate:  [79-92] 82  Resp:  [16-18] 18  BP: ()/(57-86) 108/77  SpO2:  [95 %-100 %] 95 %  on   ;   Device (Oxygen Therapy): room air  Body mass index is 39.84 kg/m².  Physical Exam  Vitals reviewed.   Constitutional:       Appearance: She is well-developed. She is obese. She is ill-appearing.   HENT:      Head: Normocephalic and atraumatic.      Mouth/Throat:      Mouth: Mucous membranes are moist.   Cardiovascular:      Rate and Rhythm: Normal rate and regular rhythm.   Pulmonary:      Effort: Pulmonary effort is normal. No respiratory distress.      Breath sounds: Normal breath sounds.   Abdominal:      General: Bowel sounds are normal. There is no distension.      Palpations: Abdomen is soft.      Tenderness: There is no abdominal tenderness.      Comments:   abdominal wall edema/ascites   Musculoskeletal:      Right lower leg: Edema present.      Left lower leg: Edema present.   Skin:     General: Skin is warm and dry.   Neurological:      General: No focal deficit present.      Mental Status: She is alert and oriented to person, place, and time.   Psychiatric:         Mood and Affect: Affect is blunt and flat.         Speech: Speech normal.         Behavior: Behavior is withdrawn.         Thought Content: Thought content normal.       Results Review     I reviewed the patient's new clinical results.  Results from last 7 days   Lab Units 10/18/23  0524 10/17/23  0545 10/16/23  1908   WBC 10*3/mm3 6.39  "5.78 6.89   HEMOGLOBIN g/dL 8.6* 8.6* 10.6*   PLATELETS 10*3/mm3 202 187 243     Results from last 7 days   Lab Units 10/19/23  0545 10/18/23  0524 10/17/23  0545 10/16/23  1908   SODIUM mmol/L 132* 127* 130* 133*   POTASSIUM mmol/L 4.7 4.5 4.5 4.9   CHLORIDE mmol/L 98 92* 93* 91*   CO2 mmol/L 24.6 21.2* 22.0 26.0   BUN mg/dL 23* 25* 39* 38*   CREATININE mg/dL 4.92* 5.82* 8.08* 7.78*   GLUCOSE mg/dL 144* 92 98 90   EGFR mL/min/1.73 11.1* 9.1* 6.1* 6.4*     Results from last 7 days   Lab Units 10/19/23  0545 10/18/23  0524 10/17/23  0545 10/16/23  1908   ALBUMIN g/dL 2.9* 2.9* 2.9* 3.3*   BILIRUBIN mg/dL  --   --   --  0.9   ALK PHOS U/L  --   --   --  119*   AST (SGOT) U/L  --   --   --  14   ALT (SGPT) U/L  --   --   --  9     Results from last 7 days   Lab Units 10/19/23  0545 10/18/23  0524 10/17/23  0545 10/16/23  1908   CALCIUM mg/dL 8.3* 8.1* 7.5* 7.9*   ALBUMIN g/dL 2.9* 2.9* 2.9* 3.3*   PHOSPHORUS mg/dL 4.5 5.5* 7.2*  --        No results found for: \"HGBA1C\", \"POCGLU\"      US Paracentesis    Result Date: 10/18/2023  Ultrasound-guided paracentesis as described  This report was finalized on 10/18/2023 5:10 PM by Dr. Carlos Cartagena M.D on Workstation: [id number]       I have personally reviewed all medications:  Scheduled Medications  albumin human, 12.5 g, Intravenous, Once  allopurinol, 100 mg, Oral, Daily  carvedilol, 25 mg, Oral, BID With Meals  heparin (porcine), 5,000 Units, Subcutaneous, Q8H  levothyroxine, 137 mcg, Oral, Q AM  nicotine, 1 patch, Transdermal, Q24H  senna-docusate sodium, 2 tablet, Oral, BID    Infusions   Diet  Diet: Renal Diets, Fluid Restriction (240 mL/tray) Diets; Low Sodium (2-3g), Low Potassium, Low Phosphorus; Other (Specify mL/day) (1200); Texture: Regular Texture (IDDSI 7); Fluid Consistency: Thin (IDDSI 0)    I have personally reviewed:  [x]  Laboratory   [x]  Microbiology   [x]  Radiology   [x]  EKG/Telemetry  [x]  Cardiology/Vascular   []  Pathology    []  Records  Results for " orders placed during the hospital encounter of 10/16/23    Adult Transthoracic Echo Complete w/ Color, Spectral and Contrast if Necessary Per Protocol    Interpretation Summary    Left ventricular systolic function is moderately decreased. Calculated left ventricular EF = 39.7%    Left ventricular wall thickness is consistent with borderline concentric hypertrophy.    Left ventricular diastolic function was normal.    Moderately reduced right ventricular systolic function noted.    The right ventricular cavity is severely dilated.    The left atrial cavity is moderately dilated.    Left atrial volume is mildly increased.    The right atrial cavity is moderately  dilated.    Moderate to severe mitral valve regurgitation is present.    Severe tricuspid valve regurgitation is present.    Estimated right ventricular systolic pressure from tricuspid regurgitation is mildly elevated (35-45 mmHg). Calculated right ventricular systolic pressure from tricuspid regurgitation is 38 mmHg.         Assessment/Plan     Active Hospital Problems    Diagnosis  POA    **Fluid overload [E87.70]  Yes    Volume overload [E87.70]  Yes    ESRD on hemodialysis [N18.6, Z99.2]  Not Applicable    Primary hypothyroidism [E03.9]  Yes    Essential hypertension [I10]  Yes    Nonrheumatic mitral valve regurgitation [I34.0]  Yes    Acute on chronic diastolic CHF (congestive heart failure) [I50.33]  Yes    Type 2 diabetes mellitus with renal complication [E11.29]  Yes    Obesity, morbid, BMI 50 or higher [E66.01]  Yes      Resolved Hospital Problems   No resolved problems to display.       38 y.o. female admitted with Fluid overload.    ESRD on HD MWF/ Volume overload/electrolyte disturbance  Appreciate nephrology. Presents with volume overload. She has been missing HD. HD completed x 3 last time yesterday. HD tmrw per renal.  day 3    Ascites/ anasarca   Due to noncompliance. 10/18 LVP with fluid studies demonstrates likely nephrotic related ascites  with SAAG 0.6. no SBP, cytology is negative. .      Acute on chronic CHF/ pulmonary HTN/ valvular disease  On carvedilol held this am with hypotension. Volume management with HD. Echocardiogram with EF 39.4%, moderate to severe MR, severe TVR,  pulm HTN. Seen by cardiology in 2021 with plan for outpatient LUPE to eval severe MVR but it does not appear this occurred. EF has declined since last Echo 2021. Consult cardiology     HTN  DC hydralazine per renal. DC norvasc. Volume control will aid BP control. On coreg    Hypothyroidism- continue synthroid. TSH 6.8 but T4 wnl.      DM2  Correcitonal insulin, A1c is 5.1    Anemia CKD-monitor hemoglobin.       SCDs for DVT prophylaxis.  Full code.  Discussed with patient,  DANIKA Marrero  [facility]  10/20/23  13:00 EDT

## 2023-10-20 NOTE — PLAN OF CARE
Goal Outcome Evaluation:              Outcome Evaluation: VSS. SL. Norco given for pain. Dialysis done today. Echo done today.

## 2023-10-20 NOTE — PROGRESS NOTES
[facility] Progress Note      Patient Name: Gisela Dyson  : 1985  MRN: [id number]  Primary Care Physician:  Provider, No Known  Date of admission: 10/16/2023    Subjective     Interval History:   F/u ESRD     Review of Systems:   Dialyzed yesterday, 4L removed   Less dyspnea  Not very interactive today    Objective     Vitals:   Temp:  [97 °F (36.1 °C)-98.3 °F (36.8 °C)] 97 °F (36.1 °C)  Heart Rate:  [79-92] 79  Resp:  [16] 16  BP: ()/(57-86) 99/62    Intake/Output Summary (Last 24 hours) at 10/20/2023 0847  Last data filed at 10/19/2023 2212  Gross per 24 hour   Intake 460 ml   Output 4000 ml   Net -3540 ml       Physical Exam:    General Appearance: sleeping, arousable but interacts little  Neck: supple, no JVD  Lungs:Dec BS bibasilar no rales  Heart: RRR, normal S1 and S2  Abdomen: soft, nontender, nondistended  Extremities: 2+ BLE/BUE edema, LUE AVF    Scheduled Meds:     albumin human, 12.5 g, Intravenous, Once  allopurinol, 100 mg, Oral, Daily  carvedilol, 25 mg, Oral, BID With Meals  heparin (porcine), 5,000 Units, Subcutaneous, Q8H  levothyroxine, 137 mcg, Oral, Q AM  nicotine, 1 patch, Transdermal, Q24H  senna-docusate sodium, 2 tablet, Oral, BID      IV Meds:        Results Reviewed:   I have personally reviewed the results from the time of this admission to 10/20/2023 08:47 EDT     Results from last 7 days   Lab Units 10/19/23  0545 10/18/23  0524 10/17/23  0545 10/16/23  1908   SODIUM mmol/L 132* 127* 130* 133*   POTASSIUM mmol/L 4.7 4.5 4.5 4.9   CHLORIDE mmol/L 98 92* 93* 91*   CO2 mmol/L 24.6 21.2* 22.0 26.0   BUN mg/dL 23* 25* 39* 38*   CREATININE mg/dL 4.92* 5.82* 8.08* 7.78*   CALCIUM mg/dL 8.3* 8.1* 7.5* 7.9*   BILIRUBIN mg/dL  --   --   --  0.9   ALK PHOS U/L  --   --   --  119*   ALT (SGPT) U/L  --   --   --  9   AST (SGOT) U/L  --   --   --  14   GLUCOSE mg/dL 144* 92 98 90     Estimated Creatinine Clearance: 17.4 mL/min (A) (by C-G formula based on  SCr of 4.92 mg/dL (H)).  Results from last 7 days   Lab Units 10/19/23  0545 10/18/23  0524 10/17/23  0545   PHOSPHORUS mg/dL 4.5 5.5* 7.2*         Results from last 7 days   Lab Units 10/18/23  0524 10/17/23  0545 10/16/23  1908   WBC 10*3/mm3 6.39 5.78 6.89   HEMOGLOBIN g/dL 8.6* 8.6* 10.6*   PLATELETS 10*3/mm3 202 187 243     Results from last 7 days   Lab Units 10/18/23  0524   INR  1.45*       Assessment / Plan     ASSESSMENT:  ESRD - HD MWF.  Noncompliance is major issue.  Very frequently misses treatments and comes off early.  Dialyzed again yesterday (THURS) for additional UF  Ascites, mesenteric edema, anasarca.  Likely accumulating volume due to non-compliance with dialysis. Ascitic fluid appearance on CT more dense than would be expected with simple ascites.  SP paracentesis 10/18. Echocardiogram noted, EF down 39%.     Anemia of CKD.  Last hgb 8.6  Hypothyroidism on replacement.    Hypertension - BP on low side now with aggressive UF, have stopped norvasc and hydralazine, 90s systolic this AM    PLAN:  HD tomorrow then back to MWF next week  Hold coreg this AM  No objection to discharge after dialysis tomorrow  D/W father at bedside       Omid Newell MD  10/20/23  08:47 EDT    [facility]  [phone]

## 2023-10-20 NOTE — PLAN OF CARE
Goal Outcome Evaluation:  Plan of Care Reviewed With: patient        Progress: no change  Outcome Evaluation: VSS, pt to have dialysis tomorrow, have really encouraged pt to get up in chair or walk in green as she is refusing her heparin- pt refuses. Refuses SCD's, PO pain medication given with relief, fluid restriction, saline locked

## 2023-10-20 NOTE — PLAN OF CARE
Goal Outcome Evaluation:  Plan of Care Reviewed With: patient           Outcome Evaluation: VSS, Norco given for pain Q6, monitoring fluid intake for restrictions, refusing Heparin, stool softner, and nicotine patch, up ad edwin, tolerating diet, continuing plan of care. pt asking when she can go home.

## 2023-10-21 ENCOUNTER — READMISSION MANAGEMENT (OUTPATIENT)
Dept: CALL CENTER | Facility: HOSPITAL | Age: 38
End: 2023-10-21
Payer: COMMERCIAL

## 2023-10-21 VITALS
SYSTOLIC BLOOD PRESSURE: 172 MMHG | HEART RATE: 90 BPM | RESPIRATION RATE: 16 BRPM | BODY MASS INDEX: 41.66 KG/M2 | DIASTOLIC BLOOD PRESSURE: 84 MMHG | WEIGHT: 226.41 LBS | OXYGEN SATURATION: 97 % | TEMPERATURE: 97.5 F | HEIGHT: 62 IN

## 2023-10-21 PROBLEM — I50.23 ACUTE ON CHRONIC HFREF (HEART FAILURE WITH REDUCED EJECTION FRACTION): Status: ACTIVE | Noted: 2023-01-01

## 2023-10-21 LAB
ANION GAP SERPL CALCULATED.3IONS-SCNC: 10.7 MMOL/L (ref 5–15)
BASOPHILS # BLD AUTO: 0.04 10*3/MM3 (ref 0–0.2)
BASOPHILS NFR BLD AUTO: 0.5 % (ref 0–1.5)
BUN SERPL-MCNC: 35 MG/DL (ref 6–20)
BUN/CREAT SERPL: 6.3 (ref 7–25)
CALCIUM SPEC-SCNC: 8.2 MG/DL (ref 8.6–10.5)
CHLORIDE SERPL-SCNC: 93 MMOL/L (ref 98–107)
CO2 SERPL-SCNC: 25.3 MMOL/L (ref 22–29)
CREAT SERPL-MCNC: 5.53 MG/DL (ref 0.57–1)
DEPRECATED RDW RBC AUTO: 44.3 FL (ref 37–54)
EGFRCR SERPLBLD CKD-EPI 2021: 9.6 ML/MIN/1.73
EOSINOPHIL # BLD AUTO: 0.28 10*3/MM3 (ref 0–0.4)
EOSINOPHIL NFR BLD AUTO: 3.7 % (ref 0.3–6.2)
ERYTHROCYTE [DISTWIDTH] IN BLOOD BY AUTOMATED COUNT: 13.1 % (ref 12.3–15.4)
GLUCOSE SERPL-MCNC: 90 MG/DL (ref 65–99)
HCT VFR BLD AUTO: 25.8 % (ref 34–46.6)
HGB BLD-MCNC: 8.4 G/DL (ref 12–15.9)
IMM GRANULOCYTES # BLD AUTO: 0.04 10*3/MM3 (ref 0–0.05)
IMM GRANULOCYTES NFR BLD AUTO: 0.5 % (ref 0–0.5)
LYMPHOCYTES # BLD AUTO: 0.59 10*3/MM3 (ref 0.7–3.1)
LYMPHOCYTES NFR BLD AUTO: 7.8 % (ref 19.6–45.3)
MCH RBC QN AUTO: 30.2 PG (ref 26.6–33)
MCHC RBC AUTO-ENTMCNC: 32.6 G/DL (ref 31.5–35.7)
MCV RBC AUTO: 92.8 FL (ref 79–97)
MONOCYTES # BLD AUTO: 0.66 10*3/MM3 (ref 0.1–0.9)
MONOCYTES NFR BLD AUTO: 8.8 % (ref 5–12)
NEUTROPHILS NFR BLD AUTO: 5.92 10*3/MM3 (ref 1.7–7)
NEUTROPHILS NFR BLD AUTO: 78.7 % (ref 42.7–76)
NRBC BLD AUTO-RTO: 0 /100 WBC (ref 0–0.2)
PLATELET # BLD AUTO: 191 10*3/MM3 (ref 140–450)
PMV BLD AUTO: 9.3 FL (ref 6–12)
POTASSIUM SERPL-SCNC: 4.8 MMOL/L (ref 3.5–5.2)
RBC # BLD AUTO: 2.78 10*6/MM3 (ref 3.77–5.28)
SODIUM SERPL-SCNC: 129 MMOL/L (ref 136–145)
WBC NRBC COR # BLD: 7.53 10*3/MM3 (ref 3.4–10.8)

## 2023-10-21 PROCEDURE — 80048 BASIC METABOLIC PNL TOTAL CA: CPT | Performed by: INTERNAL MEDICINE

## 2023-10-21 PROCEDURE — 85025 COMPLETE CBC W/AUTO DIFF WBC: CPT | Performed by: INTERNAL MEDICINE

## 2023-10-21 RX ORDER — LEVOTHYROXINE SODIUM 137 UG/1
137 TABLET ORAL
Qty: 30 TABLET | Refills: 0 | Status: SHIPPED | OUTPATIENT
Start: 2023-10-21 | End: 2023-10-21 | Stop reason: SDUPTHER

## 2023-10-21 RX ORDER — CARVEDILOL 6.25 MG/1
6.25 TABLET ORAL 2 TIMES DAILY WITH MEALS
Qty: 60 TABLET | Refills: 0 | Status: SHIPPED | OUTPATIENT
Start: 2023-10-21 | End: 2023-10-21 | Stop reason: SDUPTHER

## 2023-10-21 RX ORDER — CARVEDILOL 6.25 MG/1
6.25 TABLET ORAL 2 TIMES DAILY WITH MEALS
Qty: 60 TABLET | Refills: 0 | Status: SHIPPED | OUTPATIENT
Start: 2023-10-21

## 2023-10-21 RX ORDER — LEVOTHYROXINE SODIUM 137 UG/1
137 TABLET ORAL
Qty: 30 TABLET | Refills: 0 | Status: SHIPPED | OUTPATIENT
Start: 2023-10-21

## 2023-10-21 RX ORDER — ALLOPURINOL 100 MG/1
100 TABLET ORAL DAILY
Qty: 30 TABLET | Refills: 0 | Status: SHIPPED | OUTPATIENT
Start: 2023-10-21 | End: 2023-10-21 | Stop reason: SDUPTHER

## 2023-10-21 RX ORDER — ALLOPURINOL 100 MG/1
100 TABLET ORAL DAILY
Qty: 30 TABLET | Refills: 0 | Status: SHIPPED | OUTPATIENT
Start: 2023-10-21

## 2023-10-21 RX ADMIN — HYDROCODONE BITARTRATE AND ACETAMINOPHEN 1 TABLET: 5; 325 TABLET ORAL at 17:18

## 2023-10-21 RX ADMIN — HYDROCODONE BITARTRATE AND ACETAMINOPHEN 1 TABLET: 5; 325 TABLET ORAL at 06:05

## 2023-10-21 NOTE — PROGRESS NOTES
Name: Gisela Dyson ADMIT: 10/16/2023   : 1985  PCP: Provider, No Known    MRN: [id number] LOS: 5 days   AGE/SEX: 38 y.o. female  ROOM: [facility]     Subjective   Subjective    Saw in HD. Reported no acute events overnight. No CP. Dyspnea stable. No NV.            Objective   Objective   Vital Signs  Temp:  [96.5 °F (35.8 °C)-99.9 °F (37.7 °C)] 97.8 °F (36.6 °C)  Heart Rate:  [77-83] 77  Resp:  [16-18] 18  BP: (105-131)/(68-77) 131/69  SpO2:  [95 %-97 %] 97 %  on   ;   Device (Oxygen Therapy): room air  Body mass index is 41.41 kg/m².  Physical Exam  Vitals reviewed.   Constitutional:       Appearance: She is well-developed. She is obese. She is ill-appearing.   HENT:      Head: Normocephalic and atraumatic.      Mouth/Throat:      Mouth: Mucous membranes are moist.   Cardiovascular:      Rate and Rhythm: Normal rate and regular rhythm.   Pulmonary:      Effort: Pulmonary effort is normal.      Breath sounds: No wheezing.   Abdominal:      Palpations: Abdomen is soft.      Tenderness: There is no abdominal tenderness. There is no guarding or rebound.   Musculoskeletal:      Right lower leg: Edema present.      Left lower leg: Edema present.   Skin:     General: Skin is warm and dry.   Neurological:      General: No focal deficit present.      Mental Status: She is alert.   Psychiatric:         Mood and Affect: Affect is blunt and flat.         Speech: Speech normal.         Behavior: Behavior is withdrawn.       Results Review     I reviewed the patient's new clinical results.  Results from last 7 days   Lab Units 10/21/23  0535 10/18/23  0524 10/17/23  0545 10/16/23  1908   WBC 10*3/mm3 7.53 6.39 5.78 6.89   HEMOGLOBIN g/dL 8.4* 8.6* 8.6* 10.6*   PLATELETS 10*3/mm3 191 202 187 243     Results from last 7 days   Lab Units 10/21/23  0535 10/19/23  0545 10/18/23  0524 10/17/23  0545   SODIUM mmol/L 129* 132* 127* 130*   POTASSIUM mmol/L 4.8 4.7 4.5 4.5   CHLORIDE mmol/L 93* 98 92* 93*   CO2 mmol/L 25.3 24.6  "21.2* 22.0   BUN mg/dL 35* 23* 25* 39*   CREATININE mg/dL 5.53* 4.92* 5.82* 8.08*   GLUCOSE mg/dL 90 144* 92 98   EGFR mL/min/1.73 9.6* 11.1* 9.1* 6.1*     Results from last 7 days   Lab Units 10/19/23  0545 10/18/23  0524 10/17/23  0545 10/16/23  1908   ALBUMIN g/dL 2.9* 2.9* 2.9* 3.3*   BILIRUBIN mg/dL  --   --   --  0.9   ALK PHOS U/L  --   --   --  119*   AST (SGOT) U/L  --   --   --  14   ALT (SGPT) U/L  --   --   --  9     Results from last 7 days   Lab Units 10/21/23  0535 10/19/23  0545 10/18/23  0524 10/17/23  0545 10/16/23  1908   CALCIUM mg/dL 8.2* 8.3* 8.1* 7.5* 7.9*   ALBUMIN g/dL  --  2.9* 2.9* 2.9* 3.3*   PHOSPHORUS mg/dL  --  4.5 5.5* 7.2*  --        No results found for: \"HGBA1C\", \"POCGLU\"      No radiology results for the last day    I have personally reviewed all medications:  Scheduled Medications  albumin human, 12.5 g, Intravenous, Once  allopurinol, 100 mg, Oral, Daily  carvedilol, 6.25 mg, Oral, BID With Meals  heparin (porcine), 5,000 Units, Subcutaneous, Q8H  levothyroxine, 137 mcg, Oral, Q AM  nicotine, 1 patch, Transdermal, Q24H  sacubitril-valsartan, 1 tablet, Oral, Q12H  senna-docusate sodium, 2 tablet, Oral, BID    Infusions   Diet  Diet: Renal Diets, Fluid Restriction (240 mL/tray) Diets; Low Sodium (2-3g), Low Potassium, Low Phosphorus; Other (Specify mL/day) (1200); Texture: Regular Texture (IDDSI 7); Fluid Consistency: Thin (IDDSI 0)    I have personally reviewed:  [x]  Laboratory   [x]  Microbiology   [x]  Radiology   [x]  EKG/Telemetry  [x]  Cardiology/Vascular   []  Pathology    []  Records  Results for orders placed during the hospital encounter of 10/16/23    Adult Transthoracic Echo Complete w/ Color, Spectral and Contrast if Necessary Per Protocol    Interpretation Summary    Left ventricular systolic function is moderately decreased. Calculated left ventricular EF = 39.7%    Left ventricular wall thickness is consistent with borderline concentric hypertrophy.    Left " ventricular diastolic function was normal.    Moderately reduced right ventricular systolic function noted.    The right ventricular cavity is severely dilated.    The left atrial cavity is moderately dilated.    Left atrial volume is mildly increased.    The right atrial cavity is moderately  dilated.    Moderate to severe mitral valve regurgitation is present.    Severe tricuspid valve regurgitation is present.    Estimated right ventricular systolic pressure from tricuspid regurgitation is mildly elevated (35-45 mmHg). Calculated right ventricular systolic pressure from tricuspid regurgitation is 38 mmHg.         Assessment/Plan     Active Hospital Problems    Diagnosis  POA    **Fluid overload [E87.70]  Yes    Volume overload [E87.70]  Yes    ESRD on hemodialysis [N18.6, Z99.2]  Not Applicable    Primary hypothyroidism [E03.9]  Yes    Essential hypertension [I10]  Yes    Nonrheumatic mitral valve regurgitation [I34.0]  Yes    Acute on chronic diastolic CHF (congestive heart failure) [I50.33]  Yes    Type 2 diabetes mellitus with renal complication [E11.29]  Yes    Obesity, morbid, BMI 50 or higher [E66.01]  Yes      Resolved Hospital Problems   No resolved problems to display.       38 y.o. female admitted with Fluid overload.    ESRD on HD MWF/ Volume overload/electrolyte disturbance  Appreciate nephrology. Presents with volume overload. She has been missing HD as outpatient. Continue HD.    Ascites/ anasarca   Due to noncompliance. 10/18 LVP with fluid studies demonstrates likely nephrotic related ascites with SAAG 0.6. No SBP, cytology is negative.      Acute on chronic CHF/ pulmonary HTN/ valvular disease  EF is depressed. Cardiology consulted to review. There had been plan for outpatient LUPE to evaluate the valvular disease previously. Entresto was started by cardiology. Defer to them and nephrology for titration.    HTN  Acceptable acutely. Continue to monitor and adjust as needed per  renal/cardiology.    Hypothyroidism- continue synthroid. TSH 6.8 but T4 wnl.      DM2  A1c is 5.1. SSI available but not required in past few days.    Anemia CKD-monitor hemoglobin.       Heparin sq for DVT prophylaxis.  Full code.  Disposition: TBD/potential dc tomorrow, cleared by nephrology, depending on cardiology crissy Dent MD  [facility]  10/21/23  11:42 EDT

## 2023-10-21 NOTE — NURSING NOTE
HD WITHOUT INCIDENT OR C/O. TOLERATED WELL. REMOVED 4200 ML'S NO MEDS ADMINISTERED. AVF NEEDLES REMOVED X 2. HEMOSTASIS ACHIEVED. STABLE POST COMPLETION OF HD.

## 2023-10-21 NOTE — PLAN OF CARE
Goal Outcome Evaluation:  Plan of Care Reviewed With: patient        Progress: improving  Outcome Evaluation: vss, norco for pain, refused scd and heparin educated regarding needed heparin but strongly refused, for HD today then d/c home, hold coreg in am for HD, continue to monitor the pt.

## 2023-10-21 NOTE — PROGRESS NOTES
[facility] Progress Note      Patient Name: Gisela Dyson  : 1985  MRN: [id number]  Primary Care Physician:  Provider, No Known  Date of admission: 10/16/2023    Subjective     Interval History:   F/u ESRD     Review of Systems:   Seen on dialysis day today to dialysis well.  Has no new complaints this morning.    Objective     Vitals:   Temp:  [96.5 °F (35.8 °C)-99.9 °F (37.7 °C)] 97.8 °F (36.6 °C)  Heart Rate:  [77-83] 77  Resp:  [16-18] 18  BP: (105-131)/(68-77) 131/69    Intake/Output Summary (Last 24 hours) at 10/21/2023 0926  Last data filed at 10/21/2023 0845  Gross per 24 hour   Intake 700 ml   Output --   Net 700 ml       Physical Exam:    General Appearance: sleeping, arousable but interacts little  Neck: supple, no JVD  Lungs:Dec BS bibasilar no rales  Heart: RRR, normal S1 and S2  Abdomen: soft, nontender, nondistended  Extremities: 2+ BLE/BUE edema, LUE AVF    Scheduled Meds:     albumin human, 12.5 g, Intravenous, Once  allopurinol, 100 mg, Oral, Daily  carvedilol, 6.25 mg, Oral, BID With Meals  heparin (porcine), 5,000 Units, Subcutaneous, Q8H  levothyroxine, 137 mcg, Oral, Q AM  nicotine, 1 patch, Transdermal, Q24H  sacubitril-valsartan, 1 tablet, Oral, Q12H  senna-docusate sodium, 2 tablet, Oral, BID      IV Meds:        Results Reviewed:   I have personally reviewed the results from the time of this admission to 10/21/2023 09:26 EDT     Results from last 7 days   Lab Units 10/21/23  0535 10/19/23  0545 10/18/23  0524 10/17/23  0545 10/16/23  1908   SODIUM mmol/L 129* 132* 127*   < > 133*   POTASSIUM mmol/L 4.8 4.7 4.5   < > 4.9   CHLORIDE mmol/L 93* 98 92*   < > 91*   CO2 mmol/L 25.3 24.6 21.2*   < > 26.0   BUN mg/dL 35* 23* 25*   < > 38*   CREATININE mg/dL 5.53* 4.92* 5.82*   < > 7.78*   CALCIUM mg/dL 8.2* 8.3* 8.1*   < > 7.9*   BILIRUBIN mg/dL  --   --   --   --  0.9   ALK PHOS U/L  --   --   --   --  119*   ALT (SGPT) U/L  --   --   --   --  9   AST (SGOT)  U/L  --   --   --   --  14   GLUCOSE mg/dL 90 144* 92   < > 90    < > = values in this interval not displayed.     Estimated Creatinine Clearance: 15.8 mL/min (A) (by C-G formula based on SCr of 5.53 mg/dL (H)).  Results from last 7 days   Lab Units 10/19/23  0545 10/18/23  0524 10/17/23  0545   PHOSPHORUS mg/dL 4.5 5.5* 7.2*         Results from last 7 days   Lab Units 10/21/23  0535 10/18/23  0524 10/17/23  0545 10/16/23  1908   WBC 10*3/mm3 7.53 6.39 5.78 6.89   HEMOGLOBIN g/dL 8.4* 8.6* 8.6* 10.6*   PLATELETS 10*3/mm3 191 202 187 243     Results from last 7 days   Lab Units 10/18/23  0524   INR  1.45*       Assessment / Plan     ASSESSMENT:  ESRD - HD MWF.  Noncompliance is major issue.  Very frequently misses treatments and comes off early.    Ascites, mesenteric edema, anasarca.  Likely accumulating volume due to non-compliance with dialysis. Ascitic fluid appearance on CT more dense than would be expected with simple ascites.  SP paracentesis 10/18. Echocardiogram noted, EF down 39%.     Anemia of CKD.  Last hgb 8.4  Hypothyroidism on replacement.    Hypertension -blood pressure seems to be better    PLAN:  -Dialyzed today and her dialysis well-tolerated.  -No objection from nephrology standpoint to discharge patient today and to continue her dialysis treatment in outpatient setting.      Lynne Cortez MD  10/21/23  09:26 EDT    [facility]  [phone]

## 2023-10-21 NOTE — PLAN OF CARE
Goal Outcome Evaluation:  Plan of Care Reviewed With: patient        Progress: improving  Outcome Evaluation: pt completed HD today. 4.2 liters removed.  cardiology consult came to see but pt off unit in HD; awaiting their input for DC. pt's dad upset that patient may have to stay another night if cardiologist does not see until tomorrow.  consult note not put in until late afternoon and then attending MD notified that patient and father very eager (father adamant) to be discharged today.  in the interim, before nurse had opportunity to contact cardiologist, father had contacted  about possible delay of consult/discharge.  pt declined am meds prior to HD and also declined after return from HD.  pt refused SCD's and refused IS.  during review of AVS, pt's dad states she does not have any pain meds at home.  pt confirmed this.  attending md notified and states patient will need to get her chronic pain meds from her PCP.  stressed importance of f/u care with PCP, nephrolgist, and cardiology as well as importance of going to HD as scheduled.

## 2023-10-21 NOTE — PROGRESS NOTES
Ms. Dyson admitted with volume overload.  Medical history significant for end-stage renal disease hemodialysis but she does only twice a week.  She presented with abdominal pain from volume overload and subsequently dialyzed.  She has known history of moderate mitral valve regurgitation since at least 2019 which has not significantly changed.    Abnormal echo with moderate mitral valve regurgitation, moderate to severe TR, dilated RV with mildly reduced systolic function, mild pulm hypertension, grade 2 diastolic function.  Essential hypertension with hypertensive heart disease.  End-stage renal disease on hemodialysis  Tobacco use.     Patient off floor getting dialysis.  I have talked to the nurse.  Entresto added for cardiomyopathy yesterday.  Mitral valve regurgitation has not significantly changed from past.  I will see her in office if patient is willing.  She does not need any further inpatient cardiac testing.

## 2023-10-21 NOTE — DISCHARGE SUMMARY
Date of Admission: 10/16/2023  Date of Discharge:  10/21/2023  Primary Care Physician: Provider, No Known     Discharge Diagnosis:  Active Hospital Problems    Diagnosis  POA    **Fluid overload [E87.70]  Yes    Acute on chronic HFrEF (heart failure with reduced ejection fraction) [I50.23]  Yes    Volume overload [E87.70]  Yes    ESRD on hemodialysis [N18.6, Z99.2]  Not Applicable    Primary hypothyroidism [E03.9]  Yes    Essential hypertension [I10]  Yes    Nonrheumatic mitral valve regurgitation [I34.0]  Yes    Acute on chronic diastolic CHF (congestive heart failure) [I50.33]  Yes    Type 2 diabetes mellitus with renal complication [E11.29]  Yes    Obesity, morbid, BMI 50 or higher [E66.01]  Yes      Resolved Hospital Problems   No resolved problems to display.       Presenting Problem/History of Present Illness from H&P:  Anasarca [R60.1]  CKD (chronic kidney disease) requiring chronic dialysis [N18.6, Z99.2]  Noncompliance [Z91.199]  Fluid overload [E87.70]  Volume overload [E87.70]  Acute on chronic HFrEF (heart failure with reduced ejection fraction) [I50.23]     Hospital Course:  38 y.o. female admitted with Fluid overload.     ESRD on HD MWF/ Volume overload/electrolyte disturbance  Appreciate nephrology. Presents with volume overload. She has been missing HD as outpatient. Continue HD. Cleared for dc by nephrology.     Ascites/ anasarca   Due to noncompliance. 10/18 LVP with fluid studies demonstrates likely nephrotic related ascites with SAAG 0.6. No SBP, cytology is negative.       Acute on chronic CHF/ pulmonary HTN/ valvular disease  EF is depressed. Cardiology reviewed and plan on outpatient follow up. She was started on entresto. Coreg dose decreased. Additional titration of medications planned outpatient by cardiology and nephrology.      HTN: see above. Discharging on current inpatient regimen.     Hypothyroidism- Continue synthroid. Repeat TSH 6-8 weeks to continue titration.       DM2 A1c is  5.1.    Exam Today:  Constitutional:       Appearance: She is well-developed..  HENT:      Head: Normocephalic and atraumatic.      Mouth/Throat:      Mouth: Mucous membranes are moist.   Cardiovascular:      Rate and Rhythm: Normal rate and regular rhythm.   Pulmonary:      Effort: Pulmonary effort is normal.      Breath sounds: No wheezing.   Abdominal:      Palpations: Abdomen is soft.      Tenderness: There is no abdominal tenderness. There is no guarding or rebound.   Musculoskeletal:      Right lower leg: Edema present.      Left lower leg: Edema present.   Skin:     General: Skin is warm and dry.   Neurological:      General: No focal deficit present.      Mental Status: She is alert.   Psychiatric:            Speech: Speech normal.         Behavior: Behavior is withdrawn.    Results:  Final Diagnosis   Abdominal Fluid, Paracentesis:   A. Negative for malignant cells.   B. Scattered reactive mesothelial cells present and rare inflammatory cells.      CT Abdomen/Pelvis  Findings of fluid overload are present, including moderate to large  abdominal and pelvic ascites, diffuse mesenteric edema, anasarca,  diffuse skin thickening, trace left pleural effusion. Abdominal and  pelvic ascites is greater density than expected for simple fluid, could  represent hemorrhagic ascites or could be evidence of spontaneous  peritonitis.    TTE    Left ventricular systolic function is moderately decreased. Calculated left ventricular EF = 39.7%    Left ventricular wall thickness is consistent with borderline concentric hypertrophy.    Left ventricular diastolic function was normal.    Moderately reduced right ventricular systolic function noted.    The right ventricular cavity is severely dilated.    The left atrial cavity is moderately dilated.    Left atrial volume is mildly increased.    The right atrial cavity is moderately  dilated.    Moderate to severe mitral valve regurgitation is present.    Severe tricuspid valve  "regurgitation is present.    Estimated right ventricular systolic pressure from tricuspid regurgitation is mildly elevated (35-45 mmHg). Calculated right ventricular systolic pressure from tricuspid regurgitation is 38 mmHg.     Procedures Performed:         Consults:   Consults       Date and Time Order Name Status Description    10/20/2023  1:14 PM Inpatient Cardiology Consult      10/17/2023 12:32 AM Inpatient Nephrology Consult      10/16/2023  9:45 PM Nephrology (on -call MD unless specified) Completed     10/16/2023  9:45 PM [facility] (on-call MD unless specified) Details               Discharge Disposition:  Home or Self Care    Discharge Medications:     Discharge Medications        New Medications        Instructions Start Date   sacubitril-valsartan 24-26 MG tablet  Commonly known as: ENTRESTO   1 tablet, Oral, 2 Times Daily             Changes to Medications        Instructions Start Date   allopurinol 100 MG tablet  Commonly known as: ZYLOPRIM  What changed:   medication strength  how much to take   100 mg, Oral, Daily      carvedilol 6.25 MG tablet  Commonly known as: COREG  What changed:   medication strength  how much to take   6.25 mg, Oral, 2 Times Daily With Meals      HYDROcodone-acetaminophen 5-325 MG per tablet  Commonly known as: [place]  What changed: Another medication with the same name was removed. Continue taking this medication, and follow the directions you see here.   1 tablet, Oral, Every 8 Hours PRN      levothyroxine 137 MCG tablet  Commonly known as: SYNTHROID, LEVOTHROID  What changed:   medication strength  how much to take  when to take this   137 mcg, Oral, Every Early Morning             Continue These Medications        Instructions Start Date   CHLORHEXIDINE GLUCONATE CLOTH EX   Apply externally, AS DIRECTED PREOP      ERGOCAL PO   1.25 mg, Oral, 3 Times Weekly      Needle (Disp) 30G X 1/2\" misc  Commonly known as: BD Disp Needles   Use daily for injection of Tresiba.         "     Stop These Medications      amLODIPine 10 MG tablet  Commonly known as: NORVASC     hydrALAZINE 100 MG tablet  Commonly known as: APRESOLINE     lisinopril 10 MG tablet  Commonly known as: PRINIVIL,ZESTRIL     montelukast 10 MG tablet  Commonly known as: SINGULAIR              Discharge Diet:   Diet Instructions       Diet: Renal Diets; Low Sodium (2-3g), Low Potassium, Low Phosphorus; Thin (IDDSI 0)      Discharge Diet: Renal Diets    Renal Diet:  Low Sodium (2-3g)  Low Potassium  Low Phosphorus       Fluid Consistency: Thin (IDDSI 0)            Activity at Discharge:   Activity Instructions       Activity as Tolerated              Follow-up Appointments:   Follow-up Information       Provider, No Known Follow up.    Contact information:  [facility] [id number] [phone]               Lynne Cortez MD Follow up.    Specialty: Nephrology  Contact information:  [address]  [facility] [id number] [phone]               Reji Rudd MD Follow up.    Specialty: Cardiology  Contact information:  [address]  [facility] [address]  [facility] [id number] [phone]                             Test Results Pending at Discharge:  Pending Labs       Order Current Status    Body Fluid Culture - Body Fluid, Peritoneum Preliminary result             Hayden Dent MD  10/21/23  16:28 EDT    Time Spent on Discharge Activities: >30 minutes    Dictated portions using Dragon dictation software.

## 2023-10-22 ENCOUNTER — NURSE TRIAGE (OUTPATIENT)
Dept: CALL CENTER | Facility: HOSPITAL | Age: 38
End: 2023-10-22
Payer: COMMERCIAL

## 2023-10-22 NOTE — OUTREACH NOTE
Prep Survey      Flowsheet Row Responses   [facility] facility patient discharged from? [facility]   Is LACE score < 7 ? No   Eligibility Readm Mgmt   Discharge diagnosis Fluid overload/CHF   Does the patient have one of the following disease processes/diagnoses(primary or secondary)? CHF   Does the patient have Home health ordered? No   Is there a DME ordered? No   Prep survey completed? Yes            DOREEN RON - Registered Nurse

## 2023-10-22 NOTE — PAYOR COMM NOTE
"Kiel Gisela CHRISTIAN (38 y.o. Female)          Dc summary for [id number]          Date of Birth   1985    Social Security Number       Address   [address] [place] [address] [id number]    Home Phone   [phone]    MRN   [id number]       Jainism   None    Marital Status   Single                            Admission Date   10/16/23    Admission Type   Emergency    Admitting Provider   Kameron Brown MD    Attending Provider       Department, Room/Bed   [address], 81/1       Discharge Date   10/21/2023    Discharge Disposition   Home or Self Care    Discharge Destination                                 Attending Provider: (none)   Allergies: No Known Allergies    Isolation: None   Infection: None   Code Status: Prior    Ht: 157.5 cm (62\")   Wt: 103 kg (226 lb 6.6 oz)    Admission Cmt: None   Principal Problem: Fluid overload [E87.70]                   Active Insurance as of 10/16/2023       Primary Coverage       Payor Plan Insurance Group Employer/Plan Group    ANTHEM BLUE CROSS ANTHEM BLUE CROSS BLUE SHIELD PPO [id number]       Payor Plan Address Payor Plan Phone Number Payor Plan Fax Number Effective Dates    [address] [phone]  1/1/2022 - None Entered    [facility] [address]         Subscriber Name Subscriber Birth Date Member ID       GISELA ULRICH R 1985 [id number]               Secondary Coverage       Payor Plan Insurance Group Employer/Plan Group    MEDICARE MEDICARE A & B        Payor Plan Address Payor Plan Phone Number Payor Plan Fax Number Effective Dates    [address] [phone]  9/1/2021 - None Entered    [facility] [address]         Subscriber Name Subscriber Birth Date Member ID       GISELA ULRICH R 1985 [id number]                     Emergency Contacts        (Rel.) Home Phone Work Phone Mobile Phone    FILOMENA ULRICH (BILL) (Father) [phone] -- [phone]                 Physician Progress Notes (last 48 hours)    "     Reji Rudd MD at 10/21/23 1550          Ms. Dyson admitted with volume overload.  Medical history significant for end-stage renal disease hemodialysis but she does only twice a week.  She presented with abdominal pain from volume overload and subsequently dialyzed.  She has known history of moderate mitral valve regurgitation since at least 2019 which has not significantly changed.    Abnormal echo with moderate mitral valve regurgitation, moderate to severe TR, dilated RV with mildly reduced systolic function, mild pulm hypertension, grade 2 diastolic function.  Essential hypertension with hypertensive heart disease.  End-stage renal disease on hemodialysis  Tobacco use.     Patient off floor getting dialysis.  I have talked to the nurse.  Entresto added for cardiomyopathy yesterday.  Mitral valve regurgitation has not significantly changed from past.  I will see her in office if patient is willing.  She does not need any further inpatient cardiac testing.       Electronically signed by Reji Rudd MD at 10/21/23 3279       Hayden Dent MD at 10/21/23 9570              Name: Gisela Dyson ADMIT: 10/16/2023   : 1985  PCP: Provider, No Known    MRN: [id number] LOS: 5 days   AGE/SEX: 38 y.o. female  ROOM: [facility]     Subjective   Subjective    Saw in HD. Reported no acute events overnight. No CP. Dyspnea stable. No NV.           Objective   Objective   Vital Signs  Temp:  [96.5 °F (35.8 °C)-99.9 °F (37.7 °C)] 97.8 °F (36.6 °C)  Heart Rate:  [77-83] 77  Resp:  [16-18] 18  BP: (105-131)/(68-77) 131/69  SpO2:  [95 %-97 %] 97 %  on   ;   Device (Oxygen Therapy): room air  Body mass index is 41.41 kg/m².  Physical Exam  Vitals reviewed.   Constitutional:       Appearance: She is well-developed. She is obese. She is ill-appearing.   HENT:      Head: Normocephalic and atraumatic.      Mouth/Throat:      Mouth: Mucous membranes are moist.   Cardiovascular:      Rate and Rhythm: Normal  "rate and regular rhythm.   Pulmonary:      Effort: Pulmonary effort is normal.      Breath sounds: No wheezing.   Abdominal:      Palpations: Abdomen is soft.      Tenderness: There is no abdominal tenderness. There is no guarding or rebound.   Musculoskeletal:      Right lower leg: Edema present.      Left lower leg: Edema present.   Skin:     General: Skin is warm and dry.   Neurological:      General: No focal deficit present.      Mental Status: She is alert.   Psychiatric:         Mood and Affect: Affect is blunt and flat.         Speech: Speech normal.         Behavior: Behavior is withdrawn.       Results Review     I reviewed the patient's new clinical results.  Results from last 7 days   Lab Units 10/21/23  0535 10/18/23  0524 10/17/23  0545 10/16/23  1908   WBC 10*3/mm3 7.53 6.39 5.78 6.89   HEMOGLOBIN g/dL 8.4* 8.6* 8.6* 10.6*   PLATELETS 10*3/mm3 191 202 187 243     Results from last 7 days   Lab Units 10/21/23  0535 10/19/23  0545 10/18/23  0524 10/17/23  0545   SODIUM mmol/L 129* 132* 127* 130*   POTASSIUM mmol/L 4.8 4.7 4.5 4.5   CHLORIDE mmol/L 93* 98 92* 93*   CO2 mmol/L 25.3 24.6 21.2* 22.0   BUN mg/dL 35* 23* 25* 39*   CREATININE mg/dL 5.53* 4.92* 5.82* 8.08*   GLUCOSE mg/dL 90 144* 92 98   EGFR mL/min/1.73 9.6* 11.1* 9.1* 6.1*     Results from last 7 days   Lab Units 10/19/23  0545 10/18/23  0524 10/17/23  0545 10/16/23  1908   ALBUMIN g/dL 2.9* 2.9* 2.9* 3.3*   BILIRUBIN mg/dL  --   --   --  0.9   ALK PHOS U/L  --   --   --  119*   AST (SGOT) U/L  --   --   --  14   ALT (SGPT) U/L  --   --   --  9     Results from last 7 days   Lab Units 10/21/23  0535 10/19/23  0545 10/18/23  0524 10/17/23  0545 10/16/23  1908   CALCIUM mg/dL 8.2* 8.3* 8.1* 7.5* 7.9*   ALBUMIN g/dL  --  2.9* 2.9* 2.9* 3.3*   PHOSPHORUS mg/dL  --  4.5 5.5* 7.2*  --        No results found for: \"HGBA1C\", \"POCGLU\"      No radiology results for the last day    I have personally reviewed all medications:  Scheduled " Medications  albumin human, 12.5 g, Intravenous, Once  allopurinol, 100 mg, Oral, Daily  carvedilol, 6.25 mg, Oral, BID With Meals  heparin (porcine), 5,000 Units, Subcutaneous, Q8H  levothyroxine, 137 mcg, Oral, Q AM  nicotine, 1 patch, Transdermal, Q24H  sacubitril-valsartan, 1 tablet, Oral, Q12H  senna-docusate sodium, 2 tablet, Oral, BID    Infusions   Diet  Diet: Renal Diets, Fluid Restriction (240 mL/tray) Diets; Low Sodium (2-3g), Low Potassium, Low Phosphorus; Other (Specify mL/day) (1200); Texture: Regular Texture (IDDSI 7); Fluid Consistency: Thin (IDDSI 0)    I have personally reviewed:  [x]  Laboratory   [x]  Microbiology   [x]  Radiology   [x]  EKG/Telemetry  [x]  Cardiology/Vascular   []  Pathology    []  Records  Results for orders placed during the hospital encounter of 10/16/23    Adult Transthoracic Echo Complete w/ Color, Spectral and Contrast if Necessary Per Protocol    Interpretation Summary    Left ventricular systolic function is moderately decreased. Calculated left ventricular EF = 39.7%    Left ventricular wall thickness is consistent with borderline concentric hypertrophy.    Left ventricular diastolic function was normal.    Moderately reduced right ventricular systolic function noted.    The right ventricular cavity is severely dilated.    The left atrial cavity is moderately dilated.    Left atrial volume is mildly increased.    The right atrial cavity is moderately  dilated.    Moderate to severe mitral valve regurgitation is present.    Severe tricuspid valve regurgitation is present.    Estimated right ventricular systolic pressure from tricuspid regurgitation is mildly elevated (35-45 mmHg). Calculated right ventricular systolic pressure from tricuspid regurgitation is 38 mmHg.        Assessment/Plan     Active Hospital Problems    Diagnosis  POA    **Fluid overload [E87.70]  Yes    Volume overload [E87.70]  Yes    ESRD on hemodialysis [N18.6, Z99.2]  Not Applicable    Primary  hypothyroidism [E03.9]  Yes    Essential hypertension [I10]  Yes    Nonrheumatic mitral valve regurgitation [I34.0]  Yes    Acute on chronic diastolic CHF (congestive heart failure) [I50.33]  Yes    Type 2 diabetes mellitus with renal complication [E11.29]  Yes    Obesity, morbid, BMI 50 or higher [E66.01]  Yes      Resolved Hospital Problems   No resolved problems to display.       38 y.o. female admitted with Fluid overload.    ESRD on HD MWF/ Volume overload/electrolyte disturbance  Appreciate nephrology. Presents with volume overload. She has been missing HD as outpatient. Continue HD.    Ascites/ anasarca   Due to noncompliance. 10/18 LVP with fluid studies demonstrates likely nephrotic related ascites with SAAG 0.6. No SBP, cytology is negative.      Acute on chronic CHF/ pulmonary HTN/ valvular disease  EF is depressed. Cardiology consulted to review. There had been plan for outpatient LUPE to evaluate the valvular disease previously. Entresto was started by cardiology. Defer to them and nephrology for titration.    HTN  Acceptable acutely. Continue to monitor and adjust as needed per renal/cardiology.    Hypothyroidism- continue synthroid. TSH 6.8 but T4 wnl.      DM2  A1c is 5.1. SSI available but not required in past few days.    Anemia CKD-monitor hemoglobin.       Heparin sq for DVT prophylaxis.  Full code.  Disposition: TBD/potential dc tomorrow, cleared by nephrology, depending on cardiology crissy Dent MD  [facility]  10/21/23  11:42 EDT      Electronically signed by Hayden Dent MD at 10/21/23 1152       Lynne Cortez MD at 10/21/23 0984              [facility] Progress Note      Patient Name: Gisela Dyson  : 1985  MRN: [id number]  Primary Care Physician:  Provider, No Known  Date of admission: 10/16/2023    Subjective     Interval History:   F/u ESRD     Review of Systems:   Seen on dialysis day today to dialysis  well.  Has no new complaints this morning.    Objective     Vitals:   Temp:  [96.5 °F (35.8 °C)-99.9 °F (37.7 °C)] 97.8 °F (36.6 °C)  Heart Rate:  [77-83] 77  Resp:  [16-18] 18  BP: (105-131)/(68-77) 131/69    Intake/Output Summary (Last 24 hours) at 10/21/2023 0926  Last data filed at 10/21/2023 0845  Gross per 24 hour   Intake 700 ml   Output --   Net 700 ml       Physical Exam:    General Appearance: sleeping, arousable but interacts little  Neck: supple, no JVD  Lungs:Dec BS bibasilar no rales  Heart: RRR, normal S1 and S2  Abdomen: soft, nontender, nondistended  Extremities: 2+ BLE/BUE edema, LUE AVF    Scheduled Meds:     albumin human, 12.5 g, Intravenous, Once  allopurinol, 100 mg, Oral, Daily  carvedilol, 6.25 mg, Oral, BID With Meals  heparin (porcine), 5,000 Units, Subcutaneous, Q8H  levothyroxine, 137 mcg, Oral, Q AM  nicotine, 1 patch, Transdermal, Q24H  sacubitril-valsartan, 1 tablet, Oral, Q12H  senna-docusate sodium, 2 tablet, Oral, BID      IV Meds:        Results Reviewed:   I have personally reviewed the results from the time of this admission to 10/21/2023 09:26 EDT     Results from last 7 days   Lab Units 10/21/23  0535 10/19/23  0545 10/18/23  0524 10/17/23  0545 10/16/23  1908   SODIUM mmol/L 129* 132* 127*   < > 133*   POTASSIUM mmol/L 4.8 4.7 4.5   < > 4.9   CHLORIDE mmol/L 93* 98 92*   < > 91*   CO2 mmol/L 25.3 24.6 21.2*   < > 26.0   BUN mg/dL 35* 23* 25*   < > 38*   CREATININE mg/dL 5.53* 4.92* 5.82*   < > 7.78*   CALCIUM mg/dL 8.2* 8.3* 8.1*   < > 7.9*   BILIRUBIN mg/dL  --   --   --   --  0.9   ALK PHOS U/L  --   --   --   --  119*   ALT (SGPT) U/L  --   --   --   --  9   AST (SGOT) U/L  --   --   --   --  14   GLUCOSE mg/dL 90 144* 92   < > 90    < > = values in this interval not displayed.     Estimated Creatinine Clearance: 15.8 mL/min (A) (by C-G formula based on SCr of 5.53 mg/dL (H)).  Results from last 7 days   Lab Units 10/19/23  0545 10/18/23  0524 10/17/23  0545   PHOSPHORUS  mg/dL 4.5 5.5* 7.2*         Results from last 7 days   Lab Units 10/21/23  0535 10/18/23  0524 10/17/23  0545 10/16/23  1908   WBC 10*3/mm3 7.53 6.39 5.78 6.89   HEMOGLOBIN g/dL 8.4* 8.6* 8.6* 10.6*   PLATELETS 10*3/mm3 191 202 187 243     Results from last 7 days   Lab Units 10/18/23  0524   INR  1.45*       Assessment / Plan     ASSESSMENT:  ESRD - HD MWF.  Noncompliance is major issue.  Very frequently misses treatments and comes off early.    Ascites, mesenteric edema, anasarca.  Likely accumulating volume due to non-compliance with dialysis. Ascitic fluid appearance on CT more dense than would be expected with simple ascites.  SP paracentesis 10/18. Echocardiogram noted, EF down 39%.     Anemia of CKD.  Last hgb 8.4  Hypothyroidism on replacement.    Hypertension -blood pressure seems to be better    PLAN:  -Dialyzed today and her dialysis well-tolerated.  -No objection from nephrology standpoint to discharge patient today and to continue her dialysis treatment in outpatient setting.      Lynne Cortez MD  10/21/23  09:26 EDT    [facility]  [phone]    Electronically signed by Lynne Cortez MD at 10/21/23 0928       Noelle Alcantar APRN at 10/20/23 1300              Name: Gisela Dyson ADMIT: 10/16/2023   : 1985  PCP: Provider, No Known    MRN: [id number] LOS: 4 days   AGE/SEX: 38 y.o. female  ROOM: [facility]     Subjective   Subjective    Patient is asleep when enter the room. She wakes up after repeated requests but responses are limited. She is still edematous. She did not follow up with cardiology as recommended last admission in  for LUPE.           Objective   Objective   Vital Signs  Temp:  [96.5 °F (35.8 °C)-98.3 °F (36.8 °C)] 96.5 °F (35.8 °C)  Heart Rate:  [79-92] 82  Resp:  [16-18] 18  BP: ()/(57-86) 108/77  SpO2:  [95 %-100 %] 95 %  on   ;   Device (Oxygen Therapy): room air  Body mass index is 39.84 kg/m².  Physical Exam  Vitals reviewed.    Constitutional:       Appearance: She is well-developed. She is obese. She is ill-appearing.   HENT:      Head: Normocephalic and atraumatic.      Mouth/Throat:      Mouth: Mucous membranes are moist.   Cardiovascular:      Rate and Rhythm: Normal rate and regular rhythm.   Pulmonary:      Effort: Pulmonary effort is normal. No respiratory distress.      Breath sounds: Normal breath sounds.   Abdominal:      General: Bowel sounds are normal. There is no distension.      Palpations: Abdomen is soft.      Tenderness: There is no abdominal tenderness.      Comments:   abdominal wall edema/ascites   Musculoskeletal:      Right lower leg: Edema present.      Left lower leg: Edema present.   Skin:     General: Skin is warm and dry.   Neurological:      General: No focal deficit present.      Mental Status: She is alert and oriented to person, place, and time.   Psychiatric:         Mood and Affect: Affect is blunt and flat.         Speech: Speech normal.         Behavior: Behavior is withdrawn.         Thought Content: Thought content normal.       Results Review     I reviewed the patient's new clinical results.  Results from last 7 days   Lab Units 10/18/23  0524 10/17/23  0545 10/16/23  1908   WBC 10*3/mm3 6.39 5.78 6.89   HEMOGLOBIN g/dL 8.6* 8.6* 10.6*   PLATELETS 10*3/mm3 202 187 243     Results from last 7 days   Lab Units 10/19/23  0545 10/18/23  0524 10/17/23  0545 10/16/23  1908   SODIUM mmol/L 132* 127* 130* 133*   POTASSIUM mmol/L 4.7 4.5 4.5 4.9   CHLORIDE mmol/L 98 92* 93* 91*   CO2 mmol/L 24.6 21.2* 22.0 26.0   BUN mg/dL 23* 25* 39* 38*   CREATININE mg/dL 4.92* 5.82* 8.08* 7.78*   GLUCOSE mg/dL 144* 92 98 90   EGFR mL/min/1.73 11.1* 9.1* 6.1* 6.4*     Results from last 7 days   Lab Units 10/19/23  0545 10/18/23  0524 10/17/23  0545 10/16/23  1908   ALBUMIN g/dL 2.9* 2.9* 2.9* 3.3*   BILIRUBIN mg/dL  --   --   --  0.9   ALK PHOS U/L  --   --   --  119*   AST (SGOT) U/L  --   --   --  14   ALT (SGPT) U/L   "--   --   --  9     Results from last 7 days   Lab Units 10/19/23  0545 10/18/23  0524 10/17/23  0545 10/16/23  1908   CALCIUM mg/dL 8.3* 8.1* 7.5* 7.9*   ALBUMIN g/dL 2.9* 2.9* 2.9* 3.3*   PHOSPHORUS mg/dL 4.5 5.5* 7.2*  --        No results found for: \"HGBA1C\", \"POCGLU\"      US Paracentesis    Result Date: 10/18/2023  Ultrasound-guided paracentesis as described  This report was finalized on 10/18/2023 5:10 PM by Dr. Carlos Cartagena M.D on Workstation: "[employer]       I have personally reviewed all medications:  Scheduled Medications  albumin human, 12.5 g, Intravenous, Once  allopurinol, 100 mg, Oral, Daily  carvedilol, 25 mg, Oral, BID With Meals  heparin (porcine), 5,000 Units, Subcutaneous, Q8H  levothyroxine, 137 mcg, Oral, Q AM  nicotine, 1 patch, Transdermal, Q24H  senna-docusate sodium, 2 tablet, Oral, BID    Infusions   Diet  Diet: Renal Diets, Fluid Restriction (240 mL/tray) Diets; Low Sodium (2-3g), Low Potassium, Low Phosphorus; Other (Specify mL/day) (1200); Texture: Regular Texture (IDDSI 7); Fluid Consistency: Thin (IDDSI 0)    I have personally reviewed:  [x]  Laboratory   [x]  Microbiology   [x]  Radiology   [x]  EKG/Telemetry  [x]  Cardiology/Vascular   []  Pathology    []  Records  Results for orders placed during the hospital encounter of 10/16/23    Adult Transthoracic Echo Complete w/ Color, Spectral and Contrast if Necessary Per Protocol    Interpretation Summary    Left ventricular systolic function is moderately decreased. Calculated left ventricular EF = 39.7%    Left ventricular wall thickness is consistent with borderline concentric hypertrophy.    Left ventricular diastolic function was normal.    Moderately reduced right ventricular systolic function noted.    The right ventricular cavity is severely dilated.    The left atrial cavity is moderately dilated.    Left atrial volume is mildly increased.    The right atrial cavity is moderately  dilated.    Moderate to severe mitral valve " regurgitation is present.    Severe tricuspid valve regurgitation is present.    Estimated right ventricular systolic pressure from tricuspid regurgitation is mildly elevated (35-45 mmHg). Calculated right ventricular systolic pressure from tricuspid regurgitation is 38 mmHg.        Assessment/Plan     Active Hospital Problems    Diagnosis  POA    **Fluid overload [E87.70]  Yes    Volume overload [E87.70]  Yes    ESRD on hemodialysis [N18.6, Z99.2]  Not Applicable    Primary hypothyroidism [E03.9]  Yes    Essential hypertension [I10]  Yes    Nonrheumatic mitral valve regurgitation [I34.0]  Yes    Acute on chronic diastolic CHF (congestive heart failure) [I50.33]  Yes    Type 2 diabetes mellitus with renal complication [E11.29]  Yes    Obesity, morbid, BMI 50 or higher [E66.01]  Yes      Resolved Hospital Problems   No resolved problems to display.       38 y.o. female admitted with Fluid overload.    ESRD on HD MWF/ Volume overload/electrolyte disturbance  Appreciate nephrology. Presents with volume overload. She has been missing HD. HD completed x 3 last time yesterday. HD tmrw per renal.  day 3    Ascites/ anasarca   Due to noncompliance. 10/18 LVP with fluid studies demonstrates likely nephrotic related ascites with SAAG 0.6. no SBP, cytology is negative. .      Acute on chronic CHF/ pulmonary HTN/ valvular disease  On carvedilol held this am with hypotension. Volume management with HD. Echocardiogram with EF 39.4%, moderate to severe MR, severe TVR,  pulm HTN. Seen by cardiology in 2021 with plan for outpatient LUPE to eval severe MVR but it does not appear this occurred. EF has declined since last Echo 2021. Consult cardiology     HTN  DC hydralazine per renal. DC norvasc. Volume control will aid BP control. On coreg    Hypothyroidism- continue synthroid. TSH 6.8 but T4 wnl.      DM2  Correcitonal insulin, A1c is 5.1    Anemia CKD-monitor hemoglobin.       SCDs for DVT prophylaxis.  Full code.  Discussed with  patient,  DANIKA Marrero  [facility]  10/20/23  13:00 EDT      Electronically signed by Noelle Alcantar APRN at 10/20/23 1313       Consult Notes (last 48 hours)  Notes from 10/20/23 1230 through 10/22/23 1230   No notes of this type exist for this encounter.          Discharge Summary        Hayden Dent MD at 10/21/23 1628              Date of Admission: 10/16/2023  Date of Discharge:  10/21/2023  Primary Care Physician: Provider, No Known     Discharge Diagnosis:  Active Hospital Problems    Diagnosis  POA    **Fluid overload [E87.70]  Yes    Acute on chronic HFrEF (heart failure with reduced ejection fraction) [I50.23]  Yes    Volume overload [E87.70]  Yes    ESRD on hemodialysis [N18.6, Z99.2]  Not Applicable    Primary hypothyroidism [E03.9]  Yes    Essential hypertension [I10]  Yes    Nonrheumatic mitral valve regurgitation [I34.0]  Yes    Acute on chronic diastolic CHF (congestive heart failure) [I50.33]  Yes    Type 2 diabetes mellitus with renal complication [E11.29]  Yes    Obesity, morbid, BMI 50 or higher [E66.01]  Yes      Resolved Hospital Problems   No resolved problems to display.       Presenting Problem/History of Present Illness from H&P:  Anasarca [R60.1]  CKD (chronic kidney disease) requiring chronic dialysis [N18.6, Z99.2]  Noncompliance [Z91.199]  Fluid overload [E87.70]  Volume overload [E87.70]  Acute on chronic HFrEF (heart failure with reduced ejection fraction) [I50.23]     Hospital Course:  38 y.o. female admitted with Fluid overload.     ESRD on HD MWF/ Volume overload/electrolyte disturbance  Appreciate nephrology. Presents with volume overload. She has been missing HD as outpatient. Continue HD. Cleared for dc by nephrology.     Ascites/ anasarca   Due to noncompliance. 10/18 LVP with fluid studies demonstrates likely nephrotic related ascites with SAAG 0.6. No SBP, cytology is negative.       Acute on chronic CHF/  pulmonary HTN/ valvular disease  EF is depressed. Cardiology reviewed and plan on outpatient follow up. She was started on entresto. Coreg dose decreased. Additional titration of medications planned outpatient by cardiology and nephrology.      HTN: see above. Discharging on current inpatient regimen.     Hypothyroidism- Continue synthroid. Repeat TSH 6-8 weeks to continue titration.       DM2 A1c is 5.1.    Exam Today:  Constitutional:       Appearance: She is well-developed..  HENT:      Head: Normocephalic and atraumatic.      Mouth/Throat:      Mouth: Mucous membranes are moist.   Cardiovascular:      Rate and Rhythm: Normal rate and regular rhythm.   Pulmonary:      Effort: Pulmonary effort is normal.      Breath sounds: No wheezing.   Abdominal:      Palpations: Abdomen is soft.      Tenderness: There is no abdominal tenderness. There is no guarding or rebound.   Musculoskeletal:      Right lower leg: Edema present.      Left lower leg: Edema present.   Skin:     General: Skin is warm and dry.   Neurological:      General: No focal deficit present.      Mental Status: She is alert.   Psychiatric:            Speech: Speech normal.         Behavior: Behavior is withdrawn.    Results:  Final Diagnosis   Abdominal Fluid, Paracentesis:   A. Negative for malignant cells.   B. Scattered reactive mesothelial cells present and rare inflammatory cells.      CT Abdomen/Pelvis  Findings of fluid overload are present, including moderate to large  abdominal and pelvic ascites, diffuse mesenteric edema, anasarca,  diffuse skin thickening, trace left pleural effusion. Abdominal and  pelvic ascites is greater density than expected for simple fluid, could  represent hemorrhagic ascites or could be evidence of spontaneous  peritonitis.    TTE    Left ventricular systolic function is moderately decreased. Calculated left ventricular EF = 39.7%    Left ventricular wall thickness is consistent with borderline concentric  hypertrophy.    Left ventricular diastolic function was normal.    Moderately reduced right ventricular systolic function noted.    The right ventricular cavity is severely dilated.    The left atrial cavity is moderately dilated.    Left atrial volume is mildly increased.    The right atrial cavity is moderately  dilated.    Moderate to severe mitral valve regurgitation is present.    Severe tricuspid valve regurgitation is present.    Estimated right ventricular systolic pressure from tricuspid regurgitation is mildly elevated (35-45 mmHg). Calculated right ventricular systolic pressure from tricuspid regurgitation is 38 mmHg.     Procedures Performed:         Consults:   Consults       Date and Time Order Name Status Description    10/20/2023  1:14 PM Inpatient Cardiology Consult      10/17/2023 12:32 AM Inpatient Nephrology Consult      10/16/2023  9:45 PM Nephrology (on -call MD unless specified) Completed     10/16/2023  9:45 PM [facility] (on-call MD unless specified) Details               Discharge Disposition:  Home or Self Care    Discharge Medications:     Discharge Medications        New Medications        Instructions Start Date   sacubitril-valsartan 24-26 MG tablet  Commonly known as: ENTRESTO   1 tablet, Oral, 2 Times Daily             Changes to Medications        Instructions Start Date   allopurinol 100 MG tablet  Commonly known as: ZYLOPRIM  What changed:   medication strength  how much to take   100 mg, Oral, Daily      carvedilol 6.25 MG tablet  Commonly known as: COREG  What changed:   medication strength  how much to take   6.25 mg, Oral, 2 Times Daily With Meals      HYDROcodone-acetaminophen 5-325 MG per tablet  Commonly known as: Fosters  What changed: Another medication with the same name was removed. Continue taking this medication, and follow the directions you see here.   1 tablet, Oral, Every 8 Hours PRN      levothyroxine 137 MCG tablet  Commonly known as: SYNTHROID, LEVOTHROID  What changed:  "  medication strength  how much to take  when to take this   137 mcg, Oral, Every Early Morning             Continue These Medications        Instructions Start Date   CHLORHEXIDINE GLUCONATE CLOTH EX   Apply externally, AS DIRECTED PREOP      ERGOCAL PO   1.25 mg, Oral, 3 Times Weekly      Needle (Disp) 30G X 1/2\" misc  Commonly known as: BD Disp Needles   Use daily for injection of Tresiba.             Stop These Medications      amLODIPine 10 MG tablet  Commonly known as: NORVASC     hydrALAZINE 100 MG tablet  Commonly known as: APRESOLINE     lisinopril 10 MG tablet  Commonly known as: PRINIVIL,ZESTRIL     montelukast 10 MG tablet  Commonly known as: SINGULAIR              Discharge Diet:   Diet Instructions       Diet: Renal Diets; Low Sodium (2-3g), Low Potassium, Low Phosphorus; Thin (IDDSI 0)      Discharge Diet: Renal Diets    Renal Diet:  Low Sodium (2-3g)  Low Potassium  Low Phosphorus       Fluid Consistency: Thin (IDDSI 0)            Activity at Discharge:   Activity Instructions       Activity as Tolerated              Follow-up Appointments:   Follow-up Information       Provider, No Known Follow up.    Contact information:  [facility] [id number] [phone]               Lynne Cortez MD Follow up.    Specialty: Nephrology  Contact information:  [address] [facility] [address]  [facility] [id number] [phone]               Reji Rudd MD Follow up.    Specialty: Cardiology  Contact information:  [address] [facility] [address]  [facility] [address]  [phone]                             Test Results Pending at Discharge:  Pending Labs       Order Current Status    Body Fluid Culture - Body Fluid, Peritoneum Preliminary result             Hayden Dent MD  10/21/23  16:28 EDT    Time Spent on Discharge Activities: >30 minutes    Dictated portions using Dragon dictation software.    Electronically signed by Hayden Dent MD at 10/21/23 1633       "

## 2023-10-22 NOTE — CASE MANAGEMENT/SOCIAL WORK
Case Management Discharge Note      Final Note: dc home on 10/21/23    Provided Post Acute Provider List?: N/A  N/A Provider List Comment: Denies needs. Plan is home    Selected Continued Care - Discharged on 10/21/2023 Admission date: 10/16/2023 - Discharge disposition: Home or Self Care      Destination    No services have been selected for the patient.                Durable Medical Equipment    No services have been selected for the patient.                Dialysis/Infusion    No services have been selected for the patient.                Home Medical Care    No services have been selected for the patient.                Therapy    No services have been selected for the patient.                Community Resources    No services have been selected for the patient.                Community & DME    No services have been selected for the patient.                         Final Discharge Disposition Code: 01 - home or self-care

## 2023-10-23 ENCOUNTER — READMISSION MANAGEMENT (OUTPATIENT)
Dept: CALL CENTER | Facility: HOSPITAL | Age: 38
End: 2023-10-23
Payer: COMMERCIAL

## 2023-10-23 LAB
BACTERIA FLD CULT: NORMAL
GRAM STN SPEC: NORMAL
GRAM STN SPEC: NORMAL

## 2023-10-23 NOTE — TELEPHONE ENCOUNTER
"Sister in law states so weak can't get off the couch and recently in hospital. Caller states she is not eating or drinking as well. Caller states she is having abdominal pain and had procedure on Friday rates pain seven.  Ms. Dyson can be heard saying she is not going back to ER. Caller advised per guideline.         Reason for Disposition   Shock suspected (e.g., cold/pale/clammy skin, too weak to stand, low BP, rapid pulse)    Answer Assessment - Initial Assessment Questions  1. LOCATION: \"Where does it hurt?\"       Lower abdominal   2. RADIATION: \"Does the pain shoot anywhere else?\" (e.g., chest, back)      Denies   3. ONSET: \"When did the pain begin?\" (e.g., minutes, hours or days ago)       Off and on since the procedure   4. SUDDEN: \"Gradual or sudden onset?\"      Sudden   5. PATTERN \"Does the pain come and go, or is it constant?\"     - If it comes and goes: \"How long does it last?\" \"Do you have pain now?\"      (Note: Comes and goes means the pain is intermittent. It goes away completely between bouts.)     - If constant: \"Is it getting better, staying the same, or getting worse?\"       (Note: Constant means the pain never goes away completely; most serious pain is constant and gets worse.)       Comes and goes   6. SEVERITY: \"How bad is the pain?\"  (e.g., Scale 1-10; mild, moderate, or severe)     - MILD (1-3): Doesn't interfere with normal activities, abdomen soft and not tender to touch.      - MODERATE (4-7): Interferes with normal activities or awakens from sleep, abdomen tender to touch.      - SEVERE (8-10): Excruciating pain, doubled over, unable to do any normal activities.        7  7. RECURRENT SYMPTOM: \"Have you ever had this type of stomach pain before?\" If Yes, ask: \"When was the last time?\" and \"What happened that time?\"       8. CAUSE: \"What do you think is causing the stomach pain?\"      Paracentesis   9. RELIEVING/AGGRAVATING FACTORS: \"What makes it better or worse?\" (e.g., antacids, bending " "or twisting motion, bowel movement)      Laying a certain    10. OTHER SYMPTOMS: \"Do you have any other symptoms?\" (e.g., back pain, diarrhea, fever, urination pain, vomiting)        Weakness and not eating or drinking   11. PREGNANCY: \"Is there any chance you are pregnant?\" \"When was your last menstrual period?\"    Protocols used: Abdominal Pain - Female-ADULT-AH    "

## 2023-10-23 NOTE — OUTREACH NOTE
CHF Week 1 Survey      Flowsheet Row Responses   [facility] facility patient discharged from? [place]   Does the patient have one of the following disease processes/diagnoses(primary or secondary)? CHF   CHF Week 1 attempt successful? No   Unsuccessful attempts Attempt 1            Lizzie MORTON - Registered Nurse

## 2023-10-24 ENCOUNTER — READMISSION MANAGEMENT (OUTPATIENT)
Dept: CALL CENTER | Facility: HOSPITAL | Age: 38
End: 2023-10-24
Payer: COMMERCIAL

## 2023-10-24 ENCOUNTER — TELEPHONE (OUTPATIENT)
Dept: CARDIOLOGY | Facility: HOSPITAL | Age: 38
End: 2023-10-24
Payer: COMMERCIAL

## 2023-10-24 NOTE — OUTREACH NOTE
CHF Week 1 Survey      Flowsheet Row Responses   [facility] patient discharged from? [place]   Does the patient have one of the following disease processes/diagnoses(primary or secondary)? CHF   CHF Week 1 attempt successful? Yes   Call start time 0924   Call end time 1009   Discharge diagnosis Fluid overload/CHF   Is patient permission given to speak with other caregiver? Yes   List who call center can speak with Father   Person spoke with today (if not patient) and relationship Father   Medication alerts for this patient Father is helping the pt manage her medications, he has set up an organizer.Pt is non compliant with meds.  Entresto is expensive per Father, $148/mo,  unsure if the pt will be able to afford this longterm.   Meds reviewed with patient/caregiver? Yes   Is the patient having any side effects they believe may be caused by any medication additions or changes? No   Does the patient have all medications ordered at discharge? Yes   Is the patient taking all medications as directed (includes completed medication regime)? Yes   Comments regarding appointments Transferred to [facility] Clinic to make appt.   Does the patient have a primary care provider?  No   Comments regarding PCP Calling Father's PCP to inquire if she will accept pt as new pt.   Comments HD 10-23-23 for 4hrs per Father   Pulse Ox monitoring None   Psychosocial issues? No   Psychosocial comments Pt may benefit from mental health counseling, discussed benefits of this with father of pt and how this may impact the pt's willingness to be more compliant in her medical treatment.   Comments Per Father the pt is non-compliant s/ meds, HD &lifestyle.Pt does not monitor glucose at home, no glucometer in place, per FOP.They now have new BP monitor &will monitor home BP's.Pt is staying w/ Father currently.Per Father,the pt never learned to cook&mostly eats fast foods,pt lived w/parents until approx 36y/o.Pt does not monitor wts at home.   Did the  patient receive a copy of their discharge instructions? Yes   Nursing interventions Reviewed instructions with patient   What is the patient's perception of their health status since discharge? Improving   Nursing interventions Nurse provided patient education   If the patient is a current smoker, are they able to teach back resources for cessation? --  [pt is smoking again]   Additional teach back comments Educated on importance of limiting NA in diet, finding out her fluid restriction/day at HD and that multiple dietary restrictions could be overwhelming to a pt therefore meeting with a dietician may be beneficial to educate one/one with patient to help incoorporate into lifestyle.   CHF Zone this Call Green Zone   Green Zone No new or worsening shortness of breath, No new swelling -  feet, ankles and legs look normal for you   Green Zone Interventions Low sodium diet, Meds as directed, Follow up visits planned    CHF Week 1 call completed? Yes   Is the patient interested in additional calls from an ambulatory ? No   Call end time [id number]            Liseth DHILLON - Registered Nurse

## 2023-10-24 NOTE — TELEPHONE ENCOUNTER
Patient's Father Emery left a voicemail at the [facility], after talking with Liseth (Nurse Call Center).    I returned call to Emery and spoke with him regarding the HF Clinic.   He reports patient has dialysis 3 times a week, an upcoming appointment with her Nephrologist & Cardiologist.  He reports patient is to follow a renal diet,diabetic diet and a heart healthy diet. So knowing what is okay to eat, is hard for patient right now. He reports she was not being compliant with her dialysis and was only going twice a week for 2 hours.   He wants to not overwhelm her and slowly add more appointments, focusing on what she can eat right now and getting her to her kidney doctor, dialysis and her cardiologist.  Emery stated that he will hold on to the phone number for the HF Clinic and call to schedule at a later time.    Thank you,  Marylin ORTIZ [facility]

## 2023-10-30 PROBLEM — R10.32 ACUTE BILATERAL LOWER ABDOMINAL PAIN: Status: ACTIVE | Noted: 2023-01-01

## 2023-10-30 PROBLEM — R10.31 ACUTE BILATERAL LOWER ABDOMINAL PAIN: Status: ACTIVE | Noted: 2023-10-30

## 2023-10-30 NOTE — ED PROVIDER NOTES
EMERGENCY DEPARTMENT ENCOUNTER    Room Number:  N639/1  PCP: Provider, No Known  Historian: Patient, family member      HPI:  Chief Complaint: Lower abdominal pain  A complete HPI/ROS/PMH/PSH/SH/FH are unobtainable due to: Nothing  Context: Gisela Dyson is a 38 y.o. female, with a history of end-stage renal disease, who presents to the ED c/o lower abdominal pain for the past 8 to 9 days.  Pain became acutely worse around 1130 this morning while she was at dialysis.  She was only able to tolerate 1 hour of dialysis instead of her usual 3 hours.  Pain is described as sharp and aching.  Nothing makes it better or worse.  She denies nausea, vomiting, fever, chills, diarrhea, constipation, dysuria, or flank pain.  Patient was admitted here earlier this month for similar symptoms.  She underwent paracentesis with removal of approximately 5 L of fluid at that time.  Pain was better when she left the hospital but returned several days later.  She states that her abdomen feels tight.  Denies recent weight gain or increased leg swelling.  LMP was about 1 month ago.  Denies prior abdominal surgeries.            PAST MEDICAL HISTORY  Active Ambulatory Problems     Diagnosis Date Noted    Acute on chronic diastolic CHF (congestive heart failure) 11/07/2019    JULISSA (acute kidney injury) 11/07/2019    Obesity, morbid, BMI 50 or higher 11/07/2019    Type 2 diabetes mellitus with renal complication 11/07/2019    Essential hypertension 11/15/2019    Nonrheumatic mitral valve regurgitation 11/15/2019    Primary hypothyroidism 01/10/2020    Hirsutism 01/10/2020    Alopecia 01/10/2020    Acute renal failure 06/17/2021    ESRD on hemodialysis 06/18/2021    Fluid overload 10/16/2023    CKD (chronic kidney disease) requiring chronic dialysis 10/16/2023    Volume overload 10/17/2023    Acute on chronic HFrEF (heart failure with reduced ejection fraction) 10/21/2023     Resolved Ambulatory Problems     Diagnosis Date Noted    CKD (chronic  kidney disease) stage 3, GFR 30-59 ml/min 12/13/2019     Past Medical History:   Diagnosis Date    Diastolic CHF     Disease of thyroid gland     ESRD (end stage renal disease) on dialysis     Gout     History of COVID-19 01/09/2022    Neuropathy     Scratch marcel     Tachycardia     Type 2 diabetes mellitus          PAST SURGICAL HISTORY  Past Surgical History:   Procedure Laterality Date    ARTERIOVENOUS FISTULA/SHUNT SURGERY Left 3/14/2022    Procedure: LEFT ARM BRACHIOCEPHALIC FISTULA CREATION;  Surgeon: Amador Schilling MD;  Location: [facility] OR;  Service: Vascular;  Laterality: Left;    INSERTION HEMODIALYSIS CATHETER Right 6/18/2021    Procedure: TUNNEL DIALYSIS, PALINDROME CATH;  Surgeon: Ancelmo Menendez MD;  Location: [facility] OR;  Service: Vascular;  Laterality: Right;         FAMILY HISTORY  Family History   Adopted: Yes   Problem Relation Age of Onset    Malig Hyperthermia Neg Hx          SOCIAL HISTORY  Social History     Socioeconomic History    Marital status: Single   Tobacco Use    Smoking status: Former     Years: 1     Types: Cigarettes    Smokeless tobacco: Never    Tobacco comments:     QUIT AGE 32   Vaping Use    Vaping Use: Never used   Substance and Sexual Activity    Alcohol use: Yes     Alcohol/week: 1.0 - 2.0 standard drink of alcohol     Types: 1 - 2 Glasses of wine per week     Comment: socially    Drug use: No    Sexual activity: Defer         ALLERGIES  Patient has no known allergies.    REVIEW OF SYSTEMS  All systems have been reviewed and are negative except for those listed in the HPI      PHYSICAL EXAM  ED Triage Vitals   Temp Heart Rate Resp BP SpO2   10/30/23 1436 10/30/23 1435 10/30/23 1435 10/30/23 1435 10/30/23 1435   97.9 °F (36.6 °C) 79 16 123/79 98 %      Temp src Heart Rate Source Patient Position BP Location FiO2 (%)   10/30/23 1436 10/30/23 1435 10/30/23 1435 -- --   Tympanic Monitor Lying         Physical Exam      GENERAL: Awake, alert, oriented x3.   Chronically ill-appearing female.  Appears older than stated age.    HENT: NCAT, nares patent  EYES: no scleral icterus  CV: regular rhythm, normal rate  RESPIRATORY: normal effort, clear to auscultation bilaterally  ABDOMEN: soft obese, there is tenderness across the mid and lower abdomen, no rebound or guarding, the skin across the mid and lower abdomen is thickened and indurated, no overlying erythema  MUSCULOSKELETAL: Extremities are nontender with full range of motion.  There is a fistula in the upper left arm with palpable thrill  NEURO: Speech is normal.  No facial droop.  Follows commands  PSYCH:  calm, cooperative  SKIN: warm, dry    Vital signs and nursing notes reviewed.          LAB RESULTS  Recent Results (from the past 24 hour(s))   Comprehensive Metabolic Panel    Collection Time: 10/30/23  3:37 PM    Specimen: Blood   Result Value Ref Range    Glucose 77 65 - 99 mg/dL    BUN 22 (H) 6 - 20 mg/dL    Creatinine 4.05 (H) 0.57 - 1.00 mg/dL    Sodium 135 (L) 136 - 145 mmol/L    Potassium 3.3 (L) 3.5 - 5.2 mmol/L    Chloride 94 (L) 98 - 107 mmol/L    CO2 29.0 22.0 - 29.0 mmol/L    Calcium 8.4 (L) 8.6 - 10.5 mg/dL    Total Protein 7.1 6.0 - 8.5 g/dL    Albumin 3.1 (L) 3.5 - 5.2 g/dL    ALT (SGPT) 8 1 - 33 U/L    AST (SGOT) 10 1 - 32 U/L    Alkaline Phosphatase 237 (H) 39 - 117 U/L    Total Bilirubin 1.4 (H) 0.0 - 1.2 mg/dL    Globulin 4.0 gm/dL    A/G Ratio 0.8 g/dL    BUN/Creatinine Ratio 5.4 (L) 7.0 - 25.0    Anion Gap 12.0 5.0 - 15.0 mmol/L    eGFR 14.0 (L) >60.0 mL/min/1.73   Lipase    Collection Time: 10/30/23  3:37 PM    Specimen: Blood   Result Value Ref Range    Lipase 9 (L) 13 - 60 U/L   CBC Auto Differential    Collection Time: 10/30/23  3:37 PM    Specimen: Blood   Result Value Ref Range    WBC 9.04 3.40 - 10.80 10*3/mm3    RBC 2.77 (L) 3.77 - 5.28 10*6/mm3    Hemoglobin 8.4 (L) 12.0 - 15.9 g/dL    Hematocrit 26.1 (L) 34.0 - 46.6 %    MCV 94.2 79.0 - 97.0 fL    MCH 30.3 26.6 - 33.0 pg    MCHC 32.2  31.5 - 35.7 g/dL    RDW 13.2 12.3 - 15.4 %    RDW-SD 44.5 37.0 - 54.0 fl    MPV 9.2 6.0 - 12.0 fL    Platelets 330 140 - 450 10*3/mm3    Neutrophil % 83.5 (H) 42.7 - 76.0 %    Lymphocyte % 5.2 (L) 19.6 - 45.3 %    Monocyte % 6.3 5.0 - 12.0 %    Eosinophil % 3.7 0.3 - 6.2 %    Basophil % 0.2 0.0 - 1.5 %    Immature Grans % 1.1 (H) 0.0 - 0.5 %    Neutrophils, Absolute 7.55 (H) 1.70 - 7.00 10*3/mm3    Lymphocytes, Absolute 0.47 (L) 0.70 - 3.10 10*3/mm3    Monocytes, Absolute 0.57 0.10 - 0.90 10*3/mm3    Eosinophils, Absolute 0.33 0.00 - 0.40 10*3/mm3    Basophils, Absolute 0.02 0.00 - 0.20 10*3/mm3    Immature Grans, Absolute 0.10 (H) 0.00 - 0.05 10*3/mm3    nRBC 0.0 0.0 - 0.2 /100 WBC       Ordered the above labs and reviewed the results.        RADIOLOGY  CT Abdomen Pelvis Without Contrast    Result Date: 10/30/2023  CT ABDOMEN PELVIS WO CONTRAST-  INDICATIONS: Abdominal pain and swelling  TECHNIQUE: Radiation dose reduction techniques were utilized, including automated exposure control and exposure modulation based on body size. Unenhanced ABDOMEN AND PELVIS CT  COMPARISON: 10/16/2023  FINDINGS:  Moderate abdominal and pelvic ascites is present and, with diffuse mesenteric edema limits assessment for inflammatory changes of bowel. No bowel obstruction is noted.  Cholelithiasis is apparent. The liver and spleen aren't enlarged.  Left adrenal calcifications are seen.  Arterial calcifications are noted at the renal millicent. No hydronephrosis.  Otherwise unremarkable unenhanced appearance of the liver, spleen, adrenal glands, pancreas, kidneys, bladder.    No free intraperitoneal gas or free fluid.  Nonspecific retroperitoneal lymph nodes are present, could be reactive in nature or potentially evidence of neoplasm, PET/CT correlation can be obtained for further evaluation as indicated; interval follow-up can characterize change. For example, a 1 cm short axis left para-aortic lymph node on axial image 84  Abdominal aorta  is not aneurysmal. Aortic and other arterial calcifications are present.  The lung bases show atelectasis, minimal pleural effusions.  Degenerative changes are seen in the spine. No acute fracture is identified. Diffuse subcutaneous edema is present, compatible with anasarca, and skin thickening is again evident at the lower levels.         Moderate abdominal and pelvic ascites. Nonspecific retroperitoneal lymph nodes. Follow-up/further evaluation advised as indicated.  This report was finalized on 10/30/2023 5:11 PM by Dr. Emery Cook M.D on Workstation: [id number]       Ordered the above noted radiological studies. Reviewed by me in PACS.            PROCEDURES  Procedures              MEDICATIONS GIVEN IN ER  Medications   sodium chloride 0.9 % flush 10 mL (has no administration in time range)   sodium chloride 0.9 % flush 10 mL (has no administration in time range)   acetaminophen (TYLENOL) tablet 650 mg (has no administration in time range)   sennosides-docusate (PERICOLACE) 8.6-50 MG per tablet 2 tablet (2 tablets Oral Not Given 10/30/23 2101)     And   polyethylene glycol (MIRALAX) packet 17 g (has no administration in time range)     And   bisacodyl (DULCOLAX) EC tablet 5 mg (has no administration in time range)     And   bisacodyl (DULCOLAX) suppository 10 mg (has no administration in time range)   ondansetron (ZOFRAN) tablet 4 mg (has no administration in time range)     Or   ondansetron (ZOFRAN) injection 4 mg (has no administration in time range)   melatonin tablet 3 mg (has no administration in time range)   sodium chloride 0.9 % with KCl 20 mEq/L infusion (100 mL/hr Intravenous New Bag 10/30/23 2059)   heparin (porcine) 5000 UNIT/ML injection 5,000 Units (5,000 Units Subcutaneous Given 10/30/23 2104)   allopurinol (ZYLOPRIM) tablet 100 mg (has no administration in time range)   carvedilol (COREG) tablet 6.25 mg (has no administration in time range)   HYDROcodone-acetaminophen (NORCO) 5-325 MG per  tablet 1 tablet (has no administration in time range)   levothyroxine (SYNTHROID, LEVOTHROID) tablet 137 mcg (has no administration in time range)   sacubitril-valsartan (ENTRESTO) 24-26 MG tablet 1 tablet (has no administration in time range)   HYDROmorphone (DILAUDID) injection 1 mg (1 mg Intravenous Given 10/30/23 1541)   ondansetron (ZOFRAN) injection 4 mg (4 mg Intravenous Given 10/30/23 1540)   HYDROmorphone (DILAUDID) injection 0.5 mg (0.5 mg Intravenous Given 10/30/23 1945)   HYDROcodone-acetaminophen (NORCO) 5-325 MG per tablet 1 tablet (1 tablet Oral Given 10/30/23 2119)                   MEDICAL DECISION MAKING, PROGRESS, and CONSULTS    All labs have been independently reviewed by me.  All radiology studies have been reviewed by me and I have also reviewed the radiology report.   EKG's independently viewed and interpreted by me.  Discussion below represents my analysis of pertinent findings related to patient's condition, differential diagnosis, treatment plan and final disposition.      Additional sources:  - Discussed/ obtained information from independent historians: Family member at bedside    - External (non-ED) record review: Patient was admitted here 10/16 through 10/21/2023 for fluid overload/anasarca.  She has a history of end-stage renal disease, hypertension, diabetes, hypothyroidism, and CHF.  Echocardiogram showed an EF of 40%.  There was borderline concentric LVH.  There was moderately reduced RV systolic function.  She underwent paracentesis with removal of 5.3 L of ascitic fluid.    - Chronic or social conditions impacting care: Patient has end-stage renal disease and is on hemodialysis          Orders placed during this visit:  Orders Placed This Encounter   Procedures    CT Abdomen Pelvis Without Contrast    US Paracentesis    Comprehensive Metabolic Panel    Lipase    CBC Auto Differential    Basic Metabolic Panel    CBC (No Diff)    Diet: Cardiac Diets; Healthy Heart (2-3 Na+);  Texture: Regular Texture (IDDSI 7); Fluid Consistency: Thin (IDDSI 0)    Vital Signs    Up with assistance    Daily Weights    Strict Intake & Output    Oral Care    Opioid Administration - Document EtCO2 and / or SpO2 With Each Set of Vitals & Any Change in Patient Status    Opioid Administration - Notify Provider Hypercapnic Monitoring    Opioid Administration - Continuous Pulse Oximetry (SpO2)    Code Status and Medical Interventions:    [facility] (on-call MD unless specified) Details    Inpatient Case Management  Consult    Inpatient Nutrition Consult    Inpatient Nephrology Consult    OT Consult: Eval & Treat    PT Consult: Eval & Treat Functional Mobility Below Baseline    Wound Ostomy Eval & Treat    Insert Peripheral IV    Insert Peripheral IV    Initiate Observation Status    CBC & Differential         Additional orders considered but not ordered:  N/A        Differential diagnosis:    Differential diagnosis includes but is not limited to:  - hepatobiliary pathology such as cholecystitis, cholangitis, and symptomatic cholelithiasis  - Pancreatitis  - Dyspepsia  - Small bowel obstruction  - Appendicitis  - Diverticulitis  - UTI including pyelonephritis  - Ureteral stone  - Zoster  - Colitis, including infectious and ischemic  - Atypical ACS        Independent interpretation of labs, radiology studies, and discussions with consultants:  ED Course as of 10/30/23 2205   Mon Oct 30, 2023   1519 First look evaluation:    Patient seen prior to arrival of oncoming physician.  38-year-old with history of end-stage renal disease on hemodialysis Monday Wednesday Friday presents with worsening lower abdominal pain.  Recent admission with first-time paracentesis and reports increased abdominal swelling.  Urination and defecation not changed from baseline.    On exam patient is afebrile and well-appearing.  She does have pretty significant induration across the abdomen with some moderate abdominal  distention.    We will get routine labs and CT scan without contrast for further assessment.  We will give some IV Dilaudid and Zofran to help with pain and nausea. [DB]   1608 WBC: 9.04 [WH]   1608 Hemoglobin(!): 8.4  Stable [WH]   1631 BUN(!): 22 [WH]   1631 Creatinine(!): 4.05 [WH]   1631 Potassium(!): 3.3 [WH]   1631 Lipase(!): 9 [WH]   1725 CT abdomen/pelvis personally interpreted by me.  My personal interpretation is: There is moderate amount of ascites.  No bowel obstruction.  No obstructive uropathy.    Per the radiologist, there is a moderate amount of abdominal and pelvic ascites.  There are nonspecific retroperitoneal lymph nodes.  There is diffuse subcutaneous edema compatible with anasarca.  See dictated report for details. [WH]   1735 Test results and plans for admission were discussed with the patient and her family.  Pain has improved after IV Dilaudid. [WH]   1737 Case discussed with Dr. Colon and she agrees to admit the patient to a monitored bed.  Pertinent history, exam findings, test results, ED course, and diagnoses were discussed with her. [WH]   1755 MDM: Patient presented to the ED complaining of mid and lower abdominal pain.  He was recently admitted here for new onset ascites and underwent paracentesis.  She has a history of end-stage renal disease.  She had dialysis earlier today but could only complete 1 hour of treatment.  On exam, abdomen was obese.  There is tenderness across the mid and lower abdomen without rebound or guarding.  Abdominal skin was extremely thickened and indurated.  There was no evidence of cellulitis.  CT ab/pelvis showed moderate amount of ascites but no bowel obstruction or inflammatory changes.  Patient's BUN and creatinine were elevated but potassium was slightly low.  Patient was afebrile.  White blood cell count was normal.  She was given IV medications for pain and nausea.  I doubt that her abdominal pain is strictly due to ascites.  Patient will be  admitted to the hospitalist for further evaluation. []      ED Course User Index  [DB] Rufus Hartman MD  [] Rodriguez Aguilera MD               DIAGNOSIS  Final diagnoses:   Acute bilateral lower abdominal pain   Other ascites   End-stage renal disease on hemodialysis         DISPOSITION  ADMISSION    Discussed treatment plan and reason for admission with pt/family and admitting physician.  Pt/family voiced understanding of the plan for admission for further testing/treatment as needed.               Latest Documented Vital Signs:  As of 22:05 EDT  BP- 123/78 HR- 87 Temp- 97.7 °F (36.5 °C) (Oral) O2 sat- 98%              --    Please note that portions of this were completed with a voice recognition program.       Note Disclaimer: At [facility], we believe that sharing information builds trust and better relationships. You are receiving this note because you are receiving care at [facility] or recently visited. It is possible you will see health information before a provider has talked with you about it. This kind of information can be easy to misunderstand. To help you fully understand what it means for your health, we urge you to discuss this note with your provider.             Rodriguez Aguilera MD  10/30/23 6869

## 2023-10-30 NOTE — ED NOTES
Pt's family up to desk, requesting more pain medication for return of pain and for something to eat. MD notified.

## 2023-10-30 NOTE — ED NOTES
..Nursing report ED to floor  Gisela Dyson  38 y.o.  female    HPI :   Chief Complaint   Patient presents with    Abdominal Pain       Admitting doctor:   Yanely Colon MD    Admitting diagnosis:   There were no encounter diagnoses.    Code status:   Current Code Status       Date Active Code Status Order ID Comments User Context       10/30/2023 1742 CPR (Attempt to Resuscitate) [id number]  Yanely Colon MD ED        Question Answer    Code Status (Patient has no pulse and is not breathing) CPR (Attempt to Resuscitate)    Medical Interventions (Patient has pulse or is breathing) Full                    Allergies:   Patient has no known allergies.    Isolation:   No active isolations    Intake and Output  No intake or output data in the 24 hours ending 10/30/23 1800    Weight:   There were no vitals filed for this visit.    Most recent vitals:   Vitals:    10/30/23 1459 10/30/23 1500 10/30/23 1545 10/30/23 1546   BP: 144/89  125/80    Patient Position:       Pulse: 84 85  82   Resp:       Temp:       TempSrc:       SpO2:  100%  99%       Active LDAs/IV Access:   Lines, Drains & Airways       Active LDAs       Name Placement date Placement time Site Days    Peripheral IV 10/30/23 1537 Anterior;Right Forearm 10/30/23  1537  Forearm  less than 1                    Labs (abnormal labs have a star):   Labs Reviewed   COMPREHENSIVE METABOLIC PANEL - Abnormal; Notable for the following components:       Result Value    BUN 22 (*)     Creatinine 4.05 (*)     Sodium 135 (*)     Potassium 3.3 (*)     Chloride 94 (*)     Calcium 8.4 (*)     Albumin 3.1 (*)     Alkaline Phosphatase 237 (*)     Total Bilirubin 1.4 (*)     BUN/Creatinine Ratio 5.4 (*)     eGFR 14.0 (*)     All other components within normal limits    Narrative:     GFR Normal >60  Chronic Kidney Disease <60  Kidney Failure <15     LIPASE - Abnormal; Notable for the following components:    Lipase 9 (*)     All other components within normal  limits   CBC WITH AUTO DIFFERENTIAL - Abnormal; Notable for the following components:    RBC 2.77 (*)     Hemoglobin 8.4 (*)     Hematocrit 26.1 (*)     Neutrophil % 83.5 (*)     Lymphocyte % 5.2 (*)     Immature Grans % 1.1 (*)     Neutrophils, Absolute 7.55 (*)     Lymphocytes, Absolute 0.47 (*)     Immature Grans, Absolute 0.10 (*)     All other components within normal limits   CBC AND DIFFERENTIAL    Narrative:     The following orders were created for panel order CBC & Differential.  Procedure                               Abnormality         Status                     ---------                               -----------         ------                     CBC Auto Differential[[id number]]        Abnormal            Final result                 Please view results for these tests on the individual orders.       EKG:   No orders to display       Meds given in ED:   Medications   sodium chloride 0.9 % flush 10 mL (has no administration in time range)   sodium chloride 0.9 % flush 10 mL (has no administration in time range)   acetaminophen (TYLENOL) tablet 650 mg (has no administration in time range)   sennosides-docusate (PERICOLACE) 8.6-50 MG per tablet 2 tablet (has no administration in time range)     And   polyethylene glycol (MIRALAX) packet 17 g (has no administration in time range)     And   bisacodyl (DULCOLAX) EC tablet 5 mg (has no administration in time range)     And   bisacodyl (DULCOLAX) suppository 10 mg (has no administration in time range)   ondansetron (ZOFRAN) tablet 4 mg (has no administration in time range)     Or   ondansetron (ZOFRAN) injection 4 mg (has no administration in time range)   melatonin tablet 3 mg (has no administration in time range)   Pharmacy to Dose enoxaparin (LOVENOX) (has no administration in time range)   Potassium Replacement - Follow Nurse / BPA Driven Protocol (has no administration in time range)   sodium chloride 0.9 % with KCl 20 mEq/L infusion (has no  administration in time range)   HYDROmorphone (DILAUDID) injection 1 mg (1 mg Intravenous Given 10/30/23 1541)   ondansetron (ZOFRAN) injection 4 mg (4 mg Intravenous Given 10/30/23 1540)       Imaging results:  CT Abdomen Pelvis Without Contrast    Result Date: 10/30/2023   Moderate abdominal and pelvic ascites. Nonspecific retroperitoneal lymph nodes. Follow-up/further evaluation advised as indicated.  This report was finalized on 10/30/2023 5:11 PM by Dr. Emery Cook M.D on Workstation: [id number]       Ambulatory status:   - uses cane/walker , x 1 assist    Social issues:   Social History     Socioeconomic History    Marital status: Single   Tobacco Use    Smoking status: Former     Years: 1     Types: Cigarettes    Smokeless tobacco: Never    Tobacco comments:     QUIT AGE 32   Vaping Use    Vaping Use: Never used   Substance and Sexual Activity    Alcohol use: Yes     Alcohol/week: 1.0 - 2.0 standard drink of alcohol     Types: 1 - 2 Glasses of wine per week     Comment: socially    Drug use: No    Sexual activity: Defer       NIH Stroke Scale:       Chloe Cartagena RN  10/30/23 18:00 EDT

## 2023-10-30 NOTE — ED NOTES
Pt arrives via EMS from dialysis. States that she has lower abdominal pain since 1130 this morning. Denies N/V. Only received 1 hour of dialysis.

## 2023-10-31 PROBLEM — S31.109A OPEN WOUND OF ABDOMINAL WALL, ANTERIOR, COMPLICATED: Status: ACTIVE | Noted: 2023-01-01

## 2023-10-31 NOTE — PLAN OF CARE
Goal Outcome Evaluation:  Plan of Care Reviewed With: patient, father           Outcome Evaluation: Pt is a 39 yo female admitted from dialysis with worsening abdominal pain. Pt reports living alone inapartment with 2 flights of stairs to enter, no handrails. She reports independence at baseline with all functional mobility, works at [employer] requiring multiple hours on her feet for some shifts, and denies falls. Today, she demo generalized weakness, impaired balance and decreased activity tolerance. She may benefit from skilled PT services while inpatient to address deficits. Recommend mobility with nsg staff to bathroom and up to chair as tolerated. Recommend d/c home with assist and HH vs OP PT for general conditioning.      Anticipated Discharge Disposition (PT): home with assist, home with outpatient therapy services, home with home health

## 2023-10-31 NOTE — NURSING NOTE
10/31/23 1130   Wound 10/30/23 2100 Bilateral lower abdomen Skin Tear   Placement Date/Time: 10/30/23 2100   Present on Original Admission: Yes  Side: Bilateral  Orientation: lower  Location: abdomen  Primary Wound Type: Skin Tear   Wound Image     Dressing Appearance copious drainage   Base moist;necrotic;slough;yellow   Periwound excoriated;moist;indurated;edematous;redness   Periwound Temperature warm   Periwound Skin Turgor firm   Edges open   Drainage Characteristics/Odor malodorous;serous   Drainage Amount small   Care, Wound cleansed with;sterile water   Dressing Care dressing changed  (Gauze moist to dry and Island dressing placed)   Skin Interventions   Pressure Reduction Devices heel offloading device utilized;pressure-redistributing mattress utilized   Pressure Reduction Techniques heels elevated off bed     Wound/Ostomy: Consult received regarding skin problems on abdominal fold. Patient is alert, oriented, able to turn. Upon assessment is observed open wound x 2 on abdominal fold, wound bed described above.  We think that the patient could be benefic from surgical consult.   In addition rash, red in color was seen on inner thigh, Magic barrier ointment is ordered.  Rn notified.  Wound care order and nursing intervention have been implemented.  Please re-consult for any additional needs.

## 2023-10-31 NOTE — H&P
HISTORY AND PHYSICAL   [facility]        Date of Admission: 10/30/2023  Patient Identification:  Name: iGsela Dyson  Age: 38 y.o.  Sex: female  :  1985  MRN: [id number]                     Primary Care Physician: Provider, No Known    Chief Complaint:  38 year old female was brought in with abdominal pain; it has been present for over a week; it was worse today and she could not complete her dialysis treatment because of it; she denies fever or chills; no nausea or vomiting; she had a similar presentation earlier this month and felt better after a paracentesis; no chest pain;     History of Present Illness:   As above    Past Medical History:  Past Medical History:   Diagnosis Date    JULISSA (acute kidney injury)     Diastolic CHF     Disease of thyroid gland     ESRD (end stage renal disease) on dialysis     Essential hypertension     Gout     History of COVID-19 2022    Neuropathy     Nonrheumatic mitral valve regurgitation     Obesity, morbid, BMI 50 or higher     Scratch marcel     ON LEFT ARM WITH SMALL OPEN AREA INSTRUCTED PT TO CALL DR FLEMING IF AREA IS STILL OPEN ON 3/14/2022.    Tachycardia     Type 2 diabetes mellitus      Past Surgical History:  Past Surgical History:   Procedure Laterality Date    ARTERIOVENOUS FISTULA/SHUNT SURGERY Left 3/14/2022    Procedure: LEFT ARM BRACHIOCEPHALIC FISTULA CREATION;  Surgeon: Amador Schilling MD;  Location: [facility];  Service: Vascular;  Laterality: Left;    INSERTION HEMODIALYSIS CATHETER Right 2021    Procedure: TUNNEL DIALYSIS, PALINDROME CATH;  Surgeon: Ancelmo Menendez MD;  Location: [facility];  Service: Vascular;  Laterality: Right;      Home Meds:  Medications Prior to Admission   Medication Sig Dispense Refill Last Dose    allopurinol (ZYLOPRIM) 100 MG tablet Take 1 tablet by mouth Daily. 30 tablet 0 10/30/2023    carvedilol (COREG) 6.25 MG tablet Take 1 tablet by mouth 2 (Two) Times a Day With Meals. 60 tablet 0  "10/30/2023    CHLORHEXIDINE GLUCONATE CLOTH EX Apply  topically. AS DIRECTED PREOP       Ergocalciferol (ERGOCAL PO) Take 1.25 mg by mouth 3 (Three) Times a Week.       HYDROcodone-acetaminophen (Norco) 5-325 MG per tablet Take 1 tablet by mouth Every 8 (Eight) Hours As Needed for Severe Pain . 8 tablet 0 10/29/2023    levothyroxine (SYNTHROID, LEVOTHROID) 137 MCG tablet Take 1 tablet by mouth Every Morning. 30 tablet 0 10/30/2023    Needle, Disp, (BD DISP NEEDLES) 30G X 1/2\" misc Use daily for injection of Tresiba. 100 each 3 10/29/2023    sacubitril-valsartan (ENTRESTO) 24-26 MG tablet Take 1 tablet by mouth 2 (Two) Times a Day. 60 tablet 0 10/30/2023       Allergies:  No Known Allergies  Immunizations:  Immunization History   Administered Date(s) Administered    Flublok 18+yrs 10/06/2023    Fluzone (or Fluarix & Flulaval for VFC) >6mos 01/28/2022, 09/19/2022    Hepatitis B 2 Dose Vaccine Heplisav-B 07/14/2021, 08/11/2021, 09/08/2021    Pneumococcal Conjugate 13-Valent (PCV13) 10/25/2021    Pneumococcal Polysaccharide (PPSV23) 12/24/2021     Social History:   Social History     Social History Narrative    Not on file     Social History     Socioeconomic History    Marital status: Single   Tobacco Use    Smoking status: Former     Years: 1     Types: Cigarettes    Smokeless tobacco: Never    Tobacco comments:     QUIT AGE 32   Vaping Use    Vaping Use: Never used   Substance and Sexual Activity    Alcohol use: Yes     Alcohol/week: 1.0 - 2.0 standard drink of alcohol     Types: 1 - 2 Glasses of wine per week     Comment: socially    Drug use: No    Sexual activity: Defer       Family History:  Family History   Adopted: Yes   Problem Relation Age of Onset    Malig Hyperthermia Neg Hx         Review of Systems  See history of present illness and past medical history.  Patient denies headache, dizziness, syncope, falls, trauma, change in vision, change in hearing, change in taste, changes in weight, changes in " appetite, focal weakness, numbness, or paresthesia.  Patient denies chest pain,  cough, sinus pressure, rhinorrhea, epistaxis, hemoptysis, nausea, vomiting,hematemesis, diarrhea, constipation or hematochezia.  Denies cold or heat intolerance, polydipsia, polyuria, polyphagia. Denies hematuria, pyuria, dysuria, hesitancy, frequency or urgency. Denies consumption of raw and under cooked meats foods or change in water source.  Denies fever, chills, sweats, night sweats.   .    Objective:  T Max 24 hrs: Temp (24hrs), Av.8 °F (36.6 °C), Min:97.7 °F (36.5 °C), Max:97.9 °F (36.6 °C)    Vitals Ranges:   Temp:  [97.7 °F (36.5 °C)-97.9 °F (36.6 °C)] 97.7 °F (36.5 °C)  Heart Rate:  [79-87] 87  Resp:  [16] 16  BP: (108-144)/(78-89) 123/78      Exam:  /78 (BP Location: Right arm, Patient Position: Lying)   Pulse 87   Temp 97.7 °F (36.5 °C) (Oral)   Resp 16   SpO2 98%     General Appearance:    Alert, cooperative, no distress, appears stated age   Head:    Normocephalic, without obvious abnormality, atraumatic   Eyes:    PERRL, conjunctivae/corneas clear, EOM's intact, both eyes   Ears:    Normal external ear canals, both ears   Nose:   Nares normal, septum midline, mucosa normal, no drainage    or sinus tenderness   Throat:   Lips, mucosa, and tongue normal   Neck:   Supple, symmetrical, trachea midline, no adenopathy;     thyroid:  no enlargement/tenderness/nodules; no carotid    bruit or JVD   Back:     Symmetric, no curvature, ROM normal, no CVA tenderness   Lungs:     Decreased breath sounds bilaterally, respirations unlabored   Chest Wall:    No tenderness or deformity    Heart:    Regular rate and rhythm, S1 and S2 normal, no murmur, rub   or gallop   Abdomen:     Soft, distended, bowel sounds active all four quadrants,        Extremities:   Extremities normal, atraumatic, no cyanosis or edema      .    Data Review:  Labs in chart were reviewed.  WBC   Date Value Ref Range Status   10/30/2023 9.04 3.40 -  10.80 10*3/mm3 Final     Hemoglobin   Date Value Ref Range Status   10/30/2023 8.4 (L) 12.0 - 15.9 g/dL Final     Hematocrit   Date Value Ref Range Status   10/30/2023 26.1 (L) 34.0 - 46.6 % Final     Platelets   Date Value Ref Range Status   10/30/2023 330 140 - 450 10*3/mm3 Final     Sodium   Date Value Ref Range Status   10/30/2023 135 (L) 136 - 145 mmol/L Final     Potassium   Date Value Ref Range Status   10/30/2023 3.3 (L) 3.5 - 5.2 mmol/L Final     Chloride   Date Value Ref Range Status   10/30/2023 94 (L) 98 - 107 mmol/L Final     CO2   Date Value Ref Range Status   10/30/2023 29.0 22.0 - 29.0 mmol/L Final     BUN   Date Value Ref Range Status   10/30/2023 22 (H) 6 - 20 mg/dL Final     Creatinine   Date Value Ref Range Status   10/30/2023 4.05 (H) 0.57 - 1.00 mg/dL Final     Glucose   Date Value Ref Range Status   10/30/2023 77 65 - 99 mg/dL Final     Calcium   Date Value Ref Range Status   10/30/2023 8.4 (L) 8.6 - 10.5 mg/dL Final                Imaging Results (All)       Procedure Component Value Units Date/Time    CT Abdomen Pelvis Without Contrast [[id number]] Collected: 10/30/23 1702     Updated: 10/30/23 1714    Narrative:      CT ABDOMEN PELVIS WO CONTRAST-     INDICATIONS: Abdominal pain and swelling     TECHNIQUE: Radiation dose reduction techniques were utilized, including  automated exposure control and exposure modulation based on body size.  Unenhanced ABDOMEN AND PELVIS CT     COMPARISON: 10/16/2023     FINDINGS:     Moderate abdominal and pelvic ascites is present and, with diffuse  mesenteric edema limits assessment for inflammatory changes of bowel. No  bowel obstruction is noted.     Cholelithiasis is apparent. The liver and spleen aren't enlarged.     Left adrenal calcifications are seen.     Arterial calcifications are noted at the renal millicent. No hydronephrosis.     Otherwise unremarkable unenhanced appearance of the liver, spleen,  adrenal glands, pancreas, kidneys, bladder.           No  free intraperitoneal gas or free fluid.     Nonspecific retroperitoneal lymph nodes are present, could be reactive  in nature or potentially evidence of neoplasm, PET/CT correlation can be  obtained for further evaluation as indicated; interval follow-up can  characterize change. For example, a 1 cm short axis left para-aortic  lymph node on axial image 84     Abdominal aorta is not aneurysmal. Aortic and other arterial  calcifications are present.     The lung bases show atelectasis, minimal pleural effusions.     Degenerative changes are seen in the spine. No acute fracture is  identified. Diffuse subcutaneous edema is present, compatible with  anasarca, and skin thickening is again evident at the lower levels.             Impression:         Moderate abdominal and pelvic ascites. Nonspecific retroperitoneal lymph  nodes. Follow-up/further evaluation advised as indicated.     This report was finalized on 10/30/2023 5:11 PM by Dr. Emery Cook M.D on Workstation: [id number]                 Assessment:  Active Hospital Problems    Diagnosis  POA    **Acute bilateral lower abdominal pain [R10.31, R10.32]  Yes      Resolved Hospital Problems   No resolved problems to display.   Esrd on hd  Ascites  Obesity  Diabetes  Hypertension  Chronic systolic chf  Hypothyroidism  Anemia  Hypokalemia  Pulmonary hypertension    Plan:  Paracentesis  Ct noted  Trend labs  Pain control  Consult nephrology to continue with dialysis  Monitor on telemetry  Dw patient and ed provider as well as family     Yanely Colon MD  10/30/2023  21:51 EDT

## 2023-10-31 NOTE — THERAPY EVALUATION
Patient Name: Gisela Dyson  : 1985    MRN: [id number]                              Today's Date: 10/31/2023       Admit Date: 10/30/2023    Visit Dx:     ICD-10-CM ICD-9-CM   1. Acute bilateral lower abdominal pain  R10.31 789.03    R10.32 789.04     338.19   2. Other ascites  R18.8 789.59   3. End-stage renal disease on hemodialysis  N18.6 585.6    Z99.2 V45.11     Patient Active Problem List   Diagnosis    Acute on chronic diastolic CHF (congestive heart failure)    JULISSA (acute kidney injury)    Obesity, morbid, BMI 50 or higher    Type 2 diabetes mellitus with renal complication    Essential hypertension    Nonrheumatic mitral valve regurgitation    Primary hypothyroidism    Hirsutism    Alopecia    Acute renal failure    ESRD on hemodialysis    Fluid overload    CKD (chronic kidney disease) requiring chronic dialysis    Volume overload    Acute on chronic HFrEF (heart failure with reduced ejection fraction)    Acute bilateral lower abdominal pain     Past Medical History:   Diagnosis Date    JULISSA (acute kidney injury)     Diastolic CHF     Disease of thyroid gland     ESRD (end stage renal disease) on dialysis     Essential hypertension     Gout     History of COVID-19 2022    Neuropathy     Nonrheumatic mitral valve regurgitation     Obesity, morbid, BMI 50 or higher     Scratch marcel     ON LEFT ARM WITH SMALL OPEN AREA INSTRUCTED PT TO CALL DR FLEMING IF AREA IS STILL OPEN ON 3/14/2022.    Tachycardia     Type 2 diabetes mellitus      Past Surgical History:   Procedure Laterality Date    ARTERIOVENOUS FISTULA/SHUNT SURGERY Left 3/14/2022    Procedure: LEFT ARM BRACHIOCEPHALIC FISTULA CREATION;  Surgeon: Amador Schilling MD;  Location: [facility] OR;  Service: Vascular;  Laterality: Left;    INSERTION HEMODIALYSIS CATHETER Right 2021    Procedure: TUNNEL DIALYSIS, PALINDROME CATH;  Surgeon: Ancelmo Menendez MD;  Location: [facility] OR;  Service: Vascular;  Laterality: Right;       General Information       Row Name 10/31/23 0900          Physical Therapy Time and Intention    Document Type evaluation  -CW     Mode of Treatment individual therapy;physical therapy  -CW       Row Name 10/31/23 0900          General Information    Patient Profile Reviewed yes  -CW     Prior Level of Function independent:  works at [employer]  -CW     Existing Precautions/Restrictions fall  -CW     Barriers to Rehab medically complex  -CW       Row Name 10/31/23 0900          Living Environment    People in Home alone;other (see comments)  apartment with 2 flights of stairs, has been staying with family with less stairs recently  -CW       Row Name 10/31/23 0900          Home Main Entrance    Number of Stairs, Main Entrance other (see comments)  2 flights  -CW     Stair Railings, Main Entrance none  -CW       Row Name 10/31/23 0900          Stairs Within Home, Primary    Number of Stairs, Within Home, Primary none  -CW       Row Name 10/31/23 0900          Cognition    Orientation Status (Cognition) oriented x 3  -CW       Row Name 10/31/23 0900          Safety Issues, Functional Mobility    Impairments Affecting Function (Mobility) balance;endurance/activity tolerance;strength  -CW               User Key  (r) = Recorded By, (t) = Taken By, (c) = Cosigned By      Initials Name Provider Type    CW Alyssa Martínez, SHRUTHI Physical Therapist                   Mobility       Row Name 10/31/23 0901          Bed Mobility    Bed Mobility supine-sit;sit-supine  -CW     Supine-Sit [place] (Bed Mobility) minimum assist (75% patient effort);verbal cues  -CW     Sit-Supine [place] (Bed Mobility) standby assist  -CW     Assistive Device (Bed Mobility) head of bed elevated;bed rails  -CW       Row Name 10/31/23 0901          Sit-Stand Transfer    Sit-Stand [place] (Transfers) contact guard;verbal cues  -CW       Row Name 10/31/23 0901          Gait/Stairs (Locomotion)    [place] Level (Gait) minimum assist (75%  patient effort);contact guard;verbal cues  -CW     Assistive Device (Gait) other (see comments)  HHA  -CW     Distance in Feet (Gait) 130'  -CW     Deviations/Abnormal Patterns (Gait) anaya decreased;gait speed decreased  -CW     Comment, (Gait/Stairs) unsteadiness noted especially initially, but no overt loss of balance  -CW               User Key  (r) = Recorded By, (t) = Taken By, (c) = Cosigned By      Initials Name Provider Type    Alyssa Acosta PT Physical Therapist                   Obj/Interventions       Row Name 10/31/23 0902          Range of Motion Comprehensive    General Range of Motion bilateral lower extremity ROM WFL  -CW       Row Name 10/31/23 0902          Strength Comprehensive (MMT)    Comment, General Manual Muscle Testing (MMT) Assessment BLE generally weak  -CW       Row Name 10/31/23 0902          Balance    Balance Assessment standing static balance;standing dynamic balance  -CW     Static Standing Balance contact guard  -CW     Dynamic Standing Balance minimal assist;contact guard  -CW     Position/Device Used, Standing Balance unsupported  -CW     Comment, Balance HHA  -CW               User Key  (r) = Recorded By, (t) = Taken By, (c) = Cosigned By      Initials Name Provider Type    CW Alyssa Martínez PT Physical Therapist                   Goals/Plan       Row Name 10/31/23 0913          Bed Mobility Goal 1 (PT)    Activity/Assistive Device (Bed Mobility Goal 1, PT) bed mobility activities, all  -CW     [place] Level/Cues Needed (Bed Mobility Goal 1, PT) modified independence  -CW     Time Frame (Bed Mobility Goal 1, PT) 2 weeks  -CW       Row Name 10/31/23 0913          Transfer Goal 1 (PT)    Activity/Assistive Device (Transfer Goal 1, PT) sit-to-stand/stand-to-sit;bed-to-chair/chair-to-bed  -CW     [place] Level/Cues Needed (Transfer Goal 1, PT) supervision required  -CW     Time Frame (Transfer Goal 1, PT) 2 weeks  -CW       Row Name 10/31/23 0913           Gait Training Goal 1 (PT)    Activity/Assistive Device (Gait Training Goal 1, PT) gait (walking locomotion)  -CW     [place] Level (Gait Training Goal 1, PT) supervision required  -CW     Distance (Gait Training Goal 1, PT) 200'  -CW     Time Frame (Gait Training Goal 1, PT) 2 weeks  -CW       Row Name 10/31/23 0913          Stairs Goal 1 (PT)    Activity/Assistive Device (Stairs Goal 1, PT) ascending stairs;descending stairs  -CW     [place] Level/Cues Needed (Stairs Goal 1, PT) contact guard required  -CW     Number of Stairs (Stairs Goal 1, PT) 12  -CW     Time Frame (Stairs Goal 1, PT) 2 weeks  -CW       Row Name 10/31/23 0913          Therapy Assessment/Plan (PT)    Planned Therapy Interventions (PT) balance training;bed mobility training;gait training;ROM (range of motion);strengthening;transfer training;patient/family education;stair training  -CW               User Key  (r) = Recorded By, (t) = Taken By, (c) = Cosigned By      Initials Name Provider Type    CW Alyssa Martínez, PT Physical Therapist                   Clinical Impression       Row Name 10/31/23 0903          Pain    Pretreatment Pain Rating 0/10 - no pain  -CW     Posttreatment Pain Rating 0/10 - no pain  -CW       Row Name 10/31/23 0903          Plan of Care Review    Plan of Care Reviewed With patient;father  -CW     Outcome Evaluation Pt is a 37 yo female admitted from dialysis with worsening abdominal pain. Pt reports living alone inapartment with 2 flights of stairs to enter, no handrails. She reports independence at baseline with all functional mobility, works at [employer] requiring multiple hours on her feet for some shifts, and denies falls. Today, she demo generalized weakness, impaired balance and decreased activity tolerance. She may benefit from skilled PT services while inpatient to address deficits. Recommend mobility with nsg staff to bathroom and up to chair as tolerated. Recommend d/c home with assist and HH vs OP PT  for general conditioning.  -CW       Row Name 10/31/23 0903          Therapy Assessment/Plan (PT)    Rehab Potential (PT) good, to achieve stated therapy goals  -CW     Criteria for Skilled Interventions Met (PT) yes  -CW     Therapy Frequency (PT) 5 times/wk  -CW       Row Name 10/31/23 0903          Vital Signs    O2 Delivery Pre Treatment room air  -CW     O2 Delivery Intra Treatment room air  -CW     O2 Delivery Post Treatment room air  -CW       Row Name 10/31/23 0903          Positioning and Restraints    Pre-Treatment Position in bed  -CW     Post Treatment Position bed  -CW     In Bed notified nsg;call light within reach;encouraged to call for assist;exit alarm on;fowlers;with family/caregiver  -CW               User Key  (r) = Recorded By, (t) = Taken By, (c) = Cosigned By      Initials Name Provider Type    Alyssa Acosta PT Physical Therapist                   Outcome Measures       Row Name 10/31/23 0914          How much help from another person do you currently need...    Turning from your back to your side while in flat bed without using bedrails? 3  -CW     Moving from lying on back to sitting on the side of a flat bed without bedrails? 3  -CW     Moving to and from a bed to a chair (including a wheelchair)? 3  -CW     Standing up from a chair using your arms (e.g., wheelchair, bedside chair)? 3  -CW     Climbing 3-5 steps with a railing? 3  -CW     To walk in hospital room? 3  -CW     AM-PAC 6 Clicks Score (PT) 18  -CW     Highest level of mobility 6 --> Walked 10 steps or more  -CW       Row Name 10/31/23 0914          Functional Assessment    Outcome Measure Options AM-PAC 6 Clicks Basic Mobility (PT)  -CW               User Key  (r) = Recorded By, (t) = Taken By, (c) = Cosigned By      Initials Name Provider Type    Alyssa Acosta PT Physical Therapist                                 Physical Therapy Education       Title: PT OT SLP Therapies (In Progress)       Topic: Physical  Therapy (In Progress)       Point: Mobility training (Done)       Learning Progress Summary             Patient Acceptance, E, VU,NR by  at 10/31/2023 0952                         Point: Home exercise program (Not Started)       Learner Progress:  Not documented in this visit.              Point: Body mechanics (Not Started)       Learner Progress:  Not documented in this visit.              Point: Precautions (Not Started)       Learner Progress:  Not documented in this visit.                              User Key       Initials Effective Dates Name Provider Type Discipline     12/13/22 -  Alyssa Martínez, PT Physical Therapist PT                  PT Recommendation and Plan  Planned Therapy Interventions (PT): balance training, bed mobility training, gait training, ROM (range of motion), strengthening, transfer training, patient/family education, stair training  Plan of Care Reviewed With: patient, father  Outcome Evaluation: Pt is a 39 yo female admitted from dialysis with worsening abdominal pain. Pt reports living alone inapartment with 2 flights of stairs to enter, no handrails. She reports independence at baseline with all functional mobility, works at [employer] requiring multiple hours on her feet for some shifts, and denies falls. Today, she demo generalized weakness, impaired balance and decreased activity tolerance. She may benefit from skilled PT services while inpatient to address deficits. Recommend mobility with nsg staff to bathroom and up to chair as tolerated. Recommend d/c home with assist and HH vs OP PT for general conditioning.     Time Calculation:         PT Charges       Row Name 10/31/23 0859             Time Calculation    Start Time 0844  -CW      Stop Time 0855  -      Time Calculation (min) 11 min  -CW      PT Received On 10/31/23  -      PT - Next Appointment 11/01/23  -      PT Goal Re-Cert Due Date 11/14/23  -                User Key  (r) = Recorded By, (t) = Taken By, (c)  = Cosigned By      Initials Name Provider Type    CW Alyssa Martínez, PT Physical Therapist                  Therapy Charges for Today       Code Description Service Date Service Provider Modifiers Qty    [id number] HC PT EVAL MOD COMPLEXITY 3 10/31/2023 Alyssa Martínez, PT GP 1            PT G-Codes  Outcome Measure Options: AM-PAC 6 Clicks Basic Mobility (PT)  AM-PAC 6 Clicks Score (PT): 18  PT Discharge Summary  Anticipated Discharge Disposition (PT): home with assist, home with outpatient therapy services, home with home health    Alyssa Martínez PT  10/31/2023

## 2023-10-31 NOTE — SIGNIFICANT NOTE
10/31/23 1348   OTHER   Discipline occupational therapist   Rehab Time/Intention   Session Not Performed other (see comments)  (Pt initially requested OT to f/u after lunch d/t increased fatigue and to avoid pain once getting comfortable following PT session. Pt recieving diaylsis in pm. OT will f/u next service date.)   Therapy Assessment/Plan (PT)   Criteria for Skilled Interventions Met (PT) yes   Recommendation   OT - Next Appointment 11/01/23

## 2023-10-31 NOTE — PROGRESS NOTES
" LOS: 0 days     Name: Gisela Dyson  Age: 38 y.o.  Sex: female  :  1985  MRN: [id number]         Primary Care Physician: Provider, No Known    Subjective   Subjective  Not having any abdominal pain at present.  She will go for dialysis this afternoon.  She does not wish to proceed with paracentesis right now and would prefer to see how things go with removal of fluid via HD.  She has declined blood transfusion as offered by nephrology.    Objective   Vital Signs  Temp:  [97.5 °F (36.4 °C)-98.1 °F (36.7 °C)] 98.1 °F (36.7 °C)  Heart Rate:  [0-92] 78  Resp:  [16] 16  BP: (108-144)/() 113/57  Body mass index is 41.65 kg/m².    Objective:  General Appearance:  Comfortable, in no acute distress and ill-appearing (She looks very chronically ill-appearing).    Vital signs: (most recent): Blood pressure 113/57, pulse 78, temperature 98.1 °F (36.7 °C), temperature source Oral, resp. rate 16, height 157.5 cm (62\"), weight 103 kg (227 lb 11.8 oz), SpO2 98%, not currently breastfeeding.    Lungs:  Normal effort and normal respiratory rate.  She is not in respiratory distress.  There are decreased breath sounds.    Heart: Normal rate.  Regular rhythm.    Abdomen: Abdomen is soft.  (She has malodorous, necrotic appearing wounds across her abdomen, primarily along the waistline).  Bowel sounds are normal.   There is no abdominal tenderness.     Extremities: There is dependent edema.  There is no local swelling.    Neurological: Patient is alert and oriented to person, place and time.    Skin:  Warm and dry.                Results Review:       I reviewed the patient's new clinical results.    Results from last 7 days   Lab Units 10/31/23  0602 10/30/23  1537   WBC 10*3/mm3 8.39 9.04   HEMOGLOBIN g/dL 7.1* 8.4*   PLATELETS 10*3/mm3 302 330     Results from last 7 days   Lab Units 10/31/23  0602 10/30/23  1537   SODIUM mmol/L 132* 135*   POTASSIUM mmol/L 3.8 3.3*   CHLORIDE mmol/L 94* 94*   CO2 mmol/L 25.9 29.0   BUN " mg/dL 27* 22*   CREATININE mg/dL 4.62* 4.05*   CALCIUM mg/dL 7.9* 8.4*   GLUCOSE mg/dL 90 77           Scheduled Meds:   allopurinol, 100 mg, Oral, Daily  carvedilol, 6.25 mg, Oral, BID With Meals  heparin (porcine), 5,000 Units, Subcutaneous, Q12H  levothyroxine, 137 mcg, Oral, Q AM  sacubitril-valsartan, 1 tablet, Oral, BID  senna-docusate sodium, 2 tablet, Oral, BID      PRN Meds:     acetaminophen    senna-docusate sodium **AND** polyethylene glycol **AND** bisacodyl **AND** bisacodyl    HYDROcodone-acetaminophen    melatonin    ondansetron **OR** ondansetron    [COMPLETED] Insert Peripheral IV **AND** sodium chloride    [COMPLETED] Insert Peripheral IV **AND** sodium chloride  Continuous Infusions:       Assessment & Plan   Active Hospital Problems    Diagnosis  POA    **Acute bilateral lower abdominal pain [R10.31, R10.32]  Yes    Acute on chronic HFrEF (heart failure with reduced ejection fraction) [I50.23]  Yes    Volume overload [E87.70]  Yes    ESRD on hemodialysis [N18.6, Z99.2]  Not Applicable    Primary hypothyroidism [E03.9]  Yes    Essential hypertension [I10]  Yes    Obesity, morbid, BMI 50 or higher [E66.01]  Yes    Type 2 diabetes mellitus with renal complication [E11.29]  Yes      Resolved Hospital Problems   No resolved problems to display.       Assessment & Plan    -Nephrology is evaluated this morning and are planning additional hemodialysis for volume removal today.  -Discussed potential for abdominal paracentesis but she does not wish to pursue this right now.  -Discussed with wound care who recommend surgical consultation for her malodorous, necrotic appearing abdominal wounds.  -Anemia noted.  She declined to accept blood transfusion during dialysis today  -Continue Entresto and levothyroxine      Expected Discharge Date: 11/3/2023; Expected Discharge Time:      Zaki De Olvieira MD  [facility]  10/31/23  11:42 EDT

## 2023-10-31 NOTE — CONSULTS
Nutrition Services    Patient Name:  Gisela Dyson  YOB: 1985  MRN: [id number]  Admit Date:  10/30/2023    Assessment Date:  10/31/23    Summary:     Pt is a 38 y.o. female adm for acute bilateral lower abdominal pain. H/o T2DM, CHF, ESRD. Nutrition assessment completed. Visited pt at bedside. She endorses decreased appetite r/t abdominal pain the past 8 to 9 days. Agreeable to Boost. Wt hx reviewed, 35 lb (13.3%) wt loss x 7 mo. Severe MSA based on wt loss and energy intake. Plans for dialysis and paracentesis per MD note.   Labs: Na 132, Cl 94, BUN 27, Creat 4.62, Lipase 9, Total Bilirubin 1.4  Meds: heparin, pericolace     Pt meets ASPEN/AND criteria for nutriton dx of severe malnutrition of chronic disease related to energy intake and wt loss.    REC:  1. Boost Plus BID B/D  2. Will continue to encourage and monitor PO/ONS intake     RD to follow per protocol.    CLINICAL NUTRITION ASSESSMENT      Reason for Assessment Nurse Admission Screen     Diagnosis/Problem   Acute bilateral lower abdominal pain    Medical/Surgical History Past Medical History:   Diagnosis Date    JULISSA (acute kidney injury)     Diastolic CHF     Disease of thyroid gland     ESRD (end stage renal disease) on dialysis     Essential hypertension     Gout     History of COVID-19 01/09/2022    Neuropathy     Nonrheumatic mitral valve regurgitation     Obesity, morbid, BMI 50 or higher     Scratch marcel     ON LEFT ARM WITH SMALL OPEN AREA INSTRUCTED PT TO CALL DR FLEMING IF AREA IS STILL OPEN ON 3/14/2022.    Tachycardia     Type 2 diabetes mellitus        Past Surgical History:   Procedure Laterality Date    ARTERIOVENOUS FISTULA/SHUNT SURGERY Left 3/14/2022    Procedure: LEFT ARM BRACHIOCEPHALIC FISTULA CREATION;  Surgeon: Amador Schilling MD;  Location: [facility];  Service: Vascular;  Laterality: Left;    INSERTION HEMODIALYSIS CATHETER Right 6/18/2021    Procedure: TUNNEL DIALYSIS, PALINDROME CATH;  Surgeon: Shon  Ancelmo ELIAS MD;  Location: [facility] OR;  Service: Vascular;  Laterality: Right;        Anthropometrics        Current Height  Current Weight  BMI kg/m2    Weight: 103 kg (227 lb 11.8 oz) (10/30/23 2027)  Body mass index is 41.65 kg/m².   Adjusted BMI (if applicable)    BMI Category Obese, Class III (40 or higher)   Ideal Body Weight (IBW) 50.1 kg    Usual Body Weight (UBW) 260 lbs   Weight Trend Loss, Amount/Timeframe: 35 lb (13.3%) wt loss x 7 mo    Weight History Wt Readings from Last 30 Encounters:   10/30/23 2027 103 kg (227 lb 11.8 oz)   10/21/23 0608 103 kg (226 lb 6.6 oz)   10/20/23 0500 98.8 kg (217 lb 13 oz)   10/19/23 0846 108 kg (239 lb)   10/18/23 1500 109 kg (239 lb 6.7 oz)   10/18/23 0500 121 kg (266 lb 15.6 oz)   10/17/23 0100 115 kg (253 lb 4.9 oz)   03/14/22 1337 119 kg (262 lb 3.2 oz)   03/11/22 1535 122 kg (268 lb)   06/22/21 0525 136 kg (299 lb 7.9 oz)   06/21/21 0600 135 kg (298 lb 4.5 oz)   06/19/21 0500 (!) 140 kg (308 lb 13.8 oz)   06/18/21 1403 (!) 139 kg (306 lb 7 oz)   06/18/21 0645 (!) 139 kg (307 lb 8 oz)   06/17/21 2337 (!) 141 kg (311 lb 6.4 oz)   05/22/20 1432 108 kg (239 lb)   01/10/20 1036 115 kg (253 lb 12.8 oz)   12/13/19 1015 116 kg (255 lb)   11/15/19 1133 (!) 144 kg (318 lb 6.4 oz)   11/10/19 0521 (!) 141 kg (311 lb 8 oz)   11/09/19 0529 (!) 141 kg (311 lb 8 oz)   11/08/19 0523 (!) 142 kg (313 lb 11.4 oz)   11/07/19 1150 (!) 150 kg (330 lb)   11/07/19 0651 (!) 150 kg (330 lb 4.8 oz)   11/07/19 0648 (!) 151 kg (332 lb 10.8 oz)   11/06/19 2331 (!) 155 kg (340 lb 12.8 oz)        Estimated Requirements         Weight used 50.1 kg IBW     Calories  4068-4274 (25 kcal/kg, 30 kcal/kg)    Protein  50-60 (1.0 - 1.2 gm/kg)    Fluid  1 mL/kcal      Labs       Pertinent Labs    Results from last 7 days   Lab Units 10/31/23  0602 10/30/23  1537   SODIUM mmol/L 132* 135*   POTASSIUM mmol/L 3.8 3.3*   CHLORIDE mmol/L 94* 94*   CO2 mmol/L 25.9 29.0   BUN mg/dL 27* 22*   CREATININE mg/dL 4.62*  4.05*   CALCIUM mg/dL 7.9* 8.4*   BILIRUBIN mg/dL  --  1.4*   ALK PHOS U/L  --  237*   ALT (SGPT) U/L  --  8   AST (SGOT) U/L  --  10   GLUCOSE mg/dL 90 77     Results from last 7 days   Lab Units 10/31/23  0602 10/30/23  1537   HEMOGLOBIN g/dL 7.1* 8.4*   HEMATOCRIT % 22.4* 26.1*   WBC 10*3/mm3 8.39 9.04   ALBUMIN g/dL  --  3.1*     Results from last 7 days   Lab Units 10/31/23  0602 10/30/23  1537   PLATELETS 10*3/mm3 302 330     COVID19   Date Value Ref Range Status   03/11/2022 Not Detected Not Detected - Ref. Range Final     Lab Results   Component Value Date    HGBA1C 5.10 10/17/2023          Medications           Scheduled Medications allopurinol, 100 mg, Oral, Daily  carvedilol, 6.25 mg, Oral, BID With Meals  heparin (porcine), 5,000 Units, Subcutaneous, Q12H  levothyroxine, 137 mcg, Oral, Q AM  sacubitril-valsartan, 1 tablet, Oral, BID  senna-docusate sodium, 2 tablet, Oral, BID       Infusions     PRN Medications   acetaminophen    senna-docusate sodium **AND** polyethylene glycol **AND** bisacodyl **AND** bisacodyl    HYDROcodone-acetaminophen    melatonin    ondansetron **OR** ondansetron    [COMPLETED] Insert Peripheral IV **AND** sodium chloride    [COMPLETED] Insert Peripheral IV **AND** sodium chloride     Physical Findings          General Findings alert, obese, oriented, room air   Oral/Mouth Cavity WDL   Edema  no edema   Gastrointestinal abdominal pain, last bowel movement: 10/30   Skin  skin tear bilateral lower abdomen   Tubes/Drains/Lines none   NFPE See Malnutrition Severity Assessment     Malnutrition Severity Assessment      Patient meets criteria for : (P) Severe Malnutrition (Pt meets ASPEN/AND criteria for nutriton dx of severe malnutrition of chronic disease related to energy intake and wt loss.)  Malnutrition Type (last 8 hours)       Malnutrition Severity Assessment       Row Name 10/31/23 0944       Malnutrition Severity Assessment    Malnutrition Type Chronic Disease - Related  Malnutrition (P)       Row Name 10/31/23 0944       Insufficient Energy Intake     Insufficient Energy Intake Findings Severe (P)     Insufficient Energy Intake  < or equal to 50% of est. energy requirement for > or equal to 5d) (P)       Row Name 10/31/23 0944       Unintentional Weight Loss     Unintentional Weight Loss Findings Severe (P)     Unintentional Weight Loss  Weight loss greater than 10% in six months (P)       Row Name 10/31/23 0944       Criteria Met (Must meet criteria for severity in at least 2 of these categories: M Wasting, Fat Loss, Fluid, Secondary Signs, Wt. Status, Intake)    Patient meets criteria for  Severe Malnutrition (P)   Pt meets ASPEN/AND criteria for nutriton dx of severe malnutrition of chronic disease related to energy intake and wt loss.                     Current Nutrition Orders & Evaluation of Intake       Oral Nutrition     Food Allergies NKFA   Current PO Diet Diet: Cardiac Diets; Healthy Heart (2-3 Na+); Texture: Regular Texture (IDDSI 7); Fluid Consistency: Thin (IDDSI 0)   Supplement n/a   PO Evaluation     % PO Intake Not well documented     Factors Affecting Intake: abdominal pain, decreased appetite   --  PES STATEMENT / NUTRITION DIAGNOSIS      Nutrition Dx Problem  Problem: Unintentional Weight Loss  Etiology: Medical Diagnosis - acute bilateral lower abdominal pain, ESRD, CHF    Signs/Symptoms: PO intake and Unintended Weight Change     NUTRITION INTERVENTION / PLAN OF CARE      Intervention Goal(s) Maintain nutrition status, Establish goals of care, Meet estimated needs, Disease management/therapy, Establish PO intake, Tolerate PO , Accepts oral nutrition supplement, Appropriate weight loss, and PO intake goal %: 75%         RD Intervention/Action Interview for preferences, Encourage intake, Continue to monitor, Care plan reviewed, and Recommend/order: ONS   --      Prescription/Orders:       PO Diet       Supplements Boost Plus BID B/D      Enteral Nutrition        Parenteral Nutrition    New Prescription Ordered? Yes   --      Monitor/Evaluation Per protocol   Discharge Plan/Needs Pending clinical course   --    RD to follow per protocol.      Electronically signed by:  Annika Stanley Dietitian Intern   10/31/23 08:55 EDT

## 2023-10-31 NOTE — PLAN OF CARE
Problem: Adult Inpatient Plan of Care  Goal: Plan of Care Review  Flowsheets (Taken 10/31/2023 1550)  Outcome Evaluation: AOX4. Sleeping between care. Room air. Currently receiving dialysis in room and dialysis orders for tomorrow called. Wound care as ordered, island dressing c/d/i, surgery consulted. Family at bedside, call light within reach.   Goal Outcome Evaluation:              Outcome Evaluation: AOX4. Sleeping between care. Room air. Currently receiving dialysis in room and dialysis orders for tomorrow called. Wound care as ordered, island dressing c/d/i, surgery consulted. Family at bedside, call light within reach.

## 2023-10-31 NOTE — CONSULTS
[facility] Consult Note      Patient Name: Gisela Dyson  : 1985  MRN: [id number]  Primary Care Physician:  Provider, No Known  Referring Physician: Yanely Colon MD  Date of admission: 10/30/2023    Subjective     Reason for Consult:  ESRD    HPI:   Gisela Dyson is a 38 y.o. female with ESRD on HD [facility]; Dr. Lynne Cortez of our group) admitted yesterday for further evaluation of abdominal pain.  PMH notable for morbid obesity; poor compliance; DM2; and recent admission earlier this month also for abdominal pain, ascites/anasarca (no evidence for SBP; negative cytology; and SAAG 0.6), partially relieved by paracentesis (5.3 L removed).    Denies shortness of breath; no orthopnea  Abdominal pain is migratory  She has numerous skin lesions on pannus, but states that her pain is deep and independent of the skin lesions  No fever or chills  Appetite fair; no N/V  She still makes urine; no urinary complaints    Review of Systems:   14 point review of systems is otherwise negative except for mentioned above on HPI    Personal History     Past Medical History:   Diagnosis Date    JULISSA (acute kidney injury)     Diastolic CHF     Disease of thyroid gland     ESRD (end stage renal disease) on dialysis     Essential hypertension     Gout     History of COVID-19 2022    Neuropathy     Nonrheumatic mitral valve regurgitation     Obesity, morbid, BMI 50 or higher     Scratch marcel     ON LEFT ARM WITH SMALL OPEN AREA INSTRUCTED PT TO CALL DR FLEMING IF AREA IS STILL OPEN ON 3/14/2022.    Tachycardia     Type 2 diabetes mellitus        Past Surgical History:   Procedure Laterality Date    ARTERIOVENOUS FISTULA/SHUNT SURGERY Left 3/14/2022    Procedure: LEFT ARM BRACHIOCEPHALIC FISTULA CREATION;  Surgeon: Amador Schilling MD;  Location: [facility];  Service: Vascular;  Laterality: Left;    INSERTION HEMODIALYSIS CATHETER Right 2021     "Procedure: TUNNEL DIALYSIS, PALINDROME CATH;  Surgeon: Ancelmo Menendez MD;  Location: [facility];  Service: Vascular;  Laterality: Right;       Family History: family history is not on file. She was adopted.    Social History:  reports that she has quit smoking. She has never used smokeless tobacco. She reports current alcohol use of about 1.0 - 2.0 standard drink of alcohol per week. She reports that she does not use drugs.    Home Medications:  Prior to Admission medications    Medication Sig Start Date End Date Taking? Authorizing Provider   allopurinol (ZYLOPRIM) 100 MG tablet Take 1 tablet by mouth Daily. 10/21/23  Yes Hayden Dent MD   carvedilol (COREG) 6.25 MG tablet Take 1 tablet by mouth 2 (Two) Times a Day With Meals. 10/21/23  Yes Hayden Dent MD   CHLORHEXIDINE GLUCONATE CLOTH EX Apply  topically. AS DIRECTED PREOP   Yes ProviderZen MD   Ergocalciferol (ERGOCAL PO) Take 1.25 mg by mouth 3 (Three) Times a Week.   Yes Provider, MD Zen   HYDROcodone-acetaminophen (Norco) 5-325 MG per tablet Take 1 tablet by mouth Every 8 (Eight) Hours As Needed for Severe Pain . 3/14/22  Yes Amador Schilling MD   levothyroxine (SYNTHROID, LEVOTHROID) 137 MCG tablet Take 1 tablet by mouth Every Morning. 10/21/23  Yes Hayden Dent MD   Needle, Disp, (BD DISP NEEDLES) 30G X 1/2\" misc Use daily for injection of Tresiba. 1/10/20  Yes Pat Coon MD   sacubitril-valsartan (ENTRESTO) 24-26 MG tablet Take 1 tablet by mouth 2 (Two) Times a Day. 10/21/23  Yes Hayden Dent MD       Allergies:  No Known Allergies    Objective     Vitals:   Temp:  [97.5 °F (36.4 °C)-98.1 °F (36.7 °C)] 98.1 °F (36.7 °C)  Heart Rate:  [0-92] 78  Resp:  [16] 16  BP: (108-144)/() 113/57    Intake/Output Summary (Last 24 hours) at 10/31/2023 0840  Last data filed at 10/30/2023 2241  Gross per 24 hour   Intake 440 ml   Output --   Net 440 ml       Physical Exam:   Constitutional: Awake, " alert, NAD, overweight, chronically ill  HEENT: Sclera anicteric, no conjunctival injection  Neck: Supple, no carotid bruit, trachea at midline, no JVD  Respiratory: Clear anteriorly, diminished BS in flanks, nonlabored on RA  Cardiovascular: RRR, no rub, 2/6M  Vascular: Left arm AV fistula patent  Gastrointestinal: BS +, soft, tender but no G or R,  : No palpable bladder  Musculoskeletal: No edema, no clubbing or cyanosis  Psychiatric: Irritable, groggy but still oriented  Neurologic: moving all extremities  Skin: Warm and dry; several ecchymoses and abrasions on pannus       Scheduled Meds:     allopurinol, 100 mg, Oral, Daily  carvedilol, 6.25 mg, Oral, BID With Meals  heparin (porcine), 5,000 Units, Subcutaneous, Q12H  levothyroxine, 137 mcg, Oral, Q AM  sacubitril-valsartan, 1 tablet, Oral, BID  senna-docusate sodium, 2 tablet, Oral, BID      IV Meds:        Results Reviewed:   I have personally reviewed the results from the time of this admission to 10/31/2023 08:40 EDT     Lab Results   Component Value Date    GLUCOSE 90 10/31/2023    CALCIUM 7.9 (L) 10/31/2023     (L) 10/31/2023    K 3.8 10/31/2023    CO2 25.9 10/31/2023    CL 94 (L) 10/31/2023    BUN 27 (H) 10/31/2023    CREATININE 4.62 (H) 10/31/2023    EGFRIFAFRI 5 (L) 01/09/2022    EGFRIFNONA 4 (L) 01/09/2022    BCR 5.8 (L) 10/31/2023    ANIONGAP 12.1 10/31/2023      Lab Results   Component Value Date    MG 1.9 01/09/2022    PHOS 4.5 10/19/2023    ALBUMIN 3.1 (L) 10/30/2023     US Paracentesis    Result Date: 10/18/2023  ULTRASOUND GUIDED PARACENTESIS  HISTORY: Ascites  After signed informed consent was obtained, the abdomen was prepped and draped in the usual sterile fashion with 2% chlorhexidine. Lidocaine was used for local anesthesia.  A 6 [place] catheter was inserted into the left lower quadrant using ultrasound guidance. 5.3 L of dark yellow ascites was removed. Sample was sent to the lab.  Confirmatory images were obtained.  Patient  tolerated the procedure well without immediate complications.      Impression: Ultrasound-guided paracentesis as described  This report was finalized on 10/18/2023 5:10 PM by Dr. Carlos Cartagena M.D on Workstation: [id number]        Assessment / Plan       Acute bilateral lower abdominal pain      ASSESSMENT:  1.  ESRD: Volume overload, peripheral more than central; stable potassium and anion gap  2.  Abdominal pain, possibly from anasarca.  She had partial relief of abdominal pain earlier this month following a 5.3-liter paracentesis  3.  Chronic CHF  4.  DM2  5.  Noncompliance  6.  Anemia of ESRD, with hemoglobin well below goal  7.  Active tobacco abuse  8.  Abdominal wounds: dusky in appearance, but not especially tender.  Might benefit from formal Derm evaluation to exclude calciphylaxis    PLAN:  Extra HD today for additional volume removal   Ask Wound Care nurse to assess abdominal wall and pannus  She declines (for now) any blood transfusion  Discussed with father at bedside    Thank you for involving us in the care of Gisela Dyson.  Please feel free to call with any questions.    Hayden Hunter MD  10/31/23  08:40 EDT    [facility]  [phone]      Please note that portions of this note were completed with a voice recognition program.

## 2023-10-31 NOTE — OUTREACH NOTE
CHF Week 2 Survey      Flowsheet Row Responses   [facility] patient discharged from? [facility]   Does the patient have one of the following disease processes/diagnoses(primary or secondary)? CHF   Week 2 attempt successful? No   Unsuccessful attempts Attempt 1   Revoke Readmitted            Chasity MILIAN - Registered Nurse

## 2023-10-31 NOTE — CONSULTS
Cc: Abdominal wall wound    History of presenting illness:   This is a 38-year-old morbidly obese female with a history of chronic renal dialysis.  Apparently for at least the last several months she has been largely neglecting her health, rarely taking her dialysis until over the last couple of weeks it seems to have caught up with her and she was admitted went home and was really not able to take care of herself and brought back to the hospital again.  Currently she is really not able to answer any questions, barely wakes up and was seen on dialysis.  Her sister-in-law who has been taking care of her was there to answer most questions.  We are asked to see her with regard to an abdominal wall wound in the lower right quadrant.  Her sister says that this is right where the band of her pants typically is.  Its not clear exactly how long its been there.    Past Medical History: Morbid obesity, end-stage renal disease secondary to hypertension, diastolic congestive heart failure, there is a listing of type 2 diabetes but the patient's family denies this    Past Surgical History: Significant for arteriovenous shunt placement 2022 and previous tunneled dialysis catheter.  No known history of abdominal surgery.    Medications: Reviewed and reconciled in epic    Allergies: None known    Social History: Former smoker, apparently until fairly recently had been working, on her feet for large portions of the day working at [employer] but currently not able to get up and get around much    Family History: Adopted, remainder family history unclear    Review of Systems:  No meaningful review of systems able to be obtained at this time    Physical Exam:  BMI: 41.6  Temperature 98.4 heart rate 87 blood pressure 116/74  General: Asleep, able to be awakened but does not answer any questions meaningfully, no acute distress but does have a chronically ill appearance  Eyes: No scleral icterus, extraocular movements are intact  Neck: Supple  without lymphadenopathy or thyromegaly, trachea is in the midline  Respiratory: There is good bilateral chest expansion, no use of accessory muscles is noted  Cardiovascular: No jugular venous distention or peripheral edema is seen  Gastrointestinal: Abdomen is obese.  The skin essentially over the entire abdomen, particularly in the lower abdomen is quite firm and rigid.  There is no obvious hernia.  In the right lower quadrant of the abdominal wall there is some superficial excoriation and superficial necrotic appearing skin.  There is no evidence of drainage or underlying abscess.  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    Laboratory data: White blood cell count normal at 8.4 hemoglobin low at 7.1 platelets normal at 302 chemistries reviewed, creatinine 4.6, albumin 3.1 chemistries otherwise unremarkable    Imaging data: There is ascitic free fluid within the abdomen.  Subcutaneous tissues, particularly in the lower abdomen demonstrated extensive calcifications.  There is no undrained fluid collection noted.      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  Since this appears to be quite superficial I would recommend dressings with Santyl.  I will ask wound care to see and assist with this.  Longer-term she would benefit from seeing a wound care specialist.  There is really no role for general surgery here.  -End-stage renal disease, noncompliant  -Morbid obesity complicating above      José Luis Marin MD, FACS  General, Minimally Invasive and Endoscopic Surgery  [facility]    [address], [address]  [place], [place], [address]  P: [phone]  F: [phone]

## 2023-10-31 NOTE — ED NOTES
Pain medication given. Boxed lunch given after checking pt's diet orders. Sprite also given. Clean bed assignment received, pt placed in transport que.

## 2023-10-31 NOTE — PLAN OF CARE
Problem: Adult Inpatient Plan of Care  Goal: Absence of Hospital-Acquired Illness or Injury  Outcome: Ongoing, Progressing  Intervention: Identify and Manage Fall Risk  Recent Flowsheet Documentation  Taken 10/31/2023 0400 by Jarvis Mascorro RN  Safety Promotion/Fall Prevention:   safety round/check completed   room organization consistent   nonskid shoes/slippers when out of bed   lighting adjusted   clutter free environment maintained   assistive device/personal items within reach   fall prevention program maintained  Taken 10/31/2023 0200 by Jravis Mascorro RN  Safety Promotion/Fall Prevention:   safety round/check completed   room organization consistent   nonskid shoes/slippers when out of bed   lighting adjusted   clutter free environment maintained   assistive device/personal items within reach   fall prevention program maintained  Taken 10/30/2023 2113 by Jarvis Mascorro RN  Safety Promotion/Fall Prevention:   safety round/check completed   room organization consistent   nonskid shoes/slippers when out of bed   lighting adjusted   clutter free environment maintained   assistive device/personal items within reach   fall prevention program maintained  Intervention: Prevent Skin Injury  Recent Flowsheet Documentation  Taken 10/31/2023 0400 by Jarvis Mascorro RN  Body Position: position changed independently  Taken 10/31/2023 0200 by Jarvis Mascorro RN  Body Position: position changed independently  Taken 10/30/2023 2113 by Jarvis Mascorro RN  Body Position: position changed independently  Intervention: Prevent and Manage VTE (Venous Thromboembolism) Risk  Recent Flowsheet Documentation  Taken 10/31/2023 0400 by Jarvis Mascorro RN  Activity Management: activity minimized  Taken 10/31/2023 0200 by Jarvis Mascoror RN  Activity Management: activity minimized  Taken 10/30/2023 2113 by Jarvis Mascorro RN  Activity Management: activity  minimized  VTE Prevention/Management: (Heparin) other (see comments)  Range of Motion: active ROM (range of motion) encouraged     Problem: Adult Inpatient Plan of Care  Goal: Optimal Comfort and Wellbeing  Intervention: Monitor Pain and Promote Comfort  Recent Flowsheet Documentation  Taken 10/31/2023 0100 by Jarvis Mascorro RN  Pain Management Interventions: see MAR  Taken 10/30/2023 2304 by Jarvis Mascorro RN  Pain Management Interventions: see MAR  Taken 10/30/2023 2119 by Jarvis Mascorro RN  Pain Management Interventions: see MAR     Problem: Adult Inpatient Plan of Care  Goal: Optimal Comfort and Wellbeing  Intervention: Provide Person-Centered Care  Recent Flowsheet Documentation  Taken 10/30/2023 2113 by Jarvis Mascorro RN  Trust Relationship/Rapport:   care explained   choices provided   questions answered   questions encouraged     Problem: Pain Chronic (Persistent) (Comorbidity Management)  Goal: Acceptable Pain Control and Functional Ability  Outcome: Ongoing, Progressing  Intervention: Develop Pain Management Plan  Recent Flowsheet Documentation  Taken 10/31/2023 0100 by Jarvis Mascorro RN  Pain Management Interventions: see MAR  Taken 10/30/2023 2304 by Jarvis Mascorro RN  Pain Management Interventions: see MAR  Taken 10/30/2023 2119 by Jarvis Mascorro RN  Pain Management Interventions: see MAR  Intervention: Optimize Psychosocial Wellbeing  Recent Flowsheet Documentation  Taken 10/30/2023 2113 by Jarvis Mascorro RN  Supportive Measures:   active listening utilized   verbalization of feelings encouraged  Diversional Activities: smartphone  Family/Support System Care:   support provided   self-care encouraged     Problem: Fall Injury Risk  Goal: Absence of Fall and Fall-Related Injury  Outcome: Ongoing, Progressing  Intervention: Promote Injury-Free Environment  Recent Flowsheet Documentation  Taken 10/31/2023 0400 by Subha  Jarvis Lex, RN  Safety Promotion/Fall Prevention:   safety round/check completed   room organization consistent   nonskid shoes/slippers when out of bed   lighting adjusted   clutter free environment maintained   assistive device/personal items within reach   fall prevention program maintained  Taken 10/31/2023 0200 by Jarvis Mascorro RN  Safety Promotion/Fall Prevention:   safety round/check completed   room organization consistent   nonskid shoes/slippers when out of bed   lighting adjusted   clutter free environment maintained   assistive device/personal items within reach   fall prevention program maintained  Taken 10/30/2023 2113 by Jarvis Mascorro, RN  Safety Promotion/Fall Prevention:   safety round/check completed   room organization consistent   nonskid shoes/slippers when out of bed   lighting adjusted   clutter free environment maintained   assistive device/personal items within reach   fall prevention program maintained   Goal Outcome Evaluation:  Plan of Care Reviewed With: patient           Outcome Evaluation: AOx4, VSS, SR on telemetry monitor, room air, had multiple complains of severe pain -informed [facility] accordingly and gave pain medications as ordered, able to sleep past 2 AM, with relatives at bedside, referred to Nephrology as marcial AVF at left arm -arm precaution maintained, for paracentesis this morning -pt and family informed but they want to speak with the doctor first before pt go for the procedure.

## 2023-11-01 NOTE — CONSULTS
"Nutrition Services    Patient Name:  Gisela Dyson  YOB: 1985  MRN: [id number]  Admit Date:  10/30/2023    Assessment Date:  11/01/23    CLINICAL NUTRITION - EDUCATION     ASSESSMENT  Encounter Information         Consult from Physician    Education topic Carbohydrate counting, Diabetes, Renal     Learner Patient, Parent    Learning readiness Pt father motivated to learn    Pertinent nutrition history T2DM, ESRD on HD     Anthropometrics         Height Height: 157.5 cm (62\")    Weight Weight: 103 kg (227 lb 11.8 oz) (11/01/23 0632)    BMI  Body mass index is 41.65 kg/m².   Obese, Class III (40 or higher)    Comments      Medication/Biochemical          Pertinent medications Anticoagulant      Pertinent lab values Results from last 7 days   Lab Units 11/01/23  0603 10/31/23  0602 10/30/23  1537   SODIUM mmol/L 133* 132* 135*   POTASSIUM mmol/L 4.2 3.8 3.3*   CHLORIDE mmol/L 95* 94* 94*   CO2 mmol/L 26.0 25.9 29.0   BUN mg/dL 27* 27* 22*   CREATININE mg/dL 4.51* 4.62* 4.05*   CALCIUM mg/dL 8.2* 7.9* 8.4*   BILIRUBIN mg/dL  --   --  1.4*   ALK PHOS U/L  --   --  237*   ALT (SGPT) U/L  --   --  8   AST (SGOT) U/L  --   --  10   GLUCOSE mg/dL 80 90 77       Lab Results   Component Value Date    HGBA1C 5.10 10/17/2023        DIAGNOSIS  PES Statement         Problem  Food and nutrition knowledge deficit    Etiology Medical Diagnosis - T2DM, ESRD on HD    Signs/Symptoms Information deficit     INTERVENTION  Intervention/Evaluation         Goal   Disease management/therapy, Improved nutrition related labs, Meet nutritional needs for diagnosis/condition, Provide information , Reduce/improve symptoms    Educated regarding Appropriate portions, Beverage choices, Eating pattern, Food choices, Food preparation, Food shopping, Label reading, Seasoning foods, Snacks    Resources given Printed materials, Sample menus    Monitor/evaluation  Per protocol    Discharge planning Contact RD if questions/concerns     RD to " follow per protocol.     Electronically signed by:  Annika Stanley Dietitian Intern   11/01/23 09:51 EDT

## 2023-11-01 NOTE — PROGRESS NOTES
[facility] Progress Note      Patient Name: Gisela Dyson  : 1985  MRN: [id number]  Primary Care Physician:  Provider, No Known  Date of admission: 10/30/2023    Subjective     Interval History:   Follow-up ESRD.  2 L off with dialysis yesterday.  Goal today 4 kg.  She is currently on dialysis.  Very sleepy.  Temp 100.9 on dialysis.  Blood cultures sent.  Review of Systems:   As noted above    Objective     Vitals:   Temp:  [98.1 °F (36.7 °C)-100.9 °F (38.3 °C)] 99.3 °F (37.4 °C)  Heart Rate:  [78-94] 89  Resp:  [16-18] 18  BP: ()/(53-80) 123/69    Intake/Output Summary (Last 24 hours) at 2023 1157  Last data filed at 10/31/2023 1805  Gross per 24 hour   Intake --   Output 2000 ml   Net -2000 ml       Physical Exam:    General Appearance: Sleeping soundly on dialysis.  Very chronically ill  Skin: warm and dry  HEENT: oral mucosa normal.  Neck: supple, no JVD  Lungs: Clear to auscultation  Heart: RRR, normal S1 and S2  Abdomen: soft, nontender, distended.  Abdominal wall edema.  wound dressed. +bs  Extremities: 1+ lower extremity edema.  Left upper extremity AV fistula.      Scheduled Meds:     allopurinol, 100 mg, Oral, Daily  bacitracin, 1 application , Topical, Q12H  carvedilol, 6.25 mg, Oral, BID With Meals  collagenase, 1 application , Topical, Q24H  heparin (porcine), 5,000 Units, Subcutaneous, Q12H  hydrocortisone-bacitracin-zinc oxide-nystatin, 1 application , Topical, Q12H  levothyroxine, 137 mcg, Oral, Q AM  sacubitril-valsartan, 1 tablet, Oral, BID  senna-docusate sodium, 2 tablet, Oral, BID      IV Meds:        Results Reviewed:   I have personally reviewed the results from the time of this admission to 2023 11:57 EDT     Results from last 7 days   Lab Units 23  0603 10/31/23  0602 10/30/23  1537   SODIUM mmol/L 133* 132* 135*   POTASSIUM mmol/L 4.2 3.8 3.3*   CHLORIDE mmol/L 95* 94* 94*   CO2 mmol/L 26.0 25.9 29.0   BUN mg/dL 27* 27* 22*    CREATININE mg/dL 4.51* 4.62* 4.05*   CALCIUM mg/dL 8.2* 7.9* 8.4*   BILIRUBIN mg/dL  --   --  1.4*   ALK PHOS U/L  --   --  237*   ALT (SGPT) U/L  --   --  8   AST (SGOT) U/L  --   --  10   GLUCOSE mg/dL 80 90 77       Estimated Creatinine Clearance: 19.4 mL/min (A) (by C-G formula based on SCr of 4.51 mg/dL (H)).    Results from last 7 days   Lab Units 11/01/23  0603   PHOSPHORUS mg/dL 3.8             Results from last 7 days   Lab Units 11/01/23  0603 10/31/23  0602 10/30/23  1537   WBC 10*3/mm3 7.60 8.39 9.04   HEMOGLOBIN g/dL 7.0* 7.1* 8.4*   PLATELETS 10*3/mm3 273 302 330             Assessment / Plan     ASSESSMENT:  ESRD.  Dialysis today.  Removing volume.  Abdominal pain.  Anasarca and ascites.  She did have relief of abdominal pain last admission after large-volume paracentesis.  Some of her pain may also be related to her abdominal wall subcutaneous edema and thickening.  Certainly calciphylaxis is in the differential.  Would refer for outpatient dermatologic evaluation.  Dr. Marin's evaluation noted.  Ask wound care to see.  3.  Hypertension.  Too Controlled.  I have stopped her Entresto for now.  Remains on low-dose Coreg.  4.  Medical noncompliance complicates all care.  5.  Anemia of CKD.  Declines transfusion.  6.  Systolic cardiomyopathy with ejection fraction 39%, right ventricular moderate dysfunction, moderate to severe mitral regurg.  Severe tricuspid regurgitation.  Echocardiogram 10/19/2023.  7.  Hypothyroid on replacement  8.  Metabolic bone disease.  Phosphorus controlled.  9.  Fever.  Blood cultures sent on dialysis.  Dr. De Oliveira has asked id to see.  PLAN:  Dialysis today for volume removal.  Ask wound care to see.  May need ongoing outpatient evaluation from dermatology.    Thank you for involving us in the care of Gsiela Dyson.  Please feel free to call with any questions.    Sowmya Boles MD  11/01/23  11:57 EDT    [facility]  [phone]    Please  note that portions of this note were completed with a voice recognition program.

## 2023-11-01 NOTE — PLAN OF CARE
Goal Outcome Evaluation:              Outcome Evaluation: A&Ox4, room air, wound care performed as ordered. Family at bedside. Bed in lowest position, 2 side rails up, brakes set, call light within reach.

## 2023-11-01 NOTE — SIGNIFICANT NOTE
11/01/23 1450   OTHER   Discipline occupational therapist   Rehab Time/Intention   Session Not Performed other (see comments)  (Pt initially GORDY for dialysis, once pt returned OT attempted to tx but pt c/o being too tired to participate in OOB act. OT will f/u next service date.)   Therapy Assessment/Plan (PT)   Criteria for Skilled Interventions Met (PT) yes   Recommendation   OT - Next Appointment 11/02/23

## 2023-11-01 NOTE — SIGNIFICANT NOTE
11/01/23 1505   OTHER   Discipline physical therapist   Rehab Time/Intention   Session Not Performed other (see comments)  (Pt back on unit after dialysis this PM, attempted to see for PT session but pt declined. Will follow up tomorrow)   Recommendation   PT - Next Appointment 11/02/23

## 2023-11-01 NOTE — CASE MANAGEMENT/SOCIAL WORK
Discharge Planning Assessment  [facility]     Patient Name: Gisela Dyson  MRN: [id number]  Today's Date: 11/1/2023    Admit Date: 10/30/2023    Plan: home with father   Discharge Needs Assessment       Row Name 11/01/23 9119       Living Environment    People in Home alone;parent(s)    Name(s) of People in Home father    Unique Family Situation staying with dad for now has less steps to enter    Current Living Arrangements apartment    Potentially Unsafe Housing Conditions none    Primary Care Provided by self    Family Caregiver if Needed parent(s)    Quality of Family Relationships involved;helpful    Able to Return to Prior Arrangements yes       Resource/Environmental Concerns    Resource/Environmental Concerns home accessibility    Home Accessibility Concerns stairs to enter home       Transition Planning    Patient/Family Anticipates Transition to home with family    Patient/Family Anticipated Services at Transition none    Transportation Anticipated family or friend will provide       Discharge Needs Assessment    Equipment Currently Used at Home none    Concerns to be Addressed no discharge needs identified    Anticipated Changes Related to Illness none    Equipment Needed After Discharge none                   Discharge Plan       Row Name 11/01/23 5582       Plan    Plan home with father    Patient/Family in Agreement with Plan yes    Plan Comments Spoke to pt father at bedside, introduced self and explained CCP role, face sheet and pharmacy information verified. Pt lives alone in an apt with 2 flights of stairs to enter, she has been staying with her father Bill until she is strong enough to enter her home. She is IADL's, uses no medical equipment, no HH or SNF history. She plans home with Bill to transport, no anticipated needs. CCP will follow - Karolina MONTOYA                  Continued Care and Services - Admitted Since 10/30/2023    Coordination has not been started for this encounter.       Expected  Discharge Date and Time       Expected Discharge Date Expected Discharge Time    Nov 3, 2023            Demographic Summary       Row Name 11/01/23 1339       General Information    Admission Type observation                   Functional Status       Row Name 11/01/23 1339       Functional Status    Usual Activity Tolerance good    Current Activity Tolerance moderate       Assessment of Health Literacy    Health Literacy Good       Functional Status, IADL    Medications independent    Meal Preparation independent    Housekeeping independent    Laundry independent    Shopping independent       Mental Status    General Appearance WDL WDL       Mental Status Summary    Recent Changes in Mental Status/Cognitive Functioning unable to assess                   Psychosocial    No documentation.                  Abuse/Neglect    No documentation.                  Legal       Row Name 11/01/23 1339       Financial/Legal    Who Manages Finances if Patient Unable dad                   Substance Abuse    No documentation.                  Patient Forms    No documentation.                     Karolina Alba RN

## 2023-11-01 NOTE — PLAN OF CARE
Goal Outcome Evaluation:        Problem: Adult Inpatient Plan of Care  Goal: Plan of Care Review  Outcome: Ongoing, Not Progressing   A&Ox4 but drowsy. VSS, NSR on monitor, on RA. Refused to work w/ PT today, Refused ambulation w/ nursing staff, Refused to go to the bathroom, Refused bath or perineal care, Refused subq heparin this shift, Refused wound care & ointment to excoriation between legs. Education provided to both pt and father at bedside, verbalized understanding.  Wound care completed to PI on abdomen. Moderate Purulent drainage from bilateral wounds and slough wound bed, foul odor. Cleansed per orders, medicated per MAR, and covered w/ island dx.   Medicated for pain w/ prn Norco. Generalized 2+ edema all over. Abdomen distended and taut. HD completed today, removed 3.5L. Updated on POC.

## 2023-11-01 NOTE — PROGRESS NOTES
" LOS: 0 days     Name: Gisela Dyson  Age: 38 y.o.  Sex: female  :  1985  MRN: [id number]         Primary Care Physician: Provider, No Known    Subjective   Subjective  Currently receiving dialysis.  Denies any abdominal pain.  Feels very tired and fatigued.  No acute overnight events noted    Objective   Vital Signs  Temp:  [98.1 °F (36.7 °C)-100.9 °F (38.3 °C)] 99.3 °F (37.4 °C)  Heart Rate:  [78-94] 89  Resp:  [16-18] 18  BP: ()/(53-80) 123/69  Body mass index is 41.65 kg/m².    Objective:  General Appearance:  Comfortable and in no acute distress (She looks very chronically ill and in poor condition).    Vital signs: (most recent): Blood pressure 128/62, pulse 90, temperature (!) 101.7 °F (38.7 °C), temperature source Oral, resp. rate 16, height 157.5 cm (62\"), weight 103 kg (227 lb 11.8 oz), SpO2 92%, not currently breastfeeding.    Lungs:  Normal effort and normal respiratory rate.  She is not in respiratory distress.  There are decreased breath sounds.    Heart: Normal rate.  Regular rhythm.    Abdomen: Abdomen is soft.  (Abdominal wounds noted).  Bowel sounds are normal.   There is no abdominal tenderness.     Extremities: There is dependent edema.  There is no local swelling.    Neurological: Patient is alert and oriented to person, place and time.  (Fatigued).    Skin:  Warm and dry.                Results Review:       I reviewed the patient's new clinical results.    Results from last 7 days   Lab Units 23  0603 10/31/23  0602 10/30/23  1537   WBC 10*3/mm3 7.60 8.39 9.04   HEMOGLOBIN g/dL 7.0* 7.1* 8.4*   PLATELETS 10*3/mm3 273 302 330     Results from last 7 days   Lab Units 23  0603 10/31/23  0602 10/30/23  1537   SODIUM mmol/L 133* 132* 135*   POTASSIUM mmol/L 4.2 3.8 3.3*   CHLORIDE mmol/L 95* 94* 94*   CO2 mmol/L 26.0 25.9 29.0   BUN mg/dL 27* 27* 22*   CREATININE mg/dL 4.51* 4.62* 4.05*   CALCIUM mg/dL 8.2* 7.9* 8.4*   GLUCOSE mg/dL 80 90 77                 Scheduled Meds: "   allopurinol, 100 mg, Oral, Daily  bacitracin, 1 application , Topical, Q12H  carvedilol, 6.25 mg, Oral, BID With Meals  collagenase, 1 application , Topical, Q24H  heparin (porcine), 5,000 Units, Subcutaneous, Q12H  hydrocortisone-bacitracin-zinc oxide-nystatin, 1 application , Topical, Q12H  levothyroxine, 137 mcg, Oral, Q AM  sacubitril-valsartan, 1 tablet, Oral, BID  senna-docusate sodium, 2 tablet, Oral, BID      PRN Meds:     acetaminophen    senna-docusate sodium **AND** polyethylene glycol **AND** bisacodyl **AND** bisacodyl    HYDROcodone-acetaminophen    melatonin    ondansetron **OR** ondansetron    [COMPLETED] Insert Peripheral IV **AND** sodium chloride    [COMPLETED] Insert Peripheral IV **AND** sodium chloride  Continuous Infusions:       Assessment & Plan   Active Hospital Problems    Diagnosis  POA    **Acute bilateral lower abdominal pain [R10.31, R10.32]  Yes    Open wound of abdominal wall, anterior, complicated [S31.109A]  Unknown    Acute on chronic HFrEF (heart failure with reduced ejection fraction) [I50.23]  Yes    Volume overload [E87.70]  Yes    ESRD on hemodialysis [N18.6, Z99.2]  Not Applicable    Primary hypothyroidism [E03.9]  Yes    Essential hypertension [I10]  Yes    Obesity, morbid, BMI 50 or higher [E66.01]  Yes    Type 2 diabetes mellitus with renal complication [E11.29]  Yes      Resolved Hospital Problems   No resolved problems to display.       Assessment & Plan    -Nephrology giving extra dialysis to remove volume.  Appreciate assistance.  -Denies abdominal pain.  She maintains she would prefer to hold off on abdominal paracentesis for now.  -General surgery evaluated her abdominal wounds and did not feel that she needs any intervention on their end right now.  Continue wound care.  -Anemia noted.  So far she has declined blood transfusion.  -Continue Entresto and levothyroxine  -Low-grade fever overnight last night noted.  Blood cultures have been obtained.  We will await  these results and if she has further fevers will conduct additional work-up.  -Blood sugars well controlled      Expected Discharge Date: 11/3/2023; Expected Discharge Time:      Zaki De Oliveira MD  [facility]  11/01/23  11:58 EDT    Addendum: Patient has had recurrence of fever up to 101.7.  No result of blood cultures yet.  Check chest x-ray.  Will ask infectious disease to evaluate.

## 2023-11-02 NOTE — CONSULTS
CONSULT NOTE    Infectious Diseases - Pattie Larsen MD  [facility]       Patient Identification:  Name: Gisela Dyson  Age: 38 y.o.  Sex: female  :  1985  MRN: [id number]             Date of Consultation: 2023      Primary Care Physician: Provider, No Known                               Requesting Physician: Dr. De Oliveira  Reason for Consultation: Fever with abdominal wounds.    Impression: Patient is a 38-year-old female with history of end-stage renal disease on hemodialysis but not able to get dialysis as per recommendations because she goes only for 2 days on dialysis per week and only allows for 2-hour sessions has been dealing with poorly controlled hypertension and body wall edema as well as progressive painful abdominal wound.  Patient has been working at [employer] and was unable to find time to complete her dialysis sessions.  In this background patient has been dealing with these painful wound that are progressive involving her abdominal area.  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  Patient continued to have fever spikes with worsening pain and discomfort in the abdominal area and infectious disease service is consulted.  At the time of my evaluation patient's father and family member at the bedside and patient is except for understandably in severe pain and crying as a result of it unable to give much information.  Paracentesis performed 2 weeks ago because of the generalized anasarca did not show any growth.    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      Recommendations/Discussions:  At this juncture I had a long conversation with the patient as well as family members including her father in the presence of nurse at the bedside.  Given her non-correctable underlying comorbidities contributing to this state and the fact that she is in so much pain which would require amount of pain medication that could put her in jeopardy of aspiration and somnolence related complication any curative intervention is likely to put her in more prolonged protracted pain and discomfort without success.  Management of calciphylaxis in this situation would require medical treatment under the guidance of nephrology service and likely to be unsuccessful given the advanced nature of the disease and other comorbidities such as elevated PTH.  I recommended emphasis on symptom management and comfort care as antibiotic treatment without surgical intervention which is not indicated in this situation is going to further make her life miserable as any active intervention would require associated care plan including lab work and imaging studies.  I would recommend no further work-up from infectious disease standpoint and symptom management.  Discussed with patient's caring nurse as well as with Dr. Keating.      History of Present Illness:   Patient is a 38-year-old female with history of end-stage renal disease on hemodialysis but not able to get dialysis as per recommendations because she goes only for 2 days on dialysis per week and only allows for 2-hour sessions has been dealing with poorly controlled hypertension and body wall edema as well as progressive painful abdominal wound.  Patient has been working at [employer] and was unable to find time to complete her dialysis sessions.  In this background patient has been dealing with these painful wound that are progressive involving her abdominal area.  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  Patient continued to have fever spikes with worsening pain and discomfort in the abdominal area and infectious disease service is consulted.  At the time of my evaluation patient's father and family member at the bedside and patient is except for understandably in severe pain and crying as a result of it unable to give much information.  Paracentesis performed 2 weeks ago because of the generalized anasarca did not show any growth.        Past Medical History:  Past Medical History:   Diagnosis Date    JULISSA (acute kidney injury)     Diastolic CHF     Disease of thyroid gland     ESRD (end stage renal disease) on dialysis     Essential hypertension     Gout     History of COVID-19 01/09/2022    Neuropathy     Nonrheumatic mitral valve regurgitation     Obesity, morbid, BMI 50 or higher     Scratch marcel     ON LEFT ARM WITH SMALL OPEN AREA INSTRUCTED PT TO CALL DR FLEMING IF AREA IS STILL OPEN ON 3/14/2022.    Tachycardia     Type 2 diabetes mellitus      Past Surgical History:  Past Surgical History:   Procedure Laterality Date    ARTERIOVENOUS FISTULA/SHUNT SURGERY Left 3/14/2022    Procedure: LEFT ARM BRACHIOCEPHALIC FISTULA CREATION;  Surgeon: Amador Schilling MD;  Location: [facility];  Service: Vascular;  Laterality: Left;    INSERTION HEMODIALYSIS CATHETER Right 6/18/2021    Procedure: TUNNEL DIALYSIS, PALINDROME CATH;  Surgeon: Ancelmo Menendez MD;  Location: [facility];  Service: Vascular;  Laterality: Right;      Home Meds:  Medications Prior to Admission   Medication Sig Dispense Refill Last Dose    allopurinol (ZYLOPRIM) 100 MG tablet Take 1 tablet by mouth Daily. 30 tablet 0 10/30/2023    carvedilol (COREG) 6.25 MG tablet Take 1 tablet by mouth 2 (Two) Times a Day With Meals. 60 tablet 0 10/30/2023    CHLORHEXIDINE GLUCONATE CLOTH EX Apply  topically. AS DIRECTED PREOP       Ergocalciferol (ERGOCAL PO) Take 1.25 mg by mouth 3 (Three)  "Times a Week.       HYDROcodone-acetaminophen (Norco) 5-325 MG per tablet Take 1 tablet by mouth Every 8 (Eight) Hours As Needed for Severe Pain . 8 tablet 0 10/29/2023    levothyroxine (SYNTHROID, LEVOTHROID) 137 MCG tablet Take 1 tablet by mouth Every Morning. 30 tablet 0 10/30/2023    Needle, Disp, (BD DISP NEEDLES) 30G X 1/2\" misc Use daily for injection of Tresiba. 100 each 3 10/29/2023    sacubitril-valsartan (ENTRESTO) 24-26 MG tablet Take 1 tablet by mouth 2 (Two) Times a Day. 60 tablet 0 10/30/2023     Current Meds:     Current Facility-Administered Medications:     acetaminophen (TYLENOL) tablet 650 mg, 650 mg, Oral, Q4H PRN, Yanely Colon MD, 650 mg at 11/02/23 0110    allopurinol (ZYLOPRIM) tablet 100 mg, 100 mg, Oral, Daily, StingYanely duque MD, 100 mg at 11/01/23 1309    bacitracin ointment 1 application , 1 application , Topical, Q12H, Zaki De Oliveira MD, 1 application  at 11/01/23 1311    sennosides-docusate (PERICOLACE) 8.6-50 MG per tablet 2 tablet, 2 tablet, Oral, BID **AND** polyethylene glycol (MIRALAX) packet 17 g, 17 g, Oral, Daily PRN **AND** bisacodyl (DULCOLAX) EC tablet 5 mg, 5 mg, Oral, Daily PRN **AND** bisacodyl (DULCOLAX) suppository 10 mg, 10 mg, Rectal, Daily PRN, Yanely Colon MD    carvedilol (COREG) tablet 6.25 mg, 6.25 mg, Oral, BID With Meals, Yanely Colon MD, 6.25 mg at 10/31/23 1722    collagenase ointment 1 application , 1 application , Topical, Q24H, José Luis Marin MD, 1 application  at 11/01/23 1312    heparin (porcine) 5000 UNIT/ML injection 5,000 Units, 5,000 Units, Subcutaneous, Q12H, Yanely Colon MD, 5,000 Units at 10/30/23 2104    HYDROcodone-acetaminophen (NORCO) 7.5-325 MG per tablet 1 tablet, 1 tablet, Oral, Q4H PRN, Sigrid Gerard, DANIKA, 1 tablet at 11/02/23 0628    hydrocortisone-bacitracin-zinc oxide-nystatin (MAGIC BARRIER) ointment 1 application , 1 application , Topical, Q12H, Zaki De Oliveira" "MD Chuy    levothyroxine (SYNTHROID, LEVOTHROID) tablet 137 mcg, 137 mcg, Oral, Q AM, Yanely Colon MD, 137 mcg at 11/01/23 1309    melatonin tablet 3 mg, 3 mg, Oral, Nightly PRN, Yanely Colon MD    ondansetron (ZOFRAN) tablet 4 mg, 4 mg, Oral, Q6H PRN, 4 mg at 10/31/23 0100 **OR** ondansetron (ZOFRAN) injection 4 mg, 4 mg, Intravenous, Q6H PRN, Yanely Colon MD    [COMPLETED] Insert Peripheral IV, , , Once **AND** sodium chloride 0.9 % flush 10 mL, 10 mL, Intravenous, PRN, Rufus Hartman MD    [COMPLETED] Insert Peripheral IV, , , Once **AND** sodium chloride 0.9 % flush 10 mL, 10 mL, Intravenous, PRN, Rufus Hartman MD  Allergies:  No Known Allergies  Social History:   Social History     Tobacco Use    Smoking status: Former     Years: 1     Types: Cigarettes    Smokeless tobacco: Never    Tobacco comments:     QUIT AGE 32   Substance Use Topics    Alcohol use: Yes     Alcohol/week: 1.0 - 2.0 standard drink of alcohol     Types: 1 - 2 Glasses of wine per week     Comment: socially      Family History:  Family History   Adopted: Yes   Problem Relation Age of Onset    Malig Hyperthermia Neg Hx           Review of Systems  See history of present illness and past medical history.    Complaining of severe pain and crying throughout the interaction.      Vitals:   /63 (BP Location: Right arm, Patient Position: Lying)   Pulse 87   Temp 99 °F (37.2 °C) (Oral)   Resp 18   Ht 157.5 cm (62\")   Wt 97.5 kg (214 lb 15.2 oz)   SpO2 91%   BMI 39.31 kg/m²   I/O:   Intake/Output Summary (Last 24 hours) at 11/2/2023 0820  Last data filed at 11/1/2023 1700  Gross per 24 hour   Intake 360 ml   Output 7000 ml   Net -6640 ml     Exam:  Patient is examined using the personal protective equipment as per guidelines from infection control for this particular patient as enacted.  Hand washing was performed before and after patient interaction.  General Appearance:  Ill-appearing morbidly obese " female who is in severe distress because of pain and discomfort and teary-eyed and crying throughout the interaction.   Head:    Normocephalic, without obvious abnormality, atraumatic   Eyes:  Pale   Ears:    Normal external ear canals, both ears   Nose:   Nares normal, septum midline, mucosa normal, no drainage    or sinus tenderness   Throat:   Lips, tongue, gums normal; oral mucosa pink and moist   Neck: Supple   Back:     Symmetric, no curvature, ROM normal, no CVA tenderness   Lungs:   No obvious use of accessory muscles of breathing noted   Chest Wall:    No tenderness or deformity    Heart:  Tachycardiac   Abdomen:              Extremities: Edema noted   Pulses:   Pulses palpable in all extremities; symmetric all extremities   Skin: Severe wound and tenderness due to calciphylaxis.    Neurologic: Awake interactive uncomfortable and crying.       Data Review:    I reviewed the patient's new clinical results.  Results from last 7 days   Lab Units 11/02/23  0734 11/01/23  0603 10/31/23  0602 10/30/23  1537   WBC 10*3/mm3 9.88 7.60 8.39 9.04   HEMOGLOBIN g/dL 6.6* 7.0* 7.1* 8.4*   PLATELETS 10*3/mm3 262 273 302 330     Results from last 7 days   Lab Units 11/02/23  0734 11/01/23  0603 10/31/23  0602 10/30/23  1537   SODIUM mmol/L 133* 133* 132* 135*   POTASSIUM mmol/L 3.9 4.2 3.8 3.3*   CHLORIDE mmol/L 98 95* 94* 94*   CO2 mmol/L 24.6 26.0 25.9 29.0   BUN mg/dL 20 27* 27* 22*   CREATININE mg/dL 3.69* 4.51* 4.62* 4.05*   CALCIUM mg/dL 8.1* 8.2* 7.9* 8.4*   GLUCOSE mg/dL 85 80 90 77     Microbiology Results (last 10 days)       ** No results found for the last 240 hours. **              Assessment:  Active Hospital Problems    Diagnosis  POA    **Acute bilateral lower abdominal pain [R10.31, R10.32]  Yes    Open wound of abdominal wall, anterior, complicated [S31.109A]  Unknown    Acute on chronic HFrEF (heart failure with reduced ejection fraction) [I50.23]  Yes    Volume overload [E87.70]  Yes    ESRD on  hemodialysis [N18.6, Z99.2]  Not Applicable    Primary hypothyroidism [E03.9]  Yes    Essential hypertension [I10]  Yes    Obesity, morbid, BMI 50 or higher [E66.01]  Yes    Type 2 diabetes mellitus with renal complication [E11.29]  Yes      Resolved Hospital Problems   No resolved problems to display.         Plan:  See above  Pattie Scott MD   11/2/2023  08:20 EDT    Parts of this note may be an electronic transcription/translation of spoken language to printed text using the Dragon dictation system.

## 2023-11-02 NOTE — PLAN OF CARE
Problem: Adult Inpatient Plan of Care  Goal: Absence of Hospital-Acquired Illness or Injury  Intervention: Identify and Manage Fall Risk  Recent Flowsheet Documentation  Taken 11/2/2023 0421 by Wild Murray RN  Safety Promotion/Fall Prevention:   safety round/check completed   room organization consistent   nonskid shoes/slippers when out of bed   fall prevention program maintained   elopement precautions   assistive device/personal items within reach   activity supervised   clutter free environment maintained  Taken 11/2/2023 0231 by Wild Murray RN  Safety Promotion/Fall Prevention:   safety round/check completed   room organization consistent   nonskid shoes/slippers when out of bed   fall prevention program maintained   elopement precautions   clutter free environment maintained   assistive device/personal items within reach   activity supervised  Taken 11/2/2023 0041 by Wild Murray RN  Safety Promotion/Fall Prevention:   safety round/check completed   room organization consistent   nonskid shoes/slippers when out of bed   fall prevention program maintained   elopement precautions   clutter free environment maintained   assistive device/personal items within reach   activity supervised  Taken 11/1/2023 2240 by Wild Murray RN  Safety Promotion/Fall Prevention:   safety round/check completed   room organization consistent   nonskid shoes/slippers when out of bed   fall prevention program maintained   elopement precautions   clutter free environment maintained   assistive device/personal items within reach   activity supervised  Taken 11/1/2023 2051 by Wild Murray RN  Safety Promotion/Fall Prevention:   safety round/check completed   room organization consistent   nonskid shoes/slippers when out of bed   fall prevention program maintained   elopement precautions   clutter free environment maintained   assistive device/personal items within reach   activity  supervised  Intervention: Prevent Skin Injury  Recent Flowsheet Documentation  Taken 11/2/2023 0421 by Wild Murray RN  Body Position:   weight shifting   position changed independently  Taken 11/2/2023 0231 by Wild Murray RN  Body Position:   weight shifting   position changed independently  Taken 11/2/2023 0041 by Wild Murray RN  Body Position:   weight shifting   position changed independently  Taken 11/1/2023 2240 by Wild Murray RN  Body Position:   weight shifting   position changed independently  Taken 11/1/2023 2051 by Wild Murray RN  Body Position: position changed independently  Intervention: Prevent and Manage VTE (Venous Thromboembolism) Risk  Recent Flowsheet Documentation  Taken 11/2/2023 0421 by Wild Murray RN  Activity Management: activity encouraged  Taken 11/2/2023 0231 by Wild Murray RN  Activity Management: activity encouraged  Taken 11/2/2023 0041 by Wild Murray RN  Activity Management: activity encouraged  Taken 11/1/2023 2240 by Wild Murray RN  Activity Management: activity encouraged  Taken 11/1/2023 2051 by Wild Murray RN  Activity Management: activity encouraged  Intervention: Prevent Infection  Recent Flowsheet Documentation  Taken 11/2/2023 0421 by Wild Murray RN  Infection Prevention:   single patient room provided   rest/sleep promoted  Taken 11/2/2023 0231 by Wild Murray RN  Infection Prevention:   single patient room provided   rest/sleep promoted  Taken 11/2/2023 0041 by Wild Murray RN  Infection Prevention:   single patient room provided   rest/sleep promoted  Taken 11/1/2023 2240 by Wild Murray RN  Infection Prevention:   single patient room provided   rest/sleep promoted  Taken 11/1/2023 2051 by Wild Murray RN  Infection Prevention:   single patient room provided   rest/sleep promoted   Goal Outcome Evaluation:

## 2023-11-02 NOTE — PROGRESS NOTES
[facility] Services: Piperacillin-Tazobactam Consult    Pt Name: Gisela Dyson   : 1985    Indication: Sepsis    Relevant clinical data and objective history reviewed:    Past Medical History:   Diagnosis Date    JULISSA (acute kidney injury)     Diastolic CHF     Disease of thyroid gland     ESRD (end stage renal disease) on dialysis     Essential hypertension     Gout     History of COVID-19 2022    Neuropathy     Nonrheumatic mitral valve regurgitation     Obesity, morbid, BMI 50 or higher     Scratch marcel     ON LEFT ARM WITH SMALL OPEN AREA INSTRUCTED PT TO CALL DR FLEMING IF AREA IS STILL OPEN ON 3/14/2022.    Tachycardia     Type 2 diabetes mellitus      Creatinine   Date Value Ref Range Status   2023 3.69 (H) 0.57 - 1.00 mg/dL Final   2023 4.51 (H) 0.57 - 1.00 mg/dL Final   10/31/2023 4.62 (H) 0.57 - 1.00 mg/dL Final     BUN   Date Value Ref Range Status   2023 20 6 - 20 mg/dL Final     Estimated Creatinine Clearance: 23 mL/min (A) (by C-G formula based on SCr of 3.69 mg/dL (H)).    Lab Results   Component Value Date    WBC 9.88 2023     Temp Readings from Last 3 Encounters:   23 99 °F (37.2 °C) (Oral)   10/21/23 97.5 °F (36.4 °C) (Temporal)   22 97.8 °F (36.6 °C) (Oral)      Assessment/Plan  Estimated CrCl >20 mL/min at this time; BMI 39.3 kg/m2 (on HD)  Will start piperacillin-tazobactam 3.375 g IV every 12  hours via extended infusion protocol    Pharmacy will continue to follow daily while on piperacillin-tazobactam and adjust as needed. Thank you for this consult.    Zhou Acosta [facility]  Clinical Pharmacist

## 2023-11-02 NOTE — PROGRESS NOTES
LOS: 0 days     Name: Gisela Dyson  Age: 38 y.o.  Sex: female  :  1985  MRN: [id number]         Primary Care Physician: Provider, No Known    Subjective   Subjective  No acute events overnight. Patient's brother and sister-in-law are at bedside today. She does appear uncomfortable. States that she is not having any pain initially, but begins moaning. Then endorses abdominal pain.    Objective   Vital Signs  Temp:  [98.4 °F (36.9 °C)-101.6 °F (38.7 °C)] 99 °F (37.2 °C)  Heart Rate:  [87-94] 87  Resp:  [16-18] 18  BP: (102-117)/(58-63) 110/63  Body mass index is 39.31 kg/m².    General: Lying in bed. Wakes up to voice. Very uncomfortable appearing.   ENT: No conjunctival injection or scleral icterus. Moist mucous membranes.   Lungs: Diminished breath sounds. Clear to auscultation bilaterally. No wheeze or crackles. No distress.   Heart: RRR, no murmurs. No edema.  Abdomen: Soft with normal bowel sounds. Generalized tenderness to palpation. Wounds covered and dressed.   Ext: Warm and well-perfused. No edema.   Skin: IV site without swelling or erythema.     Results Review:       I reviewed the patient's new clinical results.    Results from last 7 days   Lab Units 23  0734 23  0603 10/31/23  0602 10/30/23  1537   WBC 10*3/mm3 9.88 7.60 8.39 9.04   HEMOGLOBIN g/dL 6.6* 7.0* 7.1* 8.4*   PLATELETS 10*3/mm3 262 273 302 330     Results from last 7 days   Lab Units 23  0734 23  0603 10/31/23  0602 10/30/23  1537   SODIUM mmol/L 133* 133* 132* 135*   POTASSIUM mmol/L 3.9 4.2 3.8 3.3*   CHLORIDE mmol/L 98 95* 94* 94*   CO2 mmol/L 24.6 26.0 25.9 29.0   BUN mg/dL 20 27* 27* 22*   CREATININE mg/dL 3.69* 4.51* 4.62* 4.05*   CALCIUM mg/dL 8.1* 8.2* 7.9* 8.4*   GLUCOSE mg/dL 85 80 90 77                 Scheduled Meds:   bacitracin, 1 application , Topical, Q12H  collagenase, 1 application , Topical, Q24H  hydrocortisone-bacitracin-zinc oxide-nystatin, 1 application , Topical, Q12H      PRN Meds:      acetaminophen **OR** acetaminophen **OR** acetaminophen    acetaminophen    diphenoxylate-atropine    Glycerin-Hypromellose-    glycopyrrolate **OR** glycopyrrolate **OR** glycopyrrolate **OR** glycopyrrolate    HYDROmorphone **OR** Morphine **OR** morphine **OR** ketorolac    HYDROmorphone **OR** Morphine **OR** morphine    HYDROmorphone **OR** Morphine **OR** morphine    LORazepam **OR** LORazepam **OR** LORazepam    LORazepam **OR** LORazepam **OR** LORazepam    LORazepam **OR** LORazepam **OR** LORazepam    ondansetron **OR** ondansetron    Pharmacy to dose vancomycin    Scopolamine    [COMPLETED] Insert Peripheral IV **AND** sodium chloride    [COMPLETED] Insert Peripheral IV **AND** sodium chloride  Continuous Infusions:  Pharmacy to dose vancomycin,         Assessment & Plan   Active Hospital Problems    Diagnosis  POA    **Acute bilateral lower abdominal pain [R10.31, R10.32]  Yes    Open wound of abdominal wall, anterior, complicated [S31.109A]  Unknown    Acute on chronic HFrEF (heart failure with reduced ejection fraction) [I50.23]  Yes    Volume overload [E87.70]  Yes    ESRD on hemodialysis [N18.6, Z99.2]  Not Applicable    Primary hypothyroidism [E03.9]  Yes    Essential hypertension [I10]  Yes    Obesity, morbid, BMI 50 or higher [E66.01]  Yes    Type 2 diabetes mellitus with renal complication [E11.29]  Yes      Resolved Hospital Problems   No resolved problems to display.       Assessment & Plan    Patient seen today with her brother and sister-in-law at bedside. Her father has stepped out. After discussion with infectious disease this morning, the family Has decided to transition to comfort measures. Unfortunately, her wounds are consistent with calciphylaxis and there is not much that can be done for this given the extensive nature of her presentation. I personally discussed this case with both infectious disease and nephrology, and both services are in agreement that comfort measures would  be appropriate in this case. There unfortunately is not a viable treatment option and the patients discomfort is significant.  Palliative care was consult it and evaluated the patient. After further discussion, the family continue to endorse these goals of care. Gisela has never formally been diagnosed with an intellectual disability, but family reports that she has likely had this. She has only intermittently been able to live independently. She is unable to manage her finances. Given this, decision was made with the family as well.Labs were discontinued. Medication's were transitioned to comfort focus medication's. Appreciate palliative care's assistance.    Karolina Keating MD  [facility]  11/02/23  11:58 EDT

## 2023-11-02 NOTE — CONSULTS
I was request to see family to provide a few answers regarding end-of-life care.  Father, brother and daughter-in-law were present.  Patient is unable to communicate.  Family wanted information regarding an Advance Directive.  I explained since the patient is unable to communicate, we could not do an AD.  However, I did share that they still could make decisions which gave them a sense of relief.  They are very interested in Palliative Care and would like to have her transferred there.  I know there is a Palliative Care Consult.  Family is grieving appropriately, wanting to make the appropriate decisions for the patient.  Patient is a member of [employer] and her  has been by to see her.  As I concluded the family wanted to have prayer.  I will make sure Palliative Care  is aware.

## 2023-11-02 NOTE — PROGRESS NOTES
[facility] Progress Note      Patient Name: Gisela Dyson  : 1985  MRN: [id number]  Primary Care Physician:  Provider, No Known  Date of admission: 10/30/2023    Subjective     Interval History:   Follow-up ESRD.  Febrile to 101.6 this morning.  Screaming with pain earlier.  Now comfortable after Dilaudid IV.  Dr. Scott's evaluation appreciated.  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  Palliative care discussion with family noted.  I had a very denny discussion with the patient's brother, sister-in-law, and father at bedside. Patient is sleeping very soundly after IV Dilaudid. She has been noncompliant with dialysis and her medications.  Her PTH is consistently been over 1000.    We discussed calciphylaxis, limited treatment options and very grim prognosis.  I recommend palliative care.   All questions addressed and answered to the best of my ability.  Expressed understanding.  Review of Systems:   As noted above    Objective     Vitals:   Temp:  [98.4 °F (36.9 °C)-101.6 °F (38.7 °C)] 99 °F (37.2 °C)  Heart Rate:  [87-94] 87  Resp:  [16-18] 18  BP: (102-117)/(58-63) 110/63    Intake/Output Summary (Last 24 hours) at 2023 1426  Last data filed at 2023 1100  Gross per 24 hour   Intake 360 ml   Output --   Net 360 ml       Physical Exam:    General Appearance: Sleeping soundly. Very chronically ill  Skin: warm and dry  HEENT: oral mucosa normal.  Neck: supple, no JVD  Lungs: Clear to auscultation  Heart: RRR, normal S1 and S2  Abdomen: soft, nontender, distended.  Abdominal wall induration extensive.  Wounds dressed.  Pictures of wound in chart reviewed.  Extremities: 1+ lower extremity edema.  Left upper extremity AV fistula.      Scheduled Meds:     allopurinol, 100 mg, Oral, Daily  bacitracin, 1 application , Topical, Q12H  carvedilol, 6.25 mg, Oral, BID With Meals  collagenase, 1 application , Topical, Q24H  heparin (porcine), 5,000 Units, Subcutaneous,  Q12H  hydrocortisone-bacitracin-zinc oxide-nystatin, 1 application , Topical, Q12H  levothyroxine, 137 mcg, Oral, Q AM  piperacillin-tazobactam, 3.375 g, Intravenous, Once  piperacillin-tazobactam, 3.375 g, Intravenous, Q12H  senna-docusate sodium, 2 tablet, Oral, BID  vancomycin, 2,000 mg, Intravenous, Once  Vancomycin Pharmacy Intermittent/Pulse Dosing, , Does not apply, Daily      IV Meds:   Pharmacy to dose vancomycin,         Results Reviewed:   I have personally reviewed the results from the time of this admission to 11/2/2023 14:26 EDT     Results from last 7 days   Lab Units 11/02/23  0734 11/01/23  0603 10/31/23  0602 10/30/23  1537   SODIUM mmol/L 133* 133* 132* 135*   POTASSIUM mmol/L 3.9 4.2 3.8 3.3*   CHLORIDE mmol/L 98 95* 94* 94*   CO2 mmol/L 24.6 26.0 25.9 29.0   BUN mg/dL 20 27* 27* 22*   CREATININE mg/dL 3.69* 4.51* 4.62* 4.05*   CALCIUM mg/dL 8.1* 8.2* 7.9* 8.4*   BILIRUBIN mg/dL  --   --   --  1.4*   ALK PHOS U/L  --   --   --  237*   ALT (SGPT) U/L  --   --   --  8   AST (SGOT) U/L  --   --   --  10   GLUCOSE mg/dL 85 80 90 77       Estimated Creatinine Clearance: 23 mL/min (A) (by C-G formula based on SCr of 3.69 mg/dL (H)).    Results from last 7 days   Lab Units 11/02/23  0734 11/01/23  0603   PHOSPHORUS mg/dL 3.6 3.8             Results from last 7 days   Lab Units 11/02/23  0734 11/01/23  0603 10/31/23  0602 10/30/23  1537   WBC 10*3/mm3 9.88 7.60 8.39 9.04   HEMOGLOBIN g/dL 6.6* 7.0* 7.1* 8.4*   PLATELETS 10*3/mm3 262 273 302 330             Assessment / Plan     ASSESSMENT:  ESRD.  Dialysis today.  Removing volume.  Abdominal pain.  Abdominal wall wounds consistent with calciphylaxis.  With this extensive skin necrosis there is nothing to offer in terms of debridement or treatment. I Talked to the family about palliative care and recommend stopping dialysis and focusing on comfort measures.  They understand and agree.  3.  Hypertension.    4.  Medical noncompliance complicating all  care.  5.  Anemia of CKD.  Declined. transfusion.  6.  Systolic cardiomyopathy with ejection fraction 39%, right ventricular moderate dysfunction, moderate to severe mitral regurg.  Severe tricuspid regurgitation.  Echocardiogram 10/19/2023.  7.  Hypothyroid on replacement  8.  Metabolic bone disease.  PTH over 1000 at clinic.   9.  Fever.  Likely due to skin necrosis and infected wound of calciphylaxis.  Antibiotic will not be helpful.    PLAN:  Recommend palliative care and family agrees.  Discussed with GRAHAM Dozier, palliative care nurse Alla Falcon, and Dr. Zandra Keating.    Thank you for involving us in the care of Gisela Dyson.  Please feel free to call with any questions.    Sowmya Boles MD  11/02/23  14:26 EDT    [facility]  [phone]    Please note that portions of this note were completed with a voice recognition program.

## 2023-11-02 NOTE — PROGRESS NOTES
"[facility] Clinical Pharmacy Services: Vancomycin Pharmacokinetic Initial Consult Note (HD patient)    Gisela Dyson is a 38 y.o. female who is on day 1 of pharmacy to dose vancomycin.    Indication: Sepsis  Consulting Provider: Zuri  Planned Duration of Therapy: 7 days  Loading Dose Ordered or Given: 2000 mg on 11/2/23 at ~1200  MRSA PCR performed: no; Result:   Culture/Source: BC: NG x 24hrs  Target: Dose by Levels  Pertinent Vanc Dosing History: none  Other Antimicrobials: zosyn    Vitals/Labs  Ht: 157.5 cm (62\"); Wt: 97.5 kg (214 lb 15.2 oz)  Temp Readings from Last 1 Encounters:   11/02/23 99 °F (37.2 °C) (Oral)    Estimated Creatinine Clearance: 23 mL/min (A) (by C-G formula based on SCr of 3.69 mg/dL (H)).  Dialysis MWF (Had HD on 11/1/23-patient has been non-compliant w/HD sessions so not certain if on MWF schedule)     Results from last 7 days   Lab Units 11/02/23  0734 11/01/23  0603 10/31/23  0602   CREATININE mg/dL 3.69* 4.51* 4.62*   WBC 10*3/mm3 9.88 7.60 8.39     Assessment/Plan:    Vancomycin Dose:  2000 mg IV x1 over 2 hrs today  Will pulse dose the vancomycin daily & give doses after HD sessions.  Vanc Random has been ordered for 11/3/23 at 0600     Pharmacy will follow patient's kidney function and will adjust doses and obtain levels as necessary. Thank you for involving pharmacy in this patient's care. Please contact pharmacy with any questions or concerns.                           Zhou Acosta, [facility]  Clinical Pharmacist   "

## 2023-11-02 NOTE — PLAN OF CARE
Goal Outcome Evaluation:  Plan of Care Reviewed With: patient, family           Outcome Evaluation: Patient's sister-in-law was present to encourage mobilization this morning.  Hemoglobin noted to be 6.6.  PT assisted RN and aide with rolling in bed for wound and hygiene management, max assist.  Patient awake however does not really communicate and moans throughout session.  Patient does not initiate mobility . unclear prior level of function.  We will continue to progress as able DC recommendations pending progress.

## 2023-11-02 NOTE — CONSULTS
Purpose of the visit was to evaluate for: goals of care/advanced care planning and support for patient/family. Spoke with MD and RN as well as family and discussed palliative care, goals of care, care options, resuscitation status, and [facility].      Assessment:  Patient is palliative care appropriate for inpatient care given (list diagnosis/symptoms):  38 year old female with ESRD on HD, acute on chronic CHF, morbid obesity, abdominal wounds concerning for calciphylaxis. Patient did not wake up during our conversation, she received IV dilaudid and has been very sleepy today according to family. RN reports patient in significant pain when awake and with movement/wound care. Mobility significantly limited. PPS 20%    Recommendations/Plan: Change code status to DNR/DNI. Continue current treatment plan at this time. Family would like to hear from nephrology and get results of echo prior to making a decision on goals of care. Palliative care team will continue to follow.     Other Comments: Spoke with patient's father Tony, brother Omid and sister in law Amber at bedside. We discussed goals of care, treatment options and code status in detail. Family has good understanding of situation. We discussed option for inpatient palliative care in detail, answered all questions and provided support. Family in agreement to change code status but feel like they need a little more time and information before making a decision.  visited this morning for spiritual support. Discussed with Dr. Keating, Dr. Boles, and GRAHAM Dozier.     ADDENDUM: After conversation with Dr. Boles I spoke with the family again, they are ready to transfer to  for inpatient palliative care unit. We discussed medications used to alleviate suffering, [facility] consult. Updated Dr. Keating. Will change code status to comfort measures only and gear all treatment options towards symptom management. Advised of ongoing availability.

## 2023-11-02 NOTE — THERAPY TREATMENT NOTE
Acute Care - Physical Therapy Treatment Note  [facility]     Patient Name: Gisela Dyson  : 1985  MRN: [id number]  Today's Date: 2023      Visit Dx:     ICD-10-CM ICD-9-CM   1. Acute bilateral lower abdominal pain  R10.31 789.03    R10.32 789.04     338.19   2. Other ascites  R18.8 789.59   3. End-stage renal disease on hemodialysis  N18.6 585.6    Z99.2 V45.11     Patient Active Problem List   Diagnosis    Acute on chronic diastolic CHF (congestive heart failure)    JULISSA (acute kidney injury)    Obesity, morbid, BMI 50 or higher    Type 2 diabetes mellitus with renal complication    Essential hypertension    Nonrheumatic mitral valve regurgitation    Primary hypothyroidism    Hirsutism    Alopecia    Acute renal failure    ESRD on hemodialysis    Fluid overload    CKD (chronic kidney disease) requiring chronic dialysis    Volume overload    Acute on chronic HFrEF (heart failure with reduced ejection fraction)    Acute bilateral lower abdominal pain    Open wound of abdominal wall, anterior, complicated     Past Medical History:   Diagnosis Date    JULISSA (acute kidney injury)     Diastolic CHF     Disease of thyroid gland     ESRD (end stage renal disease) on dialysis     Essential hypertension     Gout     History of COVID-19 2022    Neuropathy     Nonrheumatic mitral valve regurgitation     Obesity, morbid, BMI 50 or higher     Scratch marcel     ON LEFT ARM WITH SMALL OPEN AREA INSTRUCTED PT TO CALL DR FLEMING IF AREA IS STILL OPEN ON 3/14/2022.    Tachycardia     Type 2 diabetes mellitus      Past Surgical History:   Procedure Laterality Date    ARTERIOVENOUS FISTULA/SHUNT SURGERY Left 3/14/2022    Procedure: LEFT ARM BRACHIOCEPHALIC FISTULA CREATION;  Surgeon: Amador Schilling MD;  Location: [facility] OR;  Service: Vascular;  Laterality: Left;    INSERTION HEMODIALYSIS CATHETER Right 2021    Procedure: TUNNEL DIALYSIS, PALINDROME CATH;  Surgeon: Ancelmo Menendez MD;  Location:   [facility] OR;  Service: Vascular;  Laterality: Right;     PT Assessment (last 12 hours)       PT Evaluation and Treatment       Row Name 11/02/23 0900          Physical Therapy Time and Intention    Subjective Information complains of;weakness  -     Document Type therapy note (daily note)  -     Mode of Treatment individual therapy;physical therapy  -     Patient Effort poor  -     Symptoms Noted During/After Treatment increased pain  -       Row Name 11/02/23 0900          General Information    Patient Profile Reviewed yes  -     Patient Observations alert  -     General Observations of Patient Patient supine in bed sister-in-law present to encourage mobilization  -     Barriers to Rehab medically complex  -       Row Name 11/02/23 0900          Pain    Pretreatment Pain Rating 10/10  -     Posttreatment Pain Rating 10/10  -     Pain Location generalized  -     Pain Location - abdomen  -     Pre/Posttreatment Pain Comment Patient moans with repositioning rolling in bed  -     Pain Intervention(s) Repositioned;Medication (See MAR)  -       Row Name 11/02/23 0900          Cognition    Affect/Mental Status (Cognition) flat/blunted affect  -     Behavioral Issues (Cognition) unable/difficult to assess  -     Follows Commands (Cognition) follows one-step commands;75-90% accuracy;initiation impaired  -       Row Name 11/02/23 0900          Bed Mobility    Bed Mobility supine-sit;sit-supine;rolling left;rolling right  -     Rolling Left Simpson (Bed Mobility) maximum assist (25% patient effort);nonverbal cues (demo/gesture);verbal cues  -     Rolling Right Simpson (Bed Mobility) maximum assist (25% patient effort);verbal cues;nonverbal cues (demo/gesture)  -     Supine-Sit Simpson (Bed Mobility) dependent (less than 25% patient effort);2 person assist  -     Sit-Supine Simpson (Bed Mobility) dependent (less than 25% patient effort);2 person assist  -      Bed Mobility, Safety Issues cognitive deficits limit understanding  Cognitive versus self-limiting behavior?  -       Row Name 11/02/23 0900          Transfers    Comment, (Transfers) deferred due to low hemoglobin  -       Row Name             Wound 10/30/23 2100 Bilateral lower abdomen Skin Tear    Wound - Properties Group Placement Date: 10/30/23  -DM Placement Time: 2100  -DM Present on Original Admission: Y  -DM Side: Bilateral  -DM Orientation: lower  -DM Location: abdomen  -DM Primary Wound Type: Skin tear  -DM    Retired Wound - Properties Group Placement Date: 10/30/23  -DM Placement Time: 2100  -DM Present on Original Admission: Y  -DM Side: Bilateral  -DM Orientation: lower  -DM Location: abdomen  -DM Primary Wound Type: Skin tear  -DM    Retired Wound - Properties Group Date first assessed: 10/30/23  -DM Time first assessed: 2100  -DM Present on Original Admission: Y  -DM Side: Bilateral  -DM Location: abdomen  -DM Primary Wound Type: Skin tear  -DM      Row Name 11/02/23 0900          Plan of Care Review    Plan of Care Reviewed With patient;family  -     Outcome Evaluation Patient's sister-in-law was present to encourage mobilization this morning.  Hemoglobin noted to be 6.6.  PT assisted RN and aide with rolling in bed for wound and hygiene management, max assist.  Patient awake however does not really communicate and moans throughout session.  Patient does not initiate mobility . unclear prior level of function.  We will continue to progress as able DC recommendations pending progress.  -       Row Name 11/02/23 0900          Positioning and Restraints    Pre-Treatment Position in bed  -     Post Treatment Position bed  -     In Bed supine;call light within reach;encouraged to call for assist;exit alarm on;with nsg;with family/caregiver  With nursing performing hygiene care  -               User Key  (r) = Recorded By, (t) = Taken By, (c) = Cosigned By      Initials Name Provider Type      Gisela Sorenson, PT Physical Therapist    Jarvis Samaniego, RN Registered Nurse                    Physical Therapy Education       Title: PT OT SLP Therapies (In Progress)       Topic: Physical Therapy (In Progress)       Point: Mobility training (In Progress)       Learning Progress Summary             Patient Nonacceptance, E, NR by  at 11/2/2023 0934    Acceptance, E, VU,NR by  at 10/31/2023 0914   Family Nonacceptance, E, NR by  at 11/2/2023 0934                         Point: Home exercise program (In Progress)       Learning Progress Summary             Patient Nonacceptance, E, NR by  at 11/2/2023 0934   Family Nonacceptance, E, NR by  at 11/2/2023 0934                         Point: Body mechanics (Not Started)       Learner Progress:  Not documented in this visit.              Point: Precautions (Not Started)       Learner Progress:  Not documented in this visit.                              User Key       Initials Effective Dates Name Provider Type Discipline     06/16/21 -  Gisela Sorenson, PT Physical Therapist PT     12/13/22 -  Alyssa Martínez PT Physical Therapist PT                  PT Recommendation and Plan     Plan of Care Reviewed With: patient, family  Outcome Evaluation: Patient's sister-in-law was present to encourage mobilization this morning.  Hemoglobin noted to be 6.6.  PT assisted RN and aide with rolling in bed for wound and hygiene management, max assist.  Patient awake however does not really communicate and moans throughout session.  Patient does not initiate mobility . unclear prior level of function.  We will continue to progress as able DC recommendations pending progress.   Outcome Measures       Row Name 11/02/23 0900             How much help from another person do you currently need...    Turning from your back to your side while in flat bed without using bedrails? 1  -      Moving from lying on back to sitting on the side of a flat bed without  bedrails? 1  -LH      Moving to and from a bed to a chair (including a wheelchair)? 1  -LH      Standing up from a chair using your arms (e.g., wheelchair, bedside chair)? 1  -LH      Climbing 3-5 steps with a railing? 1  -LH      To walk in hospital room? 1  -      AM-PAC 6 Clicks Score (PT) 6  -      Highest level of mobility 2 --> Bed activities/dependent transfer  -         Functional Assessment    Outcome Measure Options AM-PAC 6 Clicks Basic Mobility (PT)  -                User Key  (r) = Recorded By, (t) = Taken By, (c) = Cosigned By      Initials Name Provider Type     Gisela Sorenson, PT Physical Therapist                     Time Calculation:    PT Charges       Row Name 11/02/23 0934             Time Calculation    Start Time 0842  -      Stop Time 0905  -      Time Calculation (min) 23 min  -      PT Received On 11/02/23  -      PT - Next Appointment 11/03/23  -                User Key  (r) = Recorded By, (t) = Taken By, (c) = Cosigned By      Initials Name Provider Type     Gisela Sorenson, PT Physical Therapist                  Therapy Charges for Today       Code Description Service Date Service Provider Modifiers Qty    39088676862 HC PT THER PROC EA 15 MIN 11/2/2023 Gisela Sorenson, PT GP 1    42216232068 HC PT THERAPEUTIC ACT EA 15 MIN 11/2/2023 Gisela Sorenson, PT GP 1    30223590146 HC PT THER SUPP EA 15 MIN 11/2/2023 Gisela Sorenson, PT GP 1            PT G-Codes  Outcome Measure Options: AM-PAC 6 Clicks Basic Mobility (PT)  AM-PAC 6 Clicks Score (PT): 6    Gsiela Sorenson PT  11/2/2023

## 2023-11-03 NOTE — TELEPHONE ENCOUNTER
Please be informed that patient has passed. Patient has been marked  in the system. The date of death is: 11/3/30095    Caller: FILOMENA ULRICH)    Relationship: Father    Best call back number: [phone]     Did the patient have surgery within 30 days of their passing (Y/N): UNKNOWN

## 2023-11-03 NOTE — PROGRESS NOTES
LOS: 0 days     Name: Gisela Dyson  Age: 38 y.o.  Sex: female  :  1985  MRN: [id number]         Primary Care Physician: Provider, No Known    Subjective   Subjective  No acute events overnight.  Patient seen this morning and sleeping.  Overall comfortable appearing.    Objective   Vital Signs  Temp:  [103.8 °F (39.9 °C)] 103.8 °F (39.9 °C)  Heart Rate:  [0-110] 0  Resp:  [0-24] 0  BP: (92)/(59) 92/59  Body mass index is 39.31 kg/m².    General: Sleeping comfortably, no acute distress.  Lungs: Diminished breath sounds. Clear to auscultation bilaterally. No wheeze or crackles. No distress.   Heart: RRR, no murmurs. No edema.  Abdomen: Soft with normal bowel sounds. Wounds covered and dressed.   Ext: Warm and well-perfused. No edema.   Skin: IV site without swelling or erythema.     Results Review:       I reviewed the patient's new clinical results.    Results from last 7 days   Lab Units 23  0734 23  0603 10/31/23  0602 10/30/23  1537   WBC 10*3/mm3 9.88 7.60 8.39 9.04   HEMOGLOBIN g/dL 6.6* 7.0* 7.1* 8.4*   PLATELETS 10*3/mm3 262 273 302 330     Results from last 7 days   Lab Units 23  0734 23  0603 10/31/23  0602 10/30/23  1537   SODIUM mmol/L 133* 133* 132* 135*   POTASSIUM mmol/L 3.9 4.2 3.8 3.3*   CHLORIDE mmol/L 98 95* 94* 94*   CO2 mmol/L 24.6 26.0 25.9 29.0   BUN mg/dL 20 27* 27* 22*   CREATININE mg/dL 3.69* 4.51* 4.62* 4.05*   CALCIUM mg/dL 8.1* 8.2* 7.9* 8.4*   GLUCOSE mg/dL 85 80 90 77           Scheduled Meds:   bacitracin, 1 application , Topical, Q12H  collagenase, 1 application , Topical, Q24H  hydrocortisone-bacitracin-zinc oxide-nystatin, 1 application , Topical, Q12H      PRN Meds:     acetaminophen **OR** acetaminophen **OR** acetaminophen    acetaminophen    diphenoxylate-atropine    Glycerin-Hypromellose-    glycopyrrolate **OR** glycopyrrolate **OR** glycopyrrolate **OR** glycopyrrolate    HYDROmorphone **OR** Morphine **OR** morphine **OR** ketorolac     HYDROmorphone **OR** Morphine **OR** morphine    HYDROmorphone **OR** Morphine **OR** morphine    LORazepam **OR** LORazepam **OR** LORazepam    LORazepam **OR** LORazepam **OR** LORazepam    LORazepam **OR** LORazepam **OR** LORazepam    ondansetron **OR** ondansetron    Pharmacy to dose vancomycin    Scopolamine    [COMPLETED] Insert Peripheral IV **AND** sodium chloride    [COMPLETED] Insert Peripheral IV **AND** sodium chloride  Continuous Infusions:  Pharmacy to dose vancomycin,       Assessment & Plan   Active Hospital Problems    Diagnosis  POA    **Acute bilateral lower abdominal pain [R10.31, R10.32]  Yes    Open wound of abdominal wall, anterior, complicated [S31.109A]  Unknown    Acute on chronic HFrEF (heart failure with reduced ejection fraction) [I50.23]  Yes    Volume overload [E87.70]  Yes    ESRD on hemodialysis [N18.6, Z99.2]  Not Applicable    Primary hypothyroidism [E03.9]  Yes    Essential hypertension [I10]  Yes    Obesity, morbid, BMI 50 or higher [E66.01]  Yes    Type 2 diabetes mellitus with renal complication [E11.29]  Yes      Resolved Hospital Problems   No resolved problems to display.     Assessment & Plan    Patient transition to comfort care yesterday afternoon.  She has continued to decline overnight.  Spoke with nursing this morning, and she was not waking up to stimuli anymore.  Thankfully, on my examination she looked very comfortable.  She was not in any acute distress.  Was getting Ativan and Dilaudid overnight for symptom control.  Soon after evaluating the patient, I was notified by nursing that she had passed away.  The patient's father was at bedside.  Her brother and sister-in-law are coming to the hospital and to be with him.  See death summary for further documentation.    Karolina Keating MD  [facility]  11/03/23  11:58 EDT     MED

## 2023-11-03 NOTE — PROGRESS NOTES
Discharge Planning Assessment  [facility]     Patient Name: Gisela Dyson  MRN: [id number]  Today's Date: 11/3/2023    Admit Date: 10/30/2023    Plan: home with father   Discharge Needs Assessment    No documentation.                  Discharge Plan       Row Name 23 1352       Plan    Plan Comments The patient transferred to [facility] from [address] [place] [address] on 23. The patient is palliative. FLORI Cummings RN, CCP    Final Discharge Disposition Code 20 -     Final Note The patient  on 11/3/23 @ 07:36. FLORI Cummings RN, CCP.                  Continued Care and Services - Admitted Since 10/30/2023    Coordination has not been started for this encounter.       Expected Discharge Date and Time       Expected Discharge Date Expected Discharge Time    Nov 3, 2023            Demographic Summary    No documentation.                  Functional Status    No documentation.                  Psychosocial    No documentation.                  Abuse/Neglect    No documentation.                  Legal    No documentation.                  Substance Abuse    No documentation.                  Patient Forms    No documentation.                     Roxanna Cummings RN

## 2023-11-03 NOTE — PLAN OF CARE
Goal Outcome Evaluation:     Progress: declining    Administered PRN medication for pain, anxiety, and congestion. Family refused turns. Educated on importance of turning for pt comfort and to prevent skin breakdown. Will continue with plan of care.

## 2023-11-03 NOTE — DISCHARGE SUMMARY
Name: Gisela Dyson  :  1985  MRN: [id number]         Primary Care Physician: Provider, No Known      Date of Admission:  10/30/2023  Date and Time of Death:  11/3/2023 at 7:36 AM    Principle Cause of Death:   Calciphylaxis    Secondary Diagnoses:     Acute bilateral lower abdominal pain    Obesity, morbid, BMI 50 or higher    Type 2 diabetes mellitus with renal complication    Essential hypertension    Primary hypothyroidism    ESRD on hemodialysis    Volume overload    Acute on chronic HFrEF (heart failure with reduced ejection fraction)    Open wound of abdominal wall, anterior, complicated    Hospital Course  Patient was a 38 y.o. female who presented to [facility] with abdominal pain and wounds. Please see the admitting history and physical for further details.  On admission, nephrology was consulted for management of the patient's hemodialysis.  Due to missed appointments as an outpatient, the patient did have evidence of volume overload.  Volume was removed as tolerated by her blood pressure with hemodialysis.  Wound care evaluated the patient for her abdominal wounds.  General surgery was also consulted, and there was no indication for surgical debridement of the wounds.  Infectious diseases was also asked to evaluate the patient.  She continued to be febrile.  Unfortunately, it was ultimately determined that the patient's wounds were secondary to calciphylaxis.  Given the nature of her diffuse skin necrosis, no treatment options were available.  Spoke with infectious disease and nephrology at length about this patient.  Extensive conversations were had with family regarding the grim prognosis.  They ultimately elected to make the patient comfort measures, and palliative care was consulted.  The patient was transferred to [facility].  Medications not contributing to comfort and labs were discontinued.  The patient had occasions for symptoms, including pain and anxiety, available.  She did  appear comfortable on the as needed medication regimen.  The day after this decision was made, and the patient passed away.    Karolina Keating MD  11/03/23  08:27 EDT

## 2023-11-03 NOTE — PROGRESS NOTES
Case Management Discharge Note      Final Note: The patient  on 11/3/23 @ 07:36. FLORI Cummings RN, CCP.         Selected Continued Care - Admitted Since 10/30/2023       Destination    No services have been selected for the patient.                Durable Medical Equipment    No services have been selected for the patient.                Dialysis/Infusion    No services have been selected for the patient.                Home Medical Care    No services have been selected for the patient.                Therapy    No services have been selected for the patient.                Community Resources    No services have been selected for the patient.                Community & DME    No services have been selected for the patient.                         Final Discharge Disposition Code: 20 -

## 2023-11-06 LAB
BACTERIA SPEC AEROBE CULT: NORMAL
BACTERIA SPEC AEROBE CULT: NORMAL
